# Patient Record
Sex: FEMALE | Race: WHITE | NOT HISPANIC OR LATINO | Employment: OTHER | URBAN - METROPOLITAN AREA
[De-identification: names, ages, dates, MRNs, and addresses within clinical notes are randomized per-mention and may not be internally consistent; named-entity substitution may affect disease eponyms.]

---

## 2017-02-03 ENCOUNTER — OFFICE VISIT (OUTPATIENT)
Dept: PULMONOLOGY | Facility: HOSPICE | Age: 72
End: 2017-02-03
Payer: MEDICARE

## 2017-02-03 VITALS
TEMPERATURE: 98.1 F | SYSTOLIC BLOOD PRESSURE: 110 MMHG | HEIGHT: 63 IN | BODY MASS INDEX: 22.68 KG/M2 | RESPIRATION RATE: 16 BRPM | OXYGEN SATURATION: 95 % | HEART RATE: 75 BPM | DIASTOLIC BLOOD PRESSURE: 70 MMHG | WEIGHT: 128 LBS

## 2017-02-03 DIAGNOSIS — J45.20 MILD INTERMITTENT ASTHMA WITHOUT COMPLICATION: ICD-10-CM

## 2017-02-03 PROCEDURE — G8432 DEP SCR NOT DOC, RNG: HCPCS | Performed by: INTERNAL MEDICINE

## 2017-02-03 PROCEDURE — G8419 CALC BMI OUT NRM PARAM NOF/U: HCPCS | Performed by: INTERNAL MEDICINE

## 2017-02-03 PROCEDURE — 3014F SCREEN MAMMO DOC REV: CPT | Mod: 8P | Performed by: INTERNAL MEDICINE

## 2017-02-03 PROCEDURE — 1101F PT FALLS ASSESS-DOCD LE1/YR: CPT | Mod: 8P | Performed by: INTERNAL MEDICINE

## 2017-02-03 PROCEDURE — 1036F TOBACCO NON-USER: CPT | Performed by: INTERNAL MEDICINE

## 2017-02-03 PROCEDURE — 3017F COLORECTAL CA SCREEN DOC REV: CPT | Mod: 8P | Performed by: INTERNAL MEDICINE

## 2017-02-03 PROCEDURE — G8484 FLU IMMUNIZE NO ADMIN: HCPCS | Performed by: INTERNAL MEDICINE

## 2017-02-03 PROCEDURE — 4040F PNEUMOC VAC/ADMIN/RCVD: CPT | Mod: 8P | Performed by: INTERNAL MEDICINE

## 2017-02-03 PROCEDURE — 99213 OFFICE O/P EST LOW 20 MIN: CPT | Performed by: INTERNAL MEDICINE

## 2017-02-03 RX ORDER — TRIAMCINOLONE ACETONIDE 0.1 %
PASTE (GRAM) DENTAL
Refills: 1 | COMMUNITY
Start: 2016-11-17 | End: 2018-05-02

## 2017-02-03 RX ORDER — LOTEPREDNOL ETABONATE 2 MG/ML
SUSPENSION/ DROPS OPHTHALMIC
Refills: 12 | COMMUNITY
Start: 2017-01-24 | End: 2018-02-20

## 2017-02-03 NOTE — MR AVS SNAPSHOT
"        Ana Paula NICHOL Ordonez   2/3/2017 2:00 PM   Office Visit   MRN: [id number]    Department:  [facility]   Dept Phone:  [phone]    Description:  Female : 1945   Provider:  Peng Arzate M.D.           Reason for Visit     Follow-Up 1 Yr      Allergies as of 2/3/2017     Allergen Noted Reactions    Azithromycin 2016       Clindamycin 2016       Codeine 2013       Dilaudid [Hydromorphone] 2016   Anaphylaxis    \"went into code blue\"     Erythromycin 2016       Keflex 2013       Levaquin 2008       \"i dont know\"     Lyrica 2016       Morphine 2013       Other Food 2011       Dairy-mucus (butter is okay). spices-cough    Quinolones 06/15/2012       Sulfa Drugs 2008       \"i dont know\"     Tetracyclines 2013       Vicodin [Hydrocodone-Acetaminophen] 2013         You were diagnosed with     Mild intermittent asthma without complication   [[id number]]         Vital Signs     Blood Pressure Pulse Temperature Respirations Height Weight    110/70 mmHg 75 36.7 °C (98.1 °F) 16 1.6 m (5' 2.99\") 58.06 kg (128 lb)    Body Mass Index Oxygen Saturation Smoking Status             22.68 kg/m2 95% Never Smoker          Basic Information     Date Of Birth Sex Race Ethnicity Preferred Language    1945 Female White Non- English      Problem List              ICD-10-CM Priority Class Noted - Resolved    Other chest pain R07.89   2013 - Present    Headache, common migraine G43.009   2013 - Present    Chronic ulcerative colitis (CMS-HCC) K51.90   2013 - Present    Peripheral neuropathy (CMS-HCC) G62.9   Unknown - Present    Mild intermittent asthma without complication J45.20   2/3/2017 - Present      Health Maintenance        Date Due Completion Dates    IMM DTaP/Tdap/Td Vaccine (1 - Tdap) 1966 ---    PAP SMEAR 1968 ---    COLONOSCOPY 1997 ---    IMM ZOSTER VACCINE 2007 ---    IMM PNEUMOCOCCAL 65+ " (ADULT) LOW/MEDIUM RISK SERIES (1 of 2 - PCV13) 2/27/2012 ---    MAMMOGRAM 7/27/2013 7/27/2012, 4/1/2010, 4/1/2010, 7/14/2009, 7/14/2009, 1/21/2009, 12/29/2008, 12/29/2008    BONE DENSITY 4/1/2015 4/1/2010, 3/15/2007    IMM INFLUENZA (1) 9/1/2016 ---            Current Immunizations     No immunizations on file.      Below and/or attached are the medications your provider expects you to take. Review all of your home medications and newly ordered medications with your provider and/or pharmacist. Follow medication instructions as directed by your provider and/or pharmacist. Please keep your medication list with you and share with your provider. Update the information when medications are discontinued, doses are changed, or new medications (including over-the-counter products) are added; and carry medication information at all times in the event of emergency situations     Allergies:  AZITHROMYCIN - (reactions not documented)     CLINDAMYCIN - (reactions not documented)     CODEINE - (reactions not documented)     DILAUDID - Anaphylaxis     ERYTHROMYCIN - (reactions not documented)     KEFLEX - (reactions not documented)     LEVAQUIN - (reactions not documented)     LYRICA - (reactions not documented)     MORPHINE - (reactions not documented)     OTHER FOOD - (reactions not documented)     QUINOLONES - (reactions not documented)     SULFA DRUGS - (reactions not documented)     TETRACYCLINES - (reactions not documented)     VICODIN - (reactions not documented)               Medications  Valid as of: February 03, 2017 -  2:33 PM    Generic Name Brand Name Tablet Size Instructions for use    Albuterol Sulfate (Aero Soln) albuterol 108 (90 BASE) MCG/ACT Inhale 2 Puffs by mouth every four hours as needed for Shortness of Breath.        Amoxicillin (Cap) AMOXIL 250 MG Take 250 mg by mouth 3 times a day.        Baclofen (Tab) LIORESAL 10 MG Take 10 mg by mouth every evening.        Calcium Carbonate-Vitamin D (Tab) OSCAL-250  250-125 MG-UNIT Take 1 Tab by mouth every day.          Cholecalciferol   Take  by mouth every day.        DiazePAM (Tab) VALIUM 5 MG Take 1 Tab by mouth 3 times a day as needed (muscle relaxant).        Hydrocodone-Acetaminophen (Tab) NORCO 5-325 MG Take 1 Tab by mouth every four hours as needed.        Hydrocodone-Acetaminophen (Tab) NORCO 5-325 MG Take 1 Tab by mouth every four hours as needed.        Hydrocodone-Acetaminophen (Tab) NORCO 5-325 MG Take 1-2 Tabs by mouth every four hours as needed.        Loteprednol Etabonate (Suspension) ALREX 0.2 % INSTILL 1 DROP INTO RIGHT EYE 4 TIMES DAILY        Mesalamine (Tablet Delayed Response) ASACOL 400 MG Take 800 mg by mouth every evening.        Mesalamine (Tablet Delayed Response) Mesalamine 800 MG Take 800 mg by mouth every evening.        Multiple Vitamin (Tab) THERAGRAN  Take 1 Tab by mouth every day.        Nitrofurantoin Monohyd Macro (Cap) MACROBID 100 MG Take 50 mg by mouth every evening.        Triamcinolone Acetonide (Paste) KENALOG IN ORABASE 0.1 % APPLY TWICE A DAY        .                 Medicines prescribed today were sent to:     [id number] - [place], [place] - [address]    [address] [address]    Phone: [phone] Fax: [phone]    Open 24 Hours?: No      Medication refill instructions:       If your prescription bottle indicates you have medication refills left, it is not necessary to call your provider’s office. Please contact your pharmacy and they will refill your medication.    If your prescription bottle indicates you do not have any refills left, you may request refills at any time through one of the following ways: The online Voxie system (except Urgent Care), by calling your provider’s office, or by asking your pharmacy to contact your provider’s office with a refill request. Medication refills are processed only during regular business hours and may not be available until the next business day. Your provider may  request additional information or to have a follow-up visit with you prior to refilling your medication.   *Please Note: Medication refills are assigned a new Rx number when refilled electronically. Your pharmacy may indicate that no refills were authorized even though a new prescription for the same medication is available at the pharmacy. Please request the medicine by name with the pharmacy before contacting your provider for a refill.           BrainBot Access Code: [email or web address]  Expires: 3/5/2017  2:33 PM    BrainBot  A secure, online tool to manage your health information     [employer]’s BrainBot® is a secure, online tool that connects you to your personalized health information from the privacy of your home -- day or night - making it very easy for you to manage your healthcare. Once the activation process is completed, you can even access your medical information using the BrainBot andrew, which is available for free in the Apple Nadrew store or Google Play store.     BrainBot provides the following levels of access (as shown below):   My Chart Features   [facility] Doctor [facility]  Specialists [facility]  Urgent  Care Non-[facility]  Primary Care  Doctor   Email your healthcare team securely and privately 24/7 X X X    Manage appointments: schedule your next appointment; view details of past/upcoming appointments X      Request prescription refills. X      View recent personal medical records, including lab and immunizations X X X X   View health record, including health history, allergies, medications X X X X   Read reports about your outpatient visits, procedures, consult and ER notes X X X X   See your discharge summary, which is a recap of your hospital and/or ER visit that includes your diagnosis, lab results, and care plan. X X       How to register for BrainBot:  1. Go to  [email or web address].  2. Click on the Sign Up Now box, which takes you to the New Member Sign Up page. You will need to  provide the following information:  a. Enter your [employer] Access Code exactly as it appears at the top of this page. (You will not need to use this code after you’ve completed the sign-up process. If you do not sign up before the expiration date, you must request a new code.)   b. Enter your date of birth.   c. Enter your home email address.   d. Click Submit, and follow the next screen’s instructions.  3. Create a Lum[employer] Lighting ID. This will be your Lum[employer] Lighting login ID and cannot be changed, so think of one that is secure and easy to remember.  4. Create a [employer] password. You can change your password at any time.  5. Enter your Password Reset Question and Answer. This can be used at a later time if you forget your password.   6. Enter your e-mail address. This allows you to receive e-mail notifications when new information is available in Lum[employer] Lighting.  7. Click Sign Up. You can now view your health information.    For assistance activating your Lum[employer] Lighting account, call [phone]

## 2017-02-03 NOTE — PROGRESS NOTES
CC: Asthma follow-up       HPI:  71-year-old woman here for her yearly asthma evaluation. She has primarily a cough variant asthma. She occasionally uses pro-air when necessary but is not a fan of inhaled medication. The patient's other problems include ulcerative colitis, reflux, osteoporosis, Raynaud's syndrome, gastroesophageal reflux, post herpetic neuralgia, interureteric disease, tremors, and multifactorial balance issues. She is followed by Dr. Sourav Lai from GI.    Has had sinus issues and follows the advice in a 30 year old sinus booklet, washed sinuses with success. Seeing a homeopath, José Miguel Vale, who gave her pellets which improved mucus clearance from her lungs. Estimates that she uses her Proair only once a month. The only pharmaceutical she is taking now is Zyrtec. Off of all her UC meds and hasn't had any issues. Had a colonoscopy last year, polyp removed.    Off macrodantin. Has to self cath because of bladder issues.          Patient Active Problem List    Diagnosis Date Noted   • Mild intermittent asthma without complication 02/03/2017   • Other chest pain 06/26/2013   • Headache, common migraine 06/26/2013   • Chronic ulcerative colitis (CMS-HCC) 06/26/2013   • Peripheral neuropathy (CMS-[facility])        Past Medical History   Diagnosis Date   • Indigestion    • Arthritis    • Near-syncope    • Ulcerative colitis    • Raynaud disease    • HYPOTENSION    • ASTHMA    • Osteoporosis    • Peripheral neuropathy (CMS-HCC)    • Neck injury      fracture, hand and leg numbness and tingling   • Other chest pain 6/26/2013   • Chronic ulcerative colitis (CMS-HCC) 6/26/2013   • Cough    • GERD (gastroesophageal reflux disease)    • Vitamin D deficiency    • Inner ear disease    • Tremor         Past Surgical History   Procedure Laterality Date   • Nasal fracture reduction closed  10/8/08     Performed by DON BARRIOS at SURGERY SAME DAY [facility] ORS   • Other     • Other abdominal surgery        gallbladder   • Hip nailing intramedullary  7/30/2011     Performed by KALEB RAGSDALE at SURGERY [facility] ORS   • Tonsillectomy and adenoidectomy     • Bladder suspension       X 2       Family History   Problem Relation Age of Onset   • Heart Disease Neg Hx    • Hypertension Neg Hx    • Hyperlipidemia Neg Hx    • Diabetes Neg Hx    • Non-contributory Father      Parkinson's disease   • Non-contributory Mother      old age with mild dementia       Social History     Social History   • Marital Status: Single     Spouse Name: N/A   • Number of Children: N/A   • Years of Education: N/A     Occupational History   • Not on file.     Social History Main Topics   • Smoking status: Never Smoker    • Smokeless tobacco: Never Used   • Alcohol Use: No   • Drug Use: No   • Sexual Activity: Not on file     Other Topics Concern   • Not on file     Social History Narrative       Current Outpatient Prescriptions   Medication Sig Dispense Refill   • albuterol (PROAIR HFA) 108 (90 BASE) MCG/ACT Aero Soln inhalation aerosol Inhale 2 Puffs by mouth every four hours as needed for Shortness of Breath.     • Cholecalciferol (VITAMIN D3) Take  by mouth every day.     • multivitamin (THERAGRAN) TABS Take 1 Tab by mouth every day.     • ALREX 0.2 % Suspension INSTILL 1 DROP INTO RIGHT EYE 4 TIMES DAILY  12   • triamcinolone acetonide-orabase (KENALOG IN ORABASE) 0.1 % Paste APPLY TWICE A DAY  1   • hydrocodone-acetaminophen (NORCO) 5-325 MG Tab per tablet Take 1-2 Tabs by mouth every four hours as needed. (Patient not taking: Reported on 2/3/2017) 10 Tab 0   • hydrocodone-acetaminophen (NORCO) 5-325 MG TABS per tablet Take 1 Tab by mouth every four hours as needed. (Patient not taking: Reported on 2/3/2017) 15 Tab 0   • baclofen (LIORESAL) 10 MG TABS Take 10 mg by mouth every evening.     • Mesalamine (ASACOL HD) 800 MG TBEC Take 800 mg by mouth every evening.     • nitrofurantoin monohydr macro (MACROBID) 100 MG CAPS Take 50 mg by  "mouth every evening.     • amoxicillin (AMOXIL) 250 MG CAPS Take 250 mg by mouth 3 times a day.     • diazepam (VALIUM) 5 MG TABS Take 1 Tab by mouth 3 times a day as needed (muscle relaxant). (Patient not taking: Reported on 2/3/2017) 15 Tab 0   • hydrocodone-acetaminophen (NORCO) 5-325 MG TABS per tablet Take 1 Tab by mouth every four hours as needed. (Patient not taking: Reported on 2/3/2017) 16 Tab 0   • calcium-vitamin D (OSCAL-250) 250-125 MG-UNIT TABS Take 1 Tab by mouth every day.       • mesalamine EC (ASACOL) 400 MG TBEC Take 800 mg by mouth every evening.       No current facility-administered medications for this visit.    \"CURRENT RX\"    ALLERGIES: Azithromycin; Clindamycin; Codeine; Dilaudid; Erythromycin; Keflex; Levaquin; Lyrica; Morphine; Other food; Quinolones; Sulfa drugs; Tetracyclines; and Vicodin    ROS  Per history of present illness otherwise negative.        PHYSICAL EXAM  /70 mmHg  Pulse 75  Temp(Src) 36.7 °C (98.1 °F)  Resp 16  Ht 1.6 m (5' 2.99\")  Wt 58.06 kg (128 lb)  BMI 22.68 kg/m2  SpO2 95%  Appearance: Well-nourished, well-developed, no acute distress  Eyes:  PERRLA, EOMI; wears glasses  Hearing:  Grossly intact  Nose:  Normal, no lesions or deformities, turbinates moist  Oropharynx:  Tongue normal, posterior pharynx without erythema or exudate  Mallampati classification:    Neck: Supple, trachea midline, no masses  Respiratory effort:  No intercostal retractions or use of accessory muscles  Lung auscultation:  No wheezes rhonchi rubs or rales  Cardiac auscultation:  No murmurs, rubs, or gallops, no regular rhythm, normal rate  Abdomen:  No tenderness, no organomegaly  Extremities:  No cyanosis, clubbing, edema  Gait and Station:  Normal  Digits and nails: No clubbing, cyanosis, petechiae, or nodes  Musculoskeletal:  Grossly normal  Skin:  No rashes  Orientation:  Oriented time, place, and person  Mood and affect:  No depression, anxiety, agitation  Judgment:  " Intact    PROBLEMS:    1. Mild intermittent asthma without complication        PLAN:     Return in about 1 year (around 2/3/2018).

## 2017-04-29 ENCOUNTER — OFFICE VISIT (OUTPATIENT)
Dept: URGENT CARE | Facility: CLINIC | Age: 72
End: 2017-04-29
Payer: MEDICARE

## 2017-04-29 VITALS
BODY MASS INDEX: 19.21 KG/M2 | SYSTOLIC BLOOD PRESSURE: 106 MMHG | OXYGEN SATURATION: 90 % | RESPIRATION RATE: 18 BRPM | WEIGHT: 108.4 LBS | DIASTOLIC BLOOD PRESSURE: 60 MMHG | HEART RATE: 84 BPM | TEMPERATURE: 97.3 F

## 2017-04-29 DIAGNOSIS — J02.9 SORE THROAT: ICD-10-CM

## 2017-04-29 LAB
INT CON NEG: NEGATIVE
INT CON POS: POSITIVE
S PYO AG THROAT QL: NORMAL

## 2017-04-29 PROCEDURE — 87880 STREP A ASSAY W/OPTIC: CPT | Performed by: PHYSICIAN ASSISTANT

## 2017-04-29 PROCEDURE — 1101F PT FALLS ASSESS-DOCD LE1/YR: CPT | Mod: 8P | Performed by: PHYSICIAN ASSISTANT

## 2017-04-29 PROCEDURE — G8420 CALC BMI NORM PARAMETERS: HCPCS | Performed by: PHYSICIAN ASSISTANT

## 2017-04-29 PROCEDURE — 99214 OFFICE O/P EST MOD 30 MIN: CPT | Performed by: PHYSICIAN ASSISTANT

## 2017-04-29 PROCEDURE — 3017F COLORECTAL CA SCREEN DOC REV: CPT | Mod: 8P | Performed by: PHYSICIAN ASSISTANT

## 2017-04-29 PROCEDURE — 1036F TOBACCO NON-USER: CPT | Performed by: PHYSICIAN ASSISTANT

## 2017-04-29 PROCEDURE — G8432 DEP SCR NOT DOC, RNG: HCPCS | Performed by: PHYSICIAN ASSISTANT

## 2017-04-29 PROCEDURE — 3014F SCREEN MAMMO DOC REV: CPT | Mod: 8P | Performed by: PHYSICIAN ASSISTANT

## 2017-04-29 PROCEDURE — 4040F PNEUMOC VAC/ADMIN/RCVD: CPT | Mod: 8P | Performed by: PHYSICIAN ASSISTANT

## 2017-04-29 ASSESSMENT — ENCOUNTER SYMPTOMS
SHORTNESS OF BREATH: 0
SORE THROAT: 1
WHEEZING: 0
FEVER: 0
STRIDOR: 0
PALPITATIONS: 0
CHILLS: 0
HEADACHES: 0
COUGH: 1

## 2017-04-29 NOTE — PROGRESS NOTES
Subjective:      Ana Paula Ordonez is a 72 y.o. female who presents with Pharyngitis            Pharyngitis   This is a new problem. The current episode started in the past 7 days. The problem has been unchanged. The pain is worse on the left side. There has been no fever. The pain is moderate. Associated symptoms include coughing. Pertinent negatives include no congestion, ear discharge, ear pain, headaches, shortness of breath or stridor. She has tried NSAIDs and gargles for the symptoms. The treatment provided mild relief.       Review of Systems   Constitutional: Negative for fever, chills and malaise/fatigue.   HENT: Positive for sore throat. Negative for congestion, ear discharge and ear pain.    Respiratory: Positive for cough. Negative for shortness of breath, wheezing and stridor.    Cardiovascular: Negative for chest pain and palpitations.   Skin: Negative for rash.   Neurological: Negative for headaches.     All other systems reviewed and are negative.  PMH:  has a past medical history of Indigestion; Arthritis; Near-syncope; Ulcerative colitis; Raynaud disease; HYPOTENSION; ASTHMA; Osteoporosis; Peripheral neuropathy (CMS-HCC); Neck injury; Other chest pain (6/26/2013); Chronic ulcerative colitis (CMS-HCC) (6/26/2013); Cough; GERD (gastroesophageal reflux disease); Vitamin D deficiency; Inner ear disease; and Tremor. She also has no past medical history of Breast cancer (CMS-HCC).  MEDS:   Current outpatient prescriptions:   •  maalox plus-benadryl-visc lidocaine (MAGIC MOUTHWASH), Gargle and spit 5 mL every 6 hours as needed for pain, Disp: 100 mL, Rfl: 0  •  Cholecalciferol (VITAMIN D3), Take  by mouth every day., Disp: , Rfl:   •  multivitamin (THERAGRAN) TABS, Take 1 Tab by mouth every day., Disp: , Rfl:   •  ALREX 0.2 % Suspension, INSTILL 1 DROP INTO RIGHT EYE 4 TIMES DAILY, Disp: , Rfl: 12  •  triamcinolone acetonide-orabase (KENALOG IN ORABASE) 0.1 % Paste, APPLY TWICE A DAY, Disp: , Rfl: 1  •   "albuterol (PROAIR HFA) 108 (90 BASE) MCG/ACT Aero Soln inhalation aerosol, Inhale 2 Puffs by mouth every four hours as needed for Shortness of Breath., Disp: , Rfl:   •  hydrocodone-acetaminophen (NORCO) 5-325 MG Tab per tablet, Take 1-2 Tabs by mouth every four hours as needed. (Patient not taking: Reported on 2/3/2017), Disp: 10 Tab, Rfl: 0  •  hydrocodone-acetaminophen (NORCO) 5-325 MG TABS per tablet, Take 1 Tab by mouth every four hours as needed. (Patient not taking: Reported on 2/3/2017), Disp: 15 Tab, Rfl: 0  •  baclofen (LIORESAL) 10 MG TABS, Take 10 mg by mouth every evening., Disp: , Rfl:   •  Mesalamine (ASACOL HD) 800 MG TBEC, Take 800 mg by mouth every evening., Disp: , Rfl:   •  nitrofurantoin monohydr macro (MACROBID) 100 MG CAPS, Take 50 mg by mouth every evening., Disp: , Rfl:   •  amoxicillin (AMOXIL) 250 MG CAPS, Take 250 mg by mouth 3 times a day., Disp: , Rfl:   •  diazepam (VALIUM) 5 MG TABS, Take 1 Tab by mouth 3 times a day as needed (muscle relaxant). (Patient not taking: Reported on 2/3/2017), Disp: 15 Tab, Rfl: 0  •  hydrocodone-acetaminophen (NORCO) 5-325 MG TABS per tablet, Take 1 Tab by mouth every four hours as needed. (Patient not taking: Reported on 2/3/2017), Disp: 16 Tab, Rfl: 0  •  calcium-vitamin D (OSCAL-250) 250-125 MG-UNIT TABS, Take 1 Tab by mouth every day.  , Disp: , Rfl:   •  mesalamine EC (ASACOL) 400 MG TBEC, Take 800 mg by mouth every evening., Disp: , Rfl:   ALLERGIES:   Allergies   Allergen Reactions   • Azithromycin    • Clindamycin    • Codeine    • Dilaudid [Hydromorphone] Anaphylaxis     \"went into code blue\"    • Erythromycin    • Keflex    • Levaquin      \"i dont know\"    • Lyrica    • Morphine    • Other Food      Dairy-mucus (butter is okay). spices-cough   • Quinolones    • Sulfa Drugs      \"i dont know\"    • Tetracyclines    • Vicodin [Hydrocodone-Acetaminophen]      SURGHX:   Past Surgical History   Procedure Laterality Date   • Nasal fracture reduction " closed  10/8/08     Performed by DON BARRIOS at SURGERY SAME DAY [facility] ORS   • Other     • Other abdominal surgery       gallbladder   • Hip nailing intramedullary  7/30/2011     Performed by KALEB RAGSDALE at SURGERY [facility] ORS   • Tonsillectomy and adenoidectomy     • Bladder suspension       X 2     SOCHX:  reports that she has never smoked. She has never used smokeless tobacco. She reports that she does not drink alcohol or use illicit drugs.  FH: Family history was reviewed, no pertinent findings to report  Medications, Allergies, and current problem list reviewed today in Epic       Objective:     /60 mmHg  Pulse 84  Temp(Src) 36.3 °C (97.3 °F)  Resp 18  Wt 49.17 kg (108 lb 6.4 oz)  SpO2 90%     Physical Exam   Constitutional: She is oriented to person, place, and time. Vital signs are normal. She appears well-developed and well-nourished.   HENT:   Head: Normocephalic and atraumatic.   Right Ear: Hearing, tympanic membrane, external ear and ear canal normal.   Left Ear: Hearing, tympanic membrane, external ear and ear canal normal.   Nose: Nose normal.   Mouth/Throat: Uvula is midline and mucous membranes are normal. No oropharyngeal exudate, posterior oropharyngeal edema, posterior oropharyngeal erythema or tonsillar abscesses.   Neck: Normal range of motion. Neck supple.   Cardiovascular: Normal rate and regular rhythm.  Exam reveals no gallop and no friction rub.    No murmur heard.  Pulmonary/Chest: Effort normal and breath sounds normal. She has no wheezes. She has no rales.   Lymphadenopathy:     She has cervical adenopathy.   Neurological: She is alert and oriented to person, place, and time.   Skin: Skin is warm and dry.   Psychiatric: She has a normal mood and affect. Her behavior is normal. Judgment and thought content normal.   Vitals reviewed.           Rapid Strep: NEG  Assessment/Plan:     1. Sore throat    - POCT Rapid Strep A  - maalox plus-benadryl-visc  lidocaine (MAGIC MOUTHWASH); Gargle and spit 5 mL every 6 hours as needed for pain  Dispense: 100 mL; Refill: 0    Differential diagnosis, natural history, supportive care, and indications for immediate follow-up discussed at length.   Follow-up with primary care provider within 4-5 days, emergency room precautions discussed.  Patient and/or family appears understanding of information.

## 2017-10-30 ENCOUNTER — OFFICE VISIT (OUTPATIENT)
Dept: PULMONOLOGY | Facility: HOSPICE | Age: 72
End: 2017-10-30
Payer: MEDICARE

## 2017-10-30 VITALS
HEART RATE: 68 BPM | SYSTOLIC BLOOD PRESSURE: 126 MMHG | HEIGHT: 63 IN | BODY MASS INDEX: 19.6 KG/M2 | OXYGEN SATURATION: 94 % | WEIGHT: 110.6 LBS | DIASTOLIC BLOOD PRESSURE: 74 MMHG

## 2017-10-30 DIAGNOSIS — J45.20 MILD INTERMITTENT ASTHMA WITHOUT COMPLICATION: ICD-10-CM

## 2017-10-30 DIAGNOSIS — Z91.09 ENVIRONMENTAL ALLERGIES: ICD-10-CM

## 2017-10-30 PROCEDURE — 99213 OFFICE O/P EST LOW 20 MIN: CPT | Performed by: NURSE PRACTITIONER

## 2017-10-30 NOTE — PATIENT INSTRUCTIONS
Plan:    1) She has had a mild increased cough. Query secondary to exacerbation of seasonal allergies. Recommend addition of saline sinus irrigation which she has used in the past and Mucinex OTC daily with a tall glass of water. Continue Albuterol HFA inhaler 2 puffs q 4 hours prn. She declines addition of an ICS or Leukotrine inhibitor. She states she sees a Homeopathic doctor and would like to avoid medication if possible. We discussed pathophysiology of Asthma and stepwise treatment approach in detail. Hold off on starting antibiotic at this time as she feels her symptoms are slowly improving.   2) She declines Pneumonia or Influenza vaccines.   3) She does exercise routinely. She is encouraged to continue this.   4) She declines current reflux symptoms.   5) Follow up in 3 months with complete PFT's, sooner if needed.

## 2017-10-30 NOTE — PROGRESS NOTES
Chief Complaint   Patient presents with   • Asthma       HPI:  Ana Paula Ordonez is a 72 y.o. year old female here today for follow-up on her Asthma. She was last seen in the office in February 2017 with Dr. Peng Arzate. She has not had recent PFT's or chest imaging. She is on an Albuterol HFA inhaler as needed. She comes in today with complaints of increased cough. She states last Thursday she developed an increased cough which was productive with green mucous. She saw her PCP in consult and she was given a prescription for an antibiotic, but was encouraged to hold off on starting it. She was encouraged to increase use of her Albuterol to twice a day. She is doing this and she is feeling a little better. She states she does continue to have an intermittent cough, but her mucous is now clear in color. She has had an intermittent wheeze. She notes mild dyspnea with exertion which she feels is chronic or unchanged. She denies any fevers or chills. She does have a history of reflux. She did have a nissen fundoplication in the past. She denies current reflux symptoms. She has had an increase in her sinus drainage. Her sinus drainage is clear in color. She denies sneezing. She denies any fevers or chills. Before last week she was using her Albuterol 2-4 times per month. She does exercise routinely. She had allergy testing 25 years ago and was told she had multiple allergies. She declines repeating testing. She declines inhaled steroids or Singulair. She sees a Homeopathic doctor.       Past Medical History:   Diagnosis Date   • Arthritis    • ASTHMA    • Chronic ulcerative colitis (CMS-HCC) 6/26/2013   • Cough    • GERD (gastroesophageal reflux disease)    • HYPOTENSION    • Indigestion    • Inner ear disease    • Near-syncope    • Neck injury     fracture, hand and leg numbness and tingling   • Osteoporosis    • Other chest pain 6/26/2013   • Peripheral neuropathy (CMS-HCC)    • Raynaud disease    • Tremor    •  Ulcerative colitis    • Vitamin D deficiency        Past Surgical History:   Procedure Laterality Date   • HIP NAILING INTRAMEDULLARY  7/30/2011    Performed by KALEB RAGSDALE at SURGERY [facility] ORS   • NASAL FRACTURE REDUCTION CLOSED  10/8/08    Performed by DON BARRIOS at SURGERY SAME DAY [facility] ORS   • BLADDER SUSPENSION      X 2   • OTHER     • OTHER ABDOMINAL SURGERY      gallbladder   • TONSILLECTOMY AND ADENOIDECTOMY         Family History   Problem Relation Age of Onset   • Non-contributory Father      Parkinson's disease   • Non-contributory Mother      old age with mild dementia   • Heart Disease Neg Hx    • Hypertension Neg Hx    • Hyperlipidemia Neg Hx    • Diabetes Neg Hx        Social History     Social History   • Marital status: Single     Spouse name: N/A   • Number of children: N/A   • Years of education: N/A     Occupational History   • Not on file.     Social History Main Topics   • Smoking status: Never Smoker   • Smokeless tobacco: Never Used   • Alcohol use No   • Drug use: No   • Sexual activity: Not on file     Other Topics Concern   • Not on file     Social History Narrative   • No narrative on file           ROS:  Constitutional: Denies fevers, chills, sweats, fatigue, weight loss  Eyes: Denies  vision loss, pain, drainage, double vision. Wears glasses  Ears/Nose/Mouth/Throat: Denies rhinitis, nasal congestion, ear ache, difficulty hearing, sore throat, persistent hoarseness, decayed teeth/toothache  Cardiovascular: Denies chest pain, tightness, palpitations, swelling in feet/legs, fainting, difficulty breathing when laying down  Respiratory: See HPI   GI: Denies heartburn, difficulty swallowing, nausea, vomiting, abdominal pain, diarrhea, constipation  : Denies frequent urination, painful urination  Integumentary: Denies rashes, lumps or color changes  MSK: Denies painful joints, sore muscles, and back pain.   Neurological: Denies frequent headaches, dizziness,  weakness        Current Outpatient Prescriptions   Medication Sig Dispense Refill   • TURMERIC CURCUMIN PO Take  by mouth.     • LUTEIN PO Take  by mouth.     • BIOTIN PO Take  by mouth.     • POTASSIUM PO Take  by mouth.     • Cetirizine HCl (ZYRTEC ALLERGY PO) Take  by mouth.     • MAGNESIUM PO Take  by mouth.     • ASCORBIC ACID PO Take  by mouth.     • albuterol (PROAIR HFA) 108 (90 BASE) MCG/ACT Aero Soln inhalation aerosol Inhale 2 Puffs by mouth every four hours as needed for Shortness of Breath.     • Cholecalciferol (VITAMIN D3) Take  by mouth every day.     • multivitamin (THERAGRAN) TABS Take 1 Tab by mouth every day.     • maalox plus-benadryl-visc lidocaine (MAGIC MOUTHWASH) Gargle and spit 5 mL every 6 hours as needed for pain 100 mL 0   • ALREX 0.2 % Suspension INSTILL 1 DROP INTO RIGHT EYE 4 TIMES DAILY  12   • triamcinolone acetonide-orabase (KENALOG IN ORABASE) 0.1 % Paste APPLY TWICE A DAY  1   • hydrocodone-acetaminophen (NORCO) 5-325 MG Tab per tablet Take 1-2 Tabs by mouth every four hours as needed. (Patient not taking: Reported on 2/3/2017) 10 Tab 0   • hydrocodone-acetaminophen (NORCO) 5-325 MG TABS per tablet Take 1 Tab by mouth every four hours as needed. (Patient not taking: Reported on 2/3/2017) 15 Tab 0   • baclofen (LIORESAL) 10 MG TABS Take 10 mg by mouth every evening.     • Mesalamine (ASACOL HD) 800 MG TBEC Take 800 mg by mouth every evening.     • nitrofurantoin monohydr macro (MACROBID) 100 MG CAPS Take 50 mg by mouth every evening.     • amoxicillin (AMOXIL) 250 MG CAPS Take 250 mg by mouth 3 times a day.     • diazepam (VALIUM) 5 MG TABS Take 1 Tab by mouth 3 times a day as needed (muscle relaxant). (Patient not taking: Reported on 2/3/2017) 15 Tab 0   • hydrocodone-acetaminophen (NORCO) 5-325 MG TABS per tablet Take 1 Tab by mouth every four hours as needed. (Patient not taking: Reported on 2/3/2017) 16 Tab 0   • calcium-vitamin D (OSCAL-250) 250-125 MG-UNIT TABS Take 1 Tab by  "mouth every day.       • mesalamine EC (ASACOL) 400 MG TBEC Take 800 mg by mouth every evening.       No current facility-administered medications for this visit.        Allergies   Allergen Reactions   • Azithromycin    • Clindamycin    • Codeine    • Dilaudid [Hydromorphone] Anaphylaxis     \"went into code blue\"    • Erythromycin    • Keflex    • Levaquin      \"i dont know\"    • Lyrica    • Morphine    • Other Food      Dairy-mucus (butter is okay). spices-cough   • Quinolones    • Sulfa Drugs      \"i dont know\"    • Tetracyclines    • Vicodin [Hydrocodone-Acetaminophen]        Blood pressure 126/74, pulse 68, height 1.6 m (5' 2.99\"), weight 50.2 kg (110 lb 9.6 oz), SpO2 94 %.    PE:   Appearance: Well developed, well nourished, no acute distress  Eyes: PERRL, EOM intact, sclera white, conjunctiva moist  Ears: no lesions or deformities  Hearing: grossly intact  Nose: no lesions or deformities  Oropharynx: tongue normal, posterior pharynx without erythema or exudate  Neck: supple, trachea midline, no masses   Respiratory effort: no intercostal retractions or use of accessory muscles  Lung auscultation: no rales, rhonchi or wheezes, slightly prolonged expiratory phase  Heart auscultation: no murmur rub or gallop  Extremities: no cyanosis or edema  Abdomen: soft ,non tender, no masses  Gait and Station: Ambulates with walker  Digits and nails: no clubbing, cyanosis, petechiae or nodes.  Cranial nerves: grossly intact  Skin: no rashes, lesions or ulcers noted  Orientation: Oriented to time, person and place  Mood and affect: mood and affect appropriate, normal interaction with examiner  Judgement: Intact          Assessment:  1. Mild intermittent asthma without complication  AMB PULMONARY FUNCTION TEST/LAB   2. Environmental allergies           Plan:    1) She has had a mild increased cough. Query secondary to exacerbation of seasonal allergies. Recommend addition of saline sinus irrigation which she has used in the " past and Mucinex OTC daily with a tall glass of water. Continue Albuterol HFA inhaler 2 puffs q 4 hours prn. She declines addition of an ICS or Leukotrine inhibitor. She states she sees a Homeopathic doctor and would like to avoid medication if possible. We discussed pathophysiology of Asthma and stepwise treatment approach in detail. Hold off on starting antibiotic at this time as she feels her symptoms are slowly improving.   2) She declines Pneumonia or Influenza vaccines.   3) She does exercise routinely. She is encouraged to continue this.   4) She declines current reflux symptoms.   5) Follow up in 3 months with complete PFT's, sooner if needed.

## 2018-01-26 ENCOUNTER — APPOINTMENT (RX ONLY)
Dept: URBAN - METROPOLITAN AREA CLINIC 4 | Facility: CLINIC | Age: 73
Setting detail: DERMATOLOGY
End: 2018-01-26

## 2018-01-26 DIAGNOSIS — B02.9 ZOSTER WITHOUT COMPLICATIONS: ICD-10-CM

## 2018-01-26 DIAGNOSIS — L57.8 OTHER SKIN CHANGES DUE TO CHRONIC EXPOSURE TO NONIONIZING RADIATION: ICD-10-CM

## 2018-01-26 DIAGNOSIS — D22 MELANOCYTIC NEVI: ICD-10-CM

## 2018-01-26 DIAGNOSIS — L82.0 INFLAMED SEBORRHEIC KERATOSIS: ICD-10-CM

## 2018-01-26 DIAGNOSIS — L81.4 OTHER MELANIN HYPERPIGMENTATION: ICD-10-CM

## 2018-01-26 DIAGNOSIS — Z85.828 PERSONAL HISTORY OF OTHER MALIGNANT NEOPLASM OF SKIN: ICD-10-CM

## 2018-01-26 DIAGNOSIS — D18.0 HEMANGIOMA: ICD-10-CM

## 2018-01-26 DIAGNOSIS — L82.1 OTHER SEBORRHEIC KERATOSIS: ICD-10-CM

## 2018-01-26 PROBLEM — D22.5 MELANOCYTIC NEVI OF TRUNK: Status: ACTIVE | Noted: 2018-01-26

## 2018-01-26 PROBLEM — D18.01 HEMANGIOMA OF SKIN AND SUBCUTANEOUS TISSUE: Status: ACTIVE | Noted: 2018-01-26

## 2018-01-26 PROCEDURE — ? DIAGNOSIS COMMENT

## 2018-01-26 PROCEDURE — ? COUNSELING

## 2018-01-26 PROCEDURE — ? LIQUID NITROGEN

## 2018-01-26 PROCEDURE — 17110 DESTRUCTION B9 LES UP TO 14: CPT

## 2018-01-26 PROCEDURE — ? PATIENT SPECIFIC COUNSELING

## 2018-01-26 PROCEDURE — 99213 OFFICE O/P EST LOW 20 MIN: CPT | Mod: 25

## 2018-01-26 ASSESSMENT — LOCATION DETAILED DESCRIPTION DERM
LOCATION DETAILED: RIGHT SUPERIOR UPPER BACK
LOCATION DETAILED: LEFT SUPERIOR ANTERIOR NECK
LOCATION DETAILED: RIGHT INFERIOR CENTRAL MALAR CHEEK
LOCATION DETAILED: LEFT ULNAR DORSAL HAND
LOCATION DETAILED: LEFT POSTERIOR SHOULDER
LOCATION DETAILED: LEFT SUPERIOR LATERAL UPPER BACK
LOCATION DETAILED: RIGHT POSTERIOR SHOULDER
LOCATION DETAILED: RIGHT SUPERIOR MEDIAL UPPER BACK
LOCATION DETAILED: RIGHT ULNAR DORSAL HAND

## 2018-01-26 ASSESSMENT — LOCATION SIMPLE DESCRIPTION DERM
LOCATION SIMPLE: RIGHT SHOULDER
LOCATION SIMPLE: LEFT HAND
LOCATION SIMPLE: LEFT SHOULDER
LOCATION SIMPLE: LEFT ANTERIOR NECK
LOCATION SIMPLE: LEFT UPPER BACK
LOCATION SIMPLE: RIGHT CHEEK
LOCATION SIMPLE: RIGHT UPPER BACK
LOCATION SIMPLE: RIGHT HAND

## 2018-01-26 ASSESSMENT — LOCATION ZONE DERM
LOCATION ZONE: ARM
LOCATION ZONE: TRUNK
LOCATION ZONE: FACE
LOCATION ZONE: NECK
LOCATION ZONE: HAND

## 2018-01-26 NOTE — PROCEDURE: LIQUID NITROGEN
Post-Care Instructions: I reviewed with the patient in detail post-care instructions. Patient is to wear sunprotection, and avoid picking at any of the treated lesions. Pt may apply Vaseline to crusted or scabbing areas.
Number Of Freeze-Thaw Cycles: 1 freeze-thaw cycle
Add 52 Modifier (Optional): no
Consent: The patient's consent was obtained including but not limited to risks of crusting, scabbing, blistering, scarring, darker or lighter pigmentary change, recurrence, incomplete removal and infection.
Medical Necessity Clause: This procedure was medically necessary because the lesions that were treated were:
Medical Necessity Information: It is in your best interest to select a reason for this procedure from the list below. All of these items fulfill various CMS LCD requirements except the new and changing color options.
Aperture Size (Optional): C
Duration Of Freeze Thaw-Cycle (Seconds): 3
Detail Level: Detailed

## 2018-02-20 ENCOUNTER — APPOINTMENT (OUTPATIENT)
Dept: RADIOLOGY | Facility: MEDICAL CENTER | Age: 73
End: 2018-02-20
Attending: EMERGENCY MEDICINE
Payer: MEDICARE

## 2018-02-20 ENCOUNTER — HOSPITAL ENCOUNTER (EMERGENCY)
Facility: MEDICAL CENTER | Age: 73
End: 2018-02-21
Attending: EMERGENCY MEDICINE
Payer: MEDICARE

## 2018-02-20 DIAGNOSIS — G60.9 IDIOPATHIC PERIPHERAL NEUROPATHY: ICD-10-CM

## 2018-02-20 DIAGNOSIS — G44.89 OTHER HEADACHE SYNDROME: ICD-10-CM

## 2018-02-20 DIAGNOSIS — E86.0 DEHYDRATION: ICD-10-CM

## 2018-02-20 DIAGNOSIS — K51.919 CHRONIC ULCERATIVE COLITIS WITH COMPLICATION, UNSPECIFIED LOCATION (HCC): ICD-10-CM

## 2018-02-20 LAB
ALBUMIN SERPL BCP-MCNC: 4.2 G/DL (ref 3.2–4.9)
ALBUMIN/GLOB SERPL: 1.1 G/DL
ALP SERPL-CCNC: 78 U/L (ref 30–99)
ALT SERPL-CCNC: 24 U/L (ref 2–50)
ANION GAP SERPL CALC-SCNC: 11 MMOL/L (ref 0–11.9)
APTT PPP: 23.7 SEC (ref 24.7–36)
AST SERPL-CCNC: 17 U/L (ref 12–45)
BASOPHILS # BLD AUTO: 0.2 % (ref 0–1.8)
BASOPHILS # BLD: 0.02 K/UL (ref 0–0.12)
BILIRUB SERPL-MCNC: 0.7 MG/DL (ref 0.1–1.5)
BUN SERPL-MCNC: 18 MG/DL (ref 8–22)
CALCIUM SERPL-MCNC: 9.7 MG/DL (ref 8.5–10.5)
CHLORIDE SERPL-SCNC: 101 MMOL/L (ref 96–112)
CO2 SERPL-SCNC: 25 MMOL/L (ref 20–33)
CREAT SERPL-MCNC: 0.57 MG/DL (ref 0.5–1.4)
EOSINOPHIL # BLD AUTO: 0.07 K/UL (ref 0–0.51)
EOSINOPHIL NFR BLD: 0.9 % (ref 0–6.9)
ERYTHROCYTE [DISTWIDTH] IN BLOOD BY AUTOMATED COUNT: 40.3 FL (ref 35.9–50)
GLOBULIN SER CALC-MCNC: 3.8 G/DL (ref 1.9–3.5)
GLUCOSE SERPL-MCNC: 104 MG/DL (ref 65–99)
HCT VFR BLD AUTO: 45.9 % (ref 37–47)
HGB BLD-MCNC: 15.7 G/DL (ref 12–16)
IMM GRANULOCYTES # BLD AUTO: 0.04 K/UL (ref 0–0.11)
IMM GRANULOCYTES NFR BLD AUTO: 0.5 % (ref 0–0.9)
INR PPP: 1 (ref 0.87–1.13)
LYMPHOCYTES # BLD AUTO: 1.73 K/UL (ref 1–4.8)
LYMPHOCYTES NFR BLD: 21.4 % (ref 22–41)
MCH RBC QN AUTO: 32.4 PG (ref 27–33)
MCHC RBC AUTO-ENTMCNC: 34.2 G/DL (ref 33.6–35)
MCV RBC AUTO: 94.6 FL (ref 81.4–97.8)
MONOCYTES # BLD AUTO: 0.55 K/UL (ref 0–0.85)
MONOCYTES NFR BLD AUTO: 6.8 % (ref 0–13.4)
NEUTROPHILS # BLD AUTO: 5.68 K/UL (ref 2–7.15)
NEUTROPHILS NFR BLD: 70.2 % (ref 44–72)
NRBC # BLD AUTO: 0 K/UL
NRBC BLD-RTO: 0 /100 WBC
PLATELET # BLD AUTO: 284 K/UL (ref 164–446)
PMV BLD AUTO: 8.5 FL (ref 9–12.9)
POTASSIUM SERPL-SCNC: 4.1 MMOL/L (ref 3.6–5.5)
PROT SERPL-MCNC: 8 G/DL (ref 6–8.2)
PROTHROMBIN TIME: 12.9 SEC (ref 12–14.6)
RBC # BLD AUTO: 4.85 M/UL (ref 4.2–5.4)
SODIUM SERPL-SCNC: 137 MMOL/L (ref 135–145)
WBC # BLD AUTO: 8.1 K/UL (ref 4.8–10.8)

## 2018-02-20 PROCEDURE — 85730 THROMBOPLASTIN TIME PARTIAL: CPT

## 2018-02-20 PROCEDURE — 96374 THER/PROPH/DIAG INJ IV PUSH: CPT | Mod: XU

## 2018-02-20 PROCEDURE — 96375 TX/PRO/DX INJ NEW DRUG ADDON: CPT | Mod: XU

## 2018-02-20 PROCEDURE — 70496 CT ANGIOGRAPHY HEAD: CPT

## 2018-02-20 PROCEDURE — 80053 COMPREHEN METABOLIC PANEL: CPT

## 2018-02-20 PROCEDURE — 700111 HCHG RX REV CODE 636 W/ 250 OVERRIDE (IP): Performed by: EMERGENCY MEDICINE

## 2018-02-20 PROCEDURE — 85610 PROTHROMBIN TIME: CPT

## 2018-02-20 PROCEDURE — 96361 HYDRATE IV INFUSION ADD-ON: CPT

## 2018-02-20 PROCEDURE — 85025 COMPLETE CBC W/AUTO DIFF WBC: CPT

## 2018-02-20 PROCEDURE — 700117 HCHG RX CONTRAST REV CODE 255: Performed by: EMERGENCY MEDICINE

## 2018-02-20 PROCEDURE — 700105 HCHG RX REV CODE 258: Performed by: EMERGENCY MEDICINE

## 2018-02-20 PROCEDURE — 36415 COLL VENOUS BLD VENIPUNCTURE: CPT

## 2018-02-20 PROCEDURE — 71045 X-RAY EXAM CHEST 1 VIEW: CPT

## 2018-02-20 PROCEDURE — 99284 EMERGENCY DEPT VISIT MOD MDM: CPT

## 2018-02-20 PROCEDURE — 85652 RBC SED RATE AUTOMATED: CPT

## 2018-02-20 RX ORDER — MONTELUKAST SODIUM 10 MG/1
10 TABLET ORAL DAILY
COMMUNITY
End: 2018-05-02

## 2018-02-20 RX ORDER — DIPHENHYDRAMINE HYDROCHLORIDE 50 MG/ML
12.5 INJECTION INTRAMUSCULAR; INTRAVENOUS ONCE
Status: COMPLETED | OUTPATIENT
Start: 2018-02-20 | End: 2018-02-20

## 2018-02-20 RX ORDER — LORAZEPAM 2 MG/ML
0.5 INJECTION INTRAMUSCULAR ONCE
Status: COMPLETED | OUTPATIENT
Start: 2018-02-20 | End: 2018-02-20

## 2018-02-20 RX ORDER — METOCLOPRAMIDE HYDROCHLORIDE 5 MG/ML
10 INJECTION INTRAMUSCULAR; INTRAVENOUS ONCE
Status: COMPLETED | OUTPATIENT
Start: 2018-02-20 | End: 2018-02-20

## 2018-02-20 RX ORDER — DEXAMETHASONE SODIUM PHOSPHATE 10 MG/ML
10 INJECTION, SOLUTION INTRAMUSCULAR; INTRAVENOUS ONCE
Status: COMPLETED | OUTPATIENT
Start: 2018-02-20 | End: 2018-02-20

## 2018-02-20 RX ORDER — SODIUM CHLORIDE 9 MG/ML
1000 INJECTION, SOLUTION INTRAVENOUS ONCE
Status: COMPLETED | OUTPATIENT
Start: 2018-02-20 | End: 2018-02-21

## 2018-02-20 RX ADMIN — DIPHENHYDRAMINE HYDROCHLORIDE 12.5 MG: 50 INJECTION, SOLUTION INTRAMUSCULAR; INTRAVENOUS at 21:15

## 2018-02-20 RX ADMIN — DEXAMETHASONE SODIUM PHOSPHATE 10 MG: 10 INJECTION, SOLUTION INTRAMUSCULAR; INTRAVENOUS at 21:15

## 2018-02-20 RX ADMIN — IOHEXOL 100 ML: 350 INJECTION, SOLUTION INTRAVENOUS at 23:05

## 2018-02-20 RX ADMIN — SODIUM CHLORIDE 1000 ML: 9 INJECTION, SOLUTION INTRAVENOUS at 21:16

## 2018-02-20 RX ADMIN — METOCLOPRAMIDE 10 MG: 5 INJECTION, SOLUTION INTRAMUSCULAR; INTRAVENOUS at 21:15

## 2018-02-20 RX ADMIN — LORAZEPAM 0.5 MG: 2 INJECTION INTRAMUSCULAR; INTRAVENOUS at 22:42

## 2018-02-21 VITALS
RESPIRATION RATE: 16 BRPM | DIASTOLIC BLOOD PRESSURE: 78 MMHG | TEMPERATURE: 98.6 F | HEIGHT: 63 IN | HEART RATE: 79 BPM | WEIGHT: 110.67 LBS | BODY MASS INDEX: 19.61 KG/M2 | SYSTOLIC BLOOD PRESSURE: 122 MMHG | OXYGEN SATURATION: 92 %

## 2018-02-21 LAB — ERYTHROCYTE [SEDIMENTATION RATE] IN BLOOD BY WESTERGREN METHOD: 32 MM/HOUR (ref 0–30)

## 2018-02-21 PROCEDURE — 96361 HYDRATE IV INFUSION ADD-ON: CPT

## 2018-02-21 NOTE — ED PROVIDER NOTES
"ED PROVIDER NOTE     Scribed for Aj Alberto M.D. by Delmar Correa. 2/20/2018, 8:05 PM.    CHIEF COMPLAINT  Chief Complaint   Patient presents with   • Headache       HPI    Primary care provider: Poppy Saldana D.O.  Means of arrival: ambulance  History obtained from: patient  History limited by: none     Ana Paula Ordonez is a 72 y.o. female who presents with headache onset three days ago. Per patient, she was diagnosed with pneumonia four days ago, for which she is taking Augmentin. Soon afterwards, she had a sudden onset of \"shooting\" pain to the back of her head that has been intermittent and worsening since onset. She rates her pain as severe at worst for a second or two, then it abates to almost nothing.The patient states her pain is worsened with certain positions and movements.  She reports initially her pain was improved with Tylenol, but it no longer helps with her pain. The patient notes she called her doctor regarding her symptoms who prescribed her Gabapentin. However, the Gabapentin has not improved her pain. Patient reports she has experienced a similar headaches many times, last a few years ago, but she received some pain injections in her head/neck that improved her pain. Patient confirms a history of cerebellar ataxia. The patient adds she had a ground level fall last night, but does not report any significant injuries, hitting her head, or LOC or vomiting. She denies photophobia, auditory sensitivity, fevers, chest pain, shortness of breath, abdominal pain, leg swelling, nausea, vomiting, sensory or motor changes.    REVIEW OF SYSTEMS  Constitutional: Negative for fever or chills.   HENT: Negative for nosebleeds or sore throat.    Eyes: Negative for vision changes or discharge.   Respiratory: Negative for cough or shortness of breath.    Cardiovascular: Negative for chest pain or palpitations.   Gastrointestinal: Negative for nausea, vomiting, abdominal pain.   Genitourinary: Negative " for dysuria or flank pain.   Musculoskeletal: Negative for back pain or joint pain.   Skin: Negative for itching or rash.   Neurological: Negative for sensory or motor changes. +headache.  Endo/Heme/Allergies: Negative for weight changes or hives.   Psychiatric/Behavioral: Negative for depression or mood changes.  C.     PAST MEDICAL HISTORY   has a past medical history of Arthritis; ASTHMA; Chronic ulcerative colitis (CMS-HCC) (6/26/2013); Cough; GERD (gastroesophageal reflux disease); HYPOTENSION; Indigestion; Inner ear disease; Near-syncope; Neck injury; Osteoporosis; Other chest pain (6/26/2013); Peripheral neuropathy (CMS-HCC); Raynaud disease; Tremor; Ulcerative colitis; and Vitamin D deficiency.    PAST FAMILY HISTORY  Family History   Problem Relation Age of Onset   • Non-contributory Father      Parkinson's disease   • Non-contributory Mother      old age with mild dementia   • Heart Disease Neg Hx    • Hypertension Neg Hx    • Hyperlipidemia Neg Hx    • Diabetes Neg Hx        SOCIAL HISTORY  Social History     Social History Main Topics   • Smoking status: Never Smoker   • Smokeless tobacco: Never Used   • Alcohol use No   • Drug use: No   • Sexual activity: Not on file       SURGICAL HISTORY   has a past surgical history that includes nasal fracture reduction closed (10/8/08); other; other abdominal surgery; hip nailing intramedullary (7/30/2011); tonsillectomy and adenoidectomy; and bladder suspension.    CURRENT MEDICATIONS  Home Medications     Reviewed by Qamar Johnston R.N. (Registered Nurse) on 02/20/18 at 1936  Med List Status: Not Addressed   Medication Last Dose Status   albuterol (PROAIR HFA) 108 (90 BASE) MCG/ACT Aero Soln inhalation aerosol 10/30/2017 Active   amoxicillin-clavulanate suspension (AUGMENTIN) 125-31.25 MG/5ML Recon Susp  Active   ASCORBIC ACID PO 10/30/2017 Active   baclofen (LIORESAL) 10 MG TABS not taking Active   BIOTIN PO 10/30/2017 Active   calcium-vitamin D  "(OSCAL-250) 250-125 MG-UNIT TABS not taking Active   Cetirizine HCl (ZYRTEC ALLERGY PO) 10/30/2017 Active   Cholecalciferol (VITAMIN D3) 10/30/2017 Active   diazepam (VALIUM) 5 MG TABS not taking Active   hydrocodone-acetaminophen (NORCO) 5-325 MG Tab per tablet not taking Active   hydrocodone-acetaminophen (NORCO) 5-325 MG TABS per tablet duplicate Active   hydrocodone-acetaminophen (NORCO) 5-325 MG TABS per tablet duplicate Active   LUTEIN PO 10/30/2017 Active   maalox plus-benadryl-visc lidocaine (MAGIC MOUTHWASH)  Active   MAGNESIUM PO 10/30/2017 Active   Mesalamine (ASACOL HD) 800 MG TBEC not taking Active   mesalamine EC (ASACOL) 400 MG TBEC not taking Active   montelukast (SINGULAIR) 10 MG Tab  Active   multivitamin (THERAGRAN) TABS 10/30/2017 Active   nitrofurantoin monohydr macro (MACROBID) 100 MG CAPS not taking Active   POTASSIUM PO 10/30/2017 Active   triamcinolone acetonide-orabase (KENALOG IN ORABASE) 0.1 % Paste not taking Active   TURMERIC CURCUMIN PO 10/30/2017 Active                ALLERGIES  Allergies   Allergen Reactions   • Azithromycin    • Clindamycin    • Codeine    • Dilaudid [Hydromorphone] Anaphylaxis     \"went into code blue\"    • Erythromycin    • Keflex    • Levaquin      \"i dont know\"    • Lyrica    • Morphine    • Other Food      Dairy-mucus (butter is okay). spices-cough   • Quinolones    • Sulfa Drugs      \"i dont know\"    • Tetracyclines    • Vicodin [Hydrocodone-Acetaminophen]        PHYSICAL EXAM  VITAL SIGNS: /100   Pulse 88   Temp 36.6 °C (97.8 °F)   Resp 20   Ht 1.6 m (5' 3\")   Wt 50.2 kg (110 lb 10.7 oz)   SpO2 99%   BMI 19.60 kg/m²    Pulse ox interpretation: On room air, I interpret this pulse ox as normal.  Constitutional: Well developed, well nourished. In moderate distress.   HEENT: Normocephalic, atraumatic. Posterior pharynx clear, mucous membranes dry.   Eyes:  EOMI. Normal sclera. PERRL 3-2mm, intact.   Neck: Supple, Full range of motion, " nontender.  Chest/Pulmonary: No rhonchi. Trace expiratory wheezes at the left base.   Cardiovascular: Regular rate and rhythm, no murmur.   Abdomen: Soft, nontender, no rebound, guarding, or masses.  Back: No CVA tenderness, nontender midline, no step offs.  Musculoskeletal: No deformity, no edema.  Neuro: Clear speech, normal coordination, cranial nerves II-XII grossly intact. No pronator drift. Normal finger to nose. Normal DDK. Strength 5/5 x all extremities. Steady gait.  Psych: Normal mood and affect.  Skin: No rashes, warm and dry.     DIAGNOSTIC STUDIES / PROCEDURES    LABS & EKG  Results for orders placed or performed during the hospital encounter of 02/20/18   CBC WITH DIFFERENTIAL   Result Value Ref Range    WBC 8.1 4.8 - 10.8 K/uL    RBC 4.85 4.20 - 5.40 M/uL    Hemoglobin 15.7 12.0 - 16.0 g/dL    Hematocrit 45.9 37.0 - 47.0 %    MCV 94.6 81.4 - 97.8 fL    MCH 32.4 27.0 - 33.0 pg    MCHC 34.2 33.6 - 35.0 g/dL    RDW 40.3 35.9 - 50.0 fL    Platelet Count 284 164 - 446 K/uL    MPV 8.5 (L) 9.0 - 12.9 fL    Neutrophils-Polys 70.20 44.00 - 72.00 %    Lymphocytes 21.40 (L) 22.00 - 41.00 %    Monocytes 6.80 0.00 - 13.40 %    Eosinophils 0.90 0.00 - 6.90 %    Basophils 0.20 0.00 - 1.80 %    Immature Granulocytes 0.50 0.00 - 0.90 %    Nucleated RBC 0.00 /100 WBC    Neutrophils (Absolute) 5.68 2.00 - 7.15 K/uL    Lymphs (Absolute) 1.73 1.00 - 4.80 K/uL    Monos (Absolute) 0.55 0.00 - 0.85 K/uL    Eos (Absolute) 0.07 0.00 - 0.51 K/uL    Baso (Absolute) 0.02 0.00 - 0.12 K/uL    Immature Granulocytes (abs) 0.04 0.00 - 0.11 K/uL    NRBC (Absolute) 0.00 K/uL   COMP METABOLIC PANEL   Result Value Ref Range    Sodium 137 135 - 145 mmol/L    Potassium 4.1 3.6 - 5.5 mmol/L    Chloride 101 96 - 112 mmol/L    Co2 25 20 - 33 mmol/L    Anion Gap 11.0 0.0 - 11.9    Glucose 104 (H) 65 - 99 mg/dL    Bun 18 8 - 22 mg/dL    Creatinine 0.57 0.50 - 1.40 mg/dL    Calcium 9.7 8.5 - 10.5 mg/dL    AST(SGOT) 17 12 - 45 U/L    ALT(SGPT) 24  2 - 50 U/L    Alkaline Phosphatase 78 30 - 99 U/L    Total Bilirubin 0.7 0.1 - 1.5 mg/dL    Albumin 4.2 3.2 - 4.9 g/dL    Total Protein 8.0 6.0 - 8.2 g/dL    Globulin 3.8 (H) 1.9 - 3.5 g/dL    A-G Ratio 1.1 g/dL   ESTIMATED GFR   Result Value Ref Range    GFR If African American >60 >60 mL/min/1.73 m 2    GFR If Non African American >60 >60 mL/min/1.73 m 2   WESTERGREN SED RATE   Result Value Ref Range    Sed Rate Westergren 32 (H) 0 - 30 mm/hour   PT/INR   Result Value Ref Range    PT 12.9 12.0 - 14.6 sec    INR 1.00 0.87 - 1.13   PTT   Result Value Ref Range    APTT 23.7 (L) 24.7 - 36.0 sec        RADIOLOGY  CT-CTA HEAD WITH & W/O-POST PROCESS   Final Result         1. No hemodynamically significant narrowing of the major intracranial vessels.      DX-CHEST-PORTABLE (1 VIEW)   Final Result         1. No acute cardiopulmonary abnormalities are identified.          COURSE & MEDICAL DECISION MAKING    This is a 72 y.o. female who presents with recurrent shooting headaches.    Differential Diagnosis includes but is not limited to:  Temporal arteritis, tension headache, migraine, aneurysm, neuorpathy    ED Course:  8:05 PM - Patient was seen and examined at bedside. Ordered chest x-ray, CTA head, westergren sed rate, PT/INR, PTT, CBC with differential, CMP, estimated GFR to evaluate. The patient will be treated with 1000 mL NS infusion, 10 mg Reglan, 12.5 mg Benadryl, 10 mg Decadron. The patient is treated with IV fluids secondary to concerns for dehydration with poor oral intake and dry mucous membranes. I discussed the plan as above with the patient. She understood and verbalized agreement.       Labs are reassuring with normal white blood cell count, no evidence of anemia, electrolytes are within normal limits and kidney function is normal.  She has no LFT abnormalities, normal coags, and her sed rate is only minimally outside the normal range at 32.  Chest x-ray without any focal pneumonia or pneumothorax, the  patient has a reassuring CT/CTA of her head as I highly doubt aneurysm, bleed, or mass.    Prior to CT scan the patient felt she would be able to hold still on a low-dose of lorazepam was given.    On serial evaluations the patient's headache has gotten much better and she feels well.    12:11 AM - Reevaluated the patient at bedside. Patient is resting comfortably in the gurney. The patient is feeling improved after resuscitation with IV fluids. Updated the patient on her lab and imaging results as above. The patient will be PO challenged. If she is able to tolerate fluids PO, she will be discharged. Patient understood the plan of care and verbalized agreement.     She has a steady gait to the bathroom and is comfortable following up outpatient with her neurologist for further workup.  Again reassessed and she has no focal temporal tenderness, she has had no jaw claudication, and her ESR is only minimally elevated thus I highly doubt temporal arteritis.  She is currently on a prednisone course for an early exacerbation has been getting better, and thus I feel it is safe for her to be discharged with henry outpatient follow-up as if her symptoms persist she may need higher dose steroids and a  temporal artery biopsy.  However, since she has had the symptoms before and is mostly a posterior occipital headache that has been improved with injections in the past she may benefit from this therapy again, and my clinical impression is that this is likely tension or neuropathic in nature.  Repeat neurologic examination is normal and I feel that she is safe to go home.  A neighbors coming to provide her a ride home, all questions answered, and she will return immediately if she gets any worse or new symptoms.      Medications   metoclopramide (REGLAN) injection 10 mg (10 mg Intravenous Given 2/20/18 2115)   diphenhydrAMINE (BENADRYL) injection 12.5 mg (12.5 mg Intravenous Given 2/20/18 2115)   NS infusion 1,000 mL (0 mL  Intravenous Stopped 2/21/18 0119)   dexamethasone pf (DECADRON) injection 10 mg (10 mg Intravenous Given 2/20/18 2115)   LORazepam (ATIVAN) injection 0.5 mg (0.5 mg Intravenous Given 2/20/18 2242)   iohexol (OMNIPAQUE) 350 mg/mL (100 mL Intravenous Given 2/20/18 2305)     FINAL IMPRESSION  1. Other headache syndrome    2. Chronic ulcerative colitis with complication, unspecified location (CMS-HCC)    3. Idiopathic peripheral neuropathy    4. Dehydration        PRESCRIPTIONS  Discharge Medication List as of 2/21/2018  1:19 AM          FOLLOW UP  Poppy Saldana D.O.  [address]  [facility] [address]  [phone]    Schedule an appointment as soon as possible for a visit in 2 days      Your neurologist    Schedule an appointment as soon as possible for a visit in 1 day      [facility], Emergency Dept  [address] [facility] [id number] [phone]  Today  If symptoms worsen        -DISCHARGE-       The patient is referred to a primary physician for blood pressure management, diabetic screening, and for all other preventative health concerns and to her neurologist for a f/u of today's complaint.    Pertinent Labs & Imaging studies reviewed and verified by myself, as well as nursing notes and the patient's past medical, family, and social histories (See chart for details).    Results, exam findings, clinical impression, presumed diagnosis, treatment options, and strict return precautions were discussed with the patient, and they verbalized understanding, agreed with, and appreciated the plan of care.    IDelmar (Jb), am scribing for, and in the presence of, Aj Alberto M.D..    Electronically signed by: Delmar Correa (Jb), 2/20/2018    Aj GUADARRAMA M.D. personally performed the services described in this documentation, as scribed by Delmar Correa in my presence, and it is both accurate and complete.    Portions of this record were made with voice  recognition software.  Despite my review, spelling/grammar/context errors may still remain.  Interpretation of this chart should be taken in this context.    The note accurately reflects work and decisions made by me.  Aj Alberto  2/21/2018  1:55 PM

## 2018-02-21 NOTE — ED NOTES
Patient in pediatrics bathroom with alarm going off.  Escorted patient back to her room.  Call light in place, warm blanket and chap stick provided. Notified. RN.

## 2018-02-21 NOTE — ED TRIAGE NOTES
Pt RODRIGUEZ HENNING, c/o headaches x few days , deneis dizziness, denies weakness, pt is ambulatory , upon arrival to triage pt very upset she's going to the lobby and not to a room , pt begins to cry , intermittently yell out , pt CELE's , alert/oriented

## 2018-02-21 NOTE — DISCHARGE INSTRUCTIONS
You were seen and evaluated in the Emergency Department at [facility] for:     Headache.    You had the following tests and studies:    CT scan and blood tests and chest x-ray look great!     You received the following medications:    Reglan, benadryl, iv fluids.     ----------------------------    Please make sure to follow up with:    Your neurologist in 1 day for a recheck, but come RIGHT BACK if your symptoms get ANY worse!    Good luck, and feel better!  ----------------------------    We always encourage patients to return IMMEDIATELY if they have:  Increased or changing pain, passing out, fevers over 100.4 (taken in your mouth or rectally) for more than 2 days, redness or swelling of skin or tissues, feeling like your heart is beating fast, chest pain that is new or worsening, trouble breathing, feeling like your throat is closing up and can not breath, inability to walk, weakness of any part of your body, new dizziness, severe bleeding that won't stop from any part of your body, if you can't eat or drink, or if you have any other concerns.   If you feel worse, please know that you can always return with any questions, concerns, worse symptoms, or you are feeling unsafe. We certainly cannot say for sure that we have ruled out every illness or dangerous disease, but we feel that at this specific time, your exam, tests, and vital signs like heart rate and blood pressure are safe for discharge.         Headache   A headache is pain or discomfort felt around the head or neck area. The specific cause of a headache may not be found. There are many causes and types of headaches. A few common ones are:  · Tension headaches.  · Migraine headaches.  · Cluster headaches.  · Chronic daily headaches.  HOME CARE INSTRUCTIONS   · Keep all follow-up appointments with your health care provider or any specialist referral.  · Only take over-the-counter or prescription medicines for pain or discomfort as directed by  your health care provider.  · Lie down in a dark, quiet room when you have a headache.  · Keep a headache journal to find out what may trigger your migraine headaches. For example, write down:  ¨ What you eat and drink.  ¨ How much sleep you get.  ¨ Any change to your diet or medicines.  · Try massage or other relaxation techniques.  · Put ice packs or heat on the head and neck. Use these 3 to 4 times per day for 15 to 20 minutes each time, or as needed.  · Limit stress.  · Sit up straight, and do not tense your muscles.  · Quit smoking if you smoke.  · Limit alcohol use.  · Decrease the amount of caffeine you drink, or stop drinking caffeine.  · Eat and sleep on a regular schedule.  · Get 7 to 9 hours of sleep, or as recommended by your health care provider.  · Keep lights dim if bright lights bother you and make your headaches worse.  SEEK MEDICAL CARE IF:   · You have problems with the medicines you were prescribed.  · Your medicines are not working.  · You have a change from the usual headache.  · You have nausea or vomiting.  SEEK IMMEDIATE MEDICAL CARE IF:   · Your headache becomes severe.  · You have a fever.  · You have a stiff neck.  · You have loss of vision.  · You have muscular weakness or loss of muscle control.  · You start losing your balance or have trouble walking.  · You feel faint or pass out.  · You have severe symptoms that are different from your first symptoms.     This information is not intended to replace advice given to you by your health care provider. Make sure you discuss any questions you have with your health care provider.     Document Released: 12/18/2006 Document Revised: 05/03/2016 Document Reviewed: 01/02/2013  [employer] Interactive Patient Education ©2016 [employer]

## 2018-02-21 NOTE — ED NOTES
Pt ambulatory  Vital signs stable  Pt handed d/c paperwork with understanding stated  Pt states will follow up with PCP and neuro  Pt states safe way home

## 2018-02-21 NOTE — ED TRIAGE NOTES
"Chief Complaint   Patient presents with   • Headache     Agree with triage rn.  Pt in room.  Pt calm in room.  Pt states was sent by her neurologist and homeopath.  Pt states shooting pains in head for last two days.  Pt states \"something similar in the past several times, but not this severe.\"  Pt denies n/v, LOC, no diarrhea.  Pt has been on augmenton for last four days.    "

## 2018-03-26 ENCOUNTER — RX ONLY (OUTPATIENT)
Age: 73
Setting detail: RX ONLY
End: 2018-03-26

## 2018-03-26 RX ORDER — TRIAMCINOLONE ACETONIDE 1 MG/G
1 CREAM TOPICAL BID
Qty: 1 | Refills: 1 | Status: CANCELLED

## 2018-03-26 RX ORDER — TRIAMCINOLONE ACETONIDE 0.1 %
OINTMENT (GRAM) TOPICAL
Qty: 1 | Refills: 2 | Status: ERX

## 2018-03-26 RX ORDER — TRIAMCINOLONE ACETONIDE 1 MG/G
PASTE TOPICAL
Qty: 1 | Refills: 5 | Status: ERX

## 2018-03-27 ENCOUNTER — APPOINTMENT (RX ONLY)
Dept: URBAN - METROPOLITAN AREA CLINIC 31 | Facility: CLINIC | Age: 73
Setting detail: DERMATOLOGY
End: 2018-03-27

## 2018-03-27 DIAGNOSIS — L57.8 OTHER SKIN CHANGES DUE TO CHRONIC EXPOSURE TO NONIONIZING RADIATION: ICD-10-CM

## 2018-03-27 DIAGNOSIS — D22 MELANOCYTIC NEVI: ICD-10-CM

## 2018-03-27 DIAGNOSIS — L82.1 OTHER SEBORRHEIC KERATOSIS: ICD-10-CM

## 2018-03-27 DIAGNOSIS — R20.2 PARESTHESIA OF SKIN: ICD-10-CM

## 2018-03-27 DIAGNOSIS — D18.0 HEMANGIOMA: ICD-10-CM

## 2018-03-27 DIAGNOSIS — L259 CONTACT DERMATITIS AND OTHER ECZEMA, UNSPECIFIED CAUSE: ICD-10-CM

## 2018-03-27 DIAGNOSIS — L81.4 OTHER MELANIN HYPERPIGMENTATION: ICD-10-CM

## 2018-03-27 PROBLEM — L23.9 ALLERGIC CONTACT DERMATITIS, UNSPECIFIED CAUSE: Status: ACTIVE | Noted: 2018-03-27

## 2018-03-27 PROBLEM — D18.01 HEMANGIOMA OF SKIN AND SUBCUTANEOUS TISSUE: Status: ACTIVE | Noted: 2018-03-27

## 2018-03-27 PROBLEM — D22.5 MELANOCYTIC NEVI OF TRUNK: Status: ACTIVE | Noted: 2018-03-27

## 2018-03-27 PROCEDURE — 99213 OFFICE O/P EST LOW 20 MIN: CPT

## 2018-03-27 PROCEDURE — ? COUNSELING

## 2018-03-27 PROCEDURE — ? ADDITIONAL NOTES

## 2018-03-27 ASSESSMENT — LOCATION SIMPLE DESCRIPTION DERM
LOCATION SIMPLE: RIGHT UPPER BACK
LOCATION SIMPLE: RIGHT CHEEK
LOCATION SIMPLE: LEFT CHEEK
LOCATION SIMPLE: LEFT LIP
LOCATION SIMPLE: CHEST
LOCATION SIMPLE: LEFT FOREARM
LOCATION SIMPLE: RIGHT HAND
LOCATION SIMPLE: LEFT UPPER BACK
LOCATION SIMPLE: LEFT HAND
LOCATION SIMPLE: UPPER BACK
LOCATION SIMPLE: RIGHT FOREARM

## 2018-03-27 ASSESSMENT — LOCATION DETAILED DESCRIPTION DERM
LOCATION DETAILED: RIGHT MID-UPPER BACK
LOCATION DETAILED: INFERIOR THORACIC SPINE
LOCATION DETAILED: RIGHT INFERIOR CENTRAL MALAR CHEEK
LOCATION DETAILED: LEFT DISTAL DORSAL FOREARM
LOCATION DETAILED: RIGHT PROXIMAL DORSAL FOREARM
LOCATION DETAILED: RIGHT SUPERIOR MEDIAL UPPER BACK
LOCATION DETAILED: LEFT RADIAL DORSAL HAND
LOCATION DETAILED: LEFT INFERIOR CENTRAL MALAR CHEEK
LOCATION DETAILED: LEFT SUPERIOR UPPER BACK
LOCATION DETAILED: LEFT MEDIAL SUPERIOR CHEST
LOCATION DETAILED: LEFT INFERIOR VERMILION LIP
LOCATION DETAILED: RIGHT RADIAL DORSAL HAND
LOCATION DETAILED: LEFT SUPERIOR MEDIAL UPPER BACK

## 2018-03-27 ASSESSMENT — LOCATION ZONE DERM
LOCATION ZONE: TRUNK
LOCATION ZONE: FACE
LOCATION ZONE: LIP
LOCATION ZONE: HAND
LOCATION ZONE: ARM

## 2018-03-27 NOTE — PROCEDURE: ADDITIONAL NOTES
Additional Notes: May continue to use TAC dental paste twice a day.  Use petroleum 3-4 x a day; samples given.  Discontinue the use of current lip balm; in a 15 minute discussion informed patient of preservatives and ingredients of lip balm.  Patient declines use of a new toothpaste, no chewing gum, no new oral regimen.  Patient maybe licking her lips more now that they are drier.  Discussed option of referral to an allergist for allergy testing but patient refuses referral today.  Options of IM Kenalog discussed, patient declines today.

## 2018-03-27 NOTE — HPI: SKIN LESIONS
Is This A New Presentation, Or A Follow-Up?: Skin Lesions
How Severe Is Your Skin Lesion?: moderate
Have Your Skin Lesions Been Treated?: not been treated
Additional History: Patient has been using a dental paste since last night and a otc lip balm by [employer]

## 2018-03-30 ENCOUNTER — HOSPITAL ENCOUNTER (OUTPATIENT)
Facility: MEDICAL CENTER | Age: 73
End: 2018-03-30
Attending: INTERNAL MEDICINE
Payer: MEDICARE

## 2018-03-30 ENCOUNTER — OFFICE VISIT (OUTPATIENT)
Dept: INTERNAL MEDICINE | Facility: IMAGING CENTER | Age: 73
End: 2018-03-30
Payer: MEDICARE

## 2018-03-30 VITALS
HEART RATE: 80 BPM | OXYGEN SATURATION: 96 % | BODY MASS INDEX: 19.49 KG/M2 | TEMPERATURE: 37.1 F | HEIGHT: 63 IN | DIASTOLIC BLOOD PRESSURE: 68 MMHG | WEIGHT: 110 LBS | RESPIRATION RATE: 14 BRPM | SYSTOLIC BLOOD PRESSURE: 104 MMHG

## 2018-03-30 DIAGNOSIS — M81.0 AGE-RELATED OSTEOPOROSIS WITHOUT CURRENT PATHOLOGICAL FRACTURE: ICD-10-CM

## 2018-03-30 DIAGNOSIS — R35.1 NOCTURIA: ICD-10-CM

## 2018-03-30 DIAGNOSIS — Z79.899 LONG TERM USE OF DRUG: ICD-10-CM

## 2018-03-30 DIAGNOSIS — G11.9 CEREBELLAR ATAXIA (HCC): ICD-10-CM

## 2018-03-30 DIAGNOSIS — E55.9 VITAMIN D DEFICIENCY: ICD-10-CM

## 2018-03-30 DIAGNOSIS — K51.20 CHRONIC ULCERATIVE PROCTITIS WITHOUT COMPLICATIONS (HCC): ICD-10-CM

## 2018-03-30 LAB
25(OH)D3 SERPL-MCNC: 29 NG/ML (ref 30–100)
ALBUMIN SERPL BCP-MCNC: 4.6 G/DL (ref 3.2–4.9)
ALBUMIN/GLOB SERPL: 1.5 G/DL
ALP SERPL-CCNC: 82 U/L (ref 30–99)
ALT SERPL-CCNC: 21 U/L (ref 2–50)
ANION GAP SERPL CALC-SCNC: 7 MMOL/L (ref 0–11.9)
APPEARANCE UR: CLEAR
AST SERPL-CCNC: 22 U/L (ref 12–45)
BACTERIA #/AREA URNS HPF: NEGATIVE /HPF
BASOPHILS # BLD AUTO: 0.7 % (ref 0–1.8)
BASOPHILS # BLD: 0.05 K/UL (ref 0–0.12)
BILIRUB SERPL-MCNC: 1 MG/DL (ref 0.1–1.5)
BILIRUB UR QL STRIP.AUTO: NEGATIVE
BUN SERPL-MCNC: 21 MG/DL (ref 8–22)
CALCIUM SERPL-MCNC: 10.3 MG/DL (ref 8.5–10.5)
CHLORIDE SERPL-SCNC: 101 MMOL/L (ref 96–112)
CHOLEST SERPL-MCNC: 220 MG/DL (ref 100–199)
CO2 SERPL-SCNC: 29 MMOL/L (ref 20–33)
COLOR UR: YELLOW
CREAT SERPL-MCNC: 0.57 MG/DL (ref 0.5–1.4)
EOSINOPHIL # BLD AUTO: 0.08 K/UL (ref 0–0.51)
EOSINOPHIL NFR BLD: 1.1 % (ref 0–6.9)
EPI CELLS #/AREA URNS HPF: ABNORMAL /HPF
ERYTHROCYTE [DISTWIDTH] IN BLOOD BY AUTOMATED COUNT: 45.1 FL (ref 35.9–50)
EST. AVERAGE GLUCOSE BLD GHB EST-MCNC: 117 MG/DL
GLOBULIN SER CALC-MCNC: 3.1 G/DL (ref 1.9–3.5)
GLUCOSE SERPL-MCNC: 94 MG/DL (ref 65–99)
GLUCOSE UR STRIP.AUTO-MCNC: NEGATIVE MG/DL
HBA1C MFR BLD: 5.7 % (ref 0–5.6)
HCT VFR BLD AUTO: 49.4 % (ref 37–47)
HDLC SERPL-MCNC: 67 MG/DL
HGB BLD-MCNC: 15.7 G/DL (ref 12–16)
HYALINE CASTS #/AREA URNS LPF: ABNORMAL /LPF
IMM GRANULOCYTES # BLD AUTO: 0.02 K/UL (ref 0–0.11)
IMM GRANULOCYTES NFR BLD AUTO: 0.3 % (ref 0–0.9)
IRON SATN MFR SERPL: 25 % (ref 15–55)
IRON SERPL-MCNC: 110 UG/DL (ref 40–170)
KETONES UR STRIP.AUTO-MCNC: NEGATIVE MG/DL
LDLC SERPL CALC-MCNC: 137 MG/DL
LEUKOCYTE ESTERASE UR QL STRIP.AUTO: ABNORMAL
LYMPHOCYTES # BLD AUTO: 1.86 K/UL (ref 1–4.8)
LYMPHOCYTES NFR BLD: 26.5 % (ref 22–41)
MCH RBC QN AUTO: 31.7 PG (ref 27–33)
MCHC RBC AUTO-ENTMCNC: 31.8 G/DL (ref 33.6–35)
MCV RBC AUTO: 99.6 FL (ref 81.4–97.8)
MICRO URNS: ABNORMAL
MONOCYTES # BLD AUTO: 0.3 K/UL (ref 0–0.85)
MONOCYTES NFR BLD AUTO: 4.3 % (ref 0–13.4)
NEUTROPHILS # BLD AUTO: 4.72 K/UL (ref 2–7.15)
NEUTROPHILS NFR BLD: 67.1 % (ref 44–72)
NITRITE UR QL STRIP.AUTO: NEGATIVE
NRBC # BLD AUTO: 0 K/UL
NRBC BLD-RTO: 0 /100 WBC
PH UR STRIP.AUTO: 7.5 [PH]
PLATELET # BLD AUTO: 275 K/UL (ref 164–446)
PMV BLD AUTO: 9.5 FL (ref 9–12.9)
POTASSIUM SERPL-SCNC: 3.8 MMOL/L (ref 3.6–5.5)
PROT SERPL-MCNC: 7.7 G/DL (ref 6–8.2)
PROT UR QL STRIP: NEGATIVE MG/DL
RBC # BLD AUTO: 4.96 M/UL (ref 4.2–5.4)
RBC # URNS HPF: ABNORMAL /HPF
RBC UR QL AUTO: NEGATIVE
SODIUM SERPL-SCNC: 137 MMOL/L (ref 135–145)
SP GR UR STRIP.AUTO: 1.01
TIBC SERPL-MCNC: 447 UG/DL (ref 250–450)
TRIGL SERPL-MCNC: 78 MG/DL (ref 0–149)
TSH SERPL DL<=0.005 MIU/L-ACNC: 1.7 UIU/ML (ref 0.38–5.33)
UROBILINOGEN UR STRIP.AUTO-MCNC: 0.2 MG/DL
VIT B12 SERPL-MCNC: 858 PG/ML (ref 211–911)
WBC # BLD AUTO: 7 K/UL (ref 4.8–10.8)
WBC #/AREA URNS HPF: ABNORMAL /HPF

## 2018-03-30 PROCEDURE — 80053 COMPREHEN METABOLIC PANEL: CPT

## 2018-03-30 PROCEDURE — 85025 COMPLETE CBC W/AUTO DIFF WBC: CPT

## 2018-03-30 PROCEDURE — 82306 VITAMIN D 25 HYDROXY: CPT

## 2018-03-30 PROCEDURE — 80061 LIPID PANEL: CPT

## 2018-03-30 PROCEDURE — 83550 IRON BINDING TEST: CPT

## 2018-03-30 PROCEDURE — 99214 OFFICE O/P EST MOD 30 MIN: CPT | Performed by: INTERNAL MEDICINE

## 2018-03-30 PROCEDURE — 84443 ASSAY THYROID STIM HORMONE: CPT

## 2018-03-30 PROCEDURE — 81001 URINALYSIS AUTO W/SCOPE: CPT

## 2018-03-30 PROCEDURE — 83036 HEMOGLOBIN GLYCOSYLATED A1C: CPT

## 2018-03-30 PROCEDURE — 83540 ASSAY OF IRON: CPT

## 2018-03-30 PROCEDURE — 82607 VITAMIN B-12: CPT

## 2018-03-30 ASSESSMENT — ENCOUNTER SYMPTOMS
HEARTBURN: 0
DIARRHEA: 0
CONSTIPATION: 1
ORTHOPNEA: 0
SHORTNESS OF BREATH: 1
DEPRESSION: 0
WHEEZING: 0
PALPITATIONS: 0
COUGH: 1
INSOMNIA: 1

## 2018-03-30 NOTE — PROGRESS NOTES
Established Patient Note   HPI:        She is concerned about diabetes. She has been fasting since last night. Her concern is based upon urinating at night 3-4 times and the fact that she has had fasting glucose levels occasionally over 100mg/dl on CMP. No excessive thirst.    Past Medical History:   Diagnosis Date   • Age related osteoporosis 3/30/2018    Prior fracture with left hip   • Arthritis    • ASTHMA    • Cerebellar ataxia (CMS-HCC) 3/30/2018    Due to fall 2004   • Chronic ulcerative colitis (CMS-HCC) 6/26/2013   • Cough    • GERD (gastroesophageal reflux disease)    • HYPOTENSION    • Inner ear disease    • Near-syncope    • Neck injury     fracture, hand and leg numbness and tingling   • Osteoporosis    • Peripheral neuropathy (CMS-HCC)    • Raynaud disease    • Tremor    • Vitamin D deficiency        Current Outpatient Prescriptions   Medication Sig Dispense Refill   • LUTEIN PO Take  by mouth.     • BIOTIN PO Take  by mouth.     • MAGNESIUM PO Take  by mouth.     • ASCORBIC ACID PO Take  by mouth.     • baclofen (LIORESAL) 10 MG TABS Take 10 mg by mouth every evening.     • Cholecalciferol (VITAMIN D3) Take  by mouth every day.     • amoxicillin-clavulanate suspension (AUGMENTIN) 125-31.25 MG/5ML Recon Susp Take 125 mg by mouth 2 times a day.     • montelukast (SINGULAIR) 10 MG Tab Take 10 mg by mouth every day.     • TURMERIC CURCUMIN PO Take  by mouth.     • POTASSIUM PO Take  by mouth.     • Cetirizine HCl (ZYRTEC ALLERGY PO) Take  by mouth.     • maalox plus-benadryl-visc lidocaine (MAGIC MOUTHWASH) Gargle and spit 5 mL every 6 hours as needed for pain 100 mL 0   • triamcinolone acetonide-orabase (KENALOG IN ORABASE) 0.1 % Paste APPLY TWICE A DAY  1   • albuterol (PROAIR HFA) 108 (90 BASE) MCG/ACT Aero Soln inhalation aerosol Inhale 2 Puffs by mouth every four hours as needed for Shortness of Breath.     • hydrocodone-acetaminophen (NORCO) 5-325 MG Tab per tablet Take 1-2 Tabs by mouth every four  "hours as needed. (Patient not taking: Reported on 2/3/2017) 10 Tab 0   • hydrocodone-acetaminophen (NORCO) 5-325 MG TABS per tablet Take 1 Tab by mouth every four hours as needed. (Patient not taking: Reported on 2/3/2017) 15 Tab 0   • Mesalamine (ASACOL HD) 800 MG TBEC Take 800 mg by mouth every evening.     • nitrofurantoin monohydr macro (MACROBID) 100 MG CAPS Take 50 mg by mouth every evening.     • diazepam (VALIUM) 5 MG TABS Take 1 Tab by mouth 3 times a day as needed (muscle relaxant). (Patient not taking: Reported on 2/3/2017) 15 Tab 0   • hydrocodone-acetaminophen (NORCO) 5-325 MG TABS per tablet Take 1 Tab by mouth every four hours as needed. (Patient not taking: Reported on 2/3/2017) 16 Tab 0   • multivitamin (THERAGRAN) TABS Take 1 Tab by mouth every day.     • calcium-vitamin D (OSCAL-250) 250-125 MG-UNIT TABS Take 1 Tab by mouth every day.       • mesalamine EC (ASACOL) 400 MG TBEC Take 800 mg by mouth every evening.       No current facility-administered medications for this visit.          Allergies   Allergen Reactions   • Azithromycin    • Clindamycin    • Codeine    • Dilaudid [Hydromorphone] Anaphylaxis     \"went into code blue\"    • Erythromycin    • Keflex    • Levaquin      \"i dont know\"    • Lyrica    • Morphine    • Other Food      Dairy-mucus (butter is okay). spices-cough   • Quinolones    • Sulfa Drugs      \"i dont know\"    • Tetracyclines    • Vicodin [Hydrocodone-Acetaminophen]          Social History   Substance Use Topics   • Smoking status: Never Smoker   • Smokeless tobacco: Never Used   • Alcohol use No     History   Alcohol Use No     History   Drug Use No       Review of Systems   Constitutional: Negative for malaise/fatigue.   Respiratory: Positive for cough and shortness of breath. Negative for wheezing.         Chronic cough but mostly dry but occasional phlegm. She will get some shortness of breath if walks up stairs or hill   Cardiovascular: Negative for chest pain, " "palpitations, orthopnea and leg swelling.   Gastrointestinal: Positive for constipation. Negative for diarrhea and heartburn.        Occasional reflux symptoms. She has had Huey fundoplication for hiatal hernia. Uses benefiber for constipation.   Genitourinary: Positive for frequency.   Neurological:        Leg cramps early morning fairly often. Chronic imbalance due to cerebellar ataxia. Balance is poor.   Psychiatric/Behavioral: Negative for depression and suicidal ideas. The patient has insomnia.         States she will have trouble falling asleep for which she will take melatonin.       Ambulatory Vitals  /68   Pulse 80   Temp (!) 2.8 °C (37.1 °F)   Resp 14   Ht 1.6 m (5' 3\")   Wt 49.9 kg (110 lb)   SpO2 96%   BMI 19.49 kg/m²     Physical Exam   Constitutional: She is oriented to person, place, and time and well-developed, well-nourished, and in no distress. No distress.   HENT:   Head: Normocephalic.   Neck: Neck supple. No JVD present. No tracheal deviation present. No thyromegaly present.   Cardiovascular: Normal rate, regular rhythm and intact distal pulses.  Exam reveals no gallop and no friction rub.    No murmur heard.  No carotid bruits   Pulmonary/Chest: Effort normal and breath sounds normal. She has no wheezes. She has no rales.   Abdominal: Soft. She exhibits no distension and no mass. There is no tenderness.   Musculoskeletal: She exhibits no edema.   Lymphadenopathy:     She has no cervical adenopathy.   Neurological: She is alert and oriented to person, place, and time. No cranial nerve deficit.   Skin: Skin is warm and dry. She is not diaphoretic.   Psychiatric: Mood and affect normal.         Assessment and Plan:     1. Nocturia  COMP METABOLIC PANEL    URINALYSIS    HEMOGLOBIN A1C   2. Cerebellar ataxia (CMS-HCC)  VITAMIN B12   3. Vitamin D deficiency  COMP METABOLIC PANEL    VITAMIN D,25 HYDROXY   4. Long term use of drug  CBC WITH DIFFERENTIAL    COMP METABOLIC PANEL    LIPID " PROFILE   5. Age-related osteoporosis without current pathological fracture  CBC WITH DIFFERENTIAL    COMP METABOLIC PANEL    VITAMIN D,25 HYDROXY    TSH    LIPID PROFILE   6. Chronic ulcerative proctitis without complications (CMS-HCC)  CBC WITH DIFFERENTIAL    VITAMIN B12    IRON/TOTAL IRON BIND    LIPID PROFILE     Obtain blood work above and schedule follow up appointment one month. I discussed with her she should consider treatment for her osteoporosis especially with her imbalance with the cerebellar ataxia; she refuses treatment. I suspect her nocturia may be due to overactive bladder or a mixed incontinence. Will attempt to discuss PT for balance and strengthening on her follow up visit in one month.    Face to face time: 45 minutes with greater than 50% of time spent with direct patient contact and medical management.       Dar Fraire M.D.

## 2018-04-10 PROBLEM — K63.5 POLYP OF SIGMOID COLON: Status: ACTIVE | Noted: 2018-04-10

## 2018-04-24 DIAGNOSIS — S89.92XD INJURY OF LEFT KNEE, SUBSEQUENT ENCOUNTER: ICD-10-CM

## 2018-04-24 DIAGNOSIS — M25.562 ACUTE PAIN OF LEFT KNEE: ICD-10-CM

## 2018-05-02 ENCOUNTER — OFFICE VISIT (OUTPATIENT)
Dept: INTERNAL MEDICINE | Facility: IMAGING CENTER | Age: 73
End: 2018-05-02
Payer: MEDICARE

## 2018-05-02 ENCOUNTER — HOSPITAL ENCOUNTER (OUTPATIENT)
Facility: MEDICAL CENTER | Age: 73
End: 2018-05-02
Attending: INTERNAL MEDICINE
Payer: MEDICARE

## 2018-05-02 VITALS
DIASTOLIC BLOOD PRESSURE: 66 MMHG | SYSTOLIC BLOOD PRESSURE: 100 MMHG | OXYGEN SATURATION: 96 % | TEMPERATURE: 97.9 F | HEART RATE: 80 BPM | RESPIRATION RATE: 14 BRPM | WEIGHT: 105 LBS | BODY MASS INDEX: 18.61 KG/M2 | HEIGHT: 63 IN

## 2018-05-02 DIAGNOSIS — G60.9 IDIOPATHIC PERIPHERAL NEUROPATHY: ICD-10-CM

## 2018-05-02 DIAGNOSIS — Z91.81 HISTORY OF FALLING: ICD-10-CM

## 2018-05-02 DIAGNOSIS — M81.0 AGE-RELATED OSTEOPOROSIS WITHOUT CURRENT PATHOLOGICAL FRACTURE: ICD-10-CM

## 2018-05-02 DIAGNOSIS — Z87.440 HISTORY OF RECURRENT UTI (URINARY TRACT INFECTION): ICD-10-CM

## 2018-05-02 DIAGNOSIS — Z79.899 LONG TERM USE OF DRUG: ICD-10-CM

## 2018-05-02 DIAGNOSIS — N39.0 RECURRENT UTI (URINARY TRACT INFECTION): ICD-10-CM

## 2018-05-02 DIAGNOSIS — G47.00 INSOMNIA, UNSPECIFIED TYPE: ICD-10-CM

## 2018-05-02 DIAGNOSIS — R23.2 HOT FLASHES: ICD-10-CM

## 2018-05-02 DIAGNOSIS — G47.62 LEG CRAMPS, SLEEP RELATED: ICD-10-CM

## 2018-05-02 DIAGNOSIS — E55.9 VITAMIN D DEFICIENCY: ICD-10-CM

## 2018-05-02 LAB
APPEARANCE UR: CLEAR
BACTERIA #/AREA URNS HPF: NEGATIVE /HPF
BILIRUB UR QL STRIP.AUTO: NEGATIVE
CAOX CRY #/AREA URNS HPF: ABNORMAL /HPF
COLOR UR: YELLOW
EPI CELLS #/AREA URNS HPF: ABNORMAL /HPF
GLUCOSE UR STRIP.AUTO-MCNC: NEGATIVE MG/DL
HYALINE CASTS #/AREA URNS LPF: ABNORMAL /LPF
KETONES UR STRIP.AUTO-MCNC: NEGATIVE MG/DL
LEUKOCYTE ESTERASE UR QL STRIP.AUTO: ABNORMAL
MICRO URNS: ABNORMAL
NITRITE UR QL STRIP.AUTO: NEGATIVE
PH UR STRIP.AUTO: 7 [PH]
PROT UR QL STRIP: NEGATIVE MG/DL
RBC UR QL AUTO: NEGATIVE
SP GR UR STRIP.AUTO: 1.02
UROBILINOGEN UR STRIP.AUTO-MCNC: 0.2 MG/DL
WBC #/AREA URNS HPF: ABNORMAL /HPF

## 2018-05-02 PROCEDURE — 81001 URINALYSIS AUTO W/SCOPE: CPT

## 2018-05-02 PROCEDURE — 87086 URINE CULTURE/COLONY COUNT: CPT

## 2018-05-02 PROCEDURE — 99214 OFFICE O/P EST MOD 30 MIN: CPT | Performed by: INTERNAL MEDICINE

## 2018-05-02 RX ORDER — DIAZEPAM 5 MG/1
5 TABLET ORAL DAILY
Qty: 30 TAB | Refills: 0 | Status: CANCELLED | OUTPATIENT
Start: 2018-05-02 | End: 2018-06-01

## 2018-05-02 ASSESSMENT — ENCOUNTER SYMPTOMS
SHORTNESS OF BREATH: 0
PALPITATIONS: 0
HEARTBURN: 1
ROS GI COMMENTS: OCCASIONAL HEARTBURN

## 2018-05-02 ASSESSMENT — PATIENT HEALTH QUESTIONNAIRE - PHQ9: CLINICAL INTERPRETATION OF PHQ2 SCORE: 0

## 2018-05-02 NOTE — PROGRESS NOTES
Established Patient Note   HPI:        She is here to review lab results. She is not interested in treating osteoporosis at this time. She fell 2 weeks ago and states left knee is sore. She is seeing physical therapy for the knee; she states she is also doing muscle strengthening and balance training with therapist and . States she always feels hot. She is prone to leg cramps.    Past Medical History:   Diagnosis Date   • Age related osteoporosis 3/30/2018    Prior fracture with left hip   • Arthritis    • ASTHMA    • Cerebellar ataxia (HCC) 3/30/2018    Due to fall 2004   • Chronic ulcerative colitis (HCC) 6/26/2013   • Cough    • GERD (gastroesophageal reflux disease)    • HYPOTENSION    • Inner ear disease    • Leg cramps, sleep related 5/2/2018   • Near-syncope    • Neck injury     fracture, hand and leg numbness and tingling   • Osteoporosis    • Peripheral neuropathy (HCC)    • Polyp of sigmoid colon 04/10/2018    Hyperplastic polyp   • Raynaud disease    • Tremor    • Vitamin D deficiency        Current Outpatient Prescriptions   Medication Sig Dispense Refill   • TURMERIC CURCUMIN PO Take  by mouth.     • LUTEIN PO Take  by mouth.     • BIOTIN PO Take  by mouth.     • POTASSIUM PO Take  by mouth.     • Cetirizine HCl (ZYRTEC ALLERGY PO) Take  by mouth.     • Cholecalciferol (VITAMIN D3) Take  by mouth every day.     • multivitamin (THERAGRAN) TABS Take 1 Tab by mouth every day.     • MAGNESIUM PO Take  by mouth.     • ASCORBIC ACID PO Take  by mouth.     • albuterol (PROAIR HFA) 108 (90 BASE) MCG/ACT Aero Soln inhalation aerosol Inhale 2 Puffs by mouth every four hours as needed for Shortness of Breath.     • diazepam (VALIUM) 5 MG TABS Take 1 Tab by mouth 3 times a day as needed (muscle relaxant). (Patient not taking: Reported on 2/3/2017) 15 Tab 0     No current facility-administered medications for this visit.          Allergies   Allergen Reactions   • Azithromycin    • Clindamycin    •  "Codeine    • Dilaudid [Hydromorphone] Anaphylaxis     \"went into code blue\"    • Erythromycin    • Keflex    • Levaquin      \"i dont know\"    • Lyrica    • Morphine    • Other Food      Dairy-mucus (butter is okay). spices-cough   • Quinolones    • Sulfa Drugs      \"i dont know\"    • Tetracyclines    • Vicodin [Hydrocodone-Acetaminophen]          Social History   Substance Use Topics   • Smoking status: Never Smoker   • Smokeless tobacco: Never Used   • Alcohol use No     History   Alcohol Use No     History   Drug Use No       Review of Systems   Respiratory: Negative for shortness of breath.    Cardiovascular: Negative for chest pain and palpitations.   Gastrointestinal: Positive for heartburn.        Occasional heartburn   Genitourinary:        Uses catheter to drain bladder throughout the day. She has been prone to frequent UTI's in past.       Ambulatory Vitals  /66   Pulse 80   Temp 36.6 °C (97.9 °F)   Resp 14   Ht 1.6 m (5' 2.99\")   Wt 47.6 kg (105 lb)   SpO2 96%   BMI 18.60 kg/m²     Physical Exam   Constitutional: No distress.   Neck: Neck supple.   Cardiovascular: Normal rate, regular rhythm and normal heart sounds.  Exam reveals no gallop and no friction rub.    No murmur heard.  Pulmonary/Chest: Effort normal and breath sounds normal. She has no wheezes. She has no rales.   Musculoskeletal: She exhibits no edema.   Skin: She is not diaphoretic.       Component      Latest Ref Rng & Units 3/30/2018   WBC      4.8 - 10.8 K/uL 7.0   RBC      4.20 - 5.40 M/uL 4.96   Hemoglobin      12.0 - 16.0 g/dL 15.7   Hematocrit      37.0 - 47.0 % 49.4 (H)   MCV      81.4 - 97.8 fL 99.6 (H)   MCH      27.0 - 33.0 pg 31.7   MCHC      33.6 - 35.0 g/dL 31.8 (L)   RDW      35.9 - 50.0 fL 45.1   Platelet Count      164 - 446 K/uL 275   MPV      9.0 - 12.9 fL 9.5   Neutrophils-Polys      44.00 - 72.00 % 67.10   Lymphocytes      22.00 - 41.00 % 26.50   Monocytes      0.00 - 13.40 % 4.30   Eosinophils      0.00 - " 6.90 % 1.10   Basophils      0.00 - 1.80 % 0.70   Immature Granulocytes      0.00 - 0.90 % 0.30   Nucleated RBC      /100 WBC 0.00   Neutrophils (Absolute)      2.00 - 7.15 K/uL 4.72   Lymphs (Absolute)      1.00 - 4.80 K/uL 1.86   Monos (Absolute)      0.00 - 0.85 K/uL 0.30   Eos (Absolute)      0.00 - 0.51 K/uL 0.08   Baso (Absolute)      0.00 - 0.12 K/uL 0.05   Immature Granulocytes (abs)      0.00 - 0.11 K/uL 0.02   NRBC (Absolute)      K/uL 0.00   Sodium      135 - 145 mmol/L 137   Potassium      3.6 - 5.5 mmol/L 3.8   Chloride      96 - 112 mmol/L 101   Co2      20 - 33 mmol/L 29   Anion Gap      0.0 - 11.9 7.0   Glucose      65 - 99 mg/dL 94   Bun      8 - 22 mg/dL 21   Creatinine      0.50 - 1.40 mg/dL 0.57   Calcium      8.5 - 10.5 mg/dL 10.3   AST(SGOT)      12 - 45 U/L 22   ALT(SGPT)      2 - 50 U/L 21   Alkaline Phosphatase      30 - 99 U/L 82   Total Bilirubin      0.1 - 1.5 mg/dL 1.0   Albumin      3.2 - 4.9 g/dL 4.6   Total Protein      6.0 - 8.2 g/dL 7.7   Globulin      1.9 - 3.5 g/dL 3.1   A-G Ratio      g/dL 1.5   Color       Yellow   Character       Clear   Specific Gravity      <1.035 1.015   Ph      5.0 - 8.0 7.5   Glucose      Negative mg/dL Negative   Ketones      Negative mg/dL Negative   Protein      Negative mg/dL Negative   Bilirubin      Negative Negative   Urobilinogen, Urine      Negative 0.2   Nitrite      Negative Negative   Leukocyte Esterase      Negative Small (A)   Occult Blood      Negative Negative   Micro Urine Req       Microscopic   WBC      /hpf 5-10 (A)   RBC      /hpf 0-2   Bacteria      None /hpf Negative   Epithelial Cells      /hpf Few   Hyaline Cast      /lpf 0-2   Cholesterol,Tot      100 - 199 mg/dL 220 (H)   Triglycerides      0 - 149 mg/dL 78   HDL      >=40 mg/dL 67   LDL      <100 mg/dL 137 (H)   Iron      40 - 170 ug/dL 110   Total Iron Binding      250 - 450 ug/dL 447   % Saturation      15 - 55 % 25   Glycohemoglobin      0.0 - 5.6 % 5.7 (H)   Estim. Avg Glu       mg/dL 117   GFR If African American      >60 mL/min/1.73 m 2 >60   GFR If Non African American      >60 mL/min/1.73 m 2 >60   25-Hydroxy   Vitamin D 25      30 - 100 ng/mL 29 (L)   TSH      0.380 - 5.330 uIU/mL 1.700   Vitamin B12 -True Cobalamin      211 - 911 pg/mL 858     ACC/AHA 10 year CV risk 6.6%    Assessment and Plan:     1. History of recurrent UTI (urinary tract infection)     2. Vitamin D deficiency     3. Age-related osteoporosis without current pathological fracture     4. Long term use of drug     5. History of falling     6. Idiopathic peripheral neuropathy     7. Leg cramps, sleep related       Increase vitamin D3; she thinks she is taking 2,000 IU daily so she will increase to 3,000 IU daily. She is taking MVI daily but is not sure how much calcium is present. Advised she take somewhere between 500-1000 mg calcium daily due to her osteoporosis. Will arrange monthly urinalysis with reflex C&S for WBC/hpf greater than 10. Check vitamin D level in 3 months. Will refill valium which she uses sparingly. Refer to endocrinologist to evaluate her heat insensitivity. She has already tried acupuncture for her neuropathy. I have recommended increasing sources of potassium to potentially help with leg cramps since her potassium level is low side of normal. I suspect her heat intolerance may be related to her neuropathy.    Face to face time: 45 minutes with greater than 50% of time spent with direct patient contact and medical management.       Dar Fraire M.D.

## 2018-05-03 DIAGNOSIS — R23.2 HOT FLASHES: ICD-10-CM

## 2018-05-03 DIAGNOSIS — R68.89 HEAT INTOLERANCE: ICD-10-CM

## 2018-05-04 ENCOUNTER — TELEPHONE (OUTPATIENT)
Dept: PULMONOLOGY | Facility: HOSPICE | Age: 73
End: 2018-05-04

## 2018-05-04 LAB
BACTERIA UR CULT: NORMAL
SIGNIFICANT IND 70042: NORMAL
SITE SITE: NORMAL
SOURCE SOURCE: NORMAL

## 2018-05-04 NOTE — TELEPHONE ENCOUNTER
Patient is scheduled to do a PFT 60 and follow up with the A rotation which is Dr. Alondra Parker on May 24th.  There was a note that she only wants to see Dr. Arzate. I spoke with her to advise that Dr. Arzate does not round in our pulmonary office anymore and she is scheduled to see one of our other pulmonologist. She insisted on only seeing Dr. Arzate and wants to talk to him only. I let her know that he only sees patients in the sleep clinic for sleep issues and he rounds in the hospital. She asked how to talk to him to see if he is willing to see her privately. I explained that he does not have any private practice but she was welcome to Mychart message him and I did my best to explain to her how to message him on her own. I also explained that I would do my best to get a message to him.  She understands that she will be seeing Dr. Parker and asked his age, etc.  I gave her my direct number to call if she needed anything further.

## 2018-05-05 ENCOUNTER — PATIENT MESSAGE (OUTPATIENT)
Dept: PULMONOLOGY | Facility: HOSPICE | Age: 73
End: 2018-05-05

## 2018-05-07 ENCOUNTER — PATIENT MESSAGE (OUTPATIENT)
Dept: PULMONOLOGY | Facility: HOSPICE | Age: 73
End: 2018-05-07

## 2018-05-07 NOTE — TELEPHONE ENCOUNTER
From: Ana Paula Ordonez  To: JACKLYN Bonilla  Sent: 5/5/2018 11:13 AM PDT  Subject: Non-Urgent Medical Question    I'm sorry, but I don't recognize your name. What kind of physician are you?    Thank you.

## 2018-05-07 NOTE — TELEPHONE ENCOUNTER
From: Ana Paula Ordonez  To: Sandie Chavis, Med Ass't  Sent: 5/7/2018 11:49 AM PDT  Subject: RE:(No subject)    I didn’t receive a message from you. I don’t know what I should reply to.  ----- Message -----  From: Sandie Chavis Med Ass't  Sent: 5/7/2018 8:19 AM PDT  To: Ana Paula Ordonez  Subject: (No subject)  Pulmonary and Sleep

## 2018-05-09 DIAGNOSIS — G47.00 INSOMNIA, UNSPECIFIED TYPE: ICD-10-CM

## 2018-05-09 DIAGNOSIS — G60.3 IDIOPATHIC PROGRESSIVE NEUROPATHY: ICD-10-CM

## 2018-05-09 DIAGNOSIS — F41.9 ANXIETY: ICD-10-CM

## 2018-05-11 RX ORDER — DIAZEPAM 5 MG/1
5 TABLET ORAL DAILY
Qty: 30 TAB | Refills: 0 | Status: SHIPPED | OUTPATIENT
Start: 2018-05-11 | End: 2018-06-10

## 2018-05-21 ENCOUNTER — HOSPITAL ENCOUNTER (OUTPATIENT)
Facility: MEDICAL CENTER | Age: 73
End: 2018-05-21
Attending: INTERNAL MEDICINE
Payer: MEDICARE

## 2018-05-21 ENCOUNTER — NON-PROVIDER VISIT (OUTPATIENT)
Dept: INTERNAL MEDICINE | Facility: IMAGING CENTER | Age: 73
End: 2018-05-21
Payer: MEDICARE

## 2018-05-21 DIAGNOSIS — Z87.440 HISTORY OF RECURRENT UTI (URINARY TRACT INFECTION): ICD-10-CM

## 2018-05-21 PROCEDURE — 81001 URINALYSIS AUTO W/SCOPE: CPT

## 2018-05-22 LAB
APPEARANCE UR: CLEAR
BACTERIA #/AREA URNS HPF: NEGATIVE /HPF
BILIRUB UR QL STRIP.AUTO: NEGATIVE
COLOR UR: YELLOW
EPI CELLS #/AREA URNS HPF: NEGATIVE /HPF
GLUCOSE UR STRIP.AUTO-MCNC: NEGATIVE MG/DL
HYALINE CASTS #/AREA URNS LPF: ABNORMAL /LPF
KETONES UR STRIP.AUTO-MCNC: NEGATIVE MG/DL
LEUKOCYTE ESTERASE UR QL STRIP.AUTO: ABNORMAL
MICRO URNS: ABNORMAL
NITRITE UR QL STRIP.AUTO: NEGATIVE
PH UR STRIP.AUTO: 7 [PH]
PROT UR QL STRIP: NEGATIVE MG/DL
RBC # URNS HPF: ABNORMAL /HPF
RBC UR QL AUTO: NEGATIVE
SP GR UR STRIP.AUTO: 1.01
UROBILINOGEN UR STRIP.AUTO-MCNC: 0.2 MG/DL
WBC #/AREA URNS HPF: ABNORMAL /HPF

## 2018-05-24 ENCOUNTER — APPOINTMENT (OUTPATIENT)
Dept: PULMONOLOGY | Facility: HOSPICE | Age: 73
End: 2018-05-24
Payer: MEDICARE

## 2018-06-06 DIAGNOSIS — G11.9 CEREBELLAR ATAXIA (HCC): ICD-10-CM

## 2018-06-12 ENCOUNTER — APPOINTMENT (RX ONLY)
Dept: URBAN - METROPOLITAN AREA CLINIC 4 | Facility: CLINIC | Age: 73
Setting detail: DERMATOLOGY
End: 2018-06-12

## 2018-06-12 DIAGNOSIS — L57.8 OTHER SKIN CHANGES DUE TO CHRONIC EXPOSURE TO NONIONIZING RADIATION: ICD-10-CM

## 2018-06-12 DIAGNOSIS — L259 CONTACT DERMATITIS AND OTHER ECZEMA, UNSPECIFIED CAUSE: ICD-10-CM

## 2018-06-12 PROBLEM — L23.9 ALLERGIC CONTACT DERMATITIS, UNSPECIFIED CAUSE: Status: ACTIVE | Noted: 2018-06-12

## 2018-06-12 PROCEDURE — 99213 OFFICE O/P EST LOW 20 MIN: CPT

## 2018-06-12 PROCEDURE — ? COUNSELING

## 2018-06-12 ASSESSMENT — LOCATION DETAILED DESCRIPTION DERM
LOCATION DETAILED: LEFT SUPERIOR MEDIAL UPPER BACK
LOCATION DETAILED: RIGHT LATERAL SUPERIOR CHEST
LOCATION DETAILED: RIGHT INFERIOR MEDIAL FOREHEAD
LOCATION DETAILED: UPPER STERNUM
LOCATION DETAILED: RIGHT MID-UPPER BACK
LOCATION DETAILED: RIGHT CENTRAL MALAR CHEEK

## 2018-06-12 ASSESSMENT — LOCATION ZONE DERM
LOCATION ZONE: TRUNK
LOCATION ZONE: FACE

## 2018-06-12 ASSESSMENT — LOCATION SIMPLE DESCRIPTION DERM
LOCATION SIMPLE: CHEST
LOCATION SIMPLE: RIGHT UPPER BACK
LOCATION SIMPLE: RIGHT CHEEK
LOCATION SIMPLE: RIGHT FOREHEAD
LOCATION SIMPLE: LEFT UPPER BACK

## 2018-06-12 NOTE — HPI: RASH (ALLERGIC CONTACT DERMATITIS)
How Severe Is Your Rash?: mild
Is This A New Presentation, Or A Follow-Up?: Evaluation for Possible Contact Allergy

## 2018-06-27 ENCOUNTER — APPOINTMENT (RX ONLY)
Dept: URBAN - METROPOLITAN AREA CLINIC 4 | Facility: CLINIC | Age: 73
Setting detail: DERMATOLOGY
End: 2018-06-27

## 2018-06-27 DIAGNOSIS — L85.3 XEROSIS CUTIS: ICD-10-CM

## 2018-06-27 DIAGNOSIS — L81.4 OTHER MELANIN HYPERPIGMENTATION: ICD-10-CM

## 2018-06-27 DIAGNOSIS — Z71.89 OTHER SPECIFIED COUNSELING: ICD-10-CM

## 2018-06-27 PROBLEM — D48.5 NEOPLASM OF UNCERTAIN BEHAVIOR OF SKIN: Status: ACTIVE | Noted: 2018-06-27

## 2018-06-27 PROCEDURE — ? COUNSELING

## 2018-06-27 PROCEDURE — ? TREATMENT REGIMEN

## 2018-06-27 PROCEDURE — 99213 OFFICE O/P EST LOW 20 MIN: CPT | Mod: 25

## 2018-06-27 PROCEDURE — ? BIOPSY BY SHAVE METHOD

## 2018-06-27 PROCEDURE — 11100: CPT

## 2018-06-27 ASSESSMENT — LOCATION SIMPLE DESCRIPTION DERM
LOCATION SIMPLE: LEFT PRETIBIAL REGION
LOCATION SIMPLE: LEFT FOREARM
LOCATION SIMPLE: RIGHT PRETIBIAL REGION
LOCATION SIMPLE: RIGHT FOREARM

## 2018-06-27 ASSESSMENT — LOCATION DETAILED DESCRIPTION DERM
LOCATION DETAILED: LEFT PROXIMAL DORSAL FOREARM
LOCATION DETAILED: RIGHT PROXIMAL PRETIBIAL REGION
LOCATION DETAILED: LEFT PROXIMAL PRETIBIAL REGION
LOCATION DETAILED: RIGHT PROXIMAL DORSAL FOREARM

## 2018-06-27 ASSESSMENT — LOCATION ZONE DERM
LOCATION ZONE: ARM
LOCATION ZONE: LEG

## 2018-06-27 NOTE — PROCEDURE: BIOPSY BY SHAVE METHOD
Lab Facility: 
Anesthesia Type: 1% lidocaine with epinephrine
Biopsy Type: H and E
Curettage Text: The wound bed was treated with curettage after the biopsy was performed.
Depth Of Biopsy: dermis
Render Post-Care Instructions In Note?: yes
Billing Type: Third-Party Bill
Electrodesiccation Text: The wound bed was treated with electrodesiccation after the biopsy was performed.
Wound Care: Vaseline
Biopsy Method: Personna blade
Bill For Surgical Tray: no
Size Of Lesion In Cm: 0
Cryotherapy Text: The wound bed was treated with cryotherapy after the biopsy was performed.
Consent: Written consent was obtained and risks were reviewed including but not limited to scarring, infection, bleeding, scabbing, incomplete removal, nerve damage and allergy to anesthesia.
Dressing: bandage
Type Of Destruction Used: Curettage
Post-Care Instructions: I reviewed with the patient in detail post-care instructions. Patient is to keep the biopsy site dry overnight, and then apply bacitracin twice daily until healed. Patient may apply hydrogen peroxide soaks to remove any crusting.
Notification Instructions: Patient will be notified of biopsy results. However, patient instructed to call the office if not contacted within 2 weeks.
Silver Nitrate Text: The wound bed was treated with silver nitrate after the biopsy was performed.
Anesthesia Volume In Cc: 1
Detail Level: Detailed
Electrodesiccation And Curettage Text: The wound bed was treated with electrodesiccation and curettage after the biopsy was performed.
Hemostasis: Drysol
Lab: [id number]

## 2018-07-13 ENCOUNTER — RX ONLY (OUTPATIENT)
Age: 73
Setting detail: RX ONLY
End: 2018-07-13

## 2018-07-13 RX ORDER — TRIAMCINOLONE ACETONIDE 1 MG/G
CREAM TOPICAL
Qty: 1 | Refills: 2 | Status: ERX

## 2018-08-08 DIAGNOSIS — G47.62 LEG CRAMPS, SLEEP RELATED: ICD-10-CM

## 2018-08-08 DIAGNOSIS — M81.0 AGE-RELATED OSTEOPOROSIS WITHOUT CURRENT PATHOLOGICAL FRACTURE: ICD-10-CM

## 2018-08-08 DIAGNOSIS — G60.9 IDIOPATHIC PERIPHERAL NEUROPATHY: ICD-10-CM

## 2018-08-08 DIAGNOSIS — R73.02 GLUCOSE INTOLERANCE (IMPAIRED GLUCOSE TOLERANCE): ICD-10-CM

## 2018-08-08 DIAGNOSIS — E78.00 HYPERCHOLESTEROLEMIA: ICD-10-CM

## 2018-08-08 DIAGNOSIS — Z87.440 HISTORY OF RECURRENT UTI (URINARY TRACT INFECTION): ICD-10-CM

## 2018-08-08 DIAGNOSIS — K51.20 CHRONIC ULCERATIVE PROCTITIS WITHOUT COMPLICATIONS (HCC): ICD-10-CM

## 2018-08-08 DIAGNOSIS — E55.9 VITAMIN D DEFICIENCY: ICD-10-CM

## 2018-08-29 ENCOUNTER — HOSPITAL ENCOUNTER (OUTPATIENT)
Facility: MEDICAL CENTER | Age: 73
End: 2018-08-29
Attending: INTERNAL MEDICINE
Payer: MEDICARE

## 2018-08-29 ENCOUNTER — NON-PROVIDER VISIT (OUTPATIENT)
Dept: INTERNAL MEDICINE | Facility: IMAGING CENTER | Age: 73
End: 2018-08-29
Payer: MEDICARE

## 2018-08-29 LAB
25(OH)D3 SERPL-MCNC: 41 NG/ML (ref 30–100)
ALBUMIN SERPL BCP-MCNC: 4 G/DL (ref 3.2–4.9)
ALBUMIN/GLOB SERPL: 1.3 G/DL
ALP SERPL-CCNC: 96 U/L (ref 30–99)
ALT SERPL-CCNC: 22 U/L (ref 2–50)
ANION GAP SERPL CALC-SCNC: 9 MMOL/L (ref 0–11.9)
APPEARANCE UR: CLEAR
AST SERPL-CCNC: 23 U/L (ref 12–45)
BASOPHILS # BLD AUTO: 0.6 % (ref 0–1.8)
BASOPHILS # BLD: 0.04 K/UL (ref 0–0.12)
BILIRUB SERPL-MCNC: 0.8 MG/DL (ref 0.1–1.5)
BILIRUB UR QL STRIP.AUTO: NEGATIVE
BUN SERPL-MCNC: 21 MG/DL (ref 8–22)
CALCIUM SERPL-MCNC: 9.5 MG/DL (ref 8.5–10.5)
CHLORIDE SERPL-SCNC: 100 MMOL/L (ref 96–112)
CO2 SERPL-SCNC: 27 MMOL/L (ref 20–33)
COLOR UR: YELLOW
CREAT SERPL-MCNC: 0.59 MG/DL (ref 0.5–1.4)
EOSINOPHIL # BLD AUTO: 0.14 K/UL (ref 0–0.51)
EOSINOPHIL NFR BLD: 2.2 % (ref 0–6.9)
ERYTHROCYTE [DISTWIDTH] IN BLOOD BY AUTOMATED COUNT: 43.6 FL (ref 35.9–50)
GLOBULIN SER CALC-MCNC: 3.1 G/DL (ref 1.9–3.5)
GLUCOSE SERPL-MCNC: 95 MG/DL (ref 65–99)
GLUCOSE UR STRIP.AUTO-MCNC: NEGATIVE MG/DL
HCT VFR BLD AUTO: 44.9 % (ref 37–47)
HGB BLD-MCNC: 14.1 G/DL (ref 12–16)
IMM GRANULOCYTES # BLD AUTO: 0.01 K/UL (ref 0–0.11)
IMM GRANULOCYTES NFR BLD AUTO: 0.2 % (ref 0–0.9)
IRON SATN MFR SERPL: 23 % (ref 15–55)
IRON SERPL-MCNC: 92 UG/DL (ref 40–170)
KETONES UR STRIP.AUTO-MCNC: NEGATIVE MG/DL
LEUKOCYTE ESTERASE UR QL STRIP.AUTO: NEGATIVE
LYMPHOCYTES # BLD AUTO: 1.68 K/UL (ref 1–4.8)
LYMPHOCYTES NFR BLD: 25.8 % (ref 22–41)
MAGNESIUM SERPL-MCNC: 2.4 MG/DL (ref 1.5–2.5)
MCH RBC QN AUTO: 31.5 PG (ref 27–33)
MCHC RBC AUTO-ENTMCNC: 31.4 G/DL (ref 33.6–35)
MCV RBC AUTO: 100.4 FL (ref 81.4–97.8)
MICRO URNS: NORMAL
MONOCYTES # BLD AUTO: 0.43 K/UL (ref 0–0.85)
MONOCYTES NFR BLD AUTO: 6.6 % (ref 0–13.4)
NEUTROPHILS # BLD AUTO: 4.21 K/UL (ref 2–7.15)
NEUTROPHILS NFR BLD: 64.6 % (ref 44–72)
NITRITE UR QL STRIP.AUTO: NEGATIVE
NRBC # BLD AUTO: 0 K/UL
NRBC BLD-RTO: 0 /100 WBC
PH UR STRIP.AUTO: 7.5 [PH]
PLATELET # BLD AUTO: 257 K/UL (ref 164–446)
PMV BLD AUTO: 9.8 FL (ref 9–12.9)
POTASSIUM SERPL-SCNC: 4.5 MMOL/L (ref 3.6–5.5)
PROT SERPL-MCNC: 7.1 G/DL (ref 6–8.2)
PROT UR QL STRIP: NEGATIVE MG/DL
RBC # BLD AUTO: 4.47 M/UL (ref 4.2–5.4)
RBC UR QL AUTO: NEGATIVE
SODIUM SERPL-SCNC: 136 MMOL/L (ref 135–145)
SP GR UR STRIP.AUTO: 1.02
TIBC SERPL-MCNC: 402 UG/DL (ref 250–450)
UROBILINOGEN UR STRIP.AUTO-MCNC: 0.2 MG/DL
WBC # BLD AUTO: 6.5 K/UL (ref 4.8–10.8)

## 2018-08-29 PROCEDURE — 83704 LIPOPROTEIN BLD QUAN PART: CPT

## 2018-08-29 PROCEDURE — 83735 ASSAY OF MAGNESIUM: CPT | Mod: GZ

## 2018-08-29 PROCEDURE — 80053 COMPREHEN METABOLIC PANEL: CPT

## 2018-08-29 PROCEDURE — 80061 LIPID PANEL: CPT

## 2018-08-29 PROCEDURE — 83540 ASSAY OF IRON: CPT

## 2018-08-29 PROCEDURE — 82306 VITAMIN D 25 HYDROXY: CPT

## 2018-08-29 PROCEDURE — 83550 IRON BINDING TEST: CPT

## 2018-08-29 PROCEDURE — 85025 COMPLETE CBC W/AUTO DIFF WBC: CPT

## 2018-08-29 PROCEDURE — 83036 HEMOGLOBIN GLYCOSYLATED A1C: CPT

## 2018-08-29 PROCEDURE — 81003 URINALYSIS AUTO W/O SCOPE: CPT

## 2018-08-30 LAB
EST. AVERAGE GLUCOSE BLD GHB EST-MCNC: 114 MG/DL
HBA1C MFR BLD: 5.6 % (ref 0–5.6)

## 2018-08-31 ENCOUNTER — OFFICE VISIT (OUTPATIENT)
Dept: INTERNAL MEDICINE | Facility: IMAGING CENTER | Age: 73
End: 2018-08-31
Payer: MEDICARE

## 2018-08-31 VITALS
RESPIRATION RATE: 14 BRPM | HEART RATE: 68 BPM | SYSTOLIC BLOOD PRESSURE: 120 MMHG | TEMPERATURE: 98.6 F | BODY MASS INDEX: 18.52 KG/M2 | WEIGHT: 104.5 LBS | OXYGEN SATURATION: 94 % | DIASTOLIC BLOOD PRESSURE: 60 MMHG

## 2018-08-31 DIAGNOSIS — E55.9 VITAMIN D DEFICIENCY: ICD-10-CM

## 2018-08-31 DIAGNOSIS — M81.0 AGE-RELATED OSTEOPOROSIS WITHOUT CURRENT PATHOLOGICAL FRACTURE: ICD-10-CM

## 2018-08-31 DIAGNOSIS — Z87.440 HISTORY OF RECURRENT UTI (URINARY TRACT INFECTION): ICD-10-CM

## 2018-08-31 DIAGNOSIS — Z13.220 SCREENING FOR HYPERCHOLESTEROLEMIA: ICD-10-CM

## 2018-08-31 DIAGNOSIS — Z91.81 HISTORY OF FALLING: ICD-10-CM

## 2018-08-31 DIAGNOSIS — J45.20 MILD INTERMITTENT ASTHMA WITHOUT COMPLICATION: ICD-10-CM

## 2018-08-31 DIAGNOSIS — G11.9 CEREBELLAR ATAXIA (HCC): ICD-10-CM

## 2018-08-31 DIAGNOSIS — G47.62 LEG CRAMPS, SLEEP RELATED: ICD-10-CM

## 2018-08-31 PROCEDURE — 99214 OFFICE O/P EST MOD 30 MIN: CPT | Performed by: INTERNAL MEDICINE

## 2018-08-31 ASSESSMENT — ENCOUNTER SYMPTOMS
WEIGHT LOSS: 0
SPUTUM PRODUCTION: 1
FALLS: 1
DIZZINESS: 0
PALPITATIONS: 0

## 2018-08-31 NOTE — PROGRESS NOTES
"Established Patient Note   HPI:        Ana Paula is here today to follow up her asthma and review her lab results. She has been coughing more over past couple days. No wheezing or more shortness of breath compared to normal. She uses her albuterol rarely. She sees a pulmonologist somewhat regularly.    Past Medical History:   Diagnosis Date   • Age related osteoporosis 3/30/2018    Prior fracture with left hip   • Arthritis    • ASTHMA    • Cerebellar ataxia ([facility]) 3/30/2018    Due to fall 2004   • Chronic ulcerative colitis ([facility]) 6/26/2013   • Cough    • GERD (gastroesophageal reflux disease)    • HYPOTENSION    • Inner ear disease    • Leg cramps, sleep related 5/2/2018   • Near-syncope    • Neck injury     fracture, hand and leg numbness and tingling   • Osteoporosis    • Peripheral neuropathy ([facility])    • Polyp of sigmoid colon 04/10/2018    Hyperplastic polyp   • Raynaud disease    • Tremor    • Vitamin D deficiency        Current Outpatient Prescriptions   Medication Sig Dispense Refill   • TURMERIC CURCUMIN PO Take  by mouth.     • LUTEIN PO Take  by mouth.     • BIOTIN PO Take  by mouth.     • POTASSIUM PO Take  by mouth.     • Cetirizine HCl (ZYRTEC ALLERGY PO) Take  by mouth.     • MAGNESIUM PO Take  by mouth.     • ASCORBIC ACID PO Take  by mouth.     • albuterol (PROAIR HFA) 108 (90 BASE) MCG/ACT Aero Soln inhalation aerosol Inhale 2 Puffs by mouth every four hours as needed for Shortness of Breath.     • diazepam (VALIUM) 5 MG TABS Take 1 Tab by mouth 3 times a day as needed (muscle relaxant). (Patient not taking: Reported on 2/3/2017) 15 Tab 0   • Cholecalciferol (VITAMIN D3) Take  by mouth every day.     • multivitamin (THERAGRAN) TABS Take 1 Tab by mouth every day.       No current facility-administered medications for this visit.          Allergies   Allergen Reactions   • Azithromycin    • Clindamycin    • Codeine    • Dilaudid [Hydromorphone] Anaphylaxis     \"went into code blue\"    • Erythromycin    • " "Keflex    • Levaquin      \"i dont know\"    • Lyrica    • Morphine    • Other Food      Dairy-mucus (butter is okay). spices-cough   • Quinolones    • Sulfa Drugs      \"i dont know\"    • Tetracyclines    • Vicodin [Hydrocodone-Acetaminophen]          Social History   Substance Use Topics   • Smoking status: Never Smoker   • Smokeless tobacco: Never Used   • Alcohol use No     History   Alcohol Use No     History   Drug Use No       Review of Systems   Constitutional: Negative for malaise/fatigue and weight loss.   Respiratory: Positive for sputum production.         Small amount of phlegm regularly.   Cardiovascular: Negative for chest pain and palpitations.   Musculoskeletal: Positive for falls.        She has fallen couple times since last seen when she tripped on object.   Neurological: Negative for dizziness.       Ambulatory Vitals  /60   Pulse 68   Temp 37 °C (98.6 °F)   Resp 14   Wt 47.4 kg (104 lb 8 oz)   SpO2 94%   BMI 18.52 kg/m²     Physical Exam   Neck: Neck supple. No JVD present.   Cardiovascular: Normal rate, regular rhythm and normal heart sounds.  Exam reveals no gallop and no friction rub.    No murmur heard.  Pulmonary/Chest: Effort normal and breath sounds normal. She has no wheezes. She has no rales.   Musculoskeletal: She exhibits no edema.   Neurological: Coordination abnormal.   Imbalance present     Component      Latest Ref Rng & Units 3/30/2018 5/2/2018   WBC      4.8 - 10.8 K/uL 7.0    RBC      4.20 - 5.40 M/uL 4.96    Hemoglobin      12.0 - 16.0 g/dL 15.7    Hematocrit      37.0 - 47.0 % 49.4 (H)    MCV      81.4 - 97.8 fL 99.6 (H)    MCH      27.0 - 33.0 pg 31.7    MCHC      33.6 - 35.0 g/dL 31.8 (L)    RDW      35.9 - 50.0 fL 45.1    Platelet Count      164 - 446 K/uL 275    MPV      9.0 - 12.9 fL 9.5    Neutrophils-Polys      44.00 - 72.00 % 67.10    Lymphocytes      22.00 - 41.00 % 26.50    Monocytes      0.00 - 13.40 % 4.30    Eosinophils      0.00 - 6.90 % 1.10  "   Basophils      0.00 - 1.80 % 0.70    Immature Granulocytes      0.00 - 0.90 % 0.30    Nucleated RBC      /100 WBC 0.00    Neutrophils (Absolute)      2.00 - 7.15 K/uL 4.72    Lymphs (Absolute)      1.00 - 4.80 K/uL 1.86    Monos (Absolute)      0.00 - 0.85 K/uL 0.30    Eos (Absolute)      0.00 - 0.51 K/uL 0.08    Baso (Absolute)      0.00 - 0.12 K/uL 0.05    Immature Granulocytes (abs)      0.00 - 0.11 K/uL 0.02    NRBC (Absolute)      K/uL 0.00    Sodium      135 - 145 mmol/L 137    Potassium      3.6 - 5.5 mmol/L 3.8    Chloride      96 - 112 mmol/L 101    Co2      20 - 33 mmol/L 29    Anion Gap      0.0 - 11.9 7.0    Glucose      65 - 99 mg/dL 94    Bun      8 - 22 mg/dL 21    Creatinine      0.50 - 1.40 mg/dL 0.57    Calcium      8.5 - 10.5 mg/dL 10.3    AST(SGOT)      12 - 45 U/L 22    ALT(SGPT)      2 - 50 U/L 21    Alkaline Phosphatase      30 - 99 U/L 82    Total Bilirubin      0.1 - 1.5 mg/dL 1.0    Albumin      3.2 - 4.9 g/dL 4.6    Total Protein      6.0 - 8.2 g/dL 7.7    Globulin      1.9 - 3.5 g/dL 3.1    A-G Ratio      g/dL 1.5    Color       Yellow Yellow   Character       Clear Clear   Specific Gravity      <1.035 1.015 1.020   Ph      5.0 - 8.0 7.5 7.0   Glucose      Negative mg/dL Negative Negative   Ketones      Negative mg/dL Negative Negative   Protein      Negative mg/dL Negative Negative   Bilirubin      Negative Negative Negative   Urobilinogen, Urine      Negative 0.2 0.2   Nitrite      Negative Negative Negative   Leukocyte Esterase      Negative Small (A) Moderate (A)   Occult Blood      Negative Negative Negative   Micro Urine Req       Microscopic Microscopic   WBC      /hpf 5-10 (A) 20-50 (A)   RBC      /hpf 0-2    Bacteria      None /hpf Negative Negative   Epithelial Cells      /hpf Few Few   Hyaline Cast      /lpf 0-2 0-2   Ca Oxalate Crystal      /hpf  Rare   Cholesterol,Tot      100 - 199 mg/dL 220 (H)    Triglycerides      0 - 149 mg/dL 78    HDL      >=40 mg/dL 67    LDL       <100 mg/dL 137 (H)    Iron      40 - 170 ug/dL 110    Total Iron Binding      250 - 450 ug/dL 447    % Saturation      15 - 55 % 25    Significant Indicator        NEG   Source        UR   Urine Culture        Mixed skin evert 10-50,000 cfu/mL   Glycohemoglobin      0.0 - 5.6 % 5.7 (H)    Estim. Avg Glu      mg/dL 117    GFR If African American      >60 mL/min/1.73 m 2 >60    GFR If Non African American      >60 mL/min/1.73 m 2 >60    25-Hydroxy   Vitamin D 25      30 - 100 ng/mL 29 (L)    TSH      0.380 - 5.330 uIU/mL 1.700    Vitamin B12 -True Cobalamin      211 - 911 pg/mL 858    Magnesium      1.5 - 2.5 mg/dL       Component      Latest Ref Rng & Units 5/21/2018 8/29/2018   WBC      4.8 - 10.8 K/uL  6.5   RBC      4.20 - 5.40 M/uL  4.47   Hemoglobin      12.0 - 16.0 g/dL  14.1   Hematocrit      37.0 - 47.0 %  44.9   MCV      81.4 - 97.8 fL  100.4 (H)   MCH      27.0 - 33.0 pg  31.5   MCHC      33.6 - 35.0 g/dL  31.4 (L)   RDW      35.9 - 50.0 fL  43.6   Platelet Count      164 - 446 K/uL  257   MPV      9.0 - 12.9 fL  9.8   Neutrophils-Polys      44.00 - 72.00 %  64.60   Lymphocytes      22.00 - 41.00 %  25.80   Monocytes      0.00 - 13.40 %  6.60   Eosinophils      0.00 - 6.90 %  2.20   Basophils      0.00 - 1.80 %  0.60   Immature Granulocytes      0.00 - 0.90 %  0.20   Nucleated RBC      /100 WBC  0.00   Neutrophils (Absolute)      2.00 - 7.15 K/uL  4.21   Lymphs (Absolute)      1.00 - 4.80 K/uL  1.68   Monos (Absolute)      0.00 - 0.85 K/uL  0.43   Eos (Absolute)      0.00 - 0.51 K/uL  0.14   Baso (Absolute)      0.00 - 0.12 K/uL  0.04   Immature Granulocytes (abs)      0.00 - 0.11 K/uL  0.01   NRBC (Absolute)      K/uL  0.00   Sodium      135 - 145 mmol/L  136   Potassium      3.6 - 5.5 mmol/L  4.5   Chloride      96 - 112 mmol/L  100   Co2      20 - 33 mmol/L  27   Anion Gap      0.0 - 11.9  9.0   Glucose      65 - 99 mg/dL  95   Bun      8 - 22 mg/dL  21   Creatinine      0.50 - 1.40 mg/dL  0.59    Calcium      8.5 - 10.5 mg/dL  9.5   AST(SGOT)      12 - 45 U/L  23   ALT(SGPT)      2 - 50 U/L  22   Alkaline Phosphatase      30 - 99 U/L  96   Total Bilirubin      0.1 - 1.5 mg/dL  0.8   Albumin      3.2 - 4.9 g/dL  4.0   Total Protein      6.0 - 8.2 g/dL  7.1   Globulin      1.9 - 3.5 g/dL  3.1   A-G Ratio      g/dL  1.3   Color       Yellow Yellow   Character       Clear Clear   Specific Gravity      <1.035 1.014 1.016   Ph      5.0 - 8.0 7.0 7.5   Glucose      Negative mg/dL Negative Negative   Ketones      Negative mg/dL Negative Negative   Protein      Negative mg/dL Negative Negative   Bilirubin      Negative Negative Negative   Urobilinogen, Urine      Negative 0.2 0.2   Nitrite      Negative Negative Negative   Leukocyte Esterase      Negative Small (A) Negative   Occult Blood      Negative Negative Negative   Micro Urine Req       Microscopic see below   WBC      /hpf 0-2    RBC      /hpf 2-5 (A)    Bacteria      None /hpf Negative    Epithelial Cells      /hpf Negative    Hyaline Cast      /lpf 0-2    Ca Oxalate Crystal      /hpf     Cholesterol,Tot      100 - 199 mg/dL     Triglycerides      0 - 149 mg/dL     HDL      >=40 mg/dL     LDL      <100 mg/dL     Iron      40 - 170 ug/dL  92   Total Iron Binding      250 - 450 ug/dL  402   % Saturation      15 - 55 %  23   Significant Indicator           Source           Urine Culture           Glycohemoglobin      0.0 - 5.6 %  5.6   Estim. Avg Glu      mg/dL  114   GFR If African American      >60 mL/min/1.73 m 2  >60   GFR If Non African American      >60 mL/min/1.73 m 2  >60   25-Hydroxy   Vitamin D 25      30 - 100 ng/mL  41   TSH      0.380 - 5.330 uIU/mL     Vitamin B12 -True Cobalamin      211 - 911 pg/mL     Magnesium      1.5 - 2.5 mg/dL  2.4       Assessment and Plan:     1. Cerebellar ataxia ([facility])  COMP METABOLIC PANEL    TSH    VITAMIN B12   2. Age-related osteoporosis without current pathological fracture  COMP METABOLIC PANEL    TSH   3. Mild  intermittent asthma without complication     4. History of recurrent UTI (urinary tract infection)  COMP METABOLIC PANEL    URINALYSIS CX IF IND   5. History of falling     6. Vitamin D deficiency  COMP METABOLIC PANEL    VITAMIN D 25-HYDROXY   7. Leg cramps, sleep related  MAGNESIUM   8. Screening for hypercholesterolemia  NMR LIPOPROFILE     She states she had a hard fall going back going back about one month ago. She saw physical therapist who found she was okay. She does not wish to treat osteoporosis at this time. Lipoprofile NMR is pending. Follow up 6 months with blood work to be done one week before. She wants to keep track of her glucose and cholesterol. Due to urine retention in the bladder she requires frequent self catheterization up to 12 times a day.    Face to face time: 45 minutes with greater than 50% of time spent with direct patient contact and medical management.       Dar Fraire M.D.

## 2018-09-03 LAB
CHOLEST SERPL-MCNC: 206 MG/DL
HDL PARTICAL NO Q4363: 40.4 UMOL/L
HDL SIZE Q4361: 9.3 NM
HDLC SERPL-MCNC: 66 MG/DL (ref 40–59)
HLD.LARGE SERPL-SCNC: 9 UMOL/L
L VLDL PART NO Q4357: <1.5 NMOL/L
LDL SERPL QN: 21.1 NM
LDL SERPL-SCNC: 1349 NMOL/L
LDL SMALL SERPL-SCNC: 404 NMOL/L
LDLC SERPL CALC-MCNC: 122 MG/DL
PATHOLOGY STUDY: ABNORMAL
TRIGL SERPL-MCNC: 89 MG/DL (ref 30–149)
VLDL SIZE Q4362: 45.5 NM

## 2018-09-12 ENCOUNTER — APPOINTMENT (RX ONLY)
Dept: URBAN - METROPOLITAN AREA CLINIC 4 | Facility: CLINIC | Age: 73
Setting detail: DERMATOLOGY
End: 2018-09-12

## 2018-09-12 DIAGNOSIS — D17 BENIGN LIPOMATOUS NEOPLASM: ICD-10-CM

## 2018-09-12 DIAGNOSIS — D485 NEOPLASM OF UNCERTAIN BEHAVIOR OF SKIN: ICD-10-CM

## 2018-09-12 PROBLEM — D17.21 BENIGN LIPOMATOUS NEOPLASM OF SKIN AND SUBCUTANEOUS TISSUE OF RIGHT ARM: Status: ACTIVE | Noted: 2018-09-12

## 2018-09-12 PROBLEM — C44.722 SQUAMOUS CELL CARCINOMA OF SKIN OF RIGHT LOWER LIMB, INCLUDING HIP: Status: ACTIVE | Noted: 2018-09-12

## 2018-09-12 PROBLEM — D48.5 NEOPLASM OF UNCERTAIN BEHAVIOR OF SKIN: Status: ACTIVE | Noted: 2018-09-12

## 2018-09-12 PROCEDURE — ? EXCISION

## 2018-09-12 PROCEDURE — 99213 OFFICE O/P EST LOW 20 MIN: CPT | Mod: 25

## 2018-09-12 PROCEDURE — ? DIAGNOSIS COMMENT

## 2018-09-12 PROCEDURE — 11603 EXC TR-EXT MAL+MARG 2.1-3 CM: CPT

## 2018-09-12 PROCEDURE — ? OBSERVATION

## 2018-09-12 ASSESSMENT — LOCATION DETAILED DESCRIPTION DERM
LOCATION DETAILED: LEFT CENTRAL EYEBROW
LOCATION DETAILED: RIGHT ANTERIOR PROXIMAL UPPER ARM

## 2018-09-12 ASSESSMENT — LOCATION ZONE DERM
LOCATION ZONE: ARM
LOCATION ZONE: FACE

## 2018-09-12 ASSESSMENT — LOCATION SIMPLE DESCRIPTION DERM
LOCATION SIMPLE: RIGHT UPPER ARM
LOCATION SIMPLE: LEFT EYEBROW

## 2018-09-12 NOTE — PROCEDURE: EXCISION
Cartilage Graft Text: The defect edges were debeveled with a #15 scalpel blade.  Given the location of the defect, shape of the defect, the fact the defect involved a full thickness cartilage defect a cartilage graft was deemed most appropriate.  An appropriate donor site was identified, cleansed, and anesthetized. The cartilage graft was then harvested and transferred to the recipient site, oriented appropriately and then sutured into place.  The secondary defect was then repaired using a primary closure.
Graft Donor Site Will Heal By Secondary Intention: No
Show Pathology Comment Variable: Yes
Excisional Biopsy Additional Text (Leave Blank If You Do Not Want): The margin was drawn around the clinically apparent lesion. An elliptical shape was then drawn on the skin incorporating the lesion and margins.  Incisions were then made along these lines to the appropriate tissue plane and the lesion was extirpated.
Bilobed Transposition Flap Text: The defect edges were debeveled with a #15 scalpel blade.  Given the location of the defect and the proximity to free margins a bilobed transposition flap was deemed most appropriate.  Using a sterile surgical marker, an appropriate bilobe flap drawn around the defect.    The area thus outlined was incised deep to adipose tissue with a #15 scalpel blade.  The skin margins were undermined to an appropriate distance in all directions utilizing iris scissors.
Hemostasis: Electrocautery
Burow's Advancement Flap Text: The defect edges were debeveled with a #15 scalpel blade.  Given the location of the defect and the proximity to free margins a Burow's advancement flap was deemed most appropriate.  Using a sterile surgical marker, the appropriate advancement flap was drawn incorporating the defect and placing the expected incisions within the relaxed skin tension lines where possible.    The area thus outlined was incised deep to adipose tissue with a #15 scalpel blade.  The skin margins were undermined to an appropriate distance in all directions utilizing iris scissors.
Anesthesia Type: 1% lidocaine with epinephrine
Complex Repair And Melolabial Flap Text: The defect edges were debeveled with a #15 scalpel blade.  The primary defect was closed partially with a complex linear closure.  Given the location of the remaining defect, shape of the defect and the proximity to free margins a melolabial flap was deemed most appropriate for complete closure of the defect.  Using a sterile surgical marker, an appropriate advancement flap was drawn incorporating the defect and placing the expected incisions within the relaxed skin tension lines where possible.    The area thus outlined was incised deep to adipose tissue with a #15 scalpel blade.  The skin margins were undermined to an appropriate distance in all directions utilizing iris scissors.
Dressing: dry sterile dressing
Tissue Cultured Epidermal Autograft Text: The defect edges were debeveled with a #15 scalpel blade.  Given the location of the defect, shape of the defect and the proximity to free margins a tissue cultured epidermal autograft was deemed most appropriate.  The graft was then trimmed to fit the size of the defect.  The graft was then placed in the primary defect and oriented appropriately.
Excision Depth: adipose tissue
Mucosal Advancement Flap Text: Given the location of the defect, shape of the defect and the proximity to free margins a mucosal advancement flap was deemed most appropriate. Incisions were made with a 15 blade scalpel in the appropriate fashion along the cutaneous vermilion border and the mucosal lip. The remaining actinically damaged mucosal tissue was excised.  The mucosal advancement flap was then elevated to the gingival sulcus with care taken to preserve the neurovascular structures and advanced into the primary defect. Care was taken to ensure that precise realignment of the vermilion border was achieved.
Lazy S Intermediate Repair Preamble Text (Leave Blank If You Do Not Want): Undermining was performed with blunt dissection.
Melolabial Interpolation Flap Text: A decision was made to reconstruct the defect utilizing an interpolation axial flap and a staged reconstruction.  A telfa template was made of the defect.  This telfa template was then used to outline the melolabial interpolation flap.  The donor area for the pedicle flap was then injected with anesthesia.  The flap was excised through the skin and subcutaneous tissue down to the layer of the underlying musculature.  The pedicle flap was carefully excised within this deep plane to maintain its blood supply.  The edges of the donor site were undermined.   The donor site was closed in a primary fashion.  The pedicle was then rotated into position and sutured.  Once the tube was sutured into place, adequate blood supply was confirmed with blanching and refill.  The pedicle was then wrapped with xeroform gauze and dressed appropriately with a telfa and gauze bandage to ensure continued blood supply and protect the attached pedicle.
Island Pedicle Flap Text: The defect edges were debeveled with a #15 scalpel blade.  Given the location of the defect, shape of the defect and the proximity to free margins an island pedicle advancement flap was deemed most appropriate.  Using a sterile surgical marker, an appropriate advancement flap was drawn incorporating the defect, outlining the appropriate donor tissue and placing the expected incisions within the relaxed skin tension lines where possible.    The area thus outlined was incised deep to adipose tissue with a #15 scalpel blade.  The skin margins were undermined to an appropriate distance in all directions around the primary defect and laterally outward around the island pedicle utilizing iris scissors.  There was minimal undermining beneath the pedicle flap.
Advancement-Rotation Flap Text: The defect edges were debeveled with a #15 scalpel blade.  Given the location of the defect, shape of the defect and the proximity to free margins an advancement-rotation flap was deemed most appropriate.  Using a sterile surgical marker, an appropriate flap was drawn incorporating the defect and placing the expected incisions within the relaxed skin tension lines where possible. The area thus outlined was incised deep to adipose tissue with a #15 scalpel blade.  The skin margins were undermined to an appropriate distance in all directions utilizing iris scissors.
Keystone Flap Text: The defect edges were debeveled with a #15 scalpel blade.  Given the location of the defect, shape of the defect a keystone flap was deemed most appropriate.  Using a sterile surgical marker, an appropriate keystone flap was drawn incorporating the defect, outlining the appropriate donor tissue and placing the expected incisions within the relaxed skin tension lines where possible. The area thus outlined was incised deep to adipose tissue with a #15 scalpel blade.  The skin margins were undermined to an appropriate distance in all directions around the primary defect and laterally outward around the flap utilizing iris scissors.
Complex Repair And W Plasty Text: The defect edges were debeveled with a #15 scalpel blade.  The primary defect was closed partially with a complex linear closure.  Given the location of the remaining defect, shape of the defect and the proximity to free margins a W plasty was deemed most appropriate for complete closure of the defect.  Using a sterile surgical marker, an appropriate advancement flap was drawn incorporating the defect and placing the expected incisions within the relaxed skin tension lines where possible.    The area thus outlined was incised deep to adipose tissue with a #15 scalpel blade.  The skin margins were undermined to an appropriate distance in all directions utilizing iris scissors.
H Plasty Text: Given the location of the defect, shape of the defect and the proximity to free margins a H-plasty was deemed most appropriate for repair.  Using a sterile surgical marker, the appropriate advancement arms of the H-plasty were drawn incorporating the defect and placing the expected incisions within the relaxed skin tension lines where possible. The area thus outlined was incised deep to adipose tissue with a #15 scalpel blade. The skin margins were undermined to an appropriate distance in all directions utilizing iris scissors.  The opposing advancement arms were then advanced into place in opposite direction and anchored with interrupted buried subcutaneous sutures.
Perilesional Excision Additional Text (Leave Blank If You Do Not Want): The margin was drawn around the clinically apparent lesion. Incisions were then made along these lines to the appropriate tissue plane and the lesion was extirpated.
Slit Excision Additional Text (Leave Blank If You Do Not Want): A linear line was drawn on the skin overlying the lesion. An incision was made slowly until the lesion was visualized.  Once visualized, the lesion was removed with blunt dissection.
Saucerization Excision Additional Text (Leave Blank If You Do Not Want): The margin was drawn around the clinically apparent lesion.  Incisions were then made along these lines, in a tangential fashion, to the appropriate tissue plane and the lesion was extirpated.
Helical Rim Advancement Flap Text: The defect edges were debeveled with a #15 blade scalpel.  Given the location of the defect and the proximity to free margins (helical rim) a double helical rim advancement flap was deemed most appropriate.  Using a sterile surgical marker, the appropriate advancement flaps were drawn incorporating the defect and placing the expected incisions between the helical rim and antihelix where possible.  The area thus outlined was incised through and through with a #15 scalpel blade.  With a skin hook and iris scissors, the flaps were gently and sharply undermined and freed up.
Mastoid Interpolation Flap Text: A decision was made to reconstruct the defect utilizing an interpolation axial flap and a staged reconstruction.  A telfa template was made of the defect.  This telfa template was then used to outline the mastoid interpolation flap.  The donor area for the pedicle flap was then injected with anesthesia.  The flap was excised through the skin and subcutaneous tissue down to the layer of the underlying musculature.  The pedicle flap was carefully excised within this deep plane to maintain its blood supply.  The edges of the donor site were undermined.   The donor site was closed in a primary fashion.  The pedicle was then rotated into position and sutured.  Once the tube was sutured into place, adequate blood supply was confirmed with blanching and refill.  The pedicle was then wrapped with xeroform gauze and dressed appropriately with a telfa and gauze bandage to ensure continued blood supply and protect the attached pedicle.
S Plasty Text: Given the location and shape of the defect, and the orientation of relaxed skin tension lines, an S-plasty was deemed most appropriate for repair.  Using a sterile surgical marker, the appropriate outline of the S-plasty was drawn, incorporating the defect and placing the expected incisions within the relaxed skin tension lines where possible.  The area thus outlined was incised deep to adipose tissue with a #15 scalpel blade.  The skin margins were undermined to an appropriate distance in all directions utilizing iris scissors. The skin flaps were advanced over the defect.  The opposing margins were then approximated with interrupted buried subcutaneous sutures.
Island Pedicle Flap-Requiring Vessel Identification Text: The defect edges were debeveled with a #15 scalpel blade.  Given the location of the defect, shape of the defect and the proximity to free margins an island pedicle advancement flap was deemed most appropriate.  Using a sterile surgical marker, an appropriate advancement flap was drawn, based on the axial vessel mentioned above, incorporating the defect, outlining the appropriate donor tissue and placing the expected incisions within the relaxed skin tension lines where possible.    The area thus outlined was incised deep to adipose tissue with a #15 scalpel blade.  The skin margins were undermined to an appropriate distance in all directions around the primary defect and laterally outward around the island pedicle utilizing iris scissors.  There was minimal undermining beneath the pedicle flap.
Bi-Rhombic Flap Text: The defect edges were debeveled with a #15 scalpel blade.  Given the location of the defect and the proximity to free margins a bi-rhombic flap was deemed most appropriate.  Using a sterile surgical marker, an appropriate rhombic flap was drawn incorporating the defect. The area thus outlined was incised deep to adipose tissue with a #15 scalpel blade.  The skin margins were undermined to an appropriate distance in all directions utilizing iris scissors.
Anesthesia Volume In Cc: 12
Island Pedicle Flap With Canthal Suspension Text: The defect edges were debeveled with a #15 scalpel blade.  Given the location of the defect, shape of the defect and the proximity to free margins an island pedicle advancement flap was deemed most appropriate.  Using a sterile surgical marker, an appropriate advancement flap was drawn incorporating the defect, outlining the appropriate donor tissue and placing the expected incisions within the relaxed skin tension lines where possible. The area thus outlined was incised deep to adipose tissue with a #15 scalpel blade.  The skin margins were undermined to an appropriate distance in all directions around the primary defect and laterally outward around the island pedicle utilizing iris scissors.  There was minimal undermining beneath the pedicle flap. A suspension suture was placed in the canthal tendon to prevent tension and prevent ectropion.
V-Y Flap Text: The defect edges were debeveled with a #15 scalpel blade.  Given the location of the defect, shape of the defect and the proximity to free margins a V-Y flap was deemed most appropriate.  Using a sterile surgical marker, an appropriate advancement flap was drawn incorporating the defect and placing the expected incisions within the relaxed skin tension lines where possible.    The area thus outlined was incised deep to adipose tissue with a #15 scalpel blade.  The skin margins were undermined to an appropriate distance in all directions utilizing iris scissors.
Paramedian Forehead Flap Text: A decision was made to reconstruct the defect utilizing an interpolation axial flap and a staged reconstruction.  A telfa template was made of the defect.  This telfa template was then used to outline the paramedian forehead pedicle flap.  The donor area for the pedicle flap was then injected with anesthesia.  The flap was excised through the skin and subcutaneous tissue down to the layer of the underlying musculature.  The pedicle flap was carefully excised within this deep plane to maintain its blood supply.  The edges of the donor site were undermined.   The donor site was closed in a primary fashion.  The pedicle was then rotated into position and sutured.  Once the tube was sutured into place, adequate blood supply was confirmed with blanching and refill.  The pedicle was then wrapped with xeroform gauze and dressed appropriately with a telfa and gauze bandage to ensure continued blood supply and protect the attached pedicle.
Melolabial Transposition Flap Text: The defect edges were debeveled with a #15 scalpel blade.  Given the location of the defect and the proximity to free margins a melolabial flap was deemed most appropriate.  Using a sterile surgical marker, an appropriate melolabial transposition flap was drawn incorporating the defect.    The area thus outlined was incised deep to adipose tissue with a #15 scalpel blade.  The skin margins were undermined to an appropriate distance in all directions utilizing iris scissors.
Complex Repair Preamble Text (Leave Blank If You Do Not Want): Extensive wide undermining was performed.
Dorsal Nasal Flap Text: The defect edges were debeveled with a #15 scalpel blade.  Given the location of the defect and the proximity to free margins a dorsal nasal flap was deemed most appropriate.  Using a sterile surgical marker, an appropriate dorsal nasal flap was drawn around the defect.    The area thus outlined was incised deep to adipose tissue with a #15 scalpel blade.  The skin margins were undermined to an appropriate distance in all directions utilizing iris scissors.
Complex Repair And Double M Plasty Text: The defect edges were debeveled with a #15 scalpel blade.  The primary defect was closed partially with a complex linear closure.  Given the location of the remaining defect, shape of the defect and the proximity to free margins a double M plasty was deemed most appropriate for complete closure of the defect.  Using a sterile surgical marker, an appropriate advancement flap was drawn incorporating the defect and placing the expected incisions within the relaxed skin tension lines where possible.    The area thus outlined was incised deep to adipose tissue with a #15 scalpel blade.  The skin margins were undermined to an appropriate distance in all directions utilizing iris scissors.
Elliptical Excision Additional Text (Leave Blank If You Do Not Want): The margin was drawn around the clinically apparent lesion.  An elliptical shape was then drawn on the skin incorporating the lesion and margins.  Incisions were then made along these lines to the appropriate tissue plane and the lesion was extirpated.
Size Of Lesion In Cm: 2.2
Crescentic Advancement Flap Text: The defect edges were debeveled with a #15 scalpel blade.  Given the location of the defect and the proximity to free margins a crescentic advancement flap was deemed most appropriate.  Using a sterile surgical marker, the appropriate advancement flap was drawn incorporating the defect and placing the expected incisions within the relaxed skin tension lines where possible.    The area thus outlined was incised deep to adipose tissue with a #15 scalpel blade.  The skin margins were undermined to an appropriate distance in all directions utilizing iris scissors.
Previous Accession (Optional): [id number]
No Repair - Repaired With Adjacent Surgical Defect Text (Leave Blank If You Do Not Want): After the excision the defect was repaired concurrently with another surgical defect which was in close approximation.
Complex Repair And Xenograft Text: The defect edges were debeveled with a #15 scalpel blade.  The primary defect was closed partially with a complex linear closure.  Given the location of the defect, shape of the defect and the proximity to free margins a xenograft was deemed most appropriate to repair the remaining defect.  The graft was trimmed to fit the size of the remaining defect.  The graft was then placed in the primary defect, oriented appropriately, and sutured into place.
Complex Repair And A-T Advancement Flap Text: The defect edges were debeveled with a #15 scalpel blade.  The primary defect was closed partially with a complex linear closure.  Given the location of the remaining defect, shape of the defect and the proximity to free margins an A-T advancement flap was deemed most appropriate for complete closure of the defect.  Using a sterile surgical marker, an appropriate advancement flap was drawn incorporating the defect and placing the expected incisions within the relaxed skin tension lines where possible.    The area thus outlined was incised deep to adipose tissue with a #15 scalpel blade.  The skin margins were undermined to an appropriate distance in all directions utilizing iris scissors.
Complex Repair And Dermal Autograft Text: The defect edges were debeveled with a #15 scalpel blade.  The primary defect was closed partially with a complex linear closure.  Given the location of the defect, shape of the defect and the proximity to free margins an dermal autograft was deemed most appropriate to repair the remaining defect.  The graft was trimmed to fit the size of the remaining defect.  The graft was then placed in the primary defect, oriented appropriately, and sutured into place.
Modified Advancement Flap Text: The defect edges were debeveled with a #15 scalpel blade.  Given the location of the defect, shape of the defect and the proximity to free margins a modified advancement flap was deemed most appropriate.  Using a sterile surgical marker, an appropriate advancement flap was drawn incorporating the defect and placing the expected incisions within the relaxed skin tension lines where possible.    The area thus outlined was incised deep to adipose tissue with a #15 scalpel blade.  The skin margins were undermined to an appropriate distance in all directions utilizing iris scissors.
Scalpel Size: 15 blade
Xenograft Text: The defect edges were debeveled with a #15 scalpel blade.  Given the location of the defect, shape of the defect and the proximity to free margins a xenograft was deemed most appropriate.  The graft was then trimmed to fit the size of the defect.  The graft was then placed in the primary defect and oriented appropriately.
Purse String (Intermediate) Text: Given the location of the defect and the characteristics of the surrounding skin a purse string intermediate closure was deemed most appropriate.  Undermining was performed circumfirentially around the surgical defect.  A purse string suture was then placed and tightened.
Size Of Margin In Cm: 0.2
Surgeon Performing The Repair (Optional): Jayson
Secondary Defect Width (In Cm): 0
Complex Repair And Ftsg Text: The defect edges were debeveled with a #15 scalpel blade.  The primary defect was closed partially with a complex linear closure.  Given the location of the defect, shape of the defect and the proximity to free margins a full thickness skin graft was deemed most appropriate to repair the remaining defect.  The graft was trimmed to fit the size of the remaining defect.  The graft was then placed in the primary defect, oriented appropriately, and sutured into place.
W Plasty Text: The lesion was extirpated to the level of the fat with a #15 scalpel blade.  Given the location of the defect, shape of the defect and the proximity to free margins a W-plasty was deemed most appropriate for repair.  Using a sterile surgical marker, the appropriate transposition arms of the W-plasty were drawn incorporating the defect and placing the expected incisions within the relaxed skin tension lines where possible.    The area thus outlined was incised deep to adipose tissue with a #15 scalpel blade.  The skin margins were undermined to an appropriate distance in all directions utilizing iris scissors.  The opposing transposition arms were then transposed into place in opposite direction and anchored with interrupted buried subcutaneous sutures.
O-Z Plasty Text: The defect edges were debeveled with a #15 scalpel blade.  Given the location of the defect, shape of the defect and the proximity to free margins an O-Z plasty (double transposition flap) was deemed most appropriate.  Using a sterile surgical marker, the appropriate transposition flaps were drawn incorporating the defect and placing the expected incisions within the relaxed skin tension lines where possible.    The area thus outlined was incised deep to adipose tissue with a #15 scalpel blade.  The skin margins were undermined to an appropriate distance in all directions utilizing iris scissors.  Hemostasis was achieved with electrocautery.  The flaps were then transposed into place, one clockwise and the other counterclockwise, and anchored with interrupted buried subcutaneous sutures.
Trilobed Flap Text: The defect edges were debeveled with a #15 scalpel blade.  Given the location of the defect and the proximity to free margins a trilobed flap was deemed most appropriate.  Using a sterile surgical marker, an appropriate trilobed flap drawn around the defect.    The area thus outlined was incised deep to adipose tissue with a #15 scalpel blade.  The skin margins were undermined to an appropriate distance in all directions utilizing iris scissors.
Composite Graft Text: The defect edges were debeveled with a #15 scalpel blade.  Given the location of the defect, shape of the defect, the proximity to free margins and the fact the defect was full thickness a composite graft was deemed most appropriate.  The defect was outline and then transferred to the donor site.  A full thickness graft was then excised from the donor site. The graft was then placed in the primary defect, oriented appropriately and then sutured into place.  The secondary defect was then repaired using a primary closure.
Body Location Override (Optional - Billing Will Still Be Based On Selected Body Map Location If Applicable): right lateral ankle
Billing Type: Third-Party Bill
Fusiform Excision Additional Text (Leave Blank If You Do Not Want): The margin was drawn around the clinically apparent lesion.  A fusiform shape was then drawn on the skin incorporating the lesion and margins.  Incisions were then made along these lines to the appropriate tissue plane and the lesion was extirpated.
Posterior Auricular Interpolation Flap Text: A decision was made to reconstruct the defect utilizing an interpolation axial flap and a staged reconstruction.  A telfa template was made of the defect.  This telfa template was then used to outline the posterior auricular interpolation flap.  The donor area for the pedicle flap was then injected with anesthesia.  The flap was excised through the skin and subcutaneous tissue down to the layer of the underlying musculature.  The pedicle flap was carefully excised within this deep plane to maintain its blood supply.  The edges of the donor site were undermined.   The donor site was closed in a primary fashion.  The pedicle was then rotated into position and sutured.  Once the tube was sutured into place, adequate blood supply was confirmed with blanching and refill.  The pedicle was then wrapped with xeroform gauze and dressed appropriately with a telfa and gauze bandage to ensure continued blood supply and protect the attached pedicle.
A-T Advancement Flap Text: The defect edges were debeveled with a #15 scalpel blade.  Given the location of the defect, shape of the defect and the proximity to free margins an A-T advancement flap was deemed most appropriate.  Using a sterile surgical marker, an appropriate advancement flap was drawn incorporating the defect and placing the expected incisions within the relaxed skin tension lines where possible.    The area thus outlined was incised deep to adipose tissue with a #15 scalpel blade.  The skin margins were undermined to an appropriate distance in all directions utilizing iris scissors.
O-T Plasty Text: The defect edges were debeveled with a #15 scalpel blade.  Given the location of the defect, shape of the defect and the proximity to free margins an O-T plasty was deemed most appropriate.  Using a sterile surgical marker, an appropriate O-T plasty was drawn incorporating the defect and placing the expected incisions within the relaxed skin tension lines where possible.    The area thus outlined was incised deep to adipose tissue with a #15 scalpel blade.  The skin margins were undermined to an appropriate distance in all directions utilizing iris scissors.
Interpolation Flap Text: A decision was made to reconstruct the defect utilizing an interpolation axial flap and a staged reconstruction.  A telfa template was made of the defect.  This telfa template was then used to outline the interpolation flap.  The donor area for the pedicle flap was then injected with anesthesia.  The flap was excised through the skin and subcutaneous tissue down to the layer of the underlying musculature.  The interpolation flap was carefully excised within this deep plane to maintain its blood supply.  The edges of the donor site were undermined.   The donor site was closed in a primary fashion.  The pedicle was then rotated into position and sutured.  Once the tube was sutured into place, adequate blood supply was confirmed with blanching and refill.  The pedicle was then wrapped with xeroform gauze and dressed appropriately with a telfa and gauze bandage to ensure continued blood supply and protect the attached pedicle.
Complex Repair And Modified Advancement Flap Text: The defect edges were debeveled with a #15 scalpel blade.  The primary defect was closed partially with a complex linear closure.  Given the location of the remaining defect, shape of the defect and the proximity to free margins a modified advancement flap was deemed most appropriate for complete closure of the defect.  Using a sterile surgical marker, an appropriate advancement flap was drawn incorporating the defect and placing the expected incisions within the relaxed skin tension lines where possible.    The area thus outlined was incised deep to adipose tissue with a #15 scalpel blade.  The skin margins were undermined to an appropriate distance in all directions utilizing iris scissors.
Complex Repair And O-T Advancement Flap Text: The defect edges were debeveled with a #15 scalpel blade.  The primary defect was closed partially with a complex linear closure.  Given the location of the remaining defect, shape of the defect and the proximity to free margins an O-T advancement flap was deemed most appropriate for complete closure of the defect.  Using a sterile surgical marker, an appropriate advancement flap was drawn incorporating the defect and placing the expected incisions within the relaxed skin tension lines where possible.    The area thus outlined was incised deep to adipose tissue with a #15 scalpel blade.  The skin margins were undermined to an appropriate distance in all directions utilizing iris scissors.
Bilateral Helical Rim Advancement Flap Text: The defect edges were debeveled with a #15 blade scalpel.  Given the location of the defect and the proximity to free margins (helical rim) a bilateral helical rim advancement flap was deemed most appropriate.  Using a sterile surgical marker, the appropriate advancement flaps were drawn incorporating the defect and placing the expected incisions between the helical rim and antihelix where possible.  The area thus outlined was incised through and through with a #15 scalpel blade.  With a skin hook and iris scissors, the flaps were gently and sharply undermined and freed up.
Partial Purse String (Simple) Text: Given the location of the defect and the characteristics of the surrounding skin a simple purse string closure was deemed most appropriate.  Undermining was performed circumferentially around the surgical defect.  A purse string suture was then placed and tightened. Wound tension of the circular defect prevented complete closure of the wound.
Complex Repair And Tissue Cultured Epidermal Autograft Text: The defect edges were debeveled with a #15 scalpel blade.  The primary defect was closed partially with a complex linear closure.  Given the location of the defect, shape of the defect and the proximity to free margins an tissue cultured epidermal autograft was deemed most appropriate to repair the remaining defect.  The graft was trimmed to fit the size of the remaining defect.  The graft was then placed in the primary defect, oriented appropriately, and sutured into place.
Complex Repair And Rhombic Flap Text: The defect edges were debeveled with a #15 scalpel blade.  The primary defect was closed partially with a complex linear closure.  Given the location of the remaining defect, shape of the defect and the proximity to free margins a rhombic flap was deemed most appropriate for complete closure of the defect.  Using a sterile surgical marker, an appropriate advancement flap was drawn incorporating the defect and placing the expected incisions within the relaxed skin tension lines where possible.    The area thus outlined was incised deep to adipose tissue with a #15 scalpel blade.  The skin margins were undermined to an appropriate distance in all directions utilizing iris scissors.
Muscle Hinge Flap Text: The defect edges were debeveled with a #15 scalpel blade.  Given the size, depth and location of the defect and the proximity to free margins a muscle hinge flap was deemed most appropriate.  Using a sterile surgical marker, an appropriate hinge flap was drawn incorporating the defect. The area thus outlined was incised with a #15 scalpel blade.  The skin margins were undermined to an appropriate distance in all directions utilizing iris scissors.
Complex Repair And Double Advancement Flap Text: The defect edges were debeveled with a #15 scalpel blade.  The primary defect was closed partially with a complex linear closure.  Given the location of the remaining defect, shape of the defect and the proximity to free margins a double advancement flap was deemed most appropriate for complete closure of the defect.  Using a sterile surgical marker, an appropriate advancement flap was drawn incorporating the defect and placing the expected incisions within the relaxed skin tension lines where possible.    The area thus outlined was incised deep to adipose tissue with a #15 scalpel blade.  The skin margins were undermined to an appropriate distance in all directions utilizing iris scissors.
Complex Repair And Transposition Flap Text: The defect edges were debeveled with a #15 scalpel blade.  The primary defect was closed partially with a complex linear closure.  Given the location of the remaining defect, shape of the defect and the proximity to free margins a transposition flap was deemed most appropriate for complete closure of the defect.  Using a sterile surgical marker, an appropriate advancement flap was drawn incorporating the defect and placing the expected incisions within the relaxed skin tension lines where possible.    The area thus outlined was incised deep to adipose tissue with a #15 scalpel blade.  The skin margins were undermined to an appropriate distance in all directions utilizing iris scissors.
Estimated Blood Loss (Cc): minimal
Partial Purse String (Intermediate) Text: Given the location of the defect and the characteristics of the surrounding skin an intermediate purse string closure was deemed most appropriate.  Undermining was performed circumferentially around the surgical defect.  A purse string suture was then placed and tightened. Wound tension of the circular defect prevented complete closure of the wound.
Purse String (Simple) Text: Given the location of the defect and the characteristics of the surrounding skin a purse string simple closure was deemed most appropriate.  Undermining was performed circumferentially around the surgical defect.  A purse string suture was then placed and tightened.
Cheek-To-Nose Interpolation Flap Text: A decision was made to reconstruct the defect utilizing an interpolation axial flap and a staged reconstruction.  A telfa template was made of the defect.  This telfa template was then used to outline the Cheek-To-Nose Interpolation flap.  The donor area for the pedicle flap was then injected with anesthesia.  The flap was excised through the skin and subcutaneous tissue down to the layer of the underlying musculature.  The interpolation flap was carefully excised within this deep plane to maintain its blood supply.  The edges of the donor site were undermined.   The donor site was closed in a primary fashion.  The pedicle was then rotated into position and sutured.  Once the tube was sutured into place, adequate blood supply was confirmed with blanching and refill.  The pedicle was then wrapped with xeroform gauze and dressed appropriately with a telfa and gauze bandage to ensure continued blood supply and protect the attached pedicle.
Double Island Pedicle Flap Text: The defect edges were debeveled with a #15 scalpel blade.  Given the location of the defect, shape of the defect and the proximity to free margins a double island pedicle advancement flap was deemed most appropriate.  Using a sterile surgical marker, an appropriate advancement flap was drawn incorporating the defect, outlining the appropriate donor tissue and placing the expected incisions within the relaxed skin tension lines where possible.    The area thus outlined was incised deep to adipose tissue with a #15 scalpel blade.  The skin margins were undermined to an appropriate distance in all directions around the primary defect and laterally outward around the island pedicle utilizing iris scissors.  There was minimal undermining beneath the pedicle flap.
Epidermal Autograft Text: The defect edges were debeveled with a #15 scalpel blade.  Given the location of the defect, shape of the defect and the proximity to free margins an epidermal autograft was deemed most appropriate.  Using a sterile surgical marker, the primary defect shape was transferred to the donor site. The epidermal graft was then harvested.  The skin graft was then placed in the primary defect and oriented appropriately.
Bilobed Flap Text: The defect edges were debeveled with a #15 scalpel blade.  Given the location of the defect and the proximity to free margins a bilobe flap was deemed most appropriate.  Using a sterile surgical marker, an appropriate bilobe flap drawn around the defect.    The area thus outlined was incised deep to adipose tissue with a #15 scalpel blade.  The skin margins were undermined to an appropriate distance in all directions utilizing iris scissors.
Split-Thickness Skin Graft Text: The defect edges were debeveled with a #15 scalpel blade.  Given the location of the defect, shape of the defect and the proximity to free margins a split thickness skin graft was deemed most appropriate.  Using a sterile surgical marker, the primary defect shape was transferred to the donor site. The split thickness graft was then harvested.  The skin graft was then placed in the primary defect and oriented appropriately.
Consent was obtained from the patient. The risks and benefits to therapy were discussed in detail. Specifically, the risks of infection, scarring, bleeding, prolonged wound healing, incomplete removal, allergy to anesthesia, nerve injury and recurrence were addressed. Prior to the procedure, the treatment site was clearly identified and confirmed by the patient. All components of Universal Protocol/PAUSE Rule completed.
Advancement Flap (Single) Text: The defect edges were debeveled with a #15 scalpel blade.  Given the location of the defect and the proximity to free margins a single advancement flap was deemed most appropriate.  Using a sterile surgical marker, an appropriate advancement flap was drawn incorporating the defect and placing the expected incisions within the relaxed skin tension lines where possible.    The area thus outlined was incised deep to adipose tissue with a #15 scalpel blade.  The skin margins were undermined to an appropriate distance in all directions utilizing iris scissors.
Z Plasty Text: The lesion was extirpated to the level of the fat with a #15 scalpel blade.  Given the location of the defect, shape of the defect and the proximity to free margins a Z-plasty was deemed most appropriate for repair.  Using a sterile surgical marker, the appropriate transposition arms of the Z-plasty were drawn incorporating the defect and placing the expected incisions within the relaxed skin tension lines where possible.    The area thus outlined was incised deep to adipose tissue with a #15 scalpel blade.  The skin margins were undermined to an appropriate distance in all directions utilizing iris scissors.  The opposing transposition arms were then transposed into place in opposite direction and anchored with interrupted buried subcutaneous sutures.
Detail Level: Detailed
Ear Star Wedge Flap Text: The defect edges were debeveled with a #15 blade scalpel.  Given the location of the defect and the proximity to free margins (helical rim) an ear star wedge flap was deemed most appropriate.  Using a sterile surgical marker, the appropriate flap was drawn incorporating the defect and placing the expected incisions between the helical rim and antihelix where possible.  The area thus outlined was incised through and through with a #15 scalpel blade.
Complex Repair And O-L Flap Text: The defect edges were debeveled with a #15 scalpel blade.  The primary defect was closed partially with a complex linear closure.  Given the location of the remaining defect, shape of the defect and the proximity to free margins an O-L flap was deemed most appropriate for complete closure of the defect.  Using a sterile surgical marker, an appropriate flap was drawn incorporating the defect and placing the expected incisions within the relaxed skin tension lines where possible.    The area thus outlined was incised deep to adipose tissue with a #15 scalpel blade.  The skin margins were undermined to an appropriate distance in all directions utilizing iris scissors.
Excision Method: Saucerization
Transposition Flap Text: The defect edges were debeveled with a #15 scalpel blade.  Given the location of the defect and the proximity to free margins a transposition flap was deemed most appropriate.  Using a sterile surgical marker, an appropriate transposition flap was drawn incorporating the defect.    The area thus outlined was incised deep to adipose tissue with a #15 scalpel blade.  The skin margins were undermined to an appropriate distance in all directions utilizing iris scissors.
Complex Repair And Split-Thickness Skin Graft Text: The defect edges were debeveled with a #15 scalpel blade.  The primary defect was closed partially with a complex linear closure.  Given the location of the defect, shape of the defect and the proximity to free margins a split thickness skin graft was deemed most appropriate to repair the remaining defect.  The graft was trimmed to fit the size of the remaining defect.  The graft was then placed in the primary defect, oriented appropriately, and sutured into place.
Mercedes Flap Text: The defect edges were debeveled with a #15 scalpel blade.  Given the location of the defect, shape of the defect and the proximity to free margins a Mercedes flap was deemed most appropriate.  Using a sterile surgical marker, an appropriate advancement flap was drawn incorporating the defect and placing the expected incisions within the relaxed skin tension lines where possible. The area thus outlined was incised deep to adipose tissue with a #15 scalpel blade.  The skin margins were undermined to an appropriate distance in all directions utilizing iris scissors.
Dermal Autograft Text: The defect edges were debeveled with a #15 scalpel blade.  Given the location of the defect, shape of the defect and the proximity to free margins a dermal autograft was deemed most appropriate.  Using a sterile surgical marker, the primary defect shape was transferred to the donor site. The area thus outlined was incised deep to adipose tissue with a #15 scalpel blade.  The harvested graft was then trimmed of adipose and epidermal tissue until only dermis was left.  The skin graft was then placed in the primary defect and oriented appropriately.
Complex Repair And Skin Substitute Graft Text: The defect edges were debeveled with a #15 scalpel blade.  The primary defect was closed partially with a complex linear closure.  Given the location of the remaining defect, shape of the defect and the proximity to free margins a skin substitute graft was deemed most appropriate to repair the remaining defect.  The graft was trimmed to fit the size of the remaining defect.  The graft was then placed in the primary defect, oriented appropriately, and sutured into place.
Curvilinear Excision Additional Text (Leave Blank If You Do Not Want): The margin was drawn around the clinically apparent lesion.  A curvilinear shape was then drawn on the skin incorporating the lesion and margins.  Incisions were then made along these lines to the appropriate tissue plane and the lesion was extirpated.
Repair Performed By Another Provider Text (Leave Blank If You Do Not Want): After the tissue was excised the defect was repaired by another provider.
Banner Transposition Flap Text: The defect edges were debeveled with a #15 scalpel blade.  Given the location of the defect and the proximity to free margins a Banner transposition flap was deemed most appropriate.  Using a sterile surgical marker, an appropriate flap drawn around the defect. The area thus outlined was incised deep to adipose tissue with a #15 scalpel blade.  The skin margins were undermined to an appropriate distance in all directions utilizing iris scissors.
Additional Anesthesia Volume In Cc: 6
Ftsg Text: The defect edges were debeveled with a #15 scalpel blade.  Given the location of the defect, shape of the defect and the proximity to free margins a full thickness skin graft was deemed most appropriate.  Using a sterile surgical marker, the primary defect shape was transferred to the donor site. The area thus outlined was incised deep to adipose tissue with a #15 scalpel blade.  The harvested graft was then trimmed of adipose tissue until only dermis and epidermis was left.  The skin margins of the secondary defect were undermined to an appropriate distance in all directions utilizing iris scissors.  The secondary defect was closed with interrupted buried subcutaneous sutures.  The skin edges were then re-apposed with running  sutures.  The skin graft was then placed in the primary defect and oriented appropriately.
O-T Advancement Flap Text: The defect edges were debeveled with a #15 scalpel blade.  Given the location of the defect, shape of the defect and the proximity to free margins an O-T advancement flap was deemed most appropriate.  Using a sterile surgical marker, an appropriate advancement flap was drawn incorporating the defect and placing the expected incisions within the relaxed skin tension lines where possible.    The area thus outlined was incised deep to adipose tissue with a #15 scalpel blade.  The skin margins were undermined to an appropriate distance in all directions utilizing iris scissors.
Lab Facility: 
Rhombic Flap Text: The defect edges were debeveled with a #15 scalpel blade.  Given the location of the defect and the proximity to free margins a rhombic flap was deemed most appropriate.  Using a sterile surgical marker, an appropriate rhombic flap was drawn incorporating the defect.    The area thus outlined was incised deep to adipose tissue with a #15 scalpel blade.  The skin margins were undermined to an appropriate distance in all directions utilizing iris scissors.
O-L Flap Text: The defect edges were debeveled with a #15 scalpel blade.  Given the location of the defect, shape of the defect and the proximity to free margins an O-L flap was deemed most appropriate.  Using a sterile surgical marker, an appropriate advancement flap was drawn incorporating the defect and placing the expected incisions within the relaxed skin tension lines where possible.    The area thus outlined was incised deep to adipose tissue with a #15 scalpel blade.  The skin margins were undermined to an appropriate distance in all directions utilizing iris scissors.
Complex Repair And Rotation Flap Text: The defect edges were debeveled with a #15 scalpel blade.  The primary defect was closed partially with a complex linear closure.  Given the location of the remaining defect, shape of the defect and the proximity to free margins a rotation flap was deemed most appropriate for complete closure of the defect.  Using a sterile surgical marker, an appropriate advancement flap was drawn incorporating the defect and placing the expected incisions within the relaxed skin tension lines where possible.    The area thus outlined was incised deep to adipose tissue with a #15 scalpel blade.  The skin margins were undermined to an appropriate distance in all directions utilizing iris scissors.
Complex Repair And Epidermal Autograft Text: The defect edges were debeveled with a #15 scalpel blade.  The primary defect was closed partially with a complex linear closure.  Given the location of the defect, shape of the defect and the proximity to free margins an epidermal autograft was deemed most appropriate to repair the remaining defect.  The graft was trimmed to fit the size of the remaining defect.  The graft was then placed in the primary defect, oriented appropriately, and sutured into place.
Home Suture Removal Text: Patient was provided a home suture removal kit and will remove their sutures at home.  If they have any questions or difficulties they will call the office.
Star Wedge Flap Text: The defect edges were debeveled with a #15 scalpel blade.  Given the location of the defect, shape of the defect and the proximity to free margins a star wedge flap was deemed most appropriate.  Using a sterile surgical marker, an appropriate rotation flap was drawn incorporating the defect and placing the expected incisions within the relaxed skin tension lines where possible. The area thus outlined was incised deep to adipose tissue with a #15 scalpel blade.  The skin margins were undermined to an appropriate distance in all directions utilizing iris scissors.
Complex Repair And V-Y Plasty Text: The defect edges were debeveled with a #15 scalpel blade.  The primary defect was closed partially with a complex linear closure.  Given the location of the remaining defect, shape of the defect and the proximity to free margins a V-Y plasty was deemed most appropriate for complete closure of the defect.  Using a sterile surgical marker, an appropriate advancement flap was drawn incorporating the defect and placing the expected incisions within the relaxed skin tension lines where possible.    The area thus outlined was incised deep to adipose tissue with a #15 scalpel blade.  The skin margins were undermined to an appropriate distance in all directions utilizing iris scissors.
Complex Repair And Bilobe Flap Text: The defect edges were debeveled with a #15 scalpel blade.  The primary defect was closed partially with a complex linear closure.  Given the location of the remaining defect, shape of the defect and the proximity to free margins a bilobe flap was deemed most appropriate for complete closure of the defect.  Using a sterile surgical marker, an appropriate advancement flap was drawn incorporating the defect and placing the expected incisions within the relaxed skin tension lines where possible.    The area thus outlined was incised deep to adipose tissue with a #15 scalpel blade.  The skin margins were undermined to an appropriate distance in all directions utilizing iris scissors.
Hatchet Flap Text: The defect edges were debeveled with a #15 scalpel blade.  Given the location of the defect, shape of the defect and the proximity to free margins a hatchet flap was deemed most appropriate.  Using a sterile surgical marker, an appropriate hatchet flap was drawn incorporating the defect and placing the expected incisions within the relaxed skin tension lines where possible.    The area thus outlined was incised deep to adipose tissue with a #15 scalpel blade.  The skin margins were undermined to an appropriate distance in all directions utilizing iris scissors.
Complex Repair And Dorsal Nasal Flap Text: The defect edges were debeveled with a #15 scalpel blade.  The primary defect was closed partially with a complex linear closure.  Given the location of the remaining defect, shape of the defect and the proximity to free margins a dorsal nasal flap was deemed most appropriate for complete closure of the defect.  Using a sterile surgical marker, an appropriate flap was drawn incorporating the defect and placing the expected incisions within the relaxed skin tension lines where possible.    The area thus outlined was incised deep to adipose tissue with a #15 scalpel blade.  The skin margins were undermined to an appropriate distance in all directions utilizing iris scissors.
Complex Repair And Single Advancement Flap Text: The defect edges were debeveled with a #15 scalpel blade.  The primary defect was closed partially with a complex linear closure.  Given the location of the remaining defect, shape of the defect and the proximity to free margins a single advancement flap was deemed most appropriate for complete closure of the defect.  Using a sterile surgical marker, an appropriate advancement flap was drawn incorporating the defect and placing the expected incisions within the relaxed skin tension lines where possible.    The area thus outlined was incised deep to adipose tissue with a #15 scalpel blade.  The skin margins were undermined to an appropriate distance in all directions utilizing iris scissors.
Lip Wedge Excision Repair Text: Given the location of the defect and the proximity to free margins a full thickness wedge repair was deemed most appropriate.  Using a sterile surgical marker, the appropriate repair was drawn incorporating the defect and placing the expected incisions perpendicular to the vermilion border.  The vermilion border was also meticulously outlined to ensure appropriate reapproximation during the repair.  The area thus outlined was incised through and through with a #15 scalpel blade.  The muscularis and dermis were reaproximated with deep sutures following hemostasis. Care was taken to realign the vermilion border before proceeding with the superficial closure.  Once the vermilion was realigned the superfical and mucosal closure was finished.
Wound Care: Bacitracin
Advancement Flap (Double) Text: The defect edges were debeveled with a #15 scalpel blade.  Given the location of the defect and the proximity to free margins a double advancement flap was deemed most appropriate.  Using a sterile surgical marker, the appropriate advancement flaps were drawn incorporating the defect and placing the expected incisions within the relaxed skin tension lines where possible.    The area thus outlined was incised deep to adipose tissue with a #15 scalpel blade.  The skin margins were undermined to an appropriate distance in all directions utilizing iris scissors.
Cheek Interpolation Flap Text: A decision was made to reconstruct the defect utilizing an interpolation axial flap and a staged reconstruction.  A telfa template was made of the defect.  This telfa template was then used to outline the Cheek Interpolation flap.  The donor area for the pedicle flap was then injected with anesthesia.  The flap was excised through the skin and subcutaneous tissue down to the layer of the underlying musculature.  The interpolation flap was carefully excised within this deep plane to maintain its blood supply.  The edges of the donor site were undermined.   The donor site was closed in a primary fashion.  The pedicle was then rotated into position and sutured.  Once the tube was sutured into place, adequate blood supply was confirmed with blanching and refill.  The pedicle was then wrapped with xeroform gauze and dressed appropriately with a telfa and gauze bandage to ensure continued blood supply and protect the attached pedicle.
Spiral Flap Text: The defect edges were debeveled with a #15 scalpel blade.  Given the location of the defect, shape of the defect and the proximity to free margins a spiral flap was deemed most appropriate.  Using a sterile surgical marker, an appropriate rotation flap was drawn incorporating the defect and placing the expected incisions within the relaxed skin tension lines where possible. The area thus outlined was incised deep to adipose tissue with a #15 scalpel blade.  The skin margins were undermined to an appropriate distance in all directions utilizing iris scissors.
Alar Island Pedicle Flap Text: The defect edges were debeveled with a #15 scalpel blade.  Given the location of the defect, shape of the defect and the proximity to the alar rim an island pedicle advancement flap was deemed most appropriate.  Using a sterile surgical marker, an appropriate advancement flap was drawn incorporating the defect, outlining the appropriate donor tissue and placing the expected incisions within the nasal ala running parallel to the alar rim. The area thus outlined was incised with a #15 scalpel blade.  The skin margins were undermined minimally to an appropriate distance in all directions around the primary defect and laterally outward around the island pedicle utilizing iris scissors.  There was minimal undermining beneath the pedicle flap.
Skin Substitute Text: The defect edges were debeveled with a #15 scalpel blade.  Given the location of the defect, shape of the defect and the proximity to free margins a skin substitute graft was deemed most appropriate.  The graft material was trimmed to fit the size of the defect. The graft was then placed in the primary defect and oriented appropriately.
Graft Donor Site Bandage (Optional-Leave Blank If You Don't Want In Note): Steri-strips and a pressure bandage were applied to the donor site.
Post-Care Instructions: I reviewed with the patient in detail post-care instructions:\\n1. Apply bacitracin over the steri-strips.  \\n2. Cut non-stick pad (Telfa) to cover the steri-strips\\n3. Apply tape (hypafix) over the non-stick pad\\n4. Change once per day for 5 days\\n5. Shower with bandage on, change bandage after shower\\n\\nPatient is not to engage in any heavy lifting, exercise, hot tub, or swimming for the next 14 days. Should the patient develop any fevers, chills, bleeding, severe pain patient will contact the office immediately.
Rotation Flap Text: The defect edges were debeveled with a #15 scalpel blade.  Given the location of the defect, shape of the defect and the proximity to free margins a rotation flap was deemed most appropriate.  Using a sterile surgical marker, an appropriate rotation flap was drawn incorporating the defect and placing the expected incisions within the relaxed skin tension lines where possible.    The area thus outlined was incised deep to adipose tissue with a #15 scalpel blade.  The skin margins were undermined to an appropriate distance in all directions utilizing iris scissors.
Pre-Excision Curettage Text (Leave Blank If You Do Not Want): Prior to drawing the surgical margin the visible lesion was removed with electrodesiccation and curettage to clearly define the lesion size.
Repair Type: Complex
Path Notes (To The Dermatopathologist): Please check margins.
Positioning (Leave Blank If You Do Not Want): The patient was placed in a comfortable position exposing the surgical site.
Complex Repair And M Plasty Text: The defect edges were debeveled with a #15 scalpel blade.  The primary defect was closed partially with a complex linear closure.  Given the location of the remaining defect, shape of the defect and the proximity to free margins an M plasty was deemed most appropriate for complete closure of the defect.  Using a sterile surgical marker, an appropriate advancement flap was drawn incorporating the defect and placing the expected incisions within the relaxed skin tension lines where possible.    The area thus outlined was incised deep to adipose tissue with a #15 scalpel blade.  The skin margins were undermined to an appropriate distance in all directions utilizing iris scissors.
Complex Repair And Z Plasty Text: The defect edges were debeveled with a #15 scalpel blade.  The primary defect was closed partially with a complex linear closure.  Given the location of the remaining defect, shape of the defect and the proximity to free margins a Z plasty was deemed most appropriate for complete closure of the defect.  Using a sterile surgical marker, an appropriate advancement flap was drawn incorporating the defect and placing the expected incisions within the relaxed skin tension lines where possible.    The area thus outlined was incised deep to adipose tissue with a #15 scalpel blade.  The skin margins were undermined to an appropriate distance in all directions utilizing iris scissors.
Lab: [id number]
Epidermal Closure Graft Donor Site (Optional): simple interrupted
V-Y Plasty Text: The defect edges were debeveled with a #15 scalpel blade.  Given the location of the defect, shape of the defect and the proximity to free margins an V-Y advancement flap was deemed most appropriate.  Using a sterile surgical marker, an appropriate advancement flap was drawn incorporating the defect and placing the expected incisions within the relaxed skin tension lines where possible.    The area thus outlined was incised deep to adipose tissue with a #15 scalpel blade.  The skin margins were undermined to an appropriate distance in all directions utilizing iris scissors.

## 2018-09-12 NOTE — PROCEDURE: OBSERVATION
Your medication has been sent to the pharmacy    Your lab results will be visible on [facility], and we will call you if any of the results are abnormal    Return in 6 months or sooner if needed for any concerns
Detail Level: Detailed
Size Of Lesion In Cm (Optional): 0

## 2018-10-19 ENCOUNTER — NON-PROVIDER VISIT (OUTPATIENT)
Dept: PULMONOLOGY | Facility: HOSPICE | Age: 73
End: 2018-10-19
Payer: MEDICARE

## 2018-10-19 ENCOUNTER — OFFICE VISIT (OUTPATIENT)
Dept: PULMONOLOGY | Facility: HOSPICE | Age: 73
End: 2018-10-19
Payer: MEDICARE

## 2018-10-19 VITALS
RESPIRATION RATE: 14 BRPM | TEMPERATURE: 97.9 F | OXYGEN SATURATION: 98 % | HEART RATE: 68 BPM | BODY MASS INDEX: 18.43 KG/M2 | WEIGHT: 104 LBS | SYSTOLIC BLOOD PRESSURE: 128 MMHG | DIASTOLIC BLOOD PRESSURE: 76 MMHG | HEIGHT: 63 IN

## 2018-10-19 VITALS — WEIGHT: 104 LBS | BODY MASS INDEX: 18.43 KG/M2

## 2018-10-19 DIAGNOSIS — J45.20 MILD INTERMITTENT ASTHMA WITHOUT COMPLICATION: ICD-10-CM

## 2018-10-19 DIAGNOSIS — G11.9 CEREBELLAR ATAXIA (HCC): ICD-10-CM

## 2018-10-19 PROCEDURE — 99214 OFFICE O/P EST MOD 30 MIN: CPT | Mod: 25 | Performed by: INTERNAL MEDICINE

## 2018-10-19 PROCEDURE — 94060 EVALUATION OF WHEEZING: CPT | Performed by: INTERNAL MEDICINE

## 2018-10-19 RX ORDER — AZELASTINE 1 MG/ML
SPRAY, METERED NASAL
Refills: 11 | COMMUNITY
Start: 2018-07-23 | End: 2019-05-06

## 2018-10-19 ASSESSMENT — PULMONARY FUNCTION TESTS
FEV1/FVC_PREDICTED: 75.18
FVC_PERCENT_PREDICTED: 95
FEV1_PREDICTED: 82
FEV1/FVC_PERCENT_PREDICTED: 95
FEV1/FVC: 70.74
FEV1_PREDICTED: 2.12
FEV1: 1.91
FEV1_PERCENT_CHANGE: 9
FVC: 2.7
FVC: 2.63
FEV1/FVC_PERCENT_PREDICTED: 88
FEV1_PERCENT_PREDICTED: 90
FEV1/FVC_PERCENT_CHANGE: 450
FVC_PERCENT_PREDICTED: 93
FVC_PREDICTED: 2.82
FEV1/FVC: 66
FEV1_PERCENT_CHANGE: 2
FEV1: 1.74

## 2018-10-19 ASSESSMENT — PAIN SCALES - GENERAL: PAINLEVEL: NO PAIN

## 2018-10-19 NOTE — PROCEDURES
Technician: Kasie Tapia [facility]  Tech notes:  Good pt effort and cooperation. Pt could not tolerate normal breathing throught the MP as it made her cough every time. Pre/Post FVC only collected due to the fact that it is the only maneuver that does not require 4 normal breaths.  ATS standards met.  Ventolin HFA 2 puffs via spacer administered.    Interpretation:  Baseline spirometry demonstrates an FEV1 of 1.74 L which is 82% of predicted.  FEV1 FVC ratio was 66%.  There is a 9% improvement after bronchodilator.      Mild obstructive lung disease with a definite reversible component

## 2018-10-20 NOTE — PROGRESS NOTES
No chief complaint on file.      HPI:  Patient is a 73-year-old woman with mild asthma.  She uses pro-air on an as-needed basis and has an antihistamine nasal spray.  She tells me she is not having any problems with her asthma.  The patient fell several years ago and suffered a cerebellar injury.  She has cerebellar ataxia.  She does not like to use inhaled medications.  Other problems include ulcerative colitis, reflux, and tremors.    Past Medical History:   Diagnosis Date   • Age related osteoporosis 3/30/2018    Prior fracture with left hip   • Arthritis    • ASTHMA    • Cerebellar ataxia (HCC) 3/30/2018    Due to fall 2004   • Chronic ulcerative colitis (HCC) 6/26/2013   • Cough    • GERD (gastroesophageal reflux disease)    • HYPOTENSION    • Inner ear disease    • Leg cramps, sleep related 5/2/2018   • Near-syncope    • Neck injury     fracture, hand and leg numbness and tingling   • Osteoporosis    • Peripheral neuropathy    • Polyp of sigmoid colon 04/10/2018    Hyperplastic polyp   • Raynaud disease    • Tremor    • Vitamin D deficiency        ROS:   Constitutional: Denies fevers, chills, night sweats, fatigue or weight loss  Eyes: Denies vision loss, pain, drainage, double vision  Ears, Nose, Throat: Denies earache, tinnitus, hoarseness  Cardiovascular: Denies chest pain, tightness, palpitations  Respiratory: Denies shortness of breath, sputum production, cough, hemoptysis  Sleep: Denies, snoring, apnea  GI: Denies abdominal pain, nausea, vomiting, diarrhea  : Denies frequent urination, hematuria, painful urination  Musculoskeletal: Denies back pain, painful joints, sore muscles  Neurological: Has significant cerebellar ataxia  Skin: Denies rashes, color changes  Psychiatric: Denies depression or thoughts of suicide  Hematologic: Denies bleeding tendency or clotting tendency  Allergic/Immunologic: Denies rhinitis, skin sensitivity    Social History     Social History   • Marital status: Single      "Spouse name: N/A   • Number of children: N/A   • Years of education: N/A     Occupational History   • Not on file.     Social History Main Topics   • Smoking status: Never Smoker   • Smokeless tobacco: Never Used   • Alcohol use No   • Drug use: No   • Sexual activity: Not on file     Other Topics Concern   • Not on file     Social History Narrative   • No narrative on file     Dilaudid [hydromorphone]; Azithromycin; Clindamycin; Codeine; Erythromycin; Keflex; Levaquin; Lyrica; Morphine; Quinolones; Sulfa drugs; Tetracyclines; Vicodin [hydrocodone-acetaminophen]; and Other food  Current Outpatient Prescriptions on File Prior to Visit   Medication Sig Dispense Refill   • TURMERIC CURCUMIN PO Take  by mouth.     • LUTEIN PO Take  by mouth.     • BIOTIN PO Take  by mouth.     • POTASSIUM PO Take  by mouth.     • Cetirizine HCl (ZYRTEC ALLERGY PO) Take  by mouth.     • MAGNESIUM PO Take  by mouth.     • ASCORBIC ACID PO Take  by mouth.     • albuterol (PROAIR HFA) 108 (90 BASE) MCG/ACT Aero Soln inhalation aerosol Inhale 2 Puffs by mouth every four hours as needed for Shortness of Breath.     • Cholecalciferol (VITAMIN D3) Take  by mouth every day.     • multivitamin (THERAGRAN) TABS Take 1 Tab by mouth every day.     • diazepam (VALIUM) 5 MG TABS Take 1 Tab by mouth 3 times a day as needed (muscle relaxant). (Patient not taking: Reported on 10/19/2018) 15 Tab 0     No current facility-administered medications on file prior to visit.      Blood pressure 128/76, pulse 68, temperature 36.6 °C (97.9 °F), temperature source Oral, resp. rate 14, height 1.6 m (5' 2.99\"), weight 47.2 kg (104 lb), SpO2 98 %, not currently breastfeeding.  Family History   Problem Relation Age of Onset   • Non-contributory Father         Parkinson's disease   • Non-contributory Mother         old age with mild dementia   • Heart Disease Neg Hx    • Hypertension Neg Hx    • Hyperlipidemia Neg Hx    • Diabetes Neg Hx        Physical Exam:    HEENT: " PERRLA, EOMI, no scleral icterus, no nasal or oral lesions  Neck: No thyromegaly, no adenopathy, no bruits  Mallampatti: Grade II  Lungs: Equal breath sounds, no wheezes or crackles  Heart: Regular rate and rhythm, no gallops or murmurs  Abdomen: Soft, benign, no organomegaly  Extremities: No clubbing, cyanosis, or edema  Neurologic: Very unsteady walking with wide-based gait    1. Mild intermittent asthma without complication    2. Cerebellar ataxia (HCC)        Her asthma is reasonably well controlled with occasional pro-air.  She does not desire any more aggressive therapy.  She does not take vaccines.  We will see her on a yearly basis.

## 2018-10-29 ENCOUNTER — OFFICE VISIT (OUTPATIENT)
Dept: INTERNAL MEDICINE | Facility: IMAGING CENTER | Age: 73
End: 2018-10-29
Payer: MEDICARE

## 2018-10-29 VITALS
OXYGEN SATURATION: 95 % | TEMPERATURE: 99.3 F | HEIGHT: 63 IN | SYSTOLIC BLOOD PRESSURE: 106 MMHG | BODY MASS INDEX: 18.43 KG/M2 | WEIGHT: 104 LBS | RESPIRATION RATE: 14 BRPM | DIASTOLIC BLOOD PRESSURE: 64 MMHG | HEART RATE: 78 BPM

## 2018-10-29 DIAGNOSIS — M25.531 RIGHT WRIST PAIN: ICD-10-CM

## 2018-10-29 PROCEDURE — 99213 OFFICE O/P EST LOW 20 MIN: CPT | Performed by: INTERNAL MEDICINE

## 2018-10-29 NOTE — PROGRESS NOTES
"Established Patient Note   HPI:        Ana Paula states fell couple days ago and hit right wrist on dresser. She has had mild pain over right medial wrist but denies swelling. She states symptoms are better today.    Past Medical History:   Diagnosis Date   • Age related osteoporosis 3/30/2018    Prior fracture with left hip   • Arthritis    • ASTHMA    • Cerebellar ataxia (HCC) 3/30/2018    Due to fall 2004   • Chronic ulcerative colitis (HCC) 6/26/2013   • Cough    • GERD (gastroesophageal reflux disease)    • HYPOTENSION    • Inner ear disease    • Leg cramps, sleep related 5/2/2018   • Near-syncope    • Neck injury     fracture, hand and leg numbness and tingling   • Osteoporosis    • Peripheral neuropathy    • Polyp of sigmoid colon 04/10/2018    Hyperplastic polyp   • Raynaud disease    • Tremor    • Vitamin D deficiency        Current Outpatient Prescriptions   Medication Sig Dispense Refill   • azelastine (ASTELIN) 137 MCG/SPRAY nasal spray USE 2 SPRAY IEN BID  11   • TURMERIC CURCUMIN PO Take  by mouth.     • LUTEIN PO Take  by mouth.     • BIOTIN PO Take  by mouth.     • POTASSIUM PO Take  by mouth.     • Cetirizine HCl (ZYRTEC ALLERGY PO) Take  by mouth.     • MAGNESIUM PO Take  by mouth.     • ASCORBIC ACID PO Take  by mouth.     • albuterol (PROAIR HFA) 108 (90 BASE) MCG/ACT Aero Soln inhalation aerosol Inhale 2 Puffs by mouth every four hours as needed for Shortness of Breath.     • diazepam (VALIUM) 5 MG TABS Take 1 Tab by mouth 3 times a day as needed (muscle relaxant). (Patient not taking: Reported on 10/19/2018) 15 Tab 0   • Cholecalciferol (VITAMIN D3) Take  by mouth every day.     • multivitamin (THERAGRAN) TABS Take 1 Tab by mouth every day.       No current facility-administered medications for this visit.          Allergies   Allergen Reactions   • Dilaudid [Hydromorphone] Anaphylaxis     \"went into code blue\"    • Azithromycin Unspecified     \" I don't know\"   • Clindamycin    • Codeine " "Unspecified     ?   • Erythromycin Unspecified     ?   • Keflex      ?   • Levaquin Unspecified     \"i dont know\"    • Lyrica Unspecified     ?   • Morphine Unspecified     ?   • Quinolones Unspecified     ?   • Sulfa Drugs Unspecified     \"i dont know\"    • Tetracyclines Unspecified     ?   • Vicodin [Hydrocodone-Acetaminophen] Unspecified     ?   • Other Food Unspecified     Dairy-mucus (butter is okay). spices-cough         Social History   Substance Use Topics   • Smoking status: Never Smoker   • Smokeless tobacco: Never Used   • Alcohol use No     History   Alcohol Use No     History   Drug Use No       ROS    Ambulatory Vitals  /64   Pulse 78   Temp 37.4 °C (99.3 °F)   Resp 14   Ht 1.6 m (5' 2.99\")   Wt 47.2 kg (104 lb)   SpO2 95%   BMI 18.43 kg/m²     Physical Exam   Musculoskeletal:   ROM right wrist without pain with flexion and extension. No tenderness with palpation over wrist including medial styloid.          Assessment and Plan:     1. Right wrist pain       Since pain is minimal over styloid and ROM is without pain and without joint swelling will not order X-ray at this time; she is comfortable with this decision. She will call if pain worsens to order X-ray if needed.    Dar Fraire M.D.  "

## 2018-12-10 ENCOUNTER — NON-PROVIDER VISIT (OUTPATIENT)
Dept: INTERNAL MEDICINE | Facility: IMAGING CENTER | Age: 73
End: 2018-12-10
Payer: MEDICARE

## 2018-12-10 VITALS
OXYGEN SATURATION: 97 % | SYSTOLIC BLOOD PRESSURE: 116 MMHG | DIASTOLIC BLOOD PRESSURE: 68 MMHG | HEART RATE: 68 BPM | TEMPERATURE: 98.7 F | RESPIRATION RATE: 14 BRPM

## 2018-12-11 NOTE — NON-PROVIDER
Patient presented with non productive cough x 1 week. All lung fields clear to auscultation; no wheezing, rhonchi, crackles. Vital signs WNL; no fever.      MD listened to lung fields and agreed with RN.     RN will follow up with patient on Wednesday and patient will continue use of inhaler.

## 2018-12-21 ENCOUNTER — NON-PROVIDER VISIT (OUTPATIENT)
Dept: INTERNAL MEDICINE | Facility: IMAGING CENTER | Age: 73
End: 2018-12-21
Payer: MEDICARE

## 2018-12-21 ENCOUNTER — HOSPITAL ENCOUNTER (OUTPATIENT)
Dept: RADIOLOGY | Facility: MEDICAL CENTER | Age: 73
End: 2018-12-21
Attending: INTERNAL MEDICINE
Payer: MEDICARE

## 2018-12-21 DIAGNOSIS — R05.9 COUGH: ICD-10-CM

## 2018-12-21 PROCEDURE — 71046 X-RAY EXAM CHEST 2 VIEWS: CPT

## 2018-12-21 NOTE — NON-PROVIDER
Patient came in to have her lungs listened to. Lungs fields are clear to auscultation throughout. Patient denies fever, chills and fatigue. MD aware and ordered a chest xray at this time. RN to follow results of chest xray and notify patient.

## 2018-12-26 ENCOUNTER — TELEPHONE (OUTPATIENT)
Dept: INTERNAL MEDICINE | Facility: IMAGING CENTER | Age: 73
End: 2018-12-26

## 2018-12-26 NOTE — TELEPHONE ENCOUNTER
Ana Paula states she fell Sunday night 12/23 hitting rub but has noticed pain left side of chest wall near breast. No bruising, shortness of breath but thinks there may be some mild swelling. She is able to sleep at night without pain waking her. I have offered her to come into office today along with ordering rib series but discussed this will likely not change the treatment even if she has fractured a rib. She wishes to wait on appointment and x-rays unless pain worsens; I feel this is acceptable unless her pain worsens or she develops other symptoms such as shortness of breath.

## 2018-12-27 ENCOUNTER — TELEPHONE (OUTPATIENT)
Dept: INTERNAL MEDICINE | Facility: IMAGING CENTER | Age: 73
End: 2018-12-27

## 2018-12-27 DIAGNOSIS — S62.102S CLOSED FRACTURE OF LEFT WRIST, SEQUELA: ICD-10-CM

## 2018-12-27 RX ORDER — TRAMADOL HYDROCHLORIDE 50 MG/1
50 TABLET ORAL EVERY 6 HOURS PRN
Qty: 40 TAB | Refills: 1 | OUTPATIENT
Start: 2018-12-27 | End: 2019-01-06

## 2019-01-10 ENCOUNTER — NON-PROVIDER VISIT (OUTPATIENT)
Dept: INTERNAL MEDICINE | Facility: IMAGING CENTER | Age: 74
End: 2019-01-10
Payer: MEDICARE

## 2019-01-10 ENCOUNTER — HOSPITAL ENCOUNTER (OUTPATIENT)
Dept: LAB | Facility: MEDICAL CENTER | Age: 74
End: 2019-01-10
Attending: PSYCHIATRY & NEUROLOGY
Payer: MEDICARE

## 2019-01-10 DIAGNOSIS — G60.9 IDIOPATHIC PERIPHERAL NEUROPATHY: ICD-10-CM

## 2019-01-10 DIAGNOSIS — G11.9 CEREBELLAR ATAXIA (HCC): ICD-10-CM

## 2019-01-10 LAB
BASOPHILS # BLD AUTO: 0.8 % (ref 0–1.8)
BASOPHILS # BLD: 0.07 K/UL (ref 0–0.12)
CK SERPL-CCNC: 53 U/L (ref 0–154)
EOSINOPHIL # BLD AUTO: 0.15 K/UL (ref 0–0.51)
EOSINOPHIL NFR BLD: 1.8 % (ref 0–6.9)
ERYTHROCYTE [DISTWIDTH] IN BLOOD BY AUTOMATED COUNT: 42.7 FL (ref 35.9–50)
HCT VFR BLD AUTO: 45.7 % (ref 37–47)
HCYS SERPL-SCNC: 9.11 UMOL/L
HGB BLD-MCNC: 15 G/DL (ref 12–16)
IMM GRANULOCYTES # BLD AUTO: 0.03 K/UL (ref 0–0.11)
IMM GRANULOCYTES NFR BLD AUTO: 0.4 % (ref 0–0.9)
LYMPHOCYTES # BLD AUTO: 2.02 K/UL (ref 1–4.8)
LYMPHOCYTES NFR BLD: 23.8 % (ref 22–41)
MCH RBC QN AUTO: 32.4 PG (ref 27–33)
MCHC RBC AUTO-ENTMCNC: 32.8 G/DL (ref 33.6–35)
MCV RBC AUTO: 98.7 FL (ref 81.4–97.8)
MONOCYTES # BLD AUTO: 0.47 K/UL (ref 0–0.85)
MONOCYTES NFR BLD AUTO: 5.5 % (ref 0–13.4)
NEUTROPHILS # BLD AUTO: 5.73 K/UL (ref 2–7.15)
NEUTROPHILS NFR BLD: 67.7 % (ref 44–72)
NRBC # BLD AUTO: 0 K/UL
NRBC BLD-RTO: 0 /100 WBC
PLATELET # BLD AUTO: 307 K/UL (ref 164–446)
PMV BLD AUTO: 10 FL (ref 9–12.9)
RBC # BLD AUTO: 4.63 M/UL (ref 4.2–5.4)
WBC # BLD AUTO: 8.5 K/UL (ref 4.8–10.8)

## 2019-01-10 PROCEDURE — 84207 ASSAY OF VITAMIN B-6: CPT

## 2019-01-10 PROCEDURE — 82550 ASSAY OF CK (CPK): CPT

## 2019-01-10 PROCEDURE — 86780 TREPONEMA PALLIDUM: CPT

## 2019-01-10 PROCEDURE — 85652 RBC SED RATE AUTOMATED: CPT

## 2019-01-10 PROCEDURE — 83090 ASSAY OF HOMOCYSTEINE: CPT

## 2019-01-10 PROCEDURE — 85025 COMPLETE CBC W/AUTO DIFF WBC: CPT

## 2019-01-11 LAB
ERYTHROCYTE [SEDIMENTATION RATE] IN BLOOD BY WESTERGREN METHOD: 23 MM/HOUR (ref 0–30)
TREPONEMA PALLIDUM IGG+IGM AB [PRESENCE] IN SERUM OR PLASMA BY IMMUNOASSAY: NON REACTIVE

## 2019-01-14 LAB — VIT B6 SERPL-MCNC: 116 NMOL/L (ref 20–125)

## 2019-02-21 ENCOUNTER — HOSPITAL ENCOUNTER (OUTPATIENT)
Dept: RADIOLOGY | Facility: MEDICAL CENTER | Age: 74
End: 2019-02-21
Attending: INTERNAL MEDICINE
Payer: MEDICARE

## 2019-02-21 ENCOUNTER — APPOINTMENT (OUTPATIENT)
Dept: INTERNAL MEDICINE | Facility: IMAGING CENTER | Age: 74
End: 2019-02-21
Payer: MEDICARE

## 2019-02-21 DIAGNOSIS — R05.3 COUGH, PERSISTENT: ICD-10-CM

## 2019-02-21 PROCEDURE — 71046 X-RAY EXAM CHEST 2 VIEWS: CPT

## 2019-03-06 ENCOUNTER — HOSPITAL ENCOUNTER (OUTPATIENT)
Facility: MEDICAL CENTER | Age: 74
End: 2019-03-06
Attending: INTERNAL MEDICINE
Payer: MEDICARE

## 2019-03-06 ENCOUNTER — NON-PROVIDER VISIT (OUTPATIENT)
Dept: INTERNAL MEDICINE | Facility: IMAGING CENTER | Age: 74
End: 2019-03-06
Payer: MEDICARE

## 2019-03-06 DIAGNOSIS — K51.20 CHRONIC ULCERATIVE PROCTITIS WITHOUT COMPLICATIONS (HCC): ICD-10-CM

## 2019-03-06 DIAGNOSIS — Z01.89 ENCOUNTER FOR ROUTINE LABORATORY TESTING: ICD-10-CM

## 2019-03-06 DIAGNOSIS — G60.9 IDIOPATHIC PERIPHERAL NEUROPATHY: ICD-10-CM

## 2019-03-06 DIAGNOSIS — G11.9 CEREBELLAR ATAXIA (HCC): ICD-10-CM

## 2019-03-06 DIAGNOSIS — N39.0 RECURRENT UTI (URINARY TRACT INFECTION): ICD-10-CM

## 2019-03-06 LAB
25(OH)D3 SERPL-MCNC: 40 NG/ML (ref 30–100)
ALBUMIN SERPL BCP-MCNC: 4.4 G/DL (ref 3.2–4.9)
ALBUMIN/GLOB SERPL: 1.3 G/DL
ALP SERPL-CCNC: 91 U/L (ref 30–99)
ALT SERPL-CCNC: 21 U/L (ref 2–50)
ANION GAP SERPL CALC-SCNC: 5 MMOL/L (ref 0–11.9)
APPEARANCE UR: CLEAR
AST SERPL-CCNC: 21 U/L (ref 12–45)
BILIRUB SERPL-MCNC: 1 MG/DL (ref 0.1–1.5)
BILIRUB UR QL STRIP.AUTO: NEGATIVE
BUN SERPL-MCNC: 19 MG/DL (ref 8–22)
CALCIUM SERPL-MCNC: 10.5 MG/DL (ref 8.5–10.5)
CHLORIDE SERPL-SCNC: 102 MMOL/L (ref 96–112)
CO2 SERPL-SCNC: 32 MMOL/L (ref 20–33)
COLOR UR: YELLOW
CREAT SERPL-MCNC: 0.59 MG/DL (ref 0.5–1.4)
CRP SERPL HS-MCNC: 1.8 MG/L (ref 0–7.5)
GLOBULIN SER CALC-MCNC: 3.3 G/DL (ref 1.9–3.5)
GLUCOSE SERPL-MCNC: 94 MG/DL (ref 65–99)
GLUCOSE UR STRIP.AUTO-MCNC: NEGATIVE MG/DL
KETONES UR STRIP.AUTO-MCNC: NEGATIVE MG/DL
LEUKOCYTE ESTERASE UR QL STRIP.AUTO: NEGATIVE
MAGNESIUM SERPL-MCNC: 2.4 MG/DL (ref 1.5–2.5)
MICRO URNS: NORMAL
NITRITE UR QL STRIP.AUTO: NEGATIVE
PH UR STRIP.AUTO: 7.5 [PH]
POTASSIUM SERPL-SCNC: 3.9 MMOL/L (ref 3.6–5.5)
PROT SERPL-MCNC: 7.7 G/DL (ref 6–8.2)
PROT UR QL STRIP: NEGATIVE MG/DL
RBC UR QL AUTO: NEGATIVE
SODIUM SERPL-SCNC: 139 MMOL/L (ref 135–145)
SP GR UR STRIP.AUTO: 1.01
TSH SERPL DL<=0.005 MIU/L-ACNC: 2.36 UIU/ML (ref 0.38–5.33)
UROBILINOGEN UR STRIP.AUTO-MCNC: 0.2 MG/DL
VIT B12 SERPL-MCNC: 988 PG/ML (ref 211–911)

## 2019-03-06 PROCEDURE — 83735 ASSAY OF MAGNESIUM: CPT | Mod: GZ

## 2019-03-06 PROCEDURE — 84443 ASSAY THYROID STIM HORMONE: CPT

## 2019-03-06 PROCEDURE — 82306 VITAMIN D 25 HYDROXY: CPT

## 2019-03-06 PROCEDURE — 80061 LIPID PANEL: CPT | Mod: GY,XU

## 2019-03-06 PROCEDURE — 82607 VITAMIN B-12: CPT

## 2019-03-06 PROCEDURE — 81003 URINALYSIS AUTO W/O SCOPE: CPT

## 2019-03-06 PROCEDURE — 86141 C-REACTIVE PROTEIN HS: CPT | Mod: GZ

## 2019-03-06 PROCEDURE — 83704 LIPOPROTEIN BLD QUAN PART: CPT | Mod: GY

## 2019-03-06 PROCEDURE — 80053 COMPREHEN METABOLIC PANEL: CPT

## 2019-03-09 LAB
CHOLEST SERPL-MCNC: 216 MG/DL
HDL PARTICAL NO Q4363: 38.8 UMOL/L
HDL SIZE Q4361: 9.3 NM
HDLC SERPL-MCNC: 68 MG/DL (ref 40–59)
HLD.LARGE SERPL-SCNC: 9.5 UMOL/L
L VLDL PART NO Q4357: <1.5 NMOL/L
LDL SERPL QN: 20.9 NM
LDL SERPL-SCNC: 1546 NMOL/L
LDL SMALL SERPL-SCNC: 527 NMOL/L
LDLC SERPL CALC-MCNC: 131 MG/DL
PATHOLOGY STUDY: ABNORMAL
TRIGL SERPL-MCNC: 87 MG/DL (ref 30–149)
VLDL SIZE Q4362: 42.8 NM

## 2019-03-13 ENCOUNTER — OFFICE VISIT (OUTPATIENT)
Dept: INTERNAL MEDICINE | Facility: IMAGING CENTER | Age: 74
End: 2019-03-13
Payer: MEDICARE

## 2019-03-13 ENCOUNTER — APPOINTMENT (RX ONLY)
Dept: URBAN - METROPOLITAN AREA CLINIC 4 | Facility: CLINIC | Age: 74
Setting detail: DERMATOLOGY
End: 2019-03-13

## 2019-03-13 VITALS
SYSTOLIC BLOOD PRESSURE: 100 MMHG | HEART RATE: 81 BPM | HEIGHT: 63 IN | OXYGEN SATURATION: 99 % | WEIGHT: 103 LBS | TEMPERATURE: 98.2 F | DIASTOLIC BLOOD PRESSURE: 60 MMHG | RESPIRATION RATE: 14 BRPM | BODY MASS INDEX: 18.25 KG/M2

## 2019-03-13 DIAGNOSIS — Z85.828 PERSONAL HISTORY OF OTHER MALIGNANT NEOPLASM OF SKIN: ICD-10-CM

## 2019-03-13 DIAGNOSIS — G11.9 CEREBELLAR ATAXIA (HCC): ICD-10-CM

## 2019-03-13 DIAGNOSIS — E78.00 HYPERCHOLESTEROLEMIA: ICD-10-CM

## 2019-03-13 DIAGNOSIS — G60.9 IDIOPATHIC PERIPHERAL NEUROPATHY: ICD-10-CM

## 2019-03-13 DIAGNOSIS — Z00.00 MEDICARE ANNUAL WELLNESS VISIT, SUBSEQUENT: ICD-10-CM

## 2019-03-13 DIAGNOSIS — L57.8 OTHER SKIN CHANGES DUE TO CHRONIC EXPOSURE TO NONIONIZING RADIATION: ICD-10-CM

## 2019-03-13 DIAGNOSIS — D18.0 HEMANGIOMA: ICD-10-CM

## 2019-03-13 DIAGNOSIS — M81.0 AGE-RELATED OSTEOPOROSIS WITHOUT CURRENT PATHOLOGICAL FRACTURE: ICD-10-CM

## 2019-03-13 DIAGNOSIS — D22 MELANOCYTIC NEVI: ICD-10-CM

## 2019-03-13 DIAGNOSIS — L57.0 ACTINIC KERATOSIS: ICD-10-CM

## 2019-03-13 DIAGNOSIS — Z91.81 HISTORY OF FALLING: ICD-10-CM

## 2019-03-13 DIAGNOSIS — L85.3 XEROSIS CUTIS: ICD-10-CM

## 2019-03-13 DIAGNOSIS — L82.1 OTHER SEBORRHEIC KERATOSIS: ICD-10-CM

## 2019-03-13 DIAGNOSIS — Z79.899 LONG TERM USE OF DRUG: ICD-10-CM

## 2019-03-13 DIAGNOSIS — G47.62 SLEEP RELATED LEG CRAMPS: ICD-10-CM

## 2019-03-13 DIAGNOSIS — L81.4 OTHER MELANIN HYPERPIGMENTATION: ICD-10-CM

## 2019-03-13 DIAGNOSIS — S62.102S WRIST FRACTURE, CLOSED, LEFT, SEQUELA: ICD-10-CM

## 2019-03-13 PROBLEM — D18.01 HEMANGIOMA OF SKIN AND SUBCUTANEOUS TISSUE: Status: ACTIVE | Noted: 2019-03-13

## 2019-03-13 PROBLEM — D22.5 MELANOCYTIC NEVI OF TRUNK: Status: ACTIVE | Noted: 2019-03-13

## 2019-03-13 PROCEDURE — 99214 OFFICE O/P EST MOD 30 MIN: CPT | Mod: 25

## 2019-03-13 PROCEDURE — ? PRESCRIPTION MEDICATION MANAGEMENT

## 2019-03-13 PROCEDURE — G0439 PPPS, SUBSEQ VISIT: HCPCS | Performed by: INTERNAL MEDICINE

## 2019-03-13 PROCEDURE — 17000 DESTRUCT PREMALG LESION: CPT

## 2019-03-13 PROCEDURE — ? ADDITIONAL NOTES

## 2019-03-13 PROCEDURE — ? LIQUID NITROGEN

## 2019-03-13 PROCEDURE — 17003 DESTRUCT PREMALG LES 2-14: CPT

## 2019-03-13 PROCEDURE — ? COUNSELING

## 2019-03-13 RX ORDER — MELATONIN 200 MCG
TABLET ORAL
COMMUNITY
End: 2019-05-06

## 2019-03-13 RX ORDER — HYDROCORTISONE ACETATE 0.5 %
CREAM (GRAM) TOPICAL
COMMUNITY
End: 2019-05-06

## 2019-03-13 RX ORDER — DIAZEPAM 5 MG/1
5 TABLET ORAL NIGHTLY PRN
Qty: 30 TAB | Refills: 0 | Status: SHIPPED
Start: 2019-03-13 | End: 2019-04-12

## 2019-03-13 ASSESSMENT — LOCATION SIMPLE DESCRIPTION DERM
LOCATION SIMPLE: RIGHT ANTERIOR NECK
LOCATION SIMPLE: LEFT PRETIBIAL REGION
LOCATION SIMPLE: RIGHT UPPER BACK
LOCATION SIMPLE: LEFT FOREARM
LOCATION SIMPLE: RIGHT PRETIBIAL REGION
LOCATION SIMPLE: RIGHT FOREARM
LOCATION SIMPLE: NOSE
LOCATION SIMPLE: LEFT HAND
LOCATION SIMPLE: LEFT UPPER BACK
LOCATION SIMPLE: RIGHT CHEEK
LOCATION SIMPLE: RIGHT HAND
LOCATION SIMPLE: SUPERIOR FOREHEAD
LOCATION SIMPLE: RIGHT FOREHEAD
LOCATION SIMPLE: CHEST
LOCATION SIMPLE: RIGHT LOWER LEG
LOCATION SIMPLE: LEFT CHEEK

## 2019-03-13 ASSESSMENT — LOCATION DETAILED DESCRIPTION DERM
LOCATION DETAILED: RIGHT INFERIOR LATERAL MALAR CHEEK
LOCATION DETAILED: RIGHT RADIAL DORSAL HAND
LOCATION DETAILED: RIGHT MEDIAL UPPER BACK
LOCATION DETAILED: UPPER STERNUM
LOCATION DETAILED: LEFT CENTRAL MALAR CHEEK
LOCATION DETAILED: RIGHT INFERIOR CENTRAL MALAR CHEEK
LOCATION DETAILED: SUPERIOR MID FOREHEAD
LOCATION DETAILED: NASAL SUPRATIP
LOCATION DETAILED: RIGHT PROXIMAL PRETIBIAL REGION
LOCATION DETAILED: LEFT SUPERIOR UPPER BACK
LOCATION DETAILED: RIGHT LATERAL ANKLE
LOCATION DETAILED: LEFT RADIAL DORSAL HAND
LOCATION DETAILED: LEFT PROXIMAL PRETIBIAL REGION
LOCATION DETAILED: LEFT MEDIAL SUPERIOR CHEST
LOCATION DETAILED: LEFT PROXIMAL DORSAL FOREARM
LOCATION DETAILED: RIGHT SUPERIOR UPPER BACK
LOCATION DETAILED: RIGHT SUPERIOR MEDIAL FOREHEAD
LOCATION DETAILED: RIGHT PROXIMAL DORSAL FOREARM
LOCATION DETAILED: RIGHT INFERIOR MEDIAL UPPER BACK
LOCATION DETAILED: RIGHT INFERIOR ANTERIOR NECK

## 2019-03-13 ASSESSMENT — ACTIVITIES OF DAILY LIVING (ADL): BATHING_REQUIRES_ASSISTANCE: 0

## 2019-03-13 ASSESSMENT — LOCATION ZONE DERM
LOCATION ZONE: FACE
LOCATION ZONE: NOSE
LOCATION ZONE: TRUNK
LOCATION ZONE: NECK
LOCATION ZONE: ARM
LOCATION ZONE: LEG
LOCATION ZONE: HAND

## 2019-03-13 ASSESSMENT — ENCOUNTER SYMPTOMS: GENERAL WELL-BEING: GOOD

## 2019-03-13 ASSESSMENT — PATIENT HEALTH QUESTIONNAIRE - PHQ9: CLINICAL INTERPRETATION OF PHQ2 SCORE: 0

## 2019-03-13 NOTE — PROGRESS NOTES
Established Patient Note   HPI:        Ana Paula is here today for her annual wellness for medicare and to review recent lab work done. Her last DEXA was done in May 2017 which revealed T score in lumbar of -3.3 and in hip of -3.5. She has had prior fractures. Her last fracture was left wrist but has not healed well.    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    Due to fall 2004   • Chronic ulcerative colitis (HCC) 6/26/2013   • Cough    • GERD (gastroesophageal reflux disease)    • HYPOTENSION    • Inner ear disease    • Leg cramps, sleep related 5/2/2018   • Near-syncope    • Neck injury     fracture, hand and leg numbness and tingling   • Osteoporosis    • Peripheral neuropathy    • Polyp of sigmoid colon 04/10/2018    Hyperplastic polyp   • Raynaud disease    • Tremor    • Vitamin D deficiency        Current Outpatient Prescriptions   Medication Sig Dispense Refill   • Omega-3 Fatty Acids (FISH OIL) 1200 MG CAPSULE DELAYED RELEASE Take  by mouth.     • Glucosamine-Chondroit-Vit C-Mn (GLUCOSAMINE CHONDR 1500 COMPLX) Cap Take  by mouth.     • Calcium-Vitamin D-Vitamin K 750-500-40 MG-UNT-MCG Tab Take  by mouth.     • Vitamins A & D (VITAMIN A & D PO) Take  by mouth.     • Melatonin 3 MG SL Tab Place  under tongue.     • Misc Natural Products (GINSENG COMPLEX PO) Take  by mouth.     • NON SPECIFIED      • B Complex Vitamins (VITAMIN B COMPLEX PO) Take  by mouth.     • NON SPECIFIED      • TURMERIC CURCUMIN PO Take  by mouth.     • LUTEIN PO Take  by mouth.     • POTASSIUM PO Take  by mouth.     • Cetirizine HCl (ZYRTEC ALLERGY PO) Take  by mouth.     • MAGNESIUM PO Take  by mouth.     • ASCORBIC ACID PO Take  by mouth.     • multivitamin (THERAGRAN) TABS Take 1 Tab by mouth every day.     • azelastine (ASTELIN) 137 MCG/SPRAY nasal spray USE 2 SPRAY IEN BID  11   • albuterol (PROAIR HFA) 108 (90 BASE) MCG/ACT Aero Soln inhalation aerosol Inhale 2 Puffs by  "mouth every four hours as needed for Shortness of Breath.     • Cholecalciferol (VITAMIN D3) Take  by mouth every day.       No current facility-administered medications for this visit.          Allergies   Allergen Reactions   • Dilaudid [Hydromorphone] Anaphylaxis     \"went into code blue\"    • Azithromycin Unspecified     \" I don't know\"   • Clindamycin    • Codeine Unspecified     ?   • Erythromycin Unspecified     ?   • Keflex      ?   • Levaquin Unspecified     \"i dont know\"    • Lyrica Unspecified     ?   • Morphine Unspecified     ?   • Quinolones Unspecified     ?   • Sulfa Drugs Unspecified     \"i dont know\"    • Tetracyclines Unspecified     ?   • Vicodin [Hydrocodone-Acetaminophen] Unspecified     ?   • Other Food Unspecified     Dairy-mucus (butter is okay). spices-cough         Social History   Substance Use Topics   • Smoking status: Never Smoker   • Smokeless tobacco: Never Used   • Alcohol use No     History   Alcohol Use No     History   Drug Use No       ROS    Depression Screening    Little interest or pleasure in doing things?  0 - not at all  Feeling down, depressed , or hopeless? 0 - not at all  Patient Health Questionnaire Score: 0     If depressive symptoms identified deferred to follow up visit unless specifically addressed in assessment and plan.    Interpretation of PHQ-9 Total Score   Score Severity   1-4 No Depression   5-9 Mild Depression   10-14 Moderate Depression   15-19 Moderately Severe Depression   20-27 Severe Depression    Screening for Cognitive Impairment    Three Minute Recall (leader, season, table) 3/3    Brad clock face with all 12 numbers and set the hands to show 10 past 11.  Yes    Cognitive concerns identified deferred for follow up unless specifically addressed in assessment and plan.    Fall Risk Assessment    Has the patient had two or more falls in the last year or any fall with injury in the last year?  Yes    Safety Assessment    Throw rugs on floor.  " No  Handrails on all stairs.  Yes  Good lighting in all hallways.  Yes  Difficulty hearing.  No  Patient counseled about all safety risks that were identified.    Functional Assessment ADLs    Are there any barriers preventing you from cooking for yourself or meeting nutritional needs?  No.    Are there any barriers preventing you from driving safely or obtaining transportation?  Yes. Unable to drive due to atxia  Are there any barriers preventing you from using a telephone or calling for help?  No.    Are there any barriers preventing you from shopping?  No.    Are there any barriers preventing you from taking care of your own finances?  No.    Are there any barriers preventing you from managing your medications?  No.    Are there any barriers preventing you from showering, bathing or dressing yourself?  No.    Are you currently engaging in any exercise or physical activity?  Yes.     What is your perception of your health?  Good.      Health Maintenance Summary                MAMMOGRAM Postponed 3/30/2020 Originally 3/30/2017. Patient Refused     Done 3/30/2016 [id number]     Patient has more history with this topic...    IMM PNEUMOCOCCAL 65+ (ADULT) LOW/MEDIUM RISK SERIES Postponed 1/1/2099 Originally 2/27/2012. Patient Refused    IMM INFLUENZA Postponed 1/1/2099 Originally 9/1/2018. Patient Refused    IMM ZOSTER VACCINES Postponed 1/1/2099 Originally 4/26/2013. Patient Refused     Done 3/1/2013 Imm Admin: Zoster Vaccine Live (ZVL) (Zostavax)    PAP SMEAR Next Due 12/28/2020      Done 12/28/2017 PAP IG (IMAGE GUIDED)    BONE DENSITY Next Due 5/3/2022      Done 5/3/2017 DS-BONE DENSITY STUDY (DEXA)     Patient has more history with this topic...    IMM DTaP/Tdap/Td Vaccine Next Due 4/27/2025      Done 4/27/2015 Imm Admin: Tdap Vaccine    COLONOSCOPY Next Due 6/29/2028      Done 6/29/2018 REFERRAL TO GI FOR COLONOSCOPY     Patient has more history with this topic...          Patient Care Team:  Dar COBB  "KAY Fraire as PCP - General (Internal Medicine)  Catie Barrientos M.D. (Otolaryngology)  Ambulatory Vitals  /60 (BP Location: Left arm, Patient Position: Sitting, BP Cuff Size: Adult)   Pulse 81   Temp 36.8 °C (98.2 °F) (Temporal)   Resp 14   Ht 1.6 m (5' 3\")   Wt 46.7 kg (103 lb)   SpO2 99%   BMI 18.25 kg/m²     Physical Exam   Constitutional: She is well-developed, well-nourished, and in no distress. No distress.   Cardiovascular: Normal rate, regular rhythm and normal heart sounds.  Exam reveals no gallop and no friction rub.    No murmur heard.  Pulmonary/Chest: Effort normal and breath sounds normal. She has no wheezes. She has no rales.   Musculoskeletal: She exhibits no edema.   Skin: She is not diaphoretic.       Component      Latest Ref Rng & Units 8/29/2018 1/10/2019 3/6/2019   WBC      4.8 - 10.8 K/uL 6.5 8.5    RBC      4.20 - 5.40 M/uL 4.47 4.63    Hemoglobin      12.0 - 16.0 g/dL 14.1 15.0    Hematocrit      37.0 - 47.0 % 44.9 45.7    MCV      81.4 - 97.8 fL 100.4 (H) 98.7 (H)    MCH      27.0 - 33.0 pg 31.5 32.4    MCHC      33.6 - 35.0 g/dL 31.4 (L) 32.8 (L)    RDW      35.9 - 50.0 fL 43.6 42.7    Platelet Count      164 - 446 K/uL 257 307    MPV      9.0 - 12.9 fL 9.8 10.0    Neutrophils-Polys      44.00 - 72.00 % 64.60 67.70    Lymphocytes      22.00 - 41.00 % 25.80 23.80    Monocytes      0.00 - 13.40 % 6.60 5.50    Eosinophils      0.00 - 6.90 % 2.20 1.80    Basophils      0.00 - 1.80 % 0.60 0.80    Immature Granulocytes      0.00 - 0.90 % 0.20 0.40    Nucleated RBC      /100 WBC 0.00 0.00    Neutrophils (Absolute)      2.00 - 7.15 K/uL 4.21 5.73    Lymphs (Absolute)      1.00 - 4.80 K/uL 1.68 2.02    Monos (Absolute)      0.00 - 0.85 K/uL 0.43 0.47    Eos (Absolute)      0.00 - 0.51 K/uL 0.14 0.15    Baso (Absolute)      0.00 - 0.12 K/uL 0.04 0.07    Immature Granulocytes (abs)      0.00 - 0.11 K/uL 0.01 0.03    NRBC (Absolute)      K/uL 0.00 0.00    Sodium      135 - 145 " mmol/L 136  139   Potassium      3.6 - 5.5 mmol/L 4.5  3.9   Chloride      96 - 112 mmol/L 100  102   Co2      20 - 33 mmol/L 27  32   Anion Gap      0.0 - 11.9 9.0  5.0   Glucose      65 - 99 mg/dL 95  94   Bun      8 - 22 mg/dL 21  19   Creatinine      0.50 - 1.40 mg/dL 0.59  0.59   Calcium      8.5 - 10.5 mg/dL 9.5  10.5   AST(SGOT)      12 - 45 U/L 23  21   ALT(SGPT)      2 - 50 U/L 22  21   Alkaline Phosphatase      30 - 99 U/L 96  91   Total Bilirubin      0.1 - 1.5 mg/dL 0.8  1.0   Albumin      3.2 - 4.9 g/dL 4.0  4.4   Total Protein      6.0 - 8.2 g/dL 7.1  7.7   Globulin      1.9 - 3.5 g/dL 3.1  3.3   A-G Ratio      g/dL 1.3  1.3   Color       Yellow  Yellow   Character       Clear  Clear   Specific Gravity      <1.035 1.016  1.006   Ph      5.0 - 8.0 7.5  7.5   Glucose      Negative mg/dL Negative  Negative   Ketones      Negative mg/dL Negative  Negative   Protein      Negative mg/dL Negative  Negative   Bilirubin      Negative Negative  Negative   Urobilinogen, Urine      Negative 0.2  0.2   Nitrite      Negative Negative  Negative   Leukocyte Esterase      Negative Negative  Negative   Occult Blood      Negative Negative  Negative   Micro Urine Req       see below  see below   Cholesterol,Tot      <=199 mg/dL 206 (H)  216 (H)   Triglycerides      30 - 149 mg/dL 89  87   HDL      40 - 59 mg/dL 66 (H)  68 (H)   LDL Cholesterol      <=129 mg/dL 122  131 (H)   LDL Particle      <=1135 nmol/L 1349 (H)  1546 (H)   Small LDL      <=634 nmol/L 404  527   L-VLDL Particle No.      <=2.7 nmol/L <1.5  <1.5   HDL Particle No.      >=33.0 umol/L 40.4  38.8   L-HDL Particle No.      >=4.2 umol/L 9.0  9.5   LDL Particle Size      >=20.7 nm 21.1  20.9   VLDL Size      <=46.7 nm 45.5  42.8   HDL Size      >=8.9 nm 9.3  9.3   EER LipoFit by NMR       See Note  See Note   Iron      40 - 170 ug/dL 92     Total Iron Binding      250 - 450 ug/dL 402     % Saturation      15 - 55 % 23     Glycohemoglobin      0.0 - 5.6 % 5.6    "  Estim. Avg Glu      mg/dL 114     GFR If African American      >60 mL/min/1.73 m 2 >60  >60   GFR If Non African American      >60 mL/min/1.73 m 2 >60  >60   25-Hydroxy   Vitamin D 25      30 - 100 ng/mL 41  40   Magnesium      1.5 - 2.5 mg/dL 2.4  2.4   CPK Total      0 - 154 U/L  53    Homocysteine      <11.00 umol/L  9.11    Sed Rate Westergren      0 - 30 mm/hour  23    Syphilis, Treponemal Qual      Non Reactive  Non Reactive    Vitamin B6      20.0 - 125.0 nmol/L  116.0    C Reactive Protein High Sensitive      0.0 - 7.5 mg/L   1.8   TSH      0.380 - 5.330 uIU/mL   2.360   Vitamin B12 -True Cobalamin      211 - 911 pg/mL   988 (H)     ACC/AHA 10 year CV risk at 7-8%    Assessment and Plan:     1. Medicare annual wellness visit, subsequent     2. Hypercholesterolemia     3. Age-related osteoporosis without current pathological fracture  CBC WITH DIFFERENTIAL    PTH INTACT (PTH ONLY)    METABOLIC PANEL,COMPREHENSIVE    DS-BONE DENSITY STUDY (DEXA)   4. Idiopathic peripheral neuropathy  VITAMIN B12   5. Long term use of drug  CBC WITH DIFFERENTIAL    METABOLIC PANEL,COMPREHENSIVE     She declines vaccines and mammogram. She is not interested at this time in trying singulair for her cough. I have discussed with her she would be an appropriate candidate to treat her osteoporosis with \"bone builder\" forteo or the new \"bone builder\" which should be available within the next year since she states she will not do daily forteo followed by inhibitor of osteoclast such as prolia; she will think on this since she does not wish to start active treatment at this time. Since she does take a fair amount of otc supplements/medications will check CBC, CMP in 6 months; will also check PTH at that time. Will order DEXA for her to do in May. Will refer to [facility] to have her left wrist evaluated due to poor healing and misalignment.    Dar Fraire M.D.  "

## 2019-03-13 NOTE — PROCEDURE: LIQUID NITROGEN
Number Of Freeze-Thaw Cycles: 2 freeze-thaw cycles
Post-Care Instructions: I reviewed with the patient in detail post-care instructions. Patient is to wear sunprotection, and avoid picking at any of the treated lesions. Pt may apply Vaseline to crusted or scabbing areas.
Aperture Size (Optional): C
Duration Of Freeze Thaw-Cycle (Seconds): 3
Consent: The patient's consent was obtained including but not limited to risks of crusting, scabbing, blistering, scarring, darker or lighter pigmentary change, recurrence, incomplete removal and infection.
Render Post-Care Instructions In Note?: no
Detail Level: Simple

## 2019-04-19 ENCOUNTER — TELEPHONE (OUTPATIENT)
Dept: INTERNAL MEDICINE | Facility: IMAGING CENTER | Age: 74
End: 2019-04-19

## 2019-04-19 ENCOUNTER — OFFICE VISIT (OUTPATIENT)
Dept: INTERNAL MEDICINE | Facility: IMAGING CENTER | Age: 74
End: 2019-04-19
Payer: MEDICARE

## 2019-04-19 VITALS
TEMPERATURE: 98.2 F | BODY MASS INDEX: 19.14 KG/M2 | SYSTOLIC BLOOD PRESSURE: 114 MMHG | OXYGEN SATURATION: 94 % | DIASTOLIC BLOOD PRESSURE: 78 MMHG | RESPIRATION RATE: 14 BRPM | HEIGHT: 63 IN | WEIGHT: 108 LBS | HEART RATE: 84 BPM

## 2019-04-19 DIAGNOSIS — J30.1 HAY FEVER: ICD-10-CM

## 2019-04-19 DIAGNOSIS — H66.001 ACUTE SUPPURATIVE OTITIS MEDIA OF RIGHT EAR WITHOUT SPONTANEOUS RUPTURE OF TYMPANIC MEMBRANE, RECURRENCE NOT SPECIFIED: ICD-10-CM

## 2019-04-19 DIAGNOSIS — J01.00 ACUTE MAXILLARY SINUSITIS, RECURRENCE NOT SPECIFIED: ICD-10-CM

## 2019-04-19 PROCEDURE — 99213 OFFICE O/P EST LOW 20 MIN: CPT | Performed by: INTERNAL MEDICINE

## 2019-04-19 RX ORDER — AMOXICILLIN AND CLAVULANATE POTASSIUM 600; 42.9 MG/5ML; MG/5ML
600 POWDER, FOR SUSPENSION ORAL 3 TIMES DAILY
Qty: 150 ML | Refills: 0 | Status: SHIPPED | OUTPATIENT
Start: 2019-04-19 | End: 2019-04-25 | Stop reason: SDUPTHER

## 2019-04-19 RX ORDER — MONTELUKAST SODIUM 10 MG/1
10 TABLET ORAL DAILY
Qty: 30 TAB | Refills: 11 | Status: SHIPPED | OUTPATIENT
Start: 2019-04-19 | End: 2020-04-14

## 2019-04-19 RX ORDER — AMOXICILLIN AND CLAVULANATE POTASSIUM 562.5; 437.5; 62.5 MG/1; MG/1; MG/1
1 TABLET, MULTILAYER, EXTENDED RELEASE ORAL 2 TIMES DAILY
Qty: 20 TAB | Refills: 0 | Status: SHIPPED | OUTPATIENT
Start: 2019-04-19 | End: 2019-04-29

## 2019-04-19 NOTE — PROGRESS NOTES
Established Patient Note   HPI:        Ana Paula comes in today after having pain in right ear and nasal congestion that started 4 days ago. When she flew into [place], [place] she experienced pressure and pain in right ear; it occurred again when flying home yesterday. Mild nasal discharge without color. No phlegm. No fever or chills.    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    Due to fall 2004   • Chronic ulcerative colitis (HCC) 6/26/2013   • Cough    • GERD (gastroesophageal reflux disease)    • Hay fever 4/19/2019   • HYPOTENSION    • Inner ear disease    • Leg cramps, sleep related 5/2/2018   • Near-syncope    • Neck injury     fracture, hand and leg numbness and tingling   • Osteoporosis    • Peripheral neuropathy    • Polyp of sigmoid colon 04/10/2018    Hyperplastic polyp   • Raynaud disease    • Tremor    • Vitamin D deficiency        Current Outpatient Prescriptions   Medication Sig Dispense Refill   • amoxicillin-clavulanate SR (AUGMENTIN SR) 1000-62.5 MG TABLET SR 12 HR Take 1 Tab by mouth 2 times a day for 10 days. 20 Tab 0   • montelukast (SINGULAIR) 10 MG Tab Take 1 Tab by mouth every day. 30 Tab 11   • Omega-3 Fatty Acids (FISH OIL) 1200 MG CAPSULE DELAYED RELEASE Take  by mouth.     • Glucosamine-Chondroit-Vit C-Mn (GLUCOSAMINE CHONDR 1500 COMPLX) Cap Take  by mouth.     • Calcium-Vitamin D-Vitamin K 750-500-40 MG-UNT-MCG Tab Take  by mouth.     • Vitamins A & D (VITAMIN A & D PO) Take  by mouth.     • Melatonin 3 MG SL Tab Place  under tongue.     • Misc Natural Products (GINSENG COMPLEX PO) Take  by mouth.     • NON SPECIFIED      • B Complex Vitamins (VITAMIN B COMPLEX PO) Take  by mouth.     • NON SPECIFIED      • azelastine (ASTELIN) 137 MCG/SPRAY nasal spray USE 2 SPRAY IEN BID  11   • TURMERIC CURCUMIN PO Take  by mouth.     • LUTEIN PO Take  by mouth.     • POTASSIUM PO Take  by mouth.     • Cetirizine HCl (ZYRTEC ALLERGY PO) Take   "by mouth.     • MAGNESIUM PO Take  by mouth.     • ASCORBIC ACID PO Take  by mouth.     • Cholecalciferol (VITAMIN D3) Take  by mouth every day.     • multivitamin (THERAGRAN) TABS Take 1 Tab by mouth every day.     • albuterol (PROAIR HFA) 108 (90 BASE) MCG/ACT Aero Soln inhalation aerosol Inhale 2 Puffs by mouth every four hours as needed for Shortness of Breath.       No current facility-administered medications for this visit.          Allergies   Allergen Reactions   • Azithromycin Unspecified     \" I don't know\"   • Clindamycin    • Codeine Unspecified     ?   • Erythromycin Unspecified     ?   • Keflex      ?   • Levaquin Unspecified     \"i dont know\"    • Lyrica Unspecified     ?   • Morphine Unspecified     ?   • Quinolones Unspecified     ?   • Sulfa Drugs Unspecified     \"i dont know\"    • Tetracyclines Unspecified     ?   • Other Food Unspecified     Dairy-mucus (butter is okay). spices-cough         Social History   Substance Use Topics   • Smoking status: Never Smoker   • Smokeless tobacco: Never Used   • Alcohol use No     History   Alcohol Use No     History   Drug Use No       ROS    Ambulatory Vitals  /78 (BP Location: Left arm, Patient Position: Sitting, BP Cuff Size: Adult)   Pulse 84   Temp 36.8 °C (98.2 °F) (Temporal)   Resp 14   Ht 1.6 m (5' 3\")   Wt 49 kg (108 lb)   SpO2 94%   BMI 19.13 kg/m²     Physical Exam   Constitutional: She is well-developed, well-nourished, and in no distress. No distress.   HENT:   TM mildly erythematous right side; no bulging. Sinuses tender over both maxillary sinuses. Throat without exudates.   Skin: She is not diaphoretic.         Assessment and Plan:     1. Acute maxillary sinusitis, recurrence not specified     2. Acute suppurative otitis media of right ear without spontaneous rupture of tympanic membrane, recurrence not specified     3. Hay fever  montelukast (SINGULAIR) 10 MG Tab     Rx augmentin SR bid for 10 days.      Dar Fraire M.D.  "

## 2019-04-22 ENCOUNTER — TELEPHONE (OUTPATIENT)
Dept: INTERNAL MEDICINE | Facility: IMAGING CENTER | Age: 74
End: 2019-04-22

## 2019-04-22 NOTE — TELEPHONE ENCOUNTER
"Patient reports stomach upset with constipation on 4/20.  She called [facility] because she was concerned she had a \"blockage\".  She thought the paramedics were unclean & unprofessional so she sent them away.  She is very concerned about starting the prescribed Augmentin because of the potential side effects of vomiting & diarrrhea given her ataxia.  She is worried that she couldn't make it to the bathroom.  She plans on treating her sinusitis with natural methods - including sinus rinse and garlic oil to her right ear.  Dr Sosa informed.  "

## 2019-04-25 RX ORDER — AMOXICILLIN AND CLAVULANATE POTASSIUM 600; 42.9 MG/5ML; MG/5ML
600 POWDER, FOR SUSPENSION ORAL 3 TIMES DAILY
Qty: 75 ML | Refills: 0 | Status: SHIPPED | OUTPATIENT
Start: 2019-04-25 | End: 2019-04-30

## 2019-05-03 ENCOUNTER — APPOINTMENT (OUTPATIENT)
Dept: RADIOLOGY | Facility: MEDICAL CENTER | Age: 74
DRG: 389 | End: 2019-05-03
Attending: EMERGENCY MEDICINE
Payer: MEDICARE

## 2019-05-03 ENCOUNTER — HOSPITAL ENCOUNTER (EMERGENCY)
Facility: MEDICAL CENTER | Age: 74
DRG: 389 | End: 2019-05-03
Attending: EMERGENCY MEDICINE
Payer: MEDICARE

## 2019-05-03 VITALS
BODY MASS INDEX: 19.57 KG/M2 | OXYGEN SATURATION: 100 % | TEMPERATURE: 97.5 F | SYSTOLIC BLOOD PRESSURE: 156 MMHG | HEART RATE: 71 BPM | RESPIRATION RATE: 14 BRPM | WEIGHT: 110.45 LBS | DIASTOLIC BLOOD PRESSURE: 63 MMHG | HEIGHT: 63 IN

## 2019-05-03 DIAGNOSIS — R07.89 ATYPICAL CHEST PAIN: ICD-10-CM

## 2019-05-03 DIAGNOSIS — K56.600 PARTIAL SMALL BOWEL OBSTRUCTION (HCC): ICD-10-CM

## 2019-05-03 DIAGNOSIS — G44.319 ACUTE POST-TRAUMATIC HEADACHE, NOT INTRACTABLE: ICD-10-CM
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
## 2019-05-03 DIAGNOSIS — R52 GENERALIZED BODY ACHES: ICD-10-CM

## 2019-05-03 LAB
ALBUMIN SERPL BCP-MCNC: 4.2 G/DL (ref 3.2–4.9)
ALBUMIN/GLOB SERPL: 1.5 G/DL
ALP SERPL-CCNC: 90 U/L (ref 30–99)
ALT SERPL-CCNC: 15 U/L (ref 2–50)
ANION GAP SERPL CALC-SCNC: 6 MMOL/L (ref 0–11.9)
AST SERPL-CCNC: 14 U/L (ref 12–45)
BASOPHILS # BLD AUTO: 0.4 % (ref 0–1.8)
BASOPHILS # BLD: 0.03 K/UL (ref 0–0.12)
BILIRUB SERPL-MCNC: 0.8 MG/DL (ref 0.1–1.5)
BUN SERPL-MCNC: 16 MG/DL (ref 8–22)
CALCIUM SERPL-MCNC: 9.3 MG/DL (ref 8.5–10.5)
CHLORIDE SERPL-SCNC: 102 MMOL/L (ref 96–112)
CO2 SERPL-SCNC: 30 MMOL/L (ref 20–33)
CREAT SERPL-MCNC: 0.53 MG/DL (ref 0.5–1.4)
EKG IMPRESSION: NORMAL
EOSINOPHIL # BLD AUTO: 0.13 K/UL (ref 0–0.51)
EOSINOPHIL NFR BLD: 1.5 % (ref 0–6.9)
ERYTHROCYTE [DISTWIDTH] IN BLOOD BY AUTOMATED COUNT: 43.1 FL (ref 35.9–50)
GLOBULIN SER CALC-MCNC: 2.8 G/DL (ref 1.9–3.5)
GLUCOSE SERPL-MCNC: 106 MG/DL (ref 65–99)
HCT VFR BLD AUTO: 44.1 % (ref 37–47)
HGB BLD-MCNC: 14.5 G/DL (ref 12–16)
IMM GRANULOCYTES # BLD AUTO: 0.02 K/UL (ref 0–0.11)
IMM GRANULOCYTES NFR BLD AUTO: 0.2 % (ref 0–0.9)
LIPASE SERPL-CCNC: 11 U/L (ref 11–82)
LYMPHOCYTES # BLD AUTO: 2.08 K/UL (ref 1–4.8)
LYMPHOCYTES NFR BLD: 24.5 % (ref 22–41)
MCH RBC QN AUTO: 32.2 PG (ref 27–33)
MCHC RBC AUTO-ENTMCNC: 32.9 G/DL (ref 33.6–35)
MCV RBC AUTO: 97.8 FL (ref 81.4–97.8)
MONOCYTES # BLD AUTO: 0.52 K/UL (ref 0–0.85)
MONOCYTES NFR BLD AUTO: 6.1 % (ref 0–13.4)
NEUTROPHILS # BLD AUTO: 5.72 K/UL (ref 2–7.15)
NEUTROPHILS NFR BLD: 67.3 % (ref 44–72)
NRBC # BLD AUTO: 0 K/UL
NRBC BLD-RTO: 0 /100 WBC
PLATELET # BLD AUTO: 259 K/UL (ref 164–446)
PMV BLD AUTO: 8.8 FL (ref 9–12.9)
POTASSIUM SERPL-SCNC: 3.8 MMOL/L (ref 3.6–5.5)
PROT SERPL-MCNC: 7 G/DL (ref 6–8.2)
RBC # BLD AUTO: 4.51 M/UL (ref 4.2–5.4)
SODIUM SERPL-SCNC: 138 MMOL/L (ref 135–145)
TROPONIN I SERPL-MCNC: <0.01 NG/ML (ref 0–0.04)
WBC # BLD AUTO: 8.5 K/UL (ref 4.8–10.8)

## 2019-05-03 PROCEDURE — 96375 TX/PRO/DX INJ NEW DRUG ADDON: CPT

## 2019-05-03 PROCEDURE — 71045 X-RAY EXAM CHEST 1 VIEW: CPT

## 2019-05-03 PROCEDURE — 36415 COLL VENOUS BLD VENIPUNCTURE: CPT

## 2019-05-03 PROCEDURE — 96376 TX/PRO/DX INJ SAME DRUG ADON: CPT

## 2019-05-03 PROCEDURE — 80053 COMPREHEN METABOLIC PANEL: CPT

## 2019-05-03 PROCEDURE — 84484 ASSAY OF TROPONIN QUANT: CPT

## 2019-05-03 PROCEDURE — 93005 ELECTROCARDIOGRAM TRACING: CPT | Performed by: EMERGENCY MEDICINE

## 2019-05-03 PROCEDURE — 700111 HCHG RX REV CODE 636 W/ 250 OVERRIDE (IP): Performed by: EMERGENCY MEDICINE

## 2019-05-03 PROCEDURE — 74177 CT ABD & PELVIS W/CONTRAST: CPT

## 2019-05-03 PROCEDURE — 93005 ELECTROCARDIOGRAM TRACING: CPT

## 2019-05-03 PROCEDURE — 70450 CT HEAD/BRAIN W/O DYE: CPT

## 2019-05-03 PROCEDURE — 85025 COMPLETE CBC W/AUTO DIFF WBC: CPT

## 2019-05-03 PROCEDURE — 96374 THER/PROPH/DIAG INJ IV PUSH: CPT

## 2019-05-03 PROCEDURE — 99285 EMERGENCY DEPT VISIT HI MDM: CPT

## 2019-05-03 PROCEDURE — 700102 HCHG RX REV CODE 250 W/ 637 OVERRIDE(OP): Performed by: EMERGENCY MEDICINE

## 2019-05-03 PROCEDURE — 700117 HCHG RX CONTRAST REV CODE 255: Performed by: EMERGENCY MEDICINE

## 2019-05-03 PROCEDURE — A9270 NON-COVERED ITEM OR SERVICE: HCPCS | Performed by: EMERGENCY MEDICINE

## 2019-05-03 PROCEDURE — 83690 ASSAY OF LIPASE: CPT

## 2019-05-03 PROCEDURE — 72170 X-RAY EXAM OF PELVIS: CPT

## 2019-05-03 PROCEDURE — 72125 CT NECK SPINE W/O DYE: CPT

## 2019-05-03 RX ORDER — TRAMADOL HYDROCHLORIDE 50 MG/1
50 TABLET ORAL ONCE
Status: COMPLETED | OUTPATIENT
Start: 2019-05-03 | End: 2019-05-03

## 2019-05-03 RX ORDER — MORPHINE SULFATE 4 MG/ML
2 INJECTION, SOLUTION INTRAMUSCULAR; INTRAVENOUS ONCE
Status: COMPLETED | OUTPATIENT
Start: 2019-05-03 | End: 2019-05-03

## 2019-05-03 RX ORDER — MORPHINE SULFATE 4 MG/ML
4 INJECTION, SOLUTION INTRAMUSCULAR; INTRAVENOUS ONCE
Status: COMPLETED | OUTPATIENT
Start: 2019-05-03 | End: 2019-05-03

## 2019-05-03 RX ORDER — TRAMADOL HYDROCHLORIDE 50 MG/1
50 TABLET ORAL EVERY 6 HOURS PRN
Qty: 12 TAB | Refills: 0 | Status: SHIPPED | OUTPATIENT
Start: 2019-05-03 | End: 2019-05-06

## 2019-05-03 RX ADMIN — TRAMADOL HYDROCHLORIDE 50 MG: 50 TABLET, FILM COATED ORAL at 08:01

## 2019-05-03 RX ADMIN — MORPHINE SULFATE 2 MG: 4 INJECTION INTRAVENOUS at 04:41

## 2019-05-03 RX ADMIN — FENTANYL CITRATE 50 MCG: 50 INJECTION, SOLUTION INTRAMUSCULAR; INTRAVENOUS at 05:11

## 2019-05-03 RX ADMIN — IOHEXOL 80 ML: 350 INJECTION, SOLUTION INTRAVENOUS at 06:59

## 2019-05-03 RX ADMIN — MORPHINE SULFATE 2 MG: 4 INJECTION INTRAVENOUS at 04:04

## 2019-05-03 ASSESSMENT — PAIN DESCRIPTION - DESCRIPTORS: DESCRIPTORS: SHOOTING

## 2019-05-03 NOTE — ED PROVIDER NOTES
"ED Provider Note    CHIEF COMPLAINT  Chief Complaint   Patient presents with   • Pain   • Chest Pain       HPI  Ana Paula Ordonez is a 74 y.o. female who presents with diffuse body pain.  She states that she has had frequent falls secondary to cerebellar ataxia diagnosis.  She has been falling several times in the past month.  Last was about a week ago.  Since then she has had a severe headache and neck pain involving the midline and radiating down to her right shoulder.  Denies any numbness or new weakness.  Has diffuse back and pelvis pain and epigastric pain around the area of her ribs.  She believes it is secondary to multiple falls.  No nausea, vomiting, fevers.  No dysuria or hematuria.  No diarrhea.    REVIEW OF SYSTEMS  See HPI for further details. All other systems are negative.     PAST MEDICAL HISTORY   has a past medical history of Arthritis; ASTHMA; Cerebellar ataxia (HCC) (3/30/2018); Chronic ulcerative colitis (HCC) (6/26/2013); Cough; GERD (gastroesophageal reflux disease); Hay fever (4/19/2019); HYPOTENSION; Inner ear disease; Leg cramps, sleep related (5/2/2018); Near-syncope; Neck injury; Osteoporosis; Peripheral neuropathy; Polyp of sigmoid colon (04/10/2018); Raynaud disease; Tremor; and Vitamin D deficiency.    SOCIAL HISTORY  Social History     Social History Main Topics   • Smoking status: Never Smoker   • Smokeless tobacco: Never Used   • Alcohol use No   • Drug use: No   • Sexual activity: Not on file       SURGICAL HISTORY   has a past surgical history that includes nasal fracture reduction closed (10/8/08); hip nailing intramedullary (7/30/2011); tonsillectomy and adenoidectomy; bladder suspension; nissen fundoplication laparoscopic (2005); and cholecystectomy (2008).    CURRENT MEDICATIONS  Home Medications    **Home medications have not yet been reviewed for this encounter**         ALLERGIES  Allergies   Allergen Reactions   • Dilaudid [Hydromorphone] Anaphylaxis     \"went into code " "blue\"    • Azithromycin Unspecified     \" I don't know\"   • Clindamycin    • Codeine Unspecified     ?   • Erythromycin Unspecified     ?   • Keflex      ?   • Levaquin Unspecified     \"i dont know\"    • Lyrica Unspecified     ?   • Quinolones Unspecified     ?   • Sulfa Drugs Unspecified     \"i dont know\"    • Tetracyclines Unspecified     ?   • Other Food Unspecified     Dairy-mucus (butter is okay). spices-cough       PHYSICAL EXAM  VITAL SIGNS: /92   Pulse 82   Temp 36.4 °C (97.5 °F) (Temporal)   Resp 16   Ht 1.6 m (5' 3\")   Wt 50.1 kg (110 lb 7.2 oz)   SpO2 95%   BMI 19.57 kg/m²   Pulse ox interpretation: I interpret this pulse ox as normal.  Constitutional: Alert in no apparent distress.  HENT: No signs of trauma, tenderness to the occipital region of the head, bilateral external ears normal, Nose normal.   Eyes: Pupils are equal and reactive, Conjunctiva normal, Non-icteric.   Neck: Normal range of motion, diffuse midline tenderness, Supple, No stridor.   Cardiovascular: Regular rate and rhythm.   Thorax & Lungs: Normal breath sounds, No respiratory distress, No wheezing, No chest tenderness.   Abdomen: Bowel sounds normal, Soft, epi tenderness, No masses, No pulsatile masses. No peritoneal signs.  Skin: Warm, Dry, No erythema, No rash.   Back: No bony tenderness, No CVA tenderness.   Extremities: Intact distal pulses, No edema, No tenderness, No cyanosis  Musculoskeletal: Good range of motion in all major joints. No tenderness to palpation or major deformities noted.   Neurologic: Alert, No focal deficits noted.       DIAGNOSTIC STUDIES / PROCEDURES    EKG - Physician interpretation  Results for orders placed or performed during the hospital encounter of 05/03/19   EKG   Result Value Ref Range    Report       [facility] Emergency Dept.    Test Date:  2019-05-03  Pt Name:    TORY HERNANDEZ                Department: ER  MRN:        [id number]                      Room:  Gender:    "  Female                       Technician: [id number]  :        [id number]                   Requested By:ER TRIAGE PROTOCOL  Order #:    [id number]                    Reading MD: LACHELLE MOREJON MD    Measurements  Intervals                                Axis  Rate:       72                           P:          59  ID:         160                          QRS:        39  QRSD:       80                           T:          68  QT:         400  QTc:        438    Interpretive Statements  SINUS RHYTHM  Compared to ECG 06/15/2012 22:42:56      Electronically Signed On 5-3-2019 5:00:45 PDT by LACHELLE MOREJON MD         LABS  Labs Reviewed   CBC WITH DIFFERENTIAL - Abnormal; Notable for the following:        Result Value    MCHC 32.9 (*)     MPV 8.8 (*)     All other components within normal limits   COMP METABOLIC PANEL - Abnormal; Notable for the following:     Glucose 106 (*)     All other components within normal limits   TROPONIN   ESTIMATED GFR   LIPASE       RADIOLOGY  CT-ABDOMEN-PELVIS WITH   Final Result      Mild fluid and gas distention of small bowel in the left central abdomen which could indicate early or incomplete mechanical small bowel obstruction. Otherwise no acute findings.      CT-HEAD W/O   Final Result      Normal CT scan of the head without contrast.               INTERPRETING LOCATION:  [address] [place] [address], [facility]      CT-CSPINE WITHOUT PLUS RECONS   Final Result      No acute fracture or traumatic malalignment.      DX-PELVIS-1 OR 2 VIEWS   Final Result      No acute bony abnormality.      DX-CHEST-PORTABLE (1 VIEW)   Final Result      No acute cardiopulmonary abnormality. No interval change.          COURSE & MEDICAL DECISION MAKING    Medications   morphine (pf) 4 mg/ml injection 4 mg (2 mg Intravenous Given 5/3/19 0109)   morphine (pf) 4 mg/ml injection 2 mg (2 mg Intravenous Given 5/3/19 0574)   fentaNYL (SUBLIMAZE) injection 50 mcg (50 mcg Intravenous Given 5/3/19 0986)   iohexol (OMNIPAQUE) 350  mg/mL (80 mL Intravenous Given 5/3/19 9936)   tramadol (ULTRAM) 50 MG tablet 50 mg (50 mg Oral Given 5/3/19 0801)       Pertinent Labs & Imaging studies reviewed. (See chart for details)  74 y.o. female presenting with diffuse body aches.  States that the pain has been worsening over the past 3 days.  Has chronic pains throughout her body however.  She attributes this to recurrent falls from her cerebellar ataxia.  Notes that she has had about 3 falls in the past month.  Last fall was about a week ago.  States that she struck her head on the ground and has had continual and worsening headache and neck pain.  No focal neurologic deficits.  She is also reporting anterior chest wall pain and pelvic pain especially in her right buttock which she has fallen onto several times.    Patient denies nausea and vomiting.  No postprandial pain symptoms.  No pain with exertion.  The patient is in no respiratory distress.  Clear breath sounds bilaterally.  No hypoxia.    Chest x-ray is unremarkable.  No evidence of rib fractures.  EKG is also unremarkable.  No signs of acute ischemia.  Troponin is normal.  No signs of ACS.  Pelvic x-ray was performed that was unremarkable.  Patient had CT head and neck performed given her history of recent fall, her age, consistent pain to the head and neck.  She does have midline tenderness to the cervical spine.  Has worsening headache following a recent fall.  Concern for possible intracranial hemorrhage such as sub-dural hematoma.    CT of the head and neck were ultimately unremarkable.    Despite treatment with morphine and fentanyl, the patient continues to writhe in pain and appears very uncomfortable.  She states that her headache and neck pain have completely resolved however she continues to have persistent epigastric pain.  Does not appear to be an ACS equivalent.  Laboratory studies are largely unremarkable.  No leukocytosis.  No liver enzyme abnormalities.    Given the patient's  epigastric pain that is rather severe, concern for potential mesenteric ischemia or pancreatitis.  Possible aortic dissection / abdominal aortic aneurysm.  CT abdomen pelvis was performed for further evaluation.  There is potential for early incomplete small bowel obstruction.  Patient was informed of the results.  Recommending a clear liquid diet for next few days and to monitor her symptoms closely.    With regards to the patient's pain symptoms, she was given a few days of pain medication until she can follow-up with a primary care physician for further management.  Overall, patient is much improved clinically prior to discharge.  No longer aggravating and feels much improved overall.  Resting comfortably prior to discharge.  Nontoxic in appearance and with stable vital signs.    In prescribing controlled substances to this patient, I certify that I have obtained and reviewed the medical history of Ana Paula Ordonez. I have also made a good yeyo effort to obtain applicable records from other providers who have treated the patient and records did not demonstrate any increased risk of substance abuse that would prevent me from prescribing controlled substances.     I have conducted a physical exam and documented it. I have reviewed Ms. Ordonez’s prescription history as maintained by the [place] Prescription Monitoring Program.     I have assessed the patient’s risk for abuse, dependency, and addiction using the validated Opioid Risk Tool available at https://www.mdcalc.com/patlah-gcdk-dprt-ort-narcotic-abuse.     Given the above, I believe the benefits of controlled substance therapy outweigh the risks. The reasons for prescribing controlled substances include non-narcotic, oral analgesic alternatives have been inadequate for pain control. Accordingly, I have discussed the risk and benefits, treatment plan, and alternative therapies with the patient.       The patient will not drink alcohol nor drive with prescribed  "medications.   The patient was instructed to follow-up with primary care physician for further management.  To return immediately for any worsening symptoms or development of any other concerning signs or symptoms. The patient verbalizes understanding in their own words.      /63   Pulse 71   Temp 36.4 °C (97.5 °F) (Temporal)   Resp 14   Ht 1.6 m (5' 3\")   Wt 50.1 kg (110 lb 7.2 oz)   SpO2 100%   BMI 19.57 kg/m²     The patient was referred to primary care where they will receive further BP management.      Dar Fraire M.D.  [address] [facility] [id number] [phone]    Schedule an appointment as soon as possible for a visit       [facility], Emergency Dept  [address] [facility] [address] [phone]    As needed, If symptoms worsen      FINAL IMPRESSION  1. Atypical chest pain    2. Fall, initial encounter    3. Generalized body aches    4. Acute post-traumatic headache, not intractable            Electronically signed by: Geoffrey Fermin, 5/3/2019 3:30 AM    "

## 2019-05-03 NOTE — ED NOTES
Pt discharged home. Explained discharge and medication instructions. Questions and comments addressed. Pt verbalized understanding of instructions. Pt advised to follow-up with PCP or return to ED for any new or worsening of symptoms. Pt is ambulating well and steady on feet. VS stable. Pt instructed no driving while taking narcotics. Pt educated on risk of opioid medications. Pt verbalized understanding. Consent signed and placed in chart. Pt's family member at bedside and will be driving pt home.

## 2019-05-03 NOTE — ED NOTES
"Pt on call light again requesting pain medication. Pt states she has \"a high pain tolerance but morphine hasn't helped\" MD notified  "

## 2019-05-03 NOTE — ED NOTES
Pt brought to red 3 via wc needing to use bathroom, wc to bathroom pt self caths, refusing assistance from RN, instructed to pull emergency cord for any assistance, pt verbalizes understanding

## 2019-05-03 NOTE — ED TRIAGE NOTES
"Chief Complaint   Patient presents with   • Pain   • Chest Pain     Pt to triage in a wheelchair, and appears to be in NAD. Pt reports pain \"everywhere\" and in my chest, describes pain as \"shooting\" and denies associated SOB, N/V, or fevers/chills. Pt has history of cerebellar ataxia from a fall, and as also reported three falls over the last month, denies dizziness and ambulates with walker at home. Pt denies taking any blood thinners.Pt educated on triage process and placed back in lobby, pt encourage to alert staff with changes in condition.        /92   Pulse 82   Temp 36.4 °C (97.5 °F) (Temporal)   Resp 16   Ht 1.6 m (5' 3\")   Wt 50.1 kg (110 lb 7.2 oz)   SpO2 95%   BMI 19.57 kg/m²     "

## 2019-05-06 ENCOUNTER — APPOINTMENT (OUTPATIENT)
Dept: RADIOLOGY | Facility: MEDICAL CENTER | Age: 74
DRG: 389 | End: 2019-05-06
Attending: EMERGENCY MEDICINE
Payer: MEDICARE

## 2019-05-06 ENCOUNTER — HOSPITAL ENCOUNTER (INPATIENT)
Facility: MEDICAL CENTER | Age: 74
LOS: 2 days | DRG: 389 | End: 2019-05-08
Attending: EMERGENCY MEDICINE | Admitting: INTERNAL MEDICINE
Payer: MEDICARE

## 2019-05-06 DIAGNOSIS — R10.9 ABDOMINAL PAIN, UNSPECIFIED ABDOMINAL LOCATION: ICD-10-CM

## 2019-05-06 DIAGNOSIS — H92.02 LEFT EAR PAIN: ICD-10-CM

## 2019-05-06 PROBLEM — R33.9 URINARY RETENTION: Status: ACTIVE | Noted: 2019-05-06

## 2019-05-06 PROBLEM — R10.13 EPIGASTRIC PAIN: Status: ACTIVE | Noted: 2019-05-06

## 2019-05-06 PROBLEM — K56.609 SMALL BOWEL OBSTRUCTION (HCC): Status: ACTIVE | Noted: 2019-05-06

## 2019-05-06 PROBLEM — R19.7 DIARRHEA: Status: ACTIVE | Noted: 2019-05-06

## 2019-05-06 LAB
ALBUMIN SERPL BCP-MCNC: 4.1 G/DL (ref 3.2–4.9)
ALBUMIN/GLOB SERPL: 1.5 G/DL
ALP SERPL-CCNC: 84 U/L (ref 30–99)
ALT SERPL-CCNC: 23 U/L (ref 2–50)
ANION GAP SERPL CALC-SCNC: 9 MMOL/L (ref 0–11.9)
APPEARANCE UR: CLEAR
AST SERPL-CCNC: 21 U/L (ref 12–45)
BASOPHILS # BLD AUTO: 0.5 % (ref 0–1.8)
BASOPHILS # BLD: 0.04 K/UL (ref 0–0.12)
BILIRUB SERPL-MCNC: 1.1 MG/DL (ref 0.1–1.5)
BILIRUB UR QL STRIP.AUTO: NEGATIVE
BUN SERPL-MCNC: 9 MG/DL (ref 8–22)
CALCIUM SERPL-MCNC: 9.3 MG/DL (ref 8.5–10.5)
CHLORIDE SERPL-SCNC: 103 MMOL/L (ref 96–112)
CO2 SERPL-SCNC: 25 MMOL/L (ref 20–33)
COLOR UR: YELLOW
CREAT SERPL-MCNC: 0.52 MG/DL (ref 0.5–1.4)
EKG IMPRESSION: NORMAL
EOSINOPHIL # BLD AUTO: 0.09 K/UL (ref 0–0.51)
EOSINOPHIL NFR BLD: 1.1 % (ref 0–6.9)
ERYTHROCYTE [DISTWIDTH] IN BLOOD BY AUTOMATED COUNT: 41 FL (ref 35.9–50)
GLOBULIN SER CALC-MCNC: 2.7 G/DL (ref 1.9–3.5)
GLUCOSE SERPL-MCNC: 104 MG/DL (ref 65–99)
GLUCOSE UR STRIP.AUTO-MCNC: NEGATIVE MG/DL
HCT VFR BLD AUTO: 41.6 % (ref 37–47)
HGB BLD-MCNC: 14.1 G/DL (ref 12–16)
IMM GRANULOCYTES # BLD AUTO: 0.02 K/UL (ref 0–0.11)
IMM GRANULOCYTES NFR BLD AUTO: 0.2 % (ref 0–0.9)
KETONES UR STRIP.AUTO-MCNC: 15 MG/DL
LEUKOCYTE ESTERASE UR QL STRIP.AUTO: NEGATIVE
LIPASE SERPL-CCNC: 7 U/L (ref 11–82)
LYMPHOCYTES # BLD AUTO: 1.84 K/UL (ref 1–4.8)
LYMPHOCYTES NFR BLD: 22.9 % (ref 22–41)
MCH RBC QN AUTO: 32.5 PG (ref 27–33)
MCHC RBC AUTO-ENTMCNC: 33.9 G/DL (ref 33.6–35)
MCV RBC AUTO: 95.9 FL (ref 81.4–97.8)
MICRO URNS: ABNORMAL
MONOCYTES # BLD AUTO: 0.56 K/UL (ref 0–0.85)
MONOCYTES NFR BLD AUTO: 7 % (ref 0–13.4)
NEUTROPHILS # BLD AUTO: 5.48 K/UL (ref 2–7.15)
NEUTROPHILS NFR BLD: 68.3 % (ref 44–72)
NITRITE UR QL STRIP.AUTO: NEGATIVE
NRBC # BLD AUTO: 0 K/UL
NRBC BLD-RTO: 0 /100 WBC
PH UR STRIP.AUTO: 5.5 [PH]
PLATELET # BLD AUTO: 273 K/UL (ref 164–446)
PMV BLD AUTO: 8.9 FL (ref 9–12.9)
POTASSIUM SERPL-SCNC: 3.6 MMOL/L (ref 3.6–5.5)
PROT SERPL-MCNC: 6.8 G/DL (ref 6–8.2)
PROT UR QL STRIP: NEGATIVE MG/DL
RBC # BLD AUTO: 4.34 M/UL (ref 4.2–5.4)
RBC UR QL AUTO: NEGATIVE
SODIUM SERPL-SCNC: 137 MMOL/L (ref 135–145)
SP GR UR STRIP.AUTO: 1.02
UROBILINOGEN UR STRIP.AUTO-MCNC: 0.2 MG/DL
WBC # BLD AUTO: 8 K/UL (ref 4.8–10.8)

## 2019-05-06 PROCEDURE — 85025 COMPLETE CBC W/AUTO DIFF WBC: CPT

## 2019-05-06 PROCEDURE — 700105 HCHG RX REV CODE 258: Performed by: STUDENT IN AN ORGANIZED HEALTH CARE EDUCATION/TRAINING PROGRAM

## 2019-05-06 PROCEDURE — 96374 THER/PROPH/DIAG INJ IV PUSH: CPT

## 2019-05-06 PROCEDURE — 700117 HCHG RX CONTRAST REV CODE 255: Performed by: EMERGENCY MEDICINE

## 2019-05-06 PROCEDURE — A9270 NON-COVERED ITEM OR SERVICE: HCPCS | Performed by: STUDENT IN AN ORGANIZED HEALTH CARE EDUCATION/TRAINING PROGRAM

## 2019-05-06 PROCEDURE — 93005 ELECTROCARDIOGRAM TRACING: CPT | Performed by: STUDENT IN AN ORGANIZED HEALTH CARE EDUCATION/TRAINING PROGRAM

## 2019-05-06 PROCEDURE — 74177 CT ABD & PELVIS W/CONTRAST: CPT

## 2019-05-06 PROCEDURE — 306784 CATH,PED 10FR VINYL LATEX FREE: Performed by: INTERNAL MEDICINE

## 2019-05-06 PROCEDURE — 700105 HCHG RX REV CODE 258: Performed by: EMERGENCY MEDICINE

## 2019-05-06 PROCEDURE — 770006 HCHG ROOM/CARE - MED/SURG/GYN SEMI*

## 2019-05-06 PROCEDURE — 99223 1ST HOSP IP/OBS HIGH 75: CPT | Mod: AI,GC | Performed by: INTERNAL MEDICINE

## 2019-05-06 PROCEDURE — 700111 HCHG RX REV CODE 636 W/ 250 OVERRIDE (IP): Performed by: EMERGENCY MEDICINE

## 2019-05-06 PROCEDURE — 83690 ASSAY OF LIPASE: CPT

## 2019-05-06 PROCEDURE — 700102 HCHG RX REV CODE 250 W/ 637 OVERRIDE(OP): Performed by: STUDENT IN AN ORGANIZED HEALTH CARE EDUCATION/TRAINING PROGRAM

## 2019-05-06 PROCEDURE — 36415 COLL VENOUS BLD VENIPUNCTURE: CPT

## 2019-05-06 PROCEDURE — 81003 URINALYSIS AUTO W/O SCOPE: CPT

## 2019-05-06 PROCEDURE — 99285 EMERGENCY DEPT VISIT HI MDM: CPT

## 2019-05-06 PROCEDURE — 80053 COMPREHEN METABOLIC PANEL: CPT

## 2019-05-06 PROCEDURE — 93010 ELECTROCARDIOGRAM REPORT: CPT | Performed by: INTERNAL MEDICINE

## 2019-05-06 RX ORDER — SODIUM CHLORIDE, SODIUM LACTATE, POTASSIUM CHLORIDE, CALCIUM CHLORIDE 600; 310; 30; 20 MG/100ML; MG/100ML; MG/100ML; MG/100ML
1000 INJECTION, SOLUTION INTRAVENOUS ONCE
Status: COMPLETED | OUTPATIENT
Start: 2019-05-06 | End: 2019-05-06

## 2019-05-06 RX ORDER — MONTELUKAST SODIUM 10 MG/1
10 TABLET ORAL DAILY
Status: DISCONTINUED | OUTPATIENT
Start: 2019-05-06 | End: 2019-05-08 | Stop reason: HOSPADM

## 2019-05-06 RX ORDER — ACETAMINOPHEN 325 MG/1
650 TABLET ORAL EVERY 6 HOURS PRN
Status: DISCONTINUED | OUTPATIENT
Start: 2019-05-06 | End: 2019-05-08 | Stop reason: HOSPADM

## 2019-05-06 RX ORDER — BISACODYL 10 MG
10 SUPPOSITORY, RECTAL RECTAL
Status: DISCONTINUED | OUTPATIENT
Start: 2019-05-06 | End: 2019-05-06

## 2019-05-06 RX ORDER — AMOXICILLIN 250 MG
2 CAPSULE ORAL 2 TIMES DAILY
Status: DISCONTINUED | OUTPATIENT
Start: 2019-05-06 | End: 2019-05-06

## 2019-05-06 RX ORDER — SODIUM CHLORIDE 9 MG/ML
INJECTION, SOLUTION INTRAVENOUS CONTINUOUS
Status: DISCONTINUED | OUTPATIENT
Start: 2019-05-06 | End: 2019-05-08 | Stop reason: HOSPADM

## 2019-05-06 RX ORDER — ALBUTEROL SULFATE 90 UG/1
2 AEROSOL, METERED RESPIRATORY (INHALATION) EVERY 4 HOURS PRN
Status: DISCONTINUED | OUTPATIENT
Start: 2019-05-06 | End: 2019-05-08 | Stop reason: HOSPADM

## 2019-05-06 RX ORDER — POLYETHYLENE GLYCOL 3350 17 G/17G
1 POWDER, FOR SOLUTION ORAL
Status: DISCONTINUED | OUTPATIENT
Start: 2019-05-06 | End: 2019-05-06

## 2019-05-06 RX ADMIN — ACETAMINOPHEN 650 MG: 325 TABLET, FILM COATED ORAL at 06:39

## 2019-05-06 RX ADMIN — SODIUM CHLORIDE: 9 INJECTION, SOLUTION INTRAVENOUS at 09:34

## 2019-05-06 RX ADMIN — ACETAMINOPHEN 650 MG: 325 TABLET, FILM COATED ORAL at 13:08

## 2019-05-06 RX ADMIN — MONTELUKAST SODIUM 10 MG: 10 TABLET, COATED ORAL at 17:53

## 2019-05-06 RX ADMIN — IOHEXOL 80 ML: 350 INJECTION, SOLUTION INTRAVENOUS at 04:35

## 2019-05-06 RX ADMIN — FENTANYL CITRATE 50 MCG: 50 INJECTION, SOLUTION INTRAMUSCULAR; INTRAVENOUS at 03:33

## 2019-05-06 RX ADMIN — ALBUTEROL SULFATE 2 PUFF: 90 AEROSOL, METERED RESPIRATORY (INHALATION) at 21:36

## 2019-05-06 RX ADMIN — SODIUM CHLORIDE, POTASSIUM CHLORIDE, SODIUM LACTATE AND CALCIUM CHLORIDE 1000 ML: 600; 310; 30; 20 INJECTION, SOLUTION INTRAVENOUS at 03:14

## 2019-05-06 RX ADMIN — ACETAMINOPHEN 650 MG: 325 TABLET, FILM COATED ORAL at 19:23

## 2019-05-06 RX ADMIN — SODIUM CHLORIDE: 9 INJECTION, SOLUTION INTRAVENOUS at 21:41

## 2019-05-06 ASSESSMENT — COGNITIVE AND FUNCTIONAL STATUS - GENERAL
HELP NEEDED FOR BATHING: A LITTLE
DAILY ACTIVITIY SCORE: 20
SUGGESTED CMS G CODE MODIFIER MOBILITY: CK
DRESSING REGULAR UPPER BODY CLOTHING: A LITTLE
DRESSING REGULAR LOWER BODY CLOTHING: A LITTLE
STANDING UP FROM CHAIR USING ARMS: A LITTLE
MOBILITY SCORE: 19
CLIMB 3 TO 5 STEPS WITH RAILING: A LITTLE
SUGGESTED CMS G CODE MODIFIER DAILY ACTIVITY: CJ
TOILETING: A LITTLE
WALKING IN HOSPITAL ROOM: A LITTLE
MOVING TO AND FROM BED TO CHAIR: A LITTLE
MOVING FROM LYING ON BACK TO SITTING ON SIDE OF FLAT BED: A LITTLE

## 2019-05-06 ASSESSMENT — ENCOUNTER SYMPTOMS
DIARRHEA: 0
FEVER: 0
COUGH: 0
BACK PAIN: 0
SEIZURES: 0
WEAKNESS: 1
DOUBLE VISION: 0
DEPRESSION: 0
WEIGHT LOSS: 0
DIZZINESS: 0
LOSS OF CONSCIOUSNESS: 0
SORE THROAT: 0
WHEEZING: 0
BLURRED VISION: 0
BACK PAIN: 1
HEARTBURN: 1
CONSTIPATION: 0
MYALGIAS: 1
HEARTBURN: 0
NAUSEA: 0
CONSTIPATION: 1
NECK PAIN: 1
PALPITATIONS: 0
FALLS: 1
CHILLS: 0
ABDOMINAL PAIN: 1
BLOOD IN STOOL: 0
COUGH: 1
DIARRHEA: 1
VOMITING: 0
SHORTNESS OF BREATH: 0
CLAUDICATION: 0
DIAPHORESIS: 0
HEADACHES: 1

## 2019-05-06 ASSESSMENT — LIFESTYLE VARIABLES
ALCOHOL_USE: NO
SUBSTANCE_ABUSE: 0
EVER_SMOKED: NEVER

## 2019-05-06 ASSESSMENT — PATIENT HEALTH QUESTIONNAIRE - PHQ9
2. FEELING DOWN, DEPRESSED, IRRITABLE, OR HOPELESS: NOT AT ALL
1. LITTLE INTEREST OR PLEASURE IN DOING THINGS: NOT AT ALL
SUM OF ALL RESPONSES TO PHQ9 QUESTIONS 1 AND 2: 0

## 2019-05-06 NOTE — ASSESSMENT & PLAN NOTE
-resolved, so far no recurrence.   -May be related to enteritis versus overflow from partial small bowel obstruction, normally has chronic constipation.  She also had an antibiotic course approximately 3 weeks ago.  -Monitor her for recurrence.  If so, sent for C. Difficile PCR.

## 2019-05-06 NOTE — ED PROVIDER NOTES
ED Provider Note    ED Provider Note      Primary care provider: Dar Fraire M.D.    CHIEF COMPLAINT  Chief Complaint   Patient presents with   • Epigastric Pain     acute on chronic   • Joint Pain     acute on chronic   • Headache     acute on chronic   • Diarrhea     watery x 24 hours       HPI  Ana Paula Ordonez is a 74 y.o. female who presents to the Emergency Department with chief complaint of multiple complaints.  Patient reports epigastric abdominal pain that radiates throughout her entire abdomen she also reports some intermittent joint pain diffusely throughout her entire body.  She has had intermittent headaches.  Patient reports that since her last visit here she is developed profuse diarrhea watery no blood noted in stool she is also had nausea without emesis.  Pain is a 10 out of 10 without alleviating or aggravating factors she denies urinary complaints.  Patient stated that she was urged to come to the emergency department by her gastroenterologist Dr. Aranda.      REVIEW OF SYSTEMS  10 systems reviewed and otherwise negative, pertinent positives and negatives listed in the history of present illness.    PAST MEDICAL HISTORY   has a past medical history of Arthritis; ASTHMA; Cerebellar ataxia (HCC) (3/30/2018); Chronic ulcerative colitis (HCC) (6/26/2013); Cough; GERD (gastroesophageal reflux disease); Hay fever (4/19/2019); HYPOTENSION; Inner ear disease; Leg cramps, sleep related (5/2/2018); Near-syncope; Neck injury; Osteoporosis; Peripheral neuropathy; Polyp of sigmoid colon (04/10/2018); Raynaud disease; Tremor; and Vitamin D deficiency.    SURGICAL HISTORY   has a past surgical history that includes nasal fracture reduction closed (10/8/08); hip nailing intramedullary (7/30/2011); tonsillectomy and adenoidectomy; bladder suspension; nissen fundoplication laparoscopic (2005); and cholecystectomy (2008).    SOCIAL HISTORY  Social History   Substance Use Topics   • Smoking status: Never Smoker   •  "Smokeless tobacco: Never Used   • Alcohol use No      History   Drug Use No       FAMILY HISTORY  Non-Contributory    CURRENT MEDICATIONS  Home Medications     Reviewed by Divine Bentley R.N. (Registered Nurse) on 05/06/19 at 0116  Med List Status: Complete   Medication Last Dose Status   albuterol (PROAIR HFA) 108 (90 BASE) MCG/ACT Aero Soln inhalation aerosol  Active   ASCORBIC ACID PO  Active   B Complex Vitamins (VITAMIN B COMPLEX PO)  Active   Calcium-Vitamin D-Vitamin K 750-500-40 MG-UNT-MCG Tab  Active   Cholecalciferol (VITAMIN D3)  Active   Glucosamine-Chondroit-Vit C-Mn (GLUCOSAMINE CHONDR 1500 COMPLX) Cap  Active   LUTEIN PO  Active   MAGNESIUM PO  Active   Melatonin 3 MG SL Tab  Active   Misc Natural Products (GINSENG COMPLEX PO)  Active   montelukast (SINGULAIR) 10 MG Tab  Active   multivitamin (THERAGRAN) TABS  Active   NON SPECIFIED  Active   NON SPECIFIED  Active   Omega-3 Fatty Acids (FISH OIL) 1200 MG CAPSULE DELAYED RELEASE  Active   POTASSIUM PO  Active   TURMERIC CURCUMIN PO  Active   Vitamins A & D (VITAMIN A & D PO)  Active                ALLERGIES  Allergies   Allergen Reactions   • Dilaudid [Hydromorphone] Anaphylaxis     \"went into code blue\"    • Azithromycin Unspecified     \" I don't know\"   • Clindamycin    • Codeine Unspecified     ?   • Erythromycin Unspecified     ?   • Keflex      ?   • Levaquin Unspecified     \"i dont know\"    • Lyrica Unspecified     ?   • Quinolones Unspecified     ?   • Sulfa Drugs Unspecified     \"i dont know\"    • Tetracyclines Unspecified     ?   • Other Food Unspecified     Dairy-mucus (butter is okay). spices-cough       PHYSICAL EXAM  VITAL SIGNS: BP (!) 120/38   Pulse (!) 59   Temp 36.7 °C (98 °F) (Temporal)   Resp 16   Ht 1.6 m (5' 3\")   Wt 46.7 kg (103 lb)   SpO2 92%   BMI 18.25 kg/m²   Pulse ox interpretation: I interpret this pulse ox as normal.  Constitutional: Alert and oriented x 3, minimal distress  HEENT: Atraumatic normocephalic, pupils " are equal round reactive to light extraocular movements are intact. The nares is clear, external ears are normal, mouth shows moist mucous membranes  Neck: Supple, no JVD no tracheal deviation  Cardiovascular: Regular rate and rhythm no murmur rub or gallop 2+ pulses peripherally x4  Thorax & Lungs: No respiratory distress, no wheezes rales or rhonchi, No chest tenderness.   GI: Tender to palpation in the epigastrium no rebound or guarding positive bowel sounds nondistended no peritoneal signs  Skin: Warm dry no acute rash or lesion  Musculoskeletal: Moving all extremities with full range and 5 of 5 strength, no acute  deformity  Neurologic: Cranial nerves III through XII are grossly intact, no sensory deficit, baseline difficulty with balance  Psychiatric: Appropriate affect for situation at this time      DIAGNOSTIC STUDIES / PROCEDURES  LABS      Results for orders placed or performed during the hospital encounter of 05/06/19   CBC WITH DIFFERENTIAL   Result Value Ref Range    WBC 8.0 4.8 - 10.8 K/uL    RBC 4.34 4.20 - 5.40 M/uL    Hemoglobin 14.1 12.0 - 16.0 g/dL    Hematocrit 41.6 37.0 - 47.0 %    MCV 95.9 81.4 - 97.8 fL    MCH 32.5 27.0 - 33.0 pg    MCHC 33.9 33.6 - 35.0 g/dL    RDW 41.0 35.9 - 50.0 fL    Platelet Count 273 164 - 446 K/uL    MPV 8.9 (L) 9.0 - 12.9 fL    Neutrophils-Polys 68.30 44.00 - 72.00 %    Lymphocytes 22.90 22.00 - 41.00 %    Monocytes 7.00 0.00 - 13.40 %    Eosinophils 1.10 0.00 - 6.90 %    Basophils 0.50 0.00 - 1.80 %    Immature Granulocytes 0.20 0.00 - 0.90 %    Nucleated RBC 0.00 /100 WBC    Neutrophils (Absolute) 5.48 2.00 - 7.15 K/uL    Lymphs (Absolute) 1.84 1.00 - 4.80 K/uL    Monos (Absolute) 0.56 0.00 - 0.85 K/uL    Eos (Absolute) 0.09 0.00 - 0.51 K/uL    Baso (Absolute) 0.04 0.00 - 0.12 K/uL    Immature Granulocytes (abs) 0.02 0.00 - 0.11 K/uL    NRBC (Absolute) 0.00 K/uL   COMP METABOLIC PANEL   Result Value Ref Range    Sodium 137 135 - 145 mmol/L    Potassium 3.6 3.6 - 5.5  mmol/L    Chloride 103 96 - 112 mmol/L    Co2 25 20 - 33 mmol/L    Anion Gap 9.0 0.0 - 11.9    Glucose 104 (H) 65 - 99 mg/dL    Bun 9 8 - 22 mg/dL    Creatinine 0.52 0.50 - 1.40 mg/dL    Calcium 9.3 8.5 - 10.5 mg/dL    AST(SGOT) 21 12 - 45 U/L    ALT(SGPT) 23 2 - 50 U/L    Alkaline Phosphatase 84 30 - 99 U/L    Total Bilirubin 1.1 0.1 - 1.5 mg/dL    Albumin 4.1 3.2 - 4.9 g/dL    Total Protein 6.8 6.0 - 8.2 g/dL    Globulin 2.7 1.9 - 3.5 g/dL    A-G Ratio 1.5 g/dL   URINALYSIS CULTURE, IF INDICATED   Result Value Ref Range    Color Yellow     Character Clear     Specific Gravity 1.019 <1.035    Ph 5.5 5.0 - 8.0    Glucose Negative Negative mg/dL    Ketones 15 (A) Negative mg/dL    Protein Negative Negative mg/dL    Bilirubin Negative Negative    Urobilinogen, Urine 0.2 Negative    Nitrite Negative Negative    Leukocyte Esterase Negative Negative    Occult Blood Negative Negative    Micro Urine Req see below    LIPASE   Result Value Ref Range    Lipase 7 (L) 11 - 82 U/L   ESTIMATED GFR   Result Value Ref Range    GFR If African American >60 >60 mL/min/1.73 m 2    GFR If Non African American >60 >60 mL/min/1.73 m 2       All labs reviewed by me.      RADIOLOGY  CT-ABDOMEN-PELVIS WITH   Final Result      1.  Ongoing dilated small bowel in the left abdomen, again suggestive of incomplete mechanical small bowel obstruction. The degree of distention is somewhat increased compared with the recent prior scan of 5/3. There is no significant change otherwise.   2.  Minimal right lower lobe atelectasis, improved.        The radiologist's interpretation of all radiological studies have been reviewed by me.    COURSE & MEDICAL DECISION MAKING  Pertinent Labs & Imaging studies reviewed. (See chart for details)    2:15 AM - Patient seen and examined at bedside.  Patient will have abdominal protocol labs and CT scan of the abdomen and pelvis with contrast.  We will also order C. difficile is patient recently completed a course of  "Augmentin.      CT scan shows ongoing dilated small bowel loops in the left lower abdomen somewhat increased distention compared to prior examination.    Labs as above negative lipase normal white count otherwise reassuring.  Troponin is negative.  Patient has ongoing pain with increasing distention of small bowel loops will be admitted to the hospital for further evaluation and treatment.    Patient noted to have slightly elevated blood pressure likely circumstantial secondary to presenting complaint. Referred to primary care physician for further evaluation.        BP (!) 120/38   Pulse (!) 59   Temp 36.7 °C (98 °F) (Temporal)   Resp 16   Ht 1.6 m (5' 3\")   Wt 46.7 kg (103 lb)   SpO2 92%   BMI 18.25 kg/m²             FINAL IMPRESSION  1. Abdominal pain, unspecified abdominal location Active   2.  Partial small bowel obstruction      This dictation has been created using voice recognition software and/or scribes. The accuracy of the dictation is limited by the abilities of the software and the expertise of the scribes. I expect there may be some errors of grammar and possibly content. I made every attempt to manually correct the errors within my dictation. However, errors related to voice recognition software and/or scribes may still exist and should be interpreted within the appropriate context.            "

## 2019-05-06 NOTE — ASSESSMENT & PLAN NOTE
- Stable.  Not in acute exacerbation.  - Continue home montelukast and albuterol, along with PRN RT protocol.

## 2019-05-06 NOTE — CARE PLAN
Problem: Safety  Goal: Will remain free from falls  Outcome: PROGRESSING AS EXPECTED  Pt agrees to call for assistance. Calls appropriately, staff stand by, pt uses F[facility]    Problem: Discharge Barriers/Planning  Goal: Patient's continuum of care needs will be met  Outcome: PROGRESSING AS EXPECTED  Pt oriented to room and unit. Mobility assessed.   Pending stool sample, now r/o c diff, isolation precautions in place.  Admission profile completed.

## 2019-05-06 NOTE — PROGRESS NOTES
Internal Medicine Interval Note  Note Author: Irtqderian Hooker D.O.     Name Ana Paula Ordonez     1945   Age/Sex 74 y.o. female   MRN [id number]   Code Status Full Code      After 5PM or if no immediate response to page, please call for cross-coverage  Attending/Team: Dr. Krishna / ALEJANDRA Lawrence  See Patient List for primary contact information  Call [phone] to page    1st Call - Day Intern (R1):   Dr. Hooker  2nd Call - Day Sr. Resident (R2/R3):   Dr. Lai          Reason for interval visit  (Principal Problem)   Abdominal pain secondary to partial mechanical small bowel obstruction      Interval Problem Daily Status Update  (24 hours, problem oriented, brief subjective history, new lab/imaging data pertinent to that problem)   Ms. Ordonez is a 74 year-old male with past medical history of cerebellar ataxia, chronic constipation, urinary retention with self catheterization, history of ulcerative colitis in remission, asthma, arthritis who came in with abdominal pain. Imaging suggestive of partial small bowel obstruction.     This morning patient was advanced to clear liquid diet which she tolerated well. She states her abdominal pain is intermittent under her right rib cage and same in character as it was one week ago. Patient reports passing gas. She has not had diarrhea since yesterday.       Review of Systems   Constitutional: Negative for chills, diaphoresis, fever and weight loss.   HENT: Negative for congestion, hearing loss and tinnitus.         Recent abx for sinusitus   Eyes: Negative for blurred vision and double vision.   Respiratory: Negative for cough.    Cardiovascular: Negative for chest pain, palpitations, claudication and leg swelling.   Gastrointestinal: Positive for abdominal pain. Negative for blood in stool, constipation, diarrhea, heartburn, melena, nausea and vomiting.   Genitourinary: Negative for dysuria, frequency and urgency.        +self caths   Musculoskeletal: Negative for  back pain.   Skin: Negative for itching and rash.   Neurological: Positive for weakness (generalized).       Disposition/Barriers to discharge:   Inpatient for partial small bowel obstruction     Consultants/Specialty  PCP: Dar Fraire M.D.      Quality Measures  Quality-Core Measures   Reviewed items::  Labs reviewed, Radiology images reviewed and Medications reviewed  Comer catheter::  No Comer  DVT prophylaxis pharmacological::  Enoxaparin (Lovenox)      Physical Exam       Vitals:    05/06/19 0300 05/06/19 0434 05/06/19 0530 05/06/19 0759   BP:    127/78   Pulse: 67 70 87 67   Resp:  15  15   Temp:    36.7 °C (98.1 °F)   TempSrc:    Temporal   SpO2: 96% 95%  90%   Weight:       Height:         Body mass index is 18.25 kg/m². Weight: 46.7 kg (103 lb)  Oxygen Therapy:  Pulse Oximetry: 90 %, O2 (LPM): 0, O2 Delivery: None (Room Air)    Physical Exam   Constitutional: She is oriented to person, place, and time. No distress.   Laying in bed in no acute distress. During attending rounds, patients was standing to go to the bathroom.    HENT:   Head: Normocephalic and atraumatic.   Mouth/Throat: No oropharyngeal exudate.   Eyes: Pupils are equal, round, and reactive to light. EOM are normal. No scleral icterus.   Neck: Normal range of motion. No JVD present.   Cardiovascular: Normal rate, regular rhythm, normal heart sounds and intact distal pulses.    No murmur heard.  Pulmonary/Chest: Effort normal and breath sounds normal. No respiratory distress. She has no wheezes. She has no rales.   Abdominal: Soft. Bowel sounds are normal. She exhibits no distension. There is no tenderness. There is no rebound.   Normotensive bowel sounds    Musculoskeletal: Normal range of motion. She exhibits no edema.   Neurological: She is alert and oriented to person, place, and time. No cranial nerve deficit. Coordination normal.   Steady gait, negative Babinski's test   Skin: Skin is warm and dry. No rash noted. She is not diaphoretic.  No erythema.         Assessment/Plan     * Partial small bowel obstruction (HCC)   Assessment & Plan    Patient presents with one week history of abdominal pain. Imaging shows evidence of partial small bowel mechanical obstruction. Patient has history of UC in remission.   - Patient appears to be improving.   - Without nausea or vomiting, NGT not indicated at this time   - Tolerating clear liquids well, advance as tolerated   - GI cocktail PRN   - Diarrhea has subsided  - Cont. IVF at reduced rate   - Low threshold to obtain abdominal x ray and possible consult to surgery if patient's abdominal pain worsens     Diarrhea   Assessment & Plan    Patient with abdominal pain and 6-8 episodes of diarrhea prior to admission.  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 Has had 3 falls in the last month per patient and daughter   - uses walker for ambulation   - declines evaluation by PT and/or home health at this time   - fall precautions      Mild intermittent asthma without complication- (present on admission)   Assessment & Plan    Stable  - Continue home montelukast and albuterol     Urinary retention- (present on admission)   Assessment & Plan    History of chronic urinary retention, self caths at home   - UA negative for infection   - continue self cath PRN

## 2019-05-06 NOTE — PROGRESS NOTES
· 2 RN skin check complete with LUKAS Winter.  · Devices in place none.  · Skin assessed under devices N/A.  · Confirmed pressure ulcers found on none.  · New potential pressure ulcers noted on none.   · The following interventions in place moisturizer, extra pillows, mobilization encouraged.

## 2019-05-06 NOTE — ASSESSMENT & PLAN NOTE
-Appears to have resolved.  No further GI symptoms.  -Continue supportive care with IV fluid, as needed antiemetics.  Trial to advance diet to GI soft.    - Low threshold to obtain abdominal x ray and possible consult to surgery if patient's abdominal pain recurs or worsens.

## 2019-05-06 NOTE — H&P
Internal Medicine Admitting History and Physical    Note Author: Janet Mcneal M.D.       Name Ana Paula Ordonez     1945   Age/Sex 74 y.o. female   MRN [id number]   Code Status FULL     After 5PM or if no immediate response to page, please call for cross-coverage  Attending/Team: Dr. Camilo/Rmoeo See Patient List for primary contact information  Call [phone] to page    1st Call - Day Intern (R1):   Dr. Hooker 2nd Call - Day Sr. Resident (R2/R3):   Dr. Lai        Chief Complaint:   Abdominal Pain     HPI:  Ms. Ordonez is a 73 yo female with PMHx of cerebellar ataxia, chronic constipation and urinary retention, hx of ulcerative colitis in remission, GERD, Osteoporosis, Raynaud disease, asthma and arthritis who presents to the ED with abdominal pain, primarily in the epigastric area, pain is intermittent and comes every couple of minutes. She denies any associated fever, chills, nausea or vomiting. It has been present now for approximately 1 week, she was seen in the ED on 5/3 and had CT abdomen done which revealed mild fluid and gas distension of the small bowel otherwise no other finding, she was having increased constipation at that time. She was told to follow a liquid diet and sent home, she has since had continuation of her symptoms in addition to the development of diarrhea, she has had 6-8 bowel movements in the last 24 hours, she has had none since admission. She has multiple other complaints but all of which are chronic including cough and body aches. She denies SOB, palpitations or dizziness.     Patient had colonoscopy approximately 6 months ago which was normal, she has no active UC.     In the ED, vitals were stable, Temp 98, HR 59, RR 16, /38, 92% on Room air. Labs are unremarkable without electrolyte derangements or leukocytosis. Lipase was 7. UA was positive for ketones but otherwise negative. CT was repeated and revealed ongoing dilated small bowel in the left abdomen with  some increase in distention.     Review of Systems   Constitutional: Positive for malaise/fatigue. Negative for chills and fever.   HENT: Negative for congestion and sore throat.    Eyes: Negative for blurred vision and double vision.   Respiratory: Positive for cough. Negative for shortness of breath and wheezing.    Cardiovascular: Negative for chest pain and leg swelling.   Gastrointestinal: Positive for abdominal pain, constipation, diarrhea and heartburn. Negative for blood in stool, melena, nausea and vomiting.   Genitourinary: Negative for dysuria.        Chronic retention, self caths    Musculoskeletal: Positive for back pain, falls, joint pain, myalgias and neck pain.   Neurological: Positive for headaches. Negative for dizziness, seizures and loss of consciousness.   Psychiatric/Behavioral: Negative for depression and substance abuse.             Past Medical History (Chronic medical problem, known complications and current treatment)    Cerebellar ataxia- follows with neurology   Chronic urinary retention- self caths   Arthritis - multivitamins   Ulcerative colitis- currently in remission, not on pharmacology therapy   Frequent falls- has  3 x weekly   Asthma- on montelukast      Past Surgical History:  Past Surgical History:   Procedure Laterality Date   • HIP NAILING INTRAMEDULLARY  7/30/2011    Performed by KALEB RAGSDALE at SURGERY [facility] ORS   • NASAL FRACTURE REDUCTION CLOSED  10/8/08    Performed by DON BARRIOS at SURGERY SAME DAY [facility] ORS   • CHOLECYSTECTOMY  2008   • NISSEN FUNDOPLICATION LAPAROSCOPIC  2005   • BLADDER SUSPENSION      X 2   • TONSILLECTOMY AND ADENOIDECTOMY         Current Outpatient Medications:  Home Medications     Reviewed by Divine Bentley R.N. (Registered Nurse) on 05/06/19 at 0116  Med List Status: Complete   Medication Last Dose Status   albuterol (PROAIR HFA) 108 (90 BASE) MCG/ACT Aero Soln inhalation aerosol  Active   ASCORBIC  "ACID PO  Active   B Complex Vitamins (VITAMIN B COMPLEX PO)  Active   Calcium-Vitamin D-Vitamin K 750-500-40 MG-UNT-MCG Tab  Active   Cholecalciferol (VITAMIN D3)  Active   Glucosamine-Chondroit-Vit C-Mn (GLUCOSAMINE CHONDR 1500 COMPLX) Cap  Active   LUTEIN PO  Active   MAGNESIUM PO  Active   Melatonin 3 MG SL Tab  Active   Misc Natural Products (GINSENG COMPLEX PO)  Active   montelukast (SINGULAIR) 10 MG Tab  Active   multivitamin (THERAGRAN) TABS  Active   NON SPECIFIED  Active   NON SPECIFIED  Active   Omega-3 Fatty Acids (FISH OIL) 1200 MG CAPSULE DELAYED RELEASE  Active   POTASSIUM PO  Active   TURMERIC CURCUMIN PO  Active   Vitamins A & D (VITAMIN A & D PO)  Active                Medication Allergy/Sensitivities:  Allergies   Allergen Reactions   • Dilaudid [Hydromorphone] Anaphylaxis     \"went into code blue\"    • Azithromycin Unspecified     \" I don't know\"   • Clindamycin    • Codeine Unspecified     ?   • Erythromycin Unspecified     ?   • Keflex      ?   • Levaquin Unspecified     \"i dont know\"    • Lyrica Unspecified     ?   • Quinolones Unspecified     ?   • Sulfa Drugs Unspecified     \"i dont know\"    • Tetracyclines Unspecified     ?   • Other Food Unspecified     Dairy-mucus (butter is okay). spices-cough         Family History (mandatory)   Family History   Problem Relation Age of Onset   • Non-contributory Father         Parkinson's disease   • Non-contributory Mother         old age with mild dementia   • Heart Disease Neg Hx    • Hypertension Neg Hx    • Hyperlipidemia Neg Hx    • Diabetes Neg Hx        Social History (mandatory)   Social History     Social History   • Marital status: Single     Spouse name: N/A   • Number of children: N/A   • Years of education: N/A     Occupational History   • Not on file.     Social History Main Topics   • Smoking status: Never Smoker   • Smokeless tobacco: Never Used   • Alcohol use No   • Drug use: No   • Sexual activity: Not on file     Other Topics Concern " "  • Not on file     Social History Narrative   • No narrative on file     Living situation: lives with daughter who is present   PCP : Dar Fraire M.D.    Physical Exam     Vitals:    05/06/19 0058 05/06/19 0112 05/06/19 0300 05/06/19 0434   BP: (!) 120/38      Pulse: (!) 59  67 70   Resp: 16   15   Temp: 36.7 °C (98 °F)      TempSrc: Temporal      SpO2: 92%  96% 95%   Weight:  46.7 kg (103 lb)     Height: 1.6 m (5' 3\")        Body mass index is 18.25 kg/m².  BP (!) 120/38   Pulse 70   Temp 36.7 °C (98 °F) (Temporal)   Resp 15   Ht 1.6 m (5' 3\")   Wt 46.7 kg (103 lb)   SpO2 95%   BMI 18.25 kg/m²   O2 therapy: Pulse Oximetry: 95 %, O2 (LPM): 0, O2 Delivery: None (Room Air)    Physical Exam   Constitutional: She is oriented to person, place, and time.   Thin, frail appearing female, sitting up comfortably in no acute distress    HENT:   Head: Normocephalic and atraumatic.   Mouth/Throat: Oropharynx is clear and moist.   Eyes: Pupils are equal, round, and reactive to light. Conjunctivae and EOM are normal. No scleral icterus.   Neck: Normal range of motion. Neck supple.   Cardiovascular: Normal rate, regular rhythm, normal heart sounds and intact distal pulses.  Exam reveals no gallop.    No murmur heard.  Pulmonary/Chest: Effort normal. No respiratory distress. She has no wheezes. She has no rales.   Abdominal: Soft. Bowel sounds are normal. She exhibits no distension and no mass. There is no tenderness. There is no rebound and no guarding.   Musculoskeletal: Normal range of motion. She exhibits no edema.   Lymphadenopathy:     She has no cervical adenopathy.   Neurological: She is alert and oriented to person, place, and time. No cranial nerve deficit. She exhibits normal muscle tone. Coordination abnormal.   Skin: Skin is warm and dry. No rash noted. No erythema.   Psychiatric: Mood, memory, affect and judgment normal.         Data Review       Old Records Request:   Deferred  Current Records " review/summary: Completed    Lab Data Review:  Recent Results (from the past 24 hour(s))   URINALYSIS CULTURE, IF INDICATED    Collection Time: 05/06/19  2:27 AM   Result Value Ref Range    Color Yellow     Character Clear     Specific Gravity 1.019 <1.035    Ph 5.5 5.0 - 8.0    Glucose Negative Negative mg/dL    Ketones 15 (A) Negative mg/dL    Protein Negative Negative mg/dL    Bilirubin Negative Negative    Urobilinogen, Urine 0.2 Negative    Nitrite Negative Negative    Leukocyte Esterase Negative Negative    Occult Blood Negative Negative    Micro Urine Req see below    CBC WITH DIFFERENTIAL    Collection Time: 05/06/19  3:15 AM   Result Value Ref Range    WBC 8.0 4.8 - 10.8 K/uL    RBC 4.34 4.20 - 5.40 M/uL    Hemoglobin 14.1 12.0 - 16.0 g/dL    Hematocrit 41.6 37.0 - 47.0 %    MCV 95.9 81.4 - 97.8 fL    MCH 32.5 27.0 - 33.0 pg    MCHC 33.9 33.6 - 35.0 g/dL    RDW 41.0 35.9 - 50.0 fL    Platelet Count 273 164 - 446 K/uL    MPV 8.9 (L) 9.0 - 12.9 fL    Neutrophils-Polys 68.30 44.00 - 72.00 %    Lymphocytes 22.90 22.00 - 41.00 %    Monocytes 7.00 0.00 - 13.40 %    Eosinophils 1.10 0.00 - 6.90 %    Basophils 0.50 0.00 - 1.80 %    Immature Granulocytes 0.20 0.00 - 0.90 %    Nucleated RBC 0.00 /100 WBC    Neutrophils (Absolute) 5.48 2.00 - 7.15 K/uL    Lymphs (Absolute) 1.84 1.00 - 4.80 K/uL    Monos (Absolute) 0.56 0.00 - 0.85 K/uL    Eos (Absolute) 0.09 0.00 - 0.51 K/uL    Baso (Absolute) 0.04 0.00 - 0.12 K/uL    Immature Granulocytes (abs) 0.02 0.00 - 0.11 K/uL    NRBC (Absolute) 0.00 K/uL   COMP METABOLIC PANEL    Collection Time: 05/06/19  3:15 AM   Result Value Ref Range    Sodium 137 135 - 145 mmol/L    Potassium 3.6 3.6 - 5.5 mmol/L    Chloride 103 96 - 112 mmol/L    Co2 25 20 - 33 mmol/L    Anion Gap 9.0 0.0 - 11.9    Glucose 104 (H) 65 - 99 mg/dL    Bun 9 8 - 22 mg/dL    Creatinine 0.52 0.50 - 1.40 mg/dL    Calcium 9.3 8.5 - 10.5 mg/dL    AST(SGOT) 21 12 - 45 U/L    ALT(SGPT) 23 2 - 50 U/L    Alkaline  Phosphatase 84 30 - 99 U/L    Total Bilirubin 1.1 0.1 - 1.5 mg/dL    Albumin 4.1 3.2 - 4.9 g/dL    Total Protein 6.8 6.0 - 8.2 g/dL    Globulin 2.7 1.9 - 3.5 g/dL    A-G Ratio 1.5 g/dL   LIPASE    Collection Time: 05/06/19  3:15 AM   Result Value Ref Range    Lipase 7 (L) 11 - 82 U/L   ESTIMATED GFR    Collection Time: 05/06/19  3:15 AM   Result Value Ref Range    GFR If African American >60 >60 mL/min/1.73 m 2    GFR If Non African American >60 >60 mL/min/1.73 m 2       Imaging/Procedures Review:    Independant Imaging Review: Completed  CT-ABDOMEN-PELVIS WITH   Final Result      1.  Ongoing dilated small bowel in the left abdomen, again suggestive of incomplete mechanical small bowel obstruction. The degree of distention is somewhat increased compared with the recent prior scan of 5/3. There is no significant change otherwise.   2.  Minimal right lower lobe atelectasis, improved.               EKG:   Records reviewed and summarized in current documentation :  Yes  [employer] teaching service handout given to patient:  No         Assessment/Plan     * Epigastric pain   Assessment & Plan    Abdominal pain x 1 weeks, CT evidence of possible partial small bowel obstruction. Has history of UC in remission. Suspect  enteritis vs complication from chronic constipation   - CT reveals increased small bowel dilation suggestive of possible small bowel obstruction/ enteritis   - no N/V, NG tube not indicated at this time   - no electrolyte derangement, no evidence of infectious source  - NPO for now, consider clear liquid with advancement as tolerated   - GI cocktail PRN   - diarrhea has since subsided, C.diff still pending   - cont. IVF         Chronic ulcerative colitis (HCC)- (present on admission)   Assessment & Plan    Currently in remission per patients   - states she had colonoscopy 6-8 months ago which was normal      Diarrhea   Assessment & Plan    Patient with abdominal pain and 6-8 episodes of diarrhea over the last 24  hours, may be secondary to enteritis vs overflow from partial small bowel obstruction, normally has chronic constipation   - did complete 10 day antibiotic course approximately 3 weeks ago   - no signs of sepsis   - C.diff ordered by ED, results pending   - no bowel movement since arrival to ED      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 Has had 3 falls in the last month per patient and daughter   - uses walker for ambulation   - declines evaluation by PT and/or home health at this time   - fall precautions      Mild intermittent asthma without complication- (present on admission)   Assessment & Plan    Stable, continue home montelukast      Urinary retention- (present on admission)   Assessment & Plan    History of chronic urinary retention, self caths at home   - UA negative for infection   - continue self cath PRN          Anticipated Hospital stay:  >2 midnights        Quality Measures  Quality-Core Measures   Reviewed items::  Labs reviewed, Medications reviewed and Radiology images reviewed  Comer catheter::  Urinary Tract Retention or Urinary Tract Obstruction  DVT prophylaxis pharmacological::  Enoxaparin (Lovenox)  Ulcer Prophylaxis::  No    PCP: Dar Fraire M.D.

## 2019-05-06 NOTE — ED NOTES
Break RN:  PIV started, labs sent & fluid bolus infusing.  Pt medicated for pain as ordered.  Will continue to monitor.

## 2019-05-06 NOTE — ASSESSMENT & PLAN NOTE
-Chronic.  Follows neurology.  -declines evaluation by PT and/or home health at this time.  Uses walker to ambulate.  -maintain on fall precautions.

## 2019-05-06 NOTE — ED TRIAGE NOTES
Ana Paula Ordonez  74 y.o.  Chief Complaint   Patient presents with   • Epigastric Pain     acute on chronic   • Joint Pain     acute on chronic   • Headache     acute on chronic   • Diarrhea     watery x 24 hours     Patient to triage via wheelchair for above.    Patient see and treated in this ED 5/3/19 for all the same symptoms EXCEPT constipation instead of diarrhea. Has been on liquid diet since then, and now has diarrhea. Spoke with GI Dr. Olmstead who recommended that she present to the ED.      Triage process explained to patient, apologized for wait time, and placed in [facility].

## 2019-05-06 NOTE — ASSESSMENT & PLAN NOTE
- History of chronic urinary retention, self caths at home   - UA negative for infection   - continue self cath PRN    Plastic Surgeon Procedure Text (C): After obtaining clear surgical margins the patient was sent to plastics for surgical repair.  The patient understands they will receive post-surgical care and follow-up from the referring physician's office.

## 2019-05-06 NOTE — ASSESSMENT & PLAN NOTE
- Currently in remission.  Had a normal colonoscopy 6 to 8 months ago.  -Continue to follow-up with outpatient GI. She has not had an EGD recently.

## 2019-05-07 ENCOUNTER — APPOINTMENT (OUTPATIENT)
Dept: RADIOLOGY | Facility: MEDICAL CENTER | Age: 74
DRG: 389 | End: 2019-05-07
Attending: INTERNAL MEDICINE
Payer: MEDICARE

## 2019-05-07 PROBLEM — H92.02 LEFT EAR PAIN: Status: ACTIVE | Noted: 2019-05-07

## 2019-05-07 PROBLEM — R94.31 PROLONGED QT INTERVAL: Status: ACTIVE | Noted: 2019-05-07

## 2019-05-07 LAB
ALBUMIN SERPL BCP-MCNC: 3.6 G/DL (ref 3.2–4.9)
ALBUMIN/GLOB SERPL: 1.4 G/DL
ALP SERPL-CCNC: 74 U/L (ref 30–99)
ALT SERPL-CCNC: 30 U/L (ref 2–50)
ANION GAP SERPL CALC-SCNC: 7 MMOL/L (ref 0–11.9)
AST SERPL-CCNC: 32 U/L (ref 12–45)
BASOPHILS # BLD AUTO: 0.5 % (ref 0–1.8)
BASOPHILS # BLD: 0.04 K/UL (ref 0–0.12)
BILIRUB SERPL-MCNC: 1 MG/DL (ref 0.1–1.5)
BUN SERPL-MCNC: 5 MG/DL (ref 8–22)
CALCIUM SERPL-MCNC: 8.6 MG/DL (ref 8.5–10.5)
CHLORIDE SERPL-SCNC: 106 MMOL/L (ref 96–112)
CO2 SERPL-SCNC: 24 MMOL/L (ref 20–33)
CREAT SERPL-MCNC: 0.39 MG/DL (ref 0.5–1.4)
EOSINOPHIL # BLD AUTO: 0.12 K/UL (ref 0–0.51)
EOSINOPHIL NFR BLD: 1.6 % (ref 0–6.9)
ERYTHROCYTE [DISTWIDTH] IN BLOOD BY AUTOMATED COUNT: 41.7 FL (ref 35.9–50)
GLOBULIN SER CALC-MCNC: 2.6 G/DL (ref 1.9–3.5)
GLUCOSE SERPL-MCNC: 92 MG/DL (ref 65–99)
HCT VFR BLD AUTO: 41.9 % (ref 37–47)
HGB BLD-MCNC: 13.8 G/DL (ref 12–16)
IMM GRANULOCYTES # BLD AUTO: 0.02 K/UL (ref 0–0.11)
IMM GRANULOCYTES NFR BLD AUTO: 0.3 % (ref 0–0.9)
LYMPHOCYTES # BLD AUTO: 2.5 K/UL (ref 1–4.8)
LYMPHOCYTES NFR BLD: 33.2 % (ref 22–41)
MAGNESIUM SERPL-MCNC: 1.9 MG/DL (ref 1.5–2.5)
MCH RBC QN AUTO: 31.9 PG (ref 27–33)
MCHC RBC AUTO-ENTMCNC: 32.9 G/DL (ref 33.6–35)
MCV RBC AUTO: 96.8 FL (ref 81.4–97.8)
MONOCYTES # BLD AUTO: 0.64 K/UL (ref 0–0.85)
MONOCYTES NFR BLD AUTO: 8.5 % (ref 0–13.4)
NEUTROPHILS # BLD AUTO: 4.21 K/UL (ref 2–7.15)
NEUTROPHILS NFR BLD: 55.9 % (ref 44–72)
NRBC # BLD AUTO: 0 K/UL
NRBC BLD-RTO: 0 /100 WBC
PLATELET # BLD AUTO: 247 K/UL (ref 164–446)
PMV BLD AUTO: 9.1 FL (ref 9–12.9)
POTASSIUM SERPL-SCNC: 3.6 MMOL/L (ref 3.6–5.5)
PROT SERPL-MCNC: 6.2 G/DL (ref 6–8.2)
RBC # BLD AUTO: 4.33 M/UL (ref 4.2–5.4)
SODIUM SERPL-SCNC: 137 MMOL/L (ref 135–145)
WBC # BLD AUTO: 7.5 K/UL (ref 4.8–10.8)

## 2019-05-07 PROCEDURE — 36415 COLL VENOUS BLD VENIPUNCTURE: CPT

## 2019-05-07 PROCEDURE — A9270 NON-COVERED ITEM OR SERVICE: HCPCS | Performed by: INTERNAL MEDICINE

## 2019-05-07 PROCEDURE — 70480 CT ORBIT/EAR/FOSSA W/O DYE: CPT

## 2019-05-07 PROCEDURE — 770006 HCHG ROOM/CARE - MED/SURG/GYN SEMI*

## 2019-05-07 PROCEDURE — 700101 HCHG RX REV CODE 250: Performed by: STUDENT IN AN ORGANIZED HEALTH CARE EDUCATION/TRAINING PROGRAM

## 2019-05-07 PROCEDURE — 83735 ASSAY OF MAGNESIUM: CPT

## 2019-05-07 PROCEDURE — 700102 HCHG RX REV CODE 250 W/ 637 OVERRIDE(OP): Performed by: STUDENT IN AN ORGANIZED HEALTH CARE EDUCATION/TRAINING PROGRAM

## 2019-05-07 PROCEDURE — 99233 SBSQ HOSP IP/OBS HIGH 50: CPT | Performed by: INTERNAL MEDICINE

## 2019-05-07 PROCEDURE — 80053 COMPREHEN METABOLIC PANEL: CPT

## 2019-05-07 PROCEDURE — 700102 HCHG RX REV CODE 250 W/ 637 OVERRIDE(OP): Performed by: INTERNAL MEDICINE

## 2019-05-07 PROCEDURE — A9270 NON-COVERED ITEM OR SERVICE: HCPCS | Performed by: STUDENT IN AN ORGANIZED HEALTH CARE EDUCATION/TRAINING PROGRAM

## 2019-05-07 PROCEDURE — 85025 COMPLETE CBC W/AUTO DIFF WBC: CPT

## 2019-05-07 RX ORDER — POTASSIUM CHLORIDE 20 MEQ/1
40 TABLET, EXTENDED RELEASE ORAL ONCE
Status: COMPLETED | OUTPATIENT
Start: 2019-05-07 | End: 2019-05-07

## 2019-05-07 RX ORDER — TRAMADOL HYDROCHLORIDE 50 MG/1
50 TABLET ORAL EVERY 6 HOURS PRN
Status: DISCONTINUED | OUTPATIENT
Start: 2019-05-07 | End: 2019-05-08 | Stop reason: HOSPADM

## 2019-05-07 RX ADMIN — TRAMADOL HYDROCHLORIDE 50 MG: 50 TABLET, FILM COATED ORAL at 19:18

## 2019-05-07 RX ADMIN — COLISTIN SULFATE, NEOMYCIN SULFATE, THONZONIUM BROMIDE AND HYDROCORTISONE ACETATE 5 DROP: 3; 3.3; .5; 1 SUSPENSION AURICULAR (OTIC) at 17:15

## 2019-05-07 RX ADMIN — ACETAMINOPHEN 650 MG: 325 TABLET, FILM COATED ORAL at 21:54

## 2019-05-07 RX ADMIN — ACETAMINOPHEN 650 MG: 325 TABLET, FILM COATED ORAL at 14:22

## 2019-05-07 RX ADMIN — Medication 6 DROP: at 08:14

## 2019-05-07 RX ADMIN — MONTELUKAST SODIUM 10 MG: 10 TABLET, COATED ORAL at 17:15

## 2019-05-07 RX ADMIN — COLISTIN SULFATE, NEOMYCIN SULFATE, THONZONIUM BROMIDE AND HYDROCORTISONE ACETATE 5 DROP: 3; 3.3; .5; 1 SUSPENSION AURICULAR (OTIC) at 09:36

## 2019-05-07 RX ADMIN — ACETAMINOPHEN 650 MG: 325 TABLET, FILM COATED ORAL at 01:55

## 2019-05-07 RX ADMIN — POTASSIUM CHLORIDE 40 MEQ: 1500 TABLET, EXTENDED RELEASE ORAL at 09:38

## 2019-05-07 RX ADMIN — ACETAMINOPHEN 650 MG: 325 TABLET, FILM COATED ORAL at 08:14

## 2019-05-07 RX ADMIN — COLISTIN SULFATE, NEOMYCIN SULFATE, THONZONIUM BROMIDE AND HYDROCORTISONE ACETATE 5 DROP: 3; 3.3; .5; 1 SUSPENSION AURICULAR (OTIC) at 12:18

## 2019-05-07 RX ADMIN — Medication 6 DROP: at 01:58

## 2019-05-07 RX ADMIN — Medication 6 DROP: at 19:19

## 2019-05-07 NOTE — PROGRESS NOTES
Patient A/Ox4, up with one person assist to bathroom. Pt exhibits generalized weakness and has hx of cerebellar ataxia. Pt pain being managed with PRN Tylenol. Receiving IVF of NS running at 75 mL/hr. During shift pt began complaining of severe pain in her L ear. BS RN paged [employer] [facility] and obtained an order for an Otic soln. Pt states relief after application of soln. Pt self caths, supplies brought from home. No complaints of n/v/d. Bed in low and locked position. No signs of distress.

## 2019-05-07 NOTE — PROGRESS NOTES
Pt pleasant, tolerating diet with adequate appetite. Declines tramadol and only wishing to take tylenol for ear pain. Improved abdominal pain, mostly distracted by ear pain. Ear drops administered as ordered. Ambulated unit 100 ft. Discussed questions and concerns, needs met at this time.

## 2019-05-07 NOTE — DIETARY
"Nutrition services: Day 1 of admit.  Ana Paula Ordonez is a 74 y.o. female with admitting DX of partial small bowel obstruction   Consult received for low BMI     Assessment:  Height: 160 cm (5' 3\")  Weight: 46.7 kg (103 lb)  Body mass index is 18.25 kg/m².  Diet/Intake: Low fiber GI soft     Evaluation:   1. PO intake is good    2. No weight loss   3. Patient with chronic diarrhea   4. Labs unremarkable     Malnutrition Risk: No risk ID  patient does have a bowel obstruction but po intake is 50-75 %  No weight loss   BMI is 18.25 which is low range some weight gain would benefit patient     Recommendations/Plan:  1. Diet GI soft is appropriate   2. Encourage intake of meal and fluids   3. Document intake of all PO as % taken in ADL's to provide interdisciplinary communication across all shifts.   4. Monitor weight.  5. Nutrition rep will continue to see patient for ongoing meal and snack preferences.           "

## 2019-05-07 NOTE — CARE PLAN
Problem: Discharge Barriers/Planning  Goal: Patient's continuum of care needs will be met  Outcome: PROGRESSING AS EXPECTED  CT fossa completed this afternoon.  Antibiotic ear drops administered as ordered.  Bowel movement today and improved abdominal pain    Problem: Pain Management  Goal: Pain level will decrease to patient's comfort goal  Outcome: PROGRESSING AS EXPECTED  Tylenol, rest, ice and quiet.

## 2019-05-07 NOTE — PROGRESS NOTES
Internal Medicine Transfer Note  Note Author: Irtqderian Hooker D.O.     Name Ana Paula Ordonez     1945   Age/Sex 74 y.o. female   MRN [id number]   Code Status Full Code      Attending/Team: Dr. Krishna / ALEJANDRA Lawrence   Intern (R1): Dr. Nhung Tapia Resident (R2/R3): Dr. Lai    Transfer Summary   Ms. Ordonez is a 74 year old female with past medical history of cerebellar ataxia, chronic constipation, urinary retention with intermittent self catheterization, history of ulcerative colitis in remission, asthma and arthritis who came in on 19 with a one week history of generalized abdominal pain. Imaging at the time of admission suggested incomplete mechanical small bowel obstruction with somewhat increased distension from previous imaging on 5/3/19. Patient was treated with conservative measures.  Diet was gently advanced to full clears which she tolerated well. She has been able to pass flatus and abdominal pain has improved.     Of note, patient was having 6-8 episodes of diarrhea prior to admission. Patient has not had any diarrhea since admission. However, she recently completed a course of antibiotics. If diarrhea returns, would consider checking for Clostridium Difficile. She does not exhibit any signs of infection at this time.     At this time, it is the patient's wish to be be transferred to the hospitalist service.

## 2019-05-07 NOTE — PROGRESS NOTES
[facility] Medicine Daily Progress Note    Date of Service  5/7/2019    Chief Complaint  Abdominal pain    Hospital Course    74 y.o. female with cerebellar ataxia, chronic constipation, ulcerative colitis in remission (normal colonoscopy 6 to 8 months ago), GERD, Raynaud's disease, asthma, and arthritis, admitted 5/6/2019 with abdominal pain in the epigastric area for 1 week.  Initial CT on 5/30 showed mild fluid and gas distention of small bowel, otherwise no other acute findings.  Initial labs are unremarkable, and had no leukocytosis.  Lipase was low.  Urinalysis was otherwise negative.  Repeat CT showed ongoing dilated small bowel in the left abdomen, with some increase in the distention.  Patient placed on bowel rest, and supportive care with IV fluids.  C. difficile PCR was ordered.  Patient responded well with conservative measures, and her abdominal pain improved, and her diet was advanced.  Her diarrhea has subsided, and C. difficile PCR was not able to be sent.        Interval Problem Update  5/7/2019 - I reviewed the patient's chart. There were no significant overnight events. Remains hemodynamically stable and afebrile. Stable on RA.  Remains without leukocytosis.  BMP remained unimpressive.  Electrolytes are normal.  She complains of left ear pain yesterday, and started on Otic solution.    > I have personally seen and examined the patient today.  Her abdomen feels better today.  She is not nauseated, and does not have further diarrhea.  She does not complain of any abdominal pain.  Her main complaint is her left ear pain.  No ear discharge.  She states she recently completed course of antibiotics for ear infection. No fevers or chills.  No chest pain or shortness of breath.        Consultants/Specialty  none    Code Status  Full    Disposition  Monitor on the floors.     Review of Systems  ROS     Pertinent positives/negatives as mentioned above.     A complete review of systems was personally done by  me. All other systems were negative.       Physical Exam  Temp:  [36.3 °C (97.3 °F)-37.1 °C (98.8 °F)] 36.3 °C (97.3 °F)  Pulse:  [58-76] 66  Resp:  [16-18] 18  BP: (129-155)/(60-80) 155/80  SpO2:  [91 %-100 %] 95 %    Physical Exam   Constitutional: She appears well-developed and well-nourished. No distress.   HENT:   Head: Normocephalic and atraumatic.   Mouth/Throat: Oropharynx is clear and moist. No oropharyngeal exudate.   No pain on external ear manipulation.  No ear discharge.  Otoscopy done, difficult to visualize tympanic membrane. Minimal external ear canal discharge.    Eyes: Pupils are equal, round, and reactive to light. Conjunctivae are normal. No scleral icterus.   Neck: Normal range of motion. Neck supple.   Cardiovascular: Normal rate and regular rhythm.  Exam reveals no gallop and no friction rub.    No murmur heard.  Pulmonary/Chest: Effort normal and breath sounds normal. No respiratory distress. She has no wheezes. She has no rales. She exhibits no tenderness.   Abdominal: Soft. Bowel sounds are normal. She exhibits no distension. There is no tenderness. There is no rebound and no guarding.   Musculoskeletal: Normal range of motion. She exhibits no edema or tenderness.   Lymphadenopathy:     She has no cervical adenopathy.   Neurological: She is alert. No cranial nerve deficit.   Skin: Skin is warm and dry. No rash noted. She is not diaphoretic. No erythema. No pallor.   Psychiatric: She has a normal mood and affect. Her behavior is normal. Judgment and thought content normal.   Nursing note and vitals reviewed.       Fluids    Intake/Output Summary (Last 24 hours) at 05/07/19 0919  Last data filed at 05/06/19 2138   Gross per 24 hour   Intake             1800 ml   Output                0 ml   Net             1800 ml       Laboratory  Recent Labs      05/06/19 0315 05/07/19 0319   WBC  8.0  7.5   RBC  4.34  4.33   HEMOGLOBIN  14.1  13.8   HEMATOCRIT  41.6  41.9   MCV  95.9  96.8   MCH  32.5   31.9   MCHC  33.9  32.9*   RDW  41.0  41.7   PLATELETCT  273  247   MPV  8.9*  9.1     Recent Labs      05/06/19   0315  05/07/19   0319   SODIUM  137  137   POTASSIUM  3.6  3.6   CHLORIDE  103  106   CO2  25  24   GLUCOSE  104*  92   BUN  9  5*   CREATININE  0.52  0.39*   CALCIUM  9.3  8.6                   Imaging  CT-ABDOMEN-PELVIS WITH   Final Result      1.  Ongoing dilated small bowel in the left abdomen, again suggestive of incomplete mechanical small bowel obstruction. The degree of distention is somewhat increased compared with the recent prior scan of 5/3. There is no significant change otherwise.   2.  Minimal right lower lobe atelectasis, improved.      CT-POST-FOSSA-EAR W/O    (Results Pending)        Assessment/Plan  * Partial small bowel obstruction (HCC)- (present on admission)   Assessment & Plan    -Appears to have resolved.  No further GI symptoms.  -Continue supportive care with IV fluid, as needed antiemetics.  Trial to advance diet to GI soft.    - Low threshold to obtain abdominal x ray and possible consult to surgery if patient's abdominal pain recurs or worsens.     Left ear pain- (present on admission)   Assessment & Plan    -Was on antibiotics for an ear infection 3 weeks ago.  Difficult to visualize tympanic membrane with otoscopy.  -Pain is severe.  I would want to rule out cholesteatoma.  I will obtain CT of the temporal bone.  -Continue PRN Tylenol. I will also start her on PRN tramadol.      Diarrhea- (present on admission)   Assessment & Plan    -resolved, so far no recurrence.   -May be related to enteritis versus overflow from partial small bowel obstruction, normally has chronic constipation.  She also had an antibiotic course approximately 3 weeks ago.  -Monitor her for recurrence.  If so, sent for C. Difficile PCR.      Prolonged QT interval- (present on admission)   Assessment & Plan    -Avoid QT prolonging medications.     Urinary retention- (present on admission)    Assessment & Plan    - History of chronic urinary retention, self caths at home   - UA negative for infection   - continue self cath PRN      Cerebellar ataxia (HCC)- (present on admission)   Assessment & Plan    -Chronic.  Follows neurology.  -declines evaluation by PT and/or home health at this time.  Uses walker to ambulate.  -maintain on fall precautions.     Mild intermittent asthma without complication- (present on admission)   Assessment & Plan    - Stable.  Not in acute exacerbation.  - Continue home montelukast and albuterol, along with PRN RT protocol.     Chronic ulcerative colitis (HCC)- (present on admission)   Assessment & Plan    - Currently in remission.  Had a normal colonoscopy 6 to 8 months ago.  -Continue to follow-up with outpatient GI. She has not had an EGD recently.          VTE prophylaxis: lovenox

## 2019-05-07 NOTE — CARE PLAN
Problem: Venous Thromboembolism (VTW)/Deep Vein Thrombosis (DVT) Prevention:  Goal: Patient will participate in Venous Thrombosis (VTE)/Deep Vein Thrombosis (DVT)Prevention Measures  Outcome: PROGRESSING AS EXPECTED  Patient is ambulatory and is compliant with accepting Lovenox injections. SCD machine ordered.    Problem: Pain Management  Goal: Pain level will decrease to patient's comfort goal  Outcome: PROGRESSING AS EXPECTED  Patient complaints of pain medicated per MAR. Heat and ice offered but declined due to pt hx of Raynaud's Syndrome.

## 2019-05-07 NOTE — ASSESSMENT & PLAN NOTE
-Was on antibiotics for an ear infection 3 weeks ago.  Difficult to visualize tympanic membrane with otoscopy.  -Pain is severe.  I would want to rule out cholesteatoma.  I will obtain CT of the temporal bone.  -Continue PRN Tylenol. I will also start her on PRN tramadol.

## 2019-05-07 NOTE — PROGRESS NOTES
Patient complaining that ear pain is coming back. When BS RN explained that ear drops were ordered BID pt became tearful. Heat pack refused as it did not provide adequate pain coverage. Pt requesting that MD see her as soon as possible.

## 2019-05-08 ENCOUNTER — PATIENT OUTREACH (OUTPATIENT)
Dept: HEALTH INFORMATION MANAGEMENT | Facility: OTHER | Age: 74
End: 2019-05-08

## 2019-05-08 VITALS
HEIGHT: 63 IN | RESPIRATION RATE: 18 BRPM | WEIGHT: 103 LBS | HEART RATE: 78 BPM | TEMPERATURE: 97.8 F | DIASTOLIC BLOOD PRESSURE: 58 MMHG | SYSTOLIC BLOOD PRESSURE: 116 MMHG | BODY MASS INDEX: 18.25 KG/M2 | OXYGEN SATURATION: 95 %

## 2019-05-08 PROBLEM — K56.609 SMALL BOWEL OBSTRUCTION (HCC): Status: RESOLVED | Noted: 2019-05-06 | Resolved: 2019-05-08

## 2019-05-08 PROBLEM — R19.7 DIARRHEA: Status: RESOLVED | Noted: 2019-05-06 | Resolved: 2019-05-08

## 2019-05-08 PROCEDURE — A9270 NON-COVERED ITEM OR SERVICE: HCPCS | Performed by: INTERNAL MEDICINE

## 2019-05-08 PROCEDURE — 99239 HOSP IP/OBS DSCHRG MGMT >30: CPT | Performed by: INTERNAL MEDICINE

## 2019-05-08 PROCEDURE — A9270 NON-COVERED ITEM OR SERVICE: HCPCS | Performed by: STUDENT IN AN ORGANIZED HEALTH CARE EDUCATION/TRAINING PROGRAM

## 2019-05-08 PROCEDURE — 700102 HCHG RX REV CODE 250 W/ 637 OVERRIDE(OP): Performed by: INTERNAL MEDICINE

## 2019-05-08 PROCEDURE — 700102 HCHG RX REV CODE 250 W/ 637 OVERRIDE(OP): Performed by: STUDENT IN AN ORGANIZED HEALTH CARE EDUCATION/TRAINING PROGRAM

## 2019-05-08 RX ORDER — NEOMYCIN SULFATE, POLYMYXIN B SULFATE, HYDROCORTISONE 3.5; 10000; 1 MG/ML; [USP'U]/ML; MG/ML
5 SOLUTION/ DROPS AURICULAR (OTIC) 3 TIMES DAILY
Qty: 1 BOTTLE | Refills: 0 | Status: SHIPPED | OUTPATIENT
Start: 2019-05-08 | End: 2019-05-15

## 2019-05-08 RX ORDER — TRAMADOL HYDROCHLORIDE 50 MG/1
50 TABLET ORAL EVERY 6 HOURS PRN
Qty: 20 TAB | Refills: 0 | Status: SHIPPED | OUTPATIENT
Start: 2019-05-08 | End: 2019-05-13

## 2019-05-08 RX ADMIN — TRAMADOL HYDROCHLORIDE 50 MG: 50 TABLET, FILM COATED ORAL at 01:18

## 2019-05-08 RX ADMIN — Medication 6 DROP: at 09:37

## 2019-05-08 RX ADMIN — ACETAMINOPHEN 650 MG: 325 TABLET, FILM COATED ORAL at 04:53

## 2019-05-08 RX ADMIN — TRAMADOL HYDROCHLORIDE 50 MG: 50 TABLET, FILM COATED ORAL at 09:36

## 2019-05-08 RX ADMIN — COLISTIN SULFATE, NEOMYCIN SULFATE, THONZONIUM BROMIDE AND HYDROCORTISONE ACETATE 5 DROP: 3; 3.3; .5; 1 SUSPENSION AURICULAR (OTIC) at 04:53

## 2019-05-08 NOTE — DISCHARGE SUMMARY
Discharge Summary    CHIEF COMPLAINT ON ADMISSION  Chief Complaint   Patient presents with   • Epigastric Pain     acute on chronic   • Joint Pain     acute on chronic   • Headache     acute on chronic   • Diarrhea     watery x 24 hours       Reason for Admission  diarrhea     Admission Date  5/6/2019    CODE STATUS  Full Code    HPI & HOSPITAL COURSE  74 y.o. female with cerebellar ataxia, chronic constipation, ulcerative colitis in remission (normal colonoscopy 6 to 8 months ago), GERD, Raynaud's disease, asthma, and arthritis, admitted 5/6/2019 with abdominal pain in the epigastric area for 1 week.  Initial CT on 5/30 showed mild fluid and gas distention of small bowel, otherwise no other acute findings.  Initial labs were unremarkable, and had no leukocytosis.  Lipase was low.  Urinalysis was otherwise negative.  Repeat CT showed ongoing dilated small bowel in the left abdomen, with some increase in the distention.  Patient placed on bowel rest, and supportive care with IV fluids.  C. difficile PCR was ordered.  Patient responded well with conservative measures, and her abdominal pain improved, and her diet was advanced.  Her diarrhea has subsided, and C. difficile PCR was not able to be sent.  She tolerated advancing her diet to GI soft.    She complained of left ear pain, with no obvious medial or external ear infection on otoscopy.  CT of the temporal bones without contrast was within normal limits without any evidence of cholesteatoma.  It was felt that the pain was due to pressure from her recent ear infection as pain was worse when she is upright and better when she lies down, and she was started on Otic corticosporin.  Her pain was controlled with oral Tylenol and tramadol.      I have personally seen and examined the patient on the day of discharge. With her clinical improvement, she was deemed ready to discharge from the hospital as she did not have any further hospitalization needs. Patient felt  comfortable going home. The discharge plan was discussed with the patient, with which she was agreeable to.      Therefore, she is discharged in good and stable condition to home with close outpatient follow-up.    The patient met 2-midnight criteria for an inpatient stay at the time of discharge.    Discharge Date  5/8/2019      FOLLOW UP ITEMS POST DISCHARGE  -She was advised to maintain GI soft diet for several days, and advance diet as tolerated.  -She was prescribed as needed tramadol, and she can take oral Tylenol PRN for her ear pain.  She will continue to take Corticosporin eardrops.  She will follow-up with her ENT providers.    DISCHARGE DIAGNOSES  Principal Problem (Resolved):    Partial small bowel obstruction (HCC) POA: Yes  Active Problems:    Left ear pain, likely residual pressure post-ear infection POA: Yes    Chronic ulcerative colitis (HCC) POA: Yes      Overview: Cecum, ascending colon and sigmoid on colonoscopy done June 2018    Mild intermittent asthma without complication POA: Yes    Cerebellar ataxia (HCC) POA: Yes      Overview: Due to fall 2004    Urinary retention POA: Yes    Prolonged QT interval POA: Yes  Resolved Problems:    Diarrhea POA: Yes      FOLLOW UP  Future Appointments  Date Time Provider Department Center   5/22/2019 10:45 AM NABEEL ALLISON BD 1 [facility]   10/18/2019 1:00 PM Zain Mckenzie M.D. [facility] None     No follow-up provider specified.    MEDICATIONS ON DISCHARGE     Medication List      START taking these medications      Instructions   carbamide peroxide 6.5 % Soln  Commonly known as:  DEBROX   Place 6 Drops in left ear 2 times a day as needed (ear pressure, ear wax impaction).  Dose:  6 Drop     NEOMYCIN-POLYMYXIN-HC (OTIC) 1 % Soln   Place 5 Drops in ear 3 times a day for 14 days. To left ear  Dose:  5 Drop     tramadol 50 MG Tabs  Commonly known as:  ULTRAM   Take 1 Tab by mouth every 6 hours as needed for Moderate Pain or Severe Pain for up to 5 days.  Dose:   "50 mg        CONTINUE taking these medications      Instructions   montelukast 10 MG Tabs  Commonly known as:  SINGULAIR   Take 1 Tab by mouth every day.  Dose:  10 mg        STOP taking these medications    AUGMENTIN 125-31.25 mg/5mL Susr  Generic drug:  amoxicillin-clavulanate            Allergies  Allergies   Allergen Reactions   • Hydromorphone Anaphylaxis     \"went into code blue\"    • Azithromycin Unspecified     \" I don't know\"   • Clindamycin    • Codeine Unspecified     ?   • Erythromycin Unspecified     ?   • Keflex      ?   • Levaquin Unspecified     \"i dont know\"    • Lyrica Unspecified     ?   • Quinolones Unspecified     ?   • Sulfa Drugs Unspecified     \"i dont know\"    • Tetracyclines Unspecified     ?   • Other Food Unspecified     Dairy-mucus (butter is okay). spices-cough       DIET  Orders Placed This Encounter   Procedures   • Diet Order Low Fiber(GI Soft)     Standing Status:   Standing     Number of Occurrences:   1     Order Specific Question:   Diet:     Answer:   Low Fiber(GI Soft) [2]       ACTIVITY  As tolerated.  Weight bearing as tolerated    CONSULTATIONS  none    PROCEDURES  none    LABORATORY  Lab Results   Component Value Date    SODIUM 137 05/07/2019    POTASSIUM 3.6 05/07/2019    CHLORIDE 106 05/07/2019    CO2 24 05/07/2019    GLUCOSE 92 05/07/2019    BUN 5 (L) 05/07/2019    CREATININE 0.39 (L) 05/07/2019    CREATININE 0.7 09/28/2008        Lab Results   Component Value Date    WBC 7.5 05/07/2019    HEMOGLOBIN 13.8 05/07/2019    HEMATOCRIT 41.9 05/07/2019    PLATELETCT 247 05/07/2019        Total time of the discharge process = 35 minutes.  "

## 2019-05-08 NOTE — DISCHARGE INSTRUCTIONS
Discharge Instructions    Discharged to home by car with relative. Discharged via wheelchair, hospital escort: Yes.  Special equipment needed: Not Applicable    Be sure to schedule a follow-up appointment with your primary care doctor or any specialists as instructed.     Discharge Plan:   Diet Plan: Discussed  Activity Level: Discussed  Confirmed Follow up Appointment: Appointment Scheduled  Confirmed Symptoms Management: Discussed  Medication Reconciliation Updated: Yes  Pneumococcal Vaccine Administered/Refused: Not given - Patient refused pneumococcal vaccine  Influenza Vaccine Indication: Patient Refuses    I understand that a diet low in cholesterol, fat, and sodium is recommended for good health. Unless I have been given specific instructions below for another diet, I accept this instruction as my diet prescription.   Other diet: low fiber GI soft    Special Instructions: None    · Is patient discharged on Warfarin / Coumadin?   No     Depression / Suicide Risk    As you are discharged from this [facility] facility, it is important to learn how to keep safe from harming yourself.    Recognize the warning signs:  · Abrupt changes in personality, positive or negative- including increase in energy   · Giving away possessions  · Change in eating patterns- significant weight changes-  positive or negative  · Change in sleeping patterns- unable to sleep or sleeping all the time   · Unwillingness or inability to communicate  · Depression  · Unusual sadness, discouragement and loneliness  · Talk of wanting to die  · Neglect of personal appearance   · Rebelliousness- reckless behavior  · Withdrawal from people/activities they love  · Confusion- inability to concentrate     If you or a loved one observes any of these behaviors or has concerns about self-harm, here's what you can do:  · Talk about it- your feelings and reasons for harming yourself  · Remove any means that you might use to hurt yourself (examples:  pills, rope, extension cords, firearm)  · Get professional help from the community (Mental Health, Substance Abuse, psychological counseling)  · Do not be alone:Call your Safe Contact- someone whom you trust who will be there for you.  · Call your local CRISIS HOTLINE 581-4514 or 768-776-4477  · Call your local Children's Mobile Crisis Response Team [place] (591) 493-2458 or www.[employer]  · Call the toll free National Suicide Prevention Hotlines   · National Suicide Prevention Lifeline 621-077-EJIR (1180)  · [employer] [facility] Line Network 800-SUICIDE (364-4898)      Small Bowel Obstruction  A small bowel obstruction means that something is blocking the small bowel. The small bowel is also called the small intestine. It is the long tube that connects the stomach to the colon. An obstruction will stop food and fluids from passing through the small bowel. Treatment depends on what is causing the problem and how bad the problem is.  Follow these instructions at home:  · Get a lot of rest.  · Follow your diet as told by your doctor. You may need to:  ¨ Only drink clear liquids until you start to get better.  ¨ Avoid solid foods as told by your doctor.  · Take over-the-counter and prescription medicines only as told by your doctor.  · Keep all follow-up visits as told by your doctor. This is important.  Contact a doctor if:  · You have a fever.  · You have chills.  Get help right away if:  · You have pain or cramps that get worse.  · You throw up (vomit) blood.  · You have a feeling of being sick to your stomach (nausea) that does not go away.  · You cannot stop throwing up.  · You cannot drink fluids.  · You feel confused.  · You feel dry or thirsty (dehydrated).  · Your belly gets more bloated.  · You feel weak or you pass out (faint).  This information is not intended to replace advice given to you by your health care provider. Make sure you discuss any questions you have with your health care  provider.  Document Released: 01/25/2006 Document Revised: 08/14/2017 Document Reviewed: 02/11/2016  [employer] Patient Education © 2017 [employer].      Otitis Media, Adult  Otitis media is redness, soreness, and puffiness (swelling) in the space just behind your eardrum (middle ear). It may be caused by allergies or infection. It often happens along with a cold.  Follow these instructions at home:  · Take your medicine as told. Finish it even if you start to feel better.  · Only take over-the-counter or prescription medicines for pain, discomfort, or fever as told by your doctor.  · Follow up with your doctor as told.  Contact a doctor if:  · You have otitis media only in one ear, or bleeding from your nose, or both.  · You notice a lump on your neck.  · You are not getting better in 3-5 days.  · You feel worse instead of better.  Get help right away if:  · You have pain that is not helped with medicine.  · You have puffiness, redness, or pain around your ear.  · You get a stiff neck.  · You cannot move part of your face (paralysis).  · You notice that the bone behind your ear hurts when you touch it.  This information is not intended to replace advice given to you by your health care provider. Make sure you discuss any questions you have with your health care provider.  Document Released: 06/05/2009 Document Revised: 05/25/2017 Document Reviewed: 07/15/2014  [employer] Patient Education © 2017 Elsevier Inc.      How can pain medicine affect me?  You were given a prescription for pain medicine. This medicine may make you tired or drowsy and may affect your ability to think clearly. Pain medicine may also affect your ability to drive or perform certain physical activities. It may not be possible to make all of your pain go away, but you should be comfortable enough to move, breathe, and take care of yourself.  How often should I take pain medicine and how much should I take?  · Take pain  medicine only as directed by your health care provider and only as needed for pain.  · You do not need to take pain medicine if you are not having pain, unless directed by your health care provider.  · You can take less than the prescribed dose if you find that a smaller amount of medicine controls your pain.  What restrictions do I have while taking pain medicine?  Follow these instructions after you start taking pain medicine, while you are taking the medicine, and for 8 hours after you stop taking the medicine:  · Do not drive.  · Do not operate machinery.  · Do not operate power tools.  · Do not sign legal documents.  · Do not drink alcohol.  · Do not take sleeping pills.  · Do not supervise children by yourself.  · Do not participate in activities that require climbing or being in high places.  · Do not enter a body of water--such as a lake, river, ocean, spa, or swimming pool--without an adult nearby who can monitor and help you.  How can I keep others safe while I am taking pain medicine?  · Store your pain medicine as directed by your health care provider. Make sure that it is placed where children and pets cannot reach it.  · Never share your pain medicine with anyone.  · Do not save any leftover pills. If you have any leftover pain medicine, get rid of it or destroy it as directed by your health care provider.  What else do I need to know about taking pain medicine?  · Use a stool softener if you become constipated from your pain medicine. Increasing your intake of fruits and vegetables will also help with constipation.  · Write down the times when you take your pain medicine. Look at the times before you take your next dose of medicine. It is easy to become confused while on pain medicine. Recording the times helps you to avoid an overdose.  · If your pain is severe, do not try to treat it yourself by taking more pills than instructed on your prescription. Contact your health care provider for  help.  · You may have been prescribed a pain medicine that contains acetaminophen. Do not take any other acetaminophen while taking this medicine. An overdose of acetaminophen can result in severe liver damage. Acetaminophen is found in many over-the-counter (OTC) and prescription medicines. If you are taking any medicines in addition to your pain medicine, check the active ingredients on those medicines to see if acetaminophen is listed.  When should I call my health care provider?  · Your medicine is not helping to make the pain go away.  · You vomit or have diarrhea shortly after taking the medicine.  · You develop new pain in areas that did not hurt before.  · You have an allergic reaction to your medicine. This may include:  ¨ Itchiness.  ¨ Swelling.  ¨ Dizziness.  ¨ Developing a new rash.  When should I call 911 or go to the emergency room?  · You feel dizzy or you faint.  · You are very confused or disoriented.  · You repeatedly vomit.  · Your skin or lips turn pale or bluish in color.  · You have shortness of breath or you are breathing much more slowly than usual.  · You have a severe allergic reaction to your medicine. This includes:  ¨ Developing tongue swelling.  ¨ Having difficulty breathing.  This information is not intended to replace advice given to you by your health care provider. Make sure you discuss any questions you have with your health care provider.  Document Released: 03/26/2002 Document Revised: 07/07/2017 Document Reviewed: 10/22/2015  Elsevier Interactive Patient Education © 2017 Elsevier Inc.

## 2019-05-08 NOTE — CARE PLAN
Problem: Discharge Barriers/Planning  Goal: Patient's continuum of care needs will be met  Outcome: PROGRESSING AS EXPECTED  Discuss discharge plan, medications, and follow up appointments.  Questions and concerns adddressed

## 2019-05-08 NOTE — PROGRESS NOTES
Patient A/Ox4 up with hand held assist for general weakness and for hx of cerebellar ataxia. Pt complaining of ear pain and repeatedly asks for ear drops. When BS RN explained dosing schedule of medication pt began crying and asking for MD to come to bedside. Further education about medication administration given, pt began to calm down but still complaining of pain. Best position to relieve some pain is for pt to lay flat on her rt side, pain increases when pt is upright in sitting position. Pt requested RN to remove PIV, RN obliged. Fluids stopped and IV removed at 2200. Pt also stated that she will not participate in any more lab draws. Bed in low and locked position. Call light within reach and used appropriately.

## 2019-05-08 NOTE — PROGRESS NOTES
Pt and daughter educated regarding discharge instructions and follow up appointments. Questions and concerns addressed. Prescriptions and belongings with pt. IV removed. Wheelchair escort provided and ride home with daughter.

## 2019-05-10 ENCOUNTER — OFFICE VISIT (OUTPATIENT)
Dept: INTERNAL MEDICINE | Facility: IMAGING CENTER | Age: 74
End: 2019-05-10
Payer: MEDICARE

## 2019-05-10 VITALS — HEART RATE: 86 BPM | DIASTOLIC BLOOD PRESSURE: 80 MMHG | SYSTOLIC BLOOD PRESSURE: 120 MMHG | OXYGEN SATURATION: 95 %

## 2019-05-10 DIAGNOSIS — K56.600 PARTIAL SMALL BOWEL OBSTRUCTION (HCC): ICD-10-CM

## 2019-05-10 DIAGNOSIS — K59.00 CONSTIPATION, UNSPECIFIED CONSTIPATION TYPE: ICD-10-CM

## 2019-05-10 PROCEDURE — 99214 OFFICE O/P EST MOD 30 MIN: CPT | Performed by: INTERNAL MEDICINE

## 2019-05-10 ASSESSMENT — ENCOUNTER SYMPTOMS: CONSTIPATION: 1

## 2019-05-10 NOTE — PROGRESS NOTES
"Established Patient Note   HPI:        Ana Paula is here for follow up from hospital. She was released 2 days ago after she presented with partial bowel obstruction. She states she has had a BM today. She states her tremor worsens when she is in pain. Her neck is stiff after her fall 2 weeks ago.    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    Due to fall 2004   • Chronic ulcerative colitis (HCC) 6/26/2013   • Cough    • GERD (gastroesophageal reflux disease)    • Hay fever 4/19/2019   • HYPOTENSION    • Inner ear disease    • Leg cramps, sleep related 5/2/2018   • Near-syncope    • Neck injury     fracture, hand and leg numbness and tingling   • Osteoporosis    • Peripheral neuropathy    • Polyp of sigmoid colon 04/10/2018    Hyperplastic polyp   • Raynaud disease    • Tremor    • Vitamin D deficiency        Current Outpatient Prescriptions   Medication Sig Dispense Refill   • montelukast (SINGULAIR) 10 MG Tab Take 1 Tab by mouth every day. 30 Tab 11   • carbamide peroxide (DEBROX) 6.5 % Solution Place 6 Drops in left ear 2 times a day as needed (ear pressure, ear wax impaction). 1 Bottle 0   • NEOMYCIN-POLYMYXIN-HC, OTIC, 1 % Solution Place 5 Drops in ear 3 times a day for 14 days. To left ear 1 Bottle 0   • tramadol (ULTRAM) 50 MG Tab Take 1 Tab by mouth every 6 hours as needed for Moderate Pain or Severe Pain for up to 5 days. 20 Tab 0     No current facility-administered medications for this visit.          Allergies   Allergen Reactions   • Hydromorphone Anaphylaxis     \"went into code blue\"    • Azithromycin Unspecified     \" I don't know\"   • Clindamycin    • Codeine Unspecified     ?   • Erythromycin Unspecified     ?   • Keflex      ?   • Levaquin Unspecified     \"i dont know\"    • Lyrica Unspecified     ?   • Quinolones Unspecified     ?   • Sulfa Drugs Unspecified     \"i dont know\"    • Tetracyclines Unspecified     ?   • Other Food Unspecified "     Dairy-mucus (butter is okay). spices-cough         Social History   Substance Use Topics   • Smoking status: Never Smoker   • Smokeless tobacco: Never Used   • Alcohol use No     History   Alcohol Use No     History   Drug Use No       Review of Systems   Gastrointestinal: Positive for constipation.        States she has been prone to some constipation. She has been eating prunes to help.       Ambulatory Vitals  /80 (BP Location: Right arm, Patient Position: Sitting, BP Cuff Size: Adult)   Pulse 86   SpO2 95%     Physical Exam   Cardiovascular: Normal rate, regular rhythm and normal heart sounds.  Exam reveals no gallop and no friction rub.    No murmur heard.  Pulmonary/Chest: Effort normal and breath sounds normal. She has no wheezes. She has no rales.   Abdominal: Soft. She exhibits no mass. There is no tenderness. There is no guarding.   Musculoskeletal: She exhibits no edema.   Neurological:   Gait unsteady due to cerebellar ataxia         Assessment and Plan:     1. Partial small bowel obstruction (HCC)      Resolving   2. Constipation, unspecified constipation type       Discussed if prunes are keeping her from getting constipated she may continue; if her constipation worsens I have proposed considering low dose linzess (72 mg) qd. Discussed continue to drink plenty of water and foods high in magnesium can also help keep her bowels regular.  Face to face time: 30 minutes with greater than 50% of time spent with direct patient contact and medical management.   Dar Fraire M.D.

## 2019-05-15 ENCOUNTER — HOSPITAL ENCOUNTER (EMERGENCY)
Facility: MEDICAL CENTER | Age: 74
End: 2019-05-15
Attending: EMERGENCY MEDICINE
Payer: MEDICARE

## 2019-05-15 ENCOUNTER — APPOINTMENT (OUTPATIENT)
Dept: RADIOLOGY | Facility: MEDICAL CENTER | Age: 74
End: 2019-05-15
Attending: EMERGENCY MEDICINE
Payer: MEDICARE

## 2019-05-15 VITALS
OXYGEN SATURATION: 95 % | RESPIRATION RATE: 16 BRPM | TEMPERATURE: 98.1 F | HEART RATE: 94 BPM | DIASTOLIC BLOOD PRESSURE: 77 MMHG | SYSTOLIC BLOOD PRESSURE: 119 MMHG | BODY MASS INDEX: 17.97 KG/M2 | HEIGHT: 63 IN | WEIGHT: 101.41 LBS

## 2019-05-15 DIAGNOSIS — K59.00 CONSTIPATION, UNSPECIFIED CONSTIPATION TYPE: ICD-10-CM

## 2019-05-15 DIAGNOSIS — R10.84 GENERALIZED ABDOMINAL PAIN: ICD-10-CM

## 2019-05-15 LAB
ALBUMIN SERPL BCP-MCNC: 4.2 G/DL (ref 3.2–4.9)
ALBUMIN/GLOB SERPL: 1.4 G/DL
ALP SERPL-CCNC: 88 U/L (ref 30–99)
ALT SERPL-CCNC: 23 U/L (ref 2–50)
ANION GAP SERPL CALC-SCNC: 11 MMOL/L (ref 0–11.9)
AST SERPL-CCNC: 16 U/L (ref 12–45)
BASOPHILS # BLD AUTO: 0.5 % (ref 0–1.8)
BASOPHILS # BLD: 0.04 K/UL (ref 0–0.12)
BILIRUB SERPL-MCNC: 1.1 MG/DL (ref 0.1–1.5)
BUN SERPL-MCNC: 12 MG/DL (ref 8–22)
CALCIUM SERPL-MCNC: 9.4 MG/DL (ref 8.5–10.5)
CHLORIDE SERPL-SCNC: 103 MMOL/L (ref 96–112)
CO2 SERPL-SCNC: 23 MMOL/L (ref 20–33)
CREAT SERPL-MCNC: 0.45 MG/DL (ref 0.5–1.4)
EOSINOPHIL # BLD AUTO: 0.1 K/UL (ref 0–0.51)
EOSINOPHIL NFR BLD: 1.2 % (ref 0–6.9)
ERYTHROCYTE [DISTWIDTH] IN BLOOD BY AUTOMATED COUNT: 41.2 FL (ref 35.9–50)
GLOBULIN SER CALC-MCNC: 3 G/DL (ref 1.9–3.5)
GLUCOSE SERPL-MCNC: 108 MG/DL (ref 65–99)
HCT VFR BLD AUTO: 44.1 % (ref 37–47)
HGB BLD-MCNC: 14.5 G/DL (ref 12–16)
IMM GRANULOCYTES # BLD AUTO: 0.02 K/UL (ref 0–0.11)
IMM GRANULOCYTES NFR BLD AUTO: 0.2 % (ref 0–0.9)
LIPASE SERPL-CCNC: 4 U/L (ref 11–82)
LYMPHOCYTES # BLD AUTO: 2.47 K/UL (ref 1–4.8)
LYMPHOCYTES NFR BLD: 29.7 % (ref 22–41)
MCH RBC QN AUTO: 31.4 PG (ref 27–33)
MCHC RBC AUTO-ENTMCNC: 32.9 G/DL (ref 33.6–35)
MCV RBC AUTO: 95.5 FL (ref 81.4–97.8)
MONOCYTES # BLD AUTO: 0.65 K/UL (ref 0–0.85)
MONOCYTES NFR BLD AUTO: 7.8 % (ref 0–13.4)
NEUTROPHILS # BLD AUTO: 5.04 K/UL (ref 2–7.15)
NEUTROPHILS NFR BLD: 60.6 % (ref 44–72)
NRBC # BLD AUTO: 0 K/UL
NRBC BLD-RTO: 0 /100 WBC
PLATELET # BLD AUTO: 275 K/UL (ref 164–446)
PMV BLD AUTO: 8.5 FL (ref 9–12.9)
POTASSIUM SERPL-SCNC: 4.2 MMOL/L (ref 3.6–5.5)
PROT SERPL-MCNC: 7.2 G/DL (ref 6–8.2)
RBC # BLD AUTO: 4.62 M/UL (ref 4.2–5.4)
SODIUM SERPL-SCNC: 137 MMOL/L (ref 135–145)
WBC # BLD AUTO: 8.3 K/UL (ref 4.8–10.8)

## 2019-05-15 PROCEDURE — 74176 CT ABD & PELVIS W/O CONTRAST: CPT

## 2019-05-15 PROCEDURE — 80053 COMPREHEN METABOLIC PANEL: CPT

## 2019-05-15 PROCEDURE — 85025 COMPLETE CBC W/AUTO DIFF WBC: CPT

## 2019-05-15 PROCEDURE — 83690 ASSAY OF LIPASE: CPT

## 2019-05-15 PROCEDURE — 99284 EMERGENCY DEPT VISIT MOD MDM: CPT

## 2019-05-15 NOTE — ED TRIAGE NOTES
Chief Complaint   Patient presents with   • Follow-Up     Pt BIB self to triage, states abd pain/distention since last Wed. pt reports to have been admitted for bowel obstruction. pt denies diarrhea,pt explains pain to be tenderness in nature and to be in upper abd.    • Abdominal Pain   Pt explains that she has concern for follow and was told by GI to come to the ER for further eval/recheck.  Explained to pt triage process, made pt aware to tell this RN/staff of any changes/concerns, pt verbalized understanding of process and instructions given. Pt to ER lobby.

## 2019-05-15 NOTE — ED NOTES
Attempted hand draw. Pt jerked and refused once needle was in. (1 attempt only) Told Pt would wait to do draw w IV later w RN.

## 2019-05-15 NOTE — ED PROVIDER NOTES
ER Provider Note     Scribed for Manoj Sanders M.D. by Nona Warren. 5/15/2019, 4:49 PM.    Primary Care Provider: Dar Fraire M.D.  Means of Arrival: walk in   History obtained from: Patient  History limited by: None     CHIEF COMPLAINT  Chief Complaint   Patient presents with   • Follow-Up     Pt BIB self to triage, states abd pain/distention since last Wed. pt reports to have been admitted for bowel obstruction. pt denies diarrhea,pt explains pain to be tenderness in nature and to be in upper abd.    • Abdominal Pain       HPI  Ana Paula Ordonez is a 74 y.o. female with who presents to the Emergency Department complaining of constant, progressively worsening mid epigastric abdominal pain onset 7 days ago. She states that her pain is in the area of her previous bowel obstruction. No exacerbating or alleviating factors are noted.The patient denies diarrhea or any other symptoms.   On 5/3/19 she was in the hospital with a small bowel obstruction in the left central abdomen.     REVIEW OF SYSTEMS  See HPI for further details. All other systems are negative.     PAST MEDICAL HISTORY   has a past medical history of Arthritis; ASTHMA; Cerebellar ataxia (HCC) (3/30/2018); Chronic ulcerative colitis (HCC) (6/26/2013); Cough; GERD (gastroesophageal reflux disease); Hay fever (4/19/2019); HYPOTENSION; Inner ear disease; Leg cramps, sleep related (5/2/2018); Near-syncope; Neck injury; Osteoporosis; Peripheral neuropathy; Polyp of sigmoid colon (04/10/2018); Raynaud disease; Tremor; and Vitamin D deficiency.    SURGICAL HISTORY   has a past surgical history that includes nasal fracture reduction closed (10/8/08); hip nailing intramedullary (7/30/2011); tonsillectomy and adenoidectomy; bladder suspension; nissen fundoplication laparoscopic (2005); and cholecystectomy (2008).    SOCIAL HISTORY  Social History   Substance Use Topics   • Smoking status: Never Smoker   • Smokeless tobacco: Never Used   • Alcohol use No     "  History   Drug Use No       FAMILY HISTORY  Family History   Problem Relation Age of Onset   • Non-contributory Father         Parkinson's disease   • Non-contributory Mother         old age with mild dementia   • Heart Disease Neg Hx    • Hypertension Neg Hx    • Hyperlipidemia Neg Hx    • Diabetes Neg Hx        CURRENT MEDICATIONS  Home Medications     Reviewed by Zully Moctezuma PhT (Pharmacy Tech) on 05/15/19 at 1640  Med List Status: Complete   Medication Last Dose Status   montelukast (SINGULAIR) 10 MG Tab 5/14/2019 Active                ALLERGIES  Allergies   Allergen Reactions   • Hydromorphone Anaphylaxis     \"went into code blue\"    • Azithromycin Unspecified     \" I don't know\"   • Clindamycin    • Codeine Unspecified     ?   • Erythromycin Unspecified     ?   • Keflex      ?   • Levaquin Unspecified     \"i dont know\"    • Lyrica Unspecified     ?   • Quinolones Unspecified     ?   • Sulfa Drugs Unspecified     \"i dont know\"    • Tetracyclines Unspecified     ?   • Other Food Unspecified     Dairy-mucus (butter is okay). spices-cough       PHYSICAL EXAM  VITAL SIGNS: /82   Pulse 88   Temp 36.7 °C (98.1 °F) (Temporal)   Resp 16   Ht 1.6 m (5' 3\")   Wt 46 kg (101 lb 6.6 oz)   SpO2 95%   BMI 17.96 kg/m²       Constitutional: Alert in no apparent distress.  HENT: No signs of trauma, Bilateral external ears normal, Nose normal.   Eyes: Pupils are equal and reactive, Conjunctiva normal, Non-icteric.   Neck: Normal range of motion, No tenderness, Supple, No stridor.   Lymphatic: No lymphadenopathy noted.   Cardiovascular: Regular rate and rhythm, no murmurs.   Thorax & Lungs: Normal breath sounds, No respiratory distress, No wheezing, No chest tenderness.   Abdomen: Bowel sounds normal, Soft, No tenderness, No masses, No pulsatile masses. No peritoneal signs.  Skin: Warm, Dry, No erythema, No rash.   Back: No bony tenderness, No CVA tenderness.   Extremities: Intact distal pulses, No edema, No " tenderness, No cyanosis.  Musculoskeletal: Good range of motion in all major joints. No tenderness to palpation or major deformities noted.   Neurologic: Alert , Normal motor function, Normal sensory function, No focal deficits noted.   Psychiatric: Affect normal, Judgment normal, Mood normal.     DIAGNOSTIC STUDIES / PROCEDURES      LABS  Labs Reviewed   CBC WITH DIFFERENTIAL - Abnormal; Notable for the following:        Result Value    MCHC 32.9 (*)     MPV 8.5 (*)     All other components within normal limits   COMP METABOLIC PANEL - Abnormal; Notable for the following:     Glucose 108 (*)     Creatinine 0.45 (*)     All other components within normal limits   LIPASE - Abnormal; Notable for the following:     Lipase 4 (*)     All other components within normal limits   ESTIMATED GFR   URINALYSIS,CULTURE IF INDICATED     All labs reviewed by me.    RADIOLOGY  CT-ABDOMEN-PELVIS W/O   Final Result      1.  Moderate to large amount of colonic stool, increased from the prior exam.      2.  No significant bowel distention or transition point is identified. Contents of small bowel loops in the left abdomen are suggestive of bowel stasis as on the prior exam.         The radiologist's interpretation of all radiological studies have been reviewed by me.    COURSE & MEDICAL DECISION MAKING  Pertinent Labs & Imaging studies reviewed. (See chart for details)    This is a 74 y.o. female that presents with abdominal pain.  She does have a history of small bowel obstructions.  She is having some mild abdominal pain.  This could present urinary tract infection versus small bowel obstruction versus pancreatitis.  I will perform the below work-up.  And then reassess the patient.    4:49 PM - Patient seen and examined at bedside. Ordered urinalysis, lipase, CMP, CBC with differential, estimated GFR, and CT-Abdomen.      8:00 PM - I reevaluated patient at bedside and discussed diagnostic study results. I also discussed the plan for  discharge as outlined below.     The patient has no small bowel obstruction.  She does appear to have a large amount of colonic stool.  I discussed prunes amongst dietary options to help with constipation.  Her lipase is normal.  Her creatinine is normal.  She is well-appearing at this time.  I will discharge her home with strict return precautions.    DISPOSITION:  Patient will be discharged home in stable condition.    FOLLOW UP:  Dar Fraire M.D.  [address] [facility] [id number]  [phone]    In 2 days        OUTPATIENT MEDICATIONS:  Discharge Medication List as of 5/15/2019  7:56 PM           FINAL IMPRESSION  1. Generalized abdominal pain    2. Constipation, unspecified constipation type          I, Nona Warren (Scribe), am scribing for, and in the presence of, Manoj Sanders M.D..    Electronically signed by: Nona Warren (Scribe), 5/15/2019    I, Manoj Sanders M.D. personally performed the services described in this documentation, as scribed by Nona Warren in my presence, and it is both accurate and complete.     C    The note accurately reflects work and decisions made by me.  Manoj Sanders  5/15/2019  11:48 PM

## 2019-05-15 NOTE — ED NOTES
Pt walking with wheeled walker in [facility] talking on phone to PMD office.  Is to follow up with PMD in morning but states will stay for an x-ray here.  To BR prior to exam room

## 2019-05-15 NOTE — ED NOTES
To exam room, gowned.  Pt states +gas, passed small amt stool 1-2 days ago.  Pt extremely anxious about possible SBO.

## 2019-05-16 NOTE — ED NOTES
Walking around in exam room, states unable to remain on stretcher.  No distress noted.  Awaiting CT, aware of status.

## 2019-05-22 ENCOUNTER — APPOINTMENT (OUTPATIENT)
Dept: RADIOLOGY | Facility: MEDICAL CENTER | Age: 74
End: 2019-05-22
Attending: INTERNAL MEDICINE
Payer: MEDICARE

## 2019-06-05 ENCOUNTER — OFFICE VISIT (OUTPATIENT)
Dept: INTERNAL MEDICINE | Facility: IMAGING CENTER | Age: 74
End: 2019-06-05
Payer: MEDICARE

## 2019-06-05 VITALS
BODY MASS INDEX: 18.07 KG/M2 | RESPIRATION RATE: 14 BRPM | HEIGHT: 63 IN | WEIGHT: 102 LBS | DIASTOLIC BLOOD PRESSURE: 66 MMHG | HEART RATE: 83 BPM | OXYGEN SATURATION: 93 % | TEMPERATURE: 99.5 F | SYSTOLIC BLOOD PRESSURE: 100 MMHG

## 2019-06-05 DIAGNOSIS — J20.9 ACUTE BRONCHITIS, UNSPECIFIED ORGANISM: ICD-10-CM

## 2019-06-05 PROCEDURE — 99213 OFFICE O/P EST LOW 20 MIN: CPT | Performed by: INTERNAL MEDICINE

## 2019-06-05 RX ORDER — BENZONATATE 200 MG/1
200 CAPSULE ORAL 3 TIMES DAILY PRN
Qty: 30 CAP | Refills: 0 | Status: SHIPPED | OUTPATIENT
Start: 2019-06-05 | End: 2019-07-24

## 2019-06-05 RX ORDER — AZITHROMYCIN 500 MG/1
500 TABLET, FILM COATED ORAL DAILY
Qty: 6 TAB | Refills: 0 | Status: SHIPPED | OUTPATIENT
Start: 2019-06-05 | End: 2019-06-11

## 2019-06-05 ASSESSMENT — ENCOUNTER SYMPTOMS
SHORTNESS OF BREATH: 0
WHEEZING: 0

## 2019-06-05 NOTE — PROGRESS NOTES
"Established Patient Note   HPI:        Ana Paula comes in today with cough with phlegm that began couple days ago that is yellowish color. Her daughter was sick starting one week ago.    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    Due to fall 2004   • Chronic ulcerative colitis (HCC) 6/26/2013   • Cough    • GERD (gastroesophageal reflux disease)    • Hay fever 4/19/2019   • HYPOTENSION    • Inner ear disease    • Leg cramps, sleep related 5/2/2018   • Near-syncope    • Neck injury     fracture, hand and leg numbness and tingling   • Osteoporosis    • Peripheral neuropathy    • Polyp of sigmoid colon 04/10/2018    Hyperplastic polyp   • Raynaud disease    • Tremor    • Vitamin D deficiency        Current Outpatient Prescriptions   Medication Sig Dispense Refill   • azithromycin (ZITHROMAX) 500 MG tablet Take 1 Tab by mouth every day for 6 days. 6 Tab 0   • montelukast (SINGULAIR) 10 MG Tab Take 1 Tab by mouth every day. 30 Tab 11     No current facility-administered medications for this visit.          Allergies   Allergen Reactions   • Hydromorphone Anaphylaxis     \"went into code blue\"    • Azithromycin Unspecified     \" I don't know\"   • Clindamycin    • Codeine Unspecified     ?   • Erythromycin Unspecified     ?   • Keflex      ?   • Levaquin Unspecified     \"i dont know\"    • Lyrica Unspecified     ?   • Quinolones Unspecified     ?   • Sulfa Drugs Unspecified     \"i dont know\"    • Tetracyclines Unspecified     ?   • Other Food Unspecified     Dairy-mucus (butter is okay). spices-cough         Social History   Substance Use Topics   • Smoking status: Never Smoker   • Smokeless tobacco: Never Used   • Alcohol use No     History   Alcohol Use No     History   Drug Use No       Review of Systems   Respiratory: Negative for shortness of breath and wheezing.        Ambulatory Vitals  /66 (BP Location: Right arm, Patient Position: Sitting, BP Cuff " "Size: Adult)   Pulse 83   Temp 37.5 °C (99.5 °F) (Temporal)   Resp 14   Ht 1.6 m (5' 3\")   Wt 46.3 kg (102 lb)   SpO2 93%   BMI 18.07 kg/m²     Physical Exam   HENT:   Throat without exudates. Sinuses nontender over frontal and maxillary.   Pulmonary/Chest: Effort normal and breath sounds normal. She has no wheezes. She has no rales.   Lymphadenopathy:     She has no cervical adenopathy.         Assessment and Plan:     1. Acute bronchitis, unspecified organism  azithromycin (ZITHROMAX) 500 MG tablet     She does not wish to start antibiotic as yet but is willing to take Rx and hold in case she worsens. Have also offered rocephin 500 mg IM for next couple days if she worsens.    Dar Fraire M.D.  "

## 2019-06-06 ENCOUNTER — NON-PROVIDER VISIT (OUTPATIENT)
Dept: INTERNAL MEDICINE | Facility: IMAGING CENTER | Age: 74
End: 2019-06-06
Payer: MEDICARE

## 2019-06-06 DIAGNOSIS — J20.9 ACUTE BRONCHITIS, UNSPECIFIED ORGANISM: ICD-10-CM

## 2019-06-06 PROCEDURE — 96372 THER/PROPH/DIAG INJ SC/IM: CPT | Performed by: INTERNAL MEDICINE

## 2019-06-06 RX ORDER — AZITHROMYCIN 250 MG/1
500 TABLET, FILM COATED ORAL DAILY
Qty: 12 TAB | Refills: 0 | Status: SHIPPED | OUTPATIENT
Start: 2019-06-06 | End: 2019-06-12

## 2019-06-07 ENCOUNTER — NON-PROVIDER VISIT (OUTPATIENT)
Dept: INTERNAL MEDICINE | Facility: IMAGING CENTER | Age: 74
End: 2019-06-07
Payer: MEDICARE

## 2019-06-07 DIAGNOSIS — J20.9 ACUTE BRONCHITIS, UNSPECIFIED ORGANISM: ICD-10-CM

## 2019-06-07 PROCEDURE — 96372 THER/PROPH/DIAG INJ SC/IM: CPT | Performed by: INTERNAL MEDICINE

## 2019-06-10 ENCOUNTER — NON-PROVIDER VISIT (OUTPATIENT)
Dept: INTERNAL MEDICINE | Facility: IMAGING CENTER | Age: 74
End: 2019-06-10
Payer: MEDICARE

## 2019-06-10 DIAGNOSIS — J20.9 ACUTE BRONCHITIS, UNSPECIFIED ORGANISM: ICD-10-CM

## 2019-06-10 PROCEDURE — 96372 THER/PROPH/DIAG INJ SC/IM: CPT | Performed by: INTERNAL MEDICINE

## 2019-06-11 ENCOUNTER — NON-PROVIDER VISIT (OUTPATIENT)
Dept: INTERNAL MEDICINE | Facility: IMAGING CENTER | Age: 74
End: 2019-06-11
Payer: MEDICARE

## 2019-06-11 DIAGNOSIS — J20.9 ACUTE BRONCHITIS, UNSPECIFIED ORGANISM: ICD-10-CM

## 2019-06-11 PROCEDURE — 96372 THER/PROPH/DIAG INJ SC/IM: CPT | Performed by: INTERNAL MEDICINE

## 2019-06-12 ENCOUNTER — NON-PROVIDER VISIT (OUTPATIENT)
Dept: INTERNAL MEDICINE | Facility: IMAGING CENTER | Age: 74
End: 2019-06-12
Payer: MEDICARE

## 2019-06-12 DIAGNOSIS — J20.9 ACUTE BRONCHITIS, UNSPECIFIED ORGANISM: ICD-10-CM

## 2019-06-12 PROCEDURE — 96372 THER/PROPH/DIAG INJ SC/IM: CPT | Performed by: INTERNAL MEDICINE

## 2019-06-13 ENCOUNTER — NON-PROVIDER VISIT (OUTPATIENT)
Dept: INTERNAL MEDICINE | Facility: IMAGING CENTER | Age: 74
End: 2019-06-13
Payer: MEDICARE

## 2019-06-13 DIAGNOSIS — J20.9 ACUTE BRONCHITIS, UNSPECIFIED ORGANISM: ICD-10-CM

## 2019-06-13 PROCEDURE — 96372 THER/PROPH/DIAG INJ SC/IM: CPT | Performed by: INTERNAL MEDICINE

## 2019-06-14 ENCOUNTER — NON-PROVIDER VISIT (OUTPATIENT)
Dept: INTERNAL MEDICINE | Facility: IMAGING CENTER | Age: 74
End: 2019-06-14
Payer: MEDICARE

## 2019-06-14 DIAGNOSIS — J20.9 ACUTE BRONCHITIS, UNSPECIFIED ORGANISM: ICD-10-CM

## 2019-06-14 PROCEDURE — 96372 THER/PROPH/DIAG INJ SC/IM: CPT | Performed by: INTERNAL MEDICINE

## 2019-06-18 ENCOUNTER — TELEPHONE (OUTPATIENT)
Dept: INTERNAL MEDICINE | Facility: IMAGING CENTER | Age: 74
End: 2019-06-18

## 2019-06-18 NOTE — TELEPHONE ENCOUNTER
Persistent cough after Rocephin 500 mg IM x 7 days.  Otherwise well.  Will try Advair Diskus 100/50 per Dr Ramos. Appointment with pulmonologist (Dr Mckenzie) in August.

## 2019-06-21 ENCOUNTER — NON-PROVIDER VISIT (OUTPATIENT)
Dept: INTERNAL MEDICINE | Facility: IMAGING CENTER | Age: 74
End: 2019-06-21
Payer: MEDICARE

## 2019-06-21 VITALS
DIASTOLIC BLOOD PRESSURE: 70 MMHG | WEIGHT: 102 LBS | BODY MASS INDEX: 18.07 KG/M2 | HEART RATE: 77 BPM | OXYGEN SATURATION: 93 % | TEMPERATURE: 98.6 F | HEIGHT: 63 IN | RESPIRATION RATE: 14 BRPM | SYSTOLIC BLOOD PRESSURE: 104 MMHG

## 2019-06-21 DIAGNOSIS — J45.20 MILD INTERMITTENT ASTHMA WITHOUT COMPLICATION: ICD-10-CM

## 2019-06-21 DIAGNOSIS — R05.3 COUGH, PERSISTENT: ICD-10-CM

## 2019-06-21 DIAGNOSIS — J20.9 ACUTE BRONCHITIS, UNSPECIFIED ORGANISM: ICD-10-CM

## 2019-06-21 RX ORDER — IPRATROPIUM BROMIDE AND ALBUTEROL SULFATE 2.5; .5 MG/3ML; MG/3ML
3 SOLUTION RESPIRATORY (INHALATION) EVERY 6 HOURS PRN
Qty: 28 BULLET | Refills: 0 | Status: SHIPPED | OUTPATIENT
Start: 2019-06-21 | End: 2019-09-15

## 2019-06-21 RX ADMIN — IPRATROPIUM BROMIDE AND ALBUTEROL SULFATE 3 ML: 2.5; .5 SOLUTION RESPIRATORY (INHALATION) at 16:00

## 2019-06-21 NOTE — NON-PROVIDER
Persistent cough x 3 weeks, S/P rocephin 500 mg IM daily for 7 days.  Lungs clear throughout, afebrile, not ill appearing.  DuoNeb nebulizer treatment in office with improvement in cough.  Nebulizer and DuoNeb prescriptions to preferred pharmacy per Dr Sosa.  CXR pending.

## 2019-06-24 ENCOUNTER — OFFICE VISIT (OUTPATIENT)
Dept: PULMONOLOGY | Facility: HOSPICE | Age: 74
End: 2019-06-24
Payer: MEDICARE

## 2019-06-24 VITALS
DIASTOLIC BLOOD PRESSURE: 72 MMHG | HEIGHT: 63 IN | SYSTOLIC BLOOD PRESSURE: 124 MMHG | BODY MASS INDEX: 18.25 KG/M2 | TEMPERATURE: 97.4 F | HEART RATE: 62 BPM | WEIGHT: 103 LBS | OXYGEN SATURATION: 95 % | RESPIRATION RATE: 68 BRPM

## 2019-06-24 DIAGNOSIS — G11.9 CEREBELLAR ATAXIA (HCC): ICD-10-CM

## 2019-06-24 DIAGNOSIS — Z91.09 ENVIRONMENTAL ALLERGIES: ICD-10-CM

## 2019-06-24 DIAGNOSIS — J45.20 MILD INTERMITTENT ASTHMA WITHOUT COMPLICATION: ICD-10-CM

## 2019-06-24 PROCEDURE — 99214 OFFICE O/P EST MOD 30 MIN: CPT | Performed by: INTERNAL MEDICINE

## 2019-06-24 RX ORDER — TRIAMCINOLONE ACETONIDE 1 MG/G
CREAM TOPICAL
Refills: 2 | COMMUNITY
Start: 2019-06-12 | End: 2019-09-15

## 2019-06-24 RX ORDER — IPRATROPIUM BROMIDE AND ALBUTEROL SULFATE 2.5; .5 MG/3ML; MG/3ML
3 SOLUTION RESPIRATORY (INHALATION) ONCE
Status: COMPLETED | OUTPATIENT
Start: 2019-06-24 | End: 2019-06-21

## 2019-06-24 RX ORDER — TRAMADOL HYDROCHLORIDE 50 MG/1
TABLET ORAL
Refills: 0 | COMMUNITY
Start: 2019-05-22 | End: 2019-09-15

## 2019-06-24 RX ORDER — AZELASTINE 1 MG/ML
2 SPRAY, METERED NASAL 2 TIMES DAILY PRN
Refills: 11 | COMMUNITY
End: 2021-02-18 | Stop reason: SDUPTHER

## 2019-06-24 ASSESSMENT — PAIN SCALES - GENERAL: PAINLEVEL: NO PAIN

## 2019-06-25 ENCOUNTER — APPOINTMENT (OUTPATIENT)
Dept: PULMONOLOGY | Facility: HOSPICE | Age: 74
End: 2019-06-25
Payer: MEDICARE

## 2019-06-26 NOTE — PROGRESS NOTES
Chief Complaint   Patient presents with   • Follow-Up     Mild Asthma       HPI:    Patient is a 73-year-old woman with mild asthma.  She uses pro-air on an as-needed basis and has an antihistamine nasal spray.    The patient fell several years ago and suffered a cerebellar injury.  She has cerebellar ataxia.  She does not like to use inhaled medications.  Other problems include ulcerative colitis, reflux, and tremors.    She did have a viral infection several months ago and developed bronchitis.  She was treated with Rocephin IM for 7 days as an outpatient.  She did develop an increased cough.  She has been using her nebulizer a little bit more and that helps her cough.  She did not require steroids.  She prefers to avoid steroids because of osteoporosis.  She continues to use only the DuoNeb via nebulizer.  Pulmonary function testing was reviewed.  Her FEV1 is 1.74 L which is 82% of predicted.  FEV1/FVC ratio is 66%.  She has a 9% improvement after bronchodilator.  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    Due to fall 2004   • Chronic ulcerative colitis (HCC) 6/26/2013   • Cough    • GERD (gastroesophageal reflux disease)    • Hay fever 4/19/2019   • HYPOTENSION    • Inner ear disease    • Leg cramps, sleep related 5/2/2018   • Near-syncope    • Neck injury     fracture, hand and leg numbness and tingling   • Osteoporosis    • Peripheral neuropathy    • Polyp of sigmoid colon 04/10/2018    Hyperplastic polyp   • Raynaud disease    • Tremor    • Vitamin D deficiency        ROS:   Constitutional: Denies fevers, chills, night sweats, fatigue or weight loss  Eyes: Denies vision loss, pain, drainage, double vision  Ears, Nose, Throat: Denies earache, tinnitus, hoarseness  Cardiovascular: Denies chest pain, tightness, palpitations  Respiratory: See HPI  Sleep: Denies, snoring, apnea  GI: Denies abdominal pain, nausea, vomiting, diarrhea  : Denies  "frequent urination, hematuria, painful urination  Musculoskeletal: Denies back pain, painful joints, sore muscles  Neurological: See HPI  Skin: Denies rashes, color changes  Psychiatric: Denies depression or thoughts of suicide  Hematologic: Denies bleeding tendency or clotting tendency  Allergic/Immunologic: Denies rhinitis, skin sensitivity    Social History     Social History   • Marital status: Single     Spouse name: N/A   • Number of children: N/A   • Years of education: N/A     Occupational History   • Not on file.     Social History Main Topics   • Smoking status: Never Smoker   • Smokeless tobacco: Never Used   • Alcohol use No   • Drug use: No   • Sexual activity: Not on file     Other Topics Concern   • Not on file     Social History Narrative   • No narrative on file     Hydromorphone; Azithromycin; Clindamycin; Codeine; Erythromycin; Keflex; Levaquin; Lyrica; Quinolones; Sulfa drugs; Tetracyclines; and Other food  Current Outpatient Prescriptions on File Prior to Visit   Medication Sig Dispense Refill   • montelukast (SINGULAIR) 10 MG Tab Take 1 Tab by mouth every day. 30 Tab 11   • Nebulizers (COMPRESSOR/NEBULIZER) Misc Use as directed with nebulizer medication 1 Each 0   • ipratropium-albuterol (DUONEB) 0.5-2.5 (3) MG/3ML nebulizer solution 3 mL by Nebulization route every 6 hours as needed for Shortness of Breath (cough). 28 Bullet 0   • fluticasone-salmeterol (ADVAIR) 100-50 MCG/DOSE AEROSOL POWDER, BREATH ACTIVATED Inhale 1 Puff by mouth every 12 hours. (Patient not taking: Reported on 6/24/2019) 1 Inhaler 2   • benzonatate (TESSALON) 200 MG capsule Take 1 Cap by mouth 3 times a day as needed for Cough. (Patient not taking: Reported on 6/24/2019) 30 Cap 0     No current facility-administered medications on file prior to visit.      /72   Pulse 62   Temp 36.3 °C (97.4 °F) (Oral)   Resp (!) 68   Ht 1.6 m (5' 3\")   Wt 46.7 kg (103 lb)   SpO2 95%   Family History   Problem Relation Age of " Onset   • Non-contributory Father         Parkinson's disease   • Non-contributory Mother         old age with mild dementia   • Heart Disease Neg Hx    • Hypertension Neg Hx    • Hyperlipidemia Neg Hx    • Diabetes Neg Hx        Physical Exam:    HEENT: PERRLA, EOMI, no scleral icterus, no nasal or oral lesions  Neck: No thyromegaly, no adenopathy, no bruits  Mallampatti: Grade II  Lungs: Equal breath sounds, no wheezes or crackles  Heart: Regular rate and rhythm, no gallops or murmurs  Abdomen: Soft, benign, no organomegaly  Extremities: No clubbing, cyanosis, or edema  Neurologic: She uses a walker.  Somewhat unsteady with a wide-based gait.    1. Mild intermittent asthma without complication    2. Cerebellar ataxia (HCC)    3. Environmental allergies      She did have a brief flare due to an upper respiratory infection but she seems back to baseline over the past several days.  We will make no changes in her current medications.  We will see her in follow-up in October.

## 2019-07-17 ENCOUNTER — APPOINTMENT (RX ONLY)
Dept: URBAN - METROPOLITAN AREA CLINIC 4 | Facility: CLINIC | Age: 74
Setting detail: DERMATOLOGY
End: 2019-07-17

## 2019-07-17 DIAGNOSIS — L82.0 INFLAMED SEBORRHEIC KERATOSIS: ICD-10-CM

## 2019-07-17 PROBLEM — F32.9 MAJOR DEPRESSIVE DISORDER, SINGLE EPISODE, UNSPECIFIED: Status: ACTIVE | Noted: 2019-07-17

## 2019-07-17 PROCEDURE — ? COUNSELING

## 2019-07-17 PROCEDURE — ? LIQUID NITROGEN

## 2019-07-17 PROCEDURE — 17110 DESTRUCTION B9 LES UP TO 14: CPT

## 2019-07-17 ASSESSMENT — LOCATION DETAILED DESCRIPTION DERM: LOCATION DETAILED: LEFT SUPERIOR LATERAL UPPER BACK

## 2019-07-17 ASSESSMENT — LOCATION SIMPLE DESCRIPTION DERM: LOCATION SIMPLE: LEFT UPPER BACK

## 2019-07-17 ASSESSMENT — LOCATION ZONE DERM: LOCATION ZONE: TRUNK

## 2019-07-24 ENCOUNTER — OFFICE VISIT (OUTPATIENT)
Dept: INTERNAL MEDICINE | Facility: IMAGING CENTER | Age: 74
End: 2019-07-24
Payer: MEDICARE

## 2019-07-24 ENCOUNTER — HOSPITAL ENCOUNTER (OUTPATIENT)
Dept: LAB | Facility: MEDICAL CENTER | Age: 74
End: 2019-07-24
Attending: PSYCHIATRY & NEUROLOGY
Payer: MEDICARE

## 2019-07-24 VITALS
SYSTOLIC BLOOD PRESSURE: 100 MMHG | WEIGHT: 101 LBS | HEIGHT: 63 IN | DIASTOLIC BLOOD PRESSURE: 60 MMHG | RESPIRATION RATE: 14 BRPM | TEMPERATURE: 99 F | HEART RATE: 71 BPM | BODY MASS INDEX: 17.89 KG/M2 | OXYGEN SATURATION: 93 %

## 2019-07-24 DIAGNOSIS — R05.9 COUGH: ICD-10-CM

## 2019-07-24 DIAGNOSIS — J30.1 HAY FEVER: ICD-10-CM

## 2019-07-24 DIAGNOSIS — J45.20 MILD INTERMITTENT ASTHMA WITHOUT COMPLICATION: ICD-10-CM

## 2019-07-24 LAB
CK SERPL-CCNC: 70 U/L (ref 0–154)
ERYTHROCYTE [SEDIMENTATION RATE] IN BLOOD BY WESTERGREN METHOD: 15 MM/HOUR (ref 0–30)

## 2019-07-24 PROCEDURE — 82784 ASSAY IGA/IGD/IGG/IGM EACH: CPT

## 2019-07-24 PROCEDURE — 82550 ASSAY OF CK (CPK): CPT

## 2019-07-24 PROCEDURE — 99213 OFFICE O/P EST LOW 20 MIN: CPT | Performed by: INTERNAL MEDICINE

## 2019-07-24 PROCEDURE — 86334 IMMUNOFIX E-PHORESIS SERUM: CPT

## 2019-07-24 PROCEDURE — 85652 RBC SED RATE AUTOMATED: CPT

## 2019-07-24 PROCEDURE — 84160 ASSAY OF PROTEIN ANY SOURCE: CPT

## 2019-07-24 PROCEDURE — 82746 ASSAY OF FOLIC ACID SERUM: CPT

## 2019-07-24 PROCEDURE — 82607 VITAMIN B-12: CPT

## 2019-07-24 PROCEDURE — 84165 PROTEIN E-PHORESIS SERUM: CPT

## 2019-07-24 RX ORDER — ALBUTEROL SULFATE 90 UG/1
2 AEROSOL, METERED RESPIRATORY (INHALATION) EVERY 6 HOURS PRN
COMMUNITY
End: 2022-01-14

## 2019-07-24 NOTE — PROGRESS NOTES
Established Patient Note   HPI:        Ana Paula comes in today for increased cough over past 2 weeks. She did not ever start breo. Denies any recent URI symptoms of sore throat or fever. Denies wheezing but phlegm is clear in color.    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    Due to fall 2004   • Chronic ulcerative colitis (HCC) 6/26/2013   • Cough    • GERD (gastroesophageal reflux disease)    • Hay fever 4/19/2019   • History of falling 5/2/2018   • HYPOTENSION    • Inner ear disease    • Leg cramps, sleep related 5/2/2018   • Near-syncope    • Neck injury     fracture, hand and leg numbness and tingling   • Osteoporosis    • Peripheral neuropathy    • Polyp of sigmoid colon 04/10/2018    Hyperplastic polyp   • Raynaud disease    • Tremor    • Vitamin D deficiency        Current Outpatient Prescriptions   Medication Sig Dispense Refill   • albuterol (PROAIR HFA) 108 (90 Base) MCG/ACT Aero Soln inhalation aerosol Inhale 2 Puffs by mouth every 6 hours as needed for Shortness of Breath.     • azelastine (ASTELIN) 137 MCG/SPRAY nasal spray USE 2 SPRAY IEN BID  11   • montelukast (SINGULAIR) 10 MG Tab Take 1 Tab by mouth every day. 30 Tab 11   • Fluticasone Furoate-Vilanterol (BREO) 100-25 MCG/INH AEROSOL POWDER, BREATH ACTIVATED Inhale 1 Puff by mouth every day. (Patient not taking: Reported on 7/24/2019) 1 Each 2   • tramadol (ULTRAM) 50 MG Tab TK 1 T PO Q 6 H PRF MODERATE OR SEVERE PAIN FOR UP TO 5 DAYS  0   • triamcinolone acetonide (KENALOG) 0.1 % Cream APPLY TO BACK 1 TO 2 TIMES A DAY  2   • Nebulizers (COMPRESSOR/NEBULIZER) Misc Use as directed with nebulizer medication (Patient not taking: Reported on 7/24/2019) 1 Each 0   • ipratropium-albuterol (DUONEB) 0.5-2.5 (3) MG/3ML nebulizer solution 3 mL by Nebulization route every 6 hours as needed for Shortness of Breath (cough). (Patient not taking: Reported on 7/24/2019) 28 Bullet 0     No current  "facility-administered medications for this visit.          Allergies   Allergen Reactions   • Hydromorphone Anaphylaxis     \"went into code blue\"    • Azithromycin Diarrhea and Unspecified     Diarrhea     • Clindamycin    • Codeine Unspecified     ?   • Erythromycin Unspecified     ?   • Keflex      ?   • Levaquin Unspecified     \"i dont know\"    • Lyrica Unspecified     ?   • Quinolones Unspecified     ?   • Sulfa Drugs Unspecified     \"i dont know\"    • Tetracyclines Unspecified     ?   • Other Food Unspecified     Dairy-mucus (butter is okay). spices-cough         Social History   Substance Use Topics   • Smoking status: Never Smoker   • Smokeless tobacco: Never Used   • Alcohol use No     History   Alcohol Use No     History   Drug Use No       ROS    Ambulatory Vitals  /60 (BP Location: Left arm, Patient Position: Sitting, BP Cuff Size: Adult)   Pulse 71   Temp 37.2 °C (99 °F) (Temporal)   Resp 14   Ht 1.6 m (5' 3\")   Wt 45.8 kg (101 lb)   SpO2 93%   BMI 17.89 kg/m²     Physical Exam      Assessment and Plan:     1. Mild intermittent asthma without complication     2. Cough     3. Hay fever       She does not wish to start breo or any similar maintenance inhaler at this time; I have advised her to fill the Rx and use it if her symptoms do not improve by adding in oral antihistamine such as zyrtec, allegra or claritin otc. For nasal congestion I have advised her to use flonase nasal.    Dar Fraire M.D.  "

## 2019-07-25 LAB
FOLATE SERPL-MCNC: >23.8 NG/ML
VIT B12 SERPL-MCNC: 1045 PG/ML (ref 211–911)

## 2019-07-27 LAB
ALBUMIN SERPL ELPH-MCNC: 4.46 G/DL (ref 3.75–5.01)
ALPHA1 GLOB SERPL ELPH-MCNC: 0.28 G/DL (ref 0.19–0.46)
ALPHA2 GLOB SERPL ELPH-MCNC: 0.69 G/DL (ref 0.48–1.05)
B-GLOBULIN SERPL ELPH-MCNC: 0.9 G/DL (ref 0.48–1.1)
GAMMA GLOB SERPL ELPH-MCNC: 1.17 G/DL (ref 0.62–1.51)
IGA SERPL-MCNC: 323 MG/DL (ref 68–408)
IGG SERPL-MCNC: 1220 MG/DL (ref 768–1632)
IGM SERPL-MCNC: 94 MG/DL (ref 35–263)
INTERPRETATION SERPL IFE-IMP: NORMAL
INTERPRETATION SERPL IFE-IMP: NORMAL
PATHOLOGY STUDY: NORMAL
PROT SERPL-MCNC: 7.5 G/DL (ref 6–8.3)

## 2019-08-20 ENCOUNTER — TELEPHONE (OUTPATIENT)
Dept: INTERNAL MEDICINE | Facility: IMAGING CENTER | Age: 74
End: 2019-08-20

## 2019-08-20 PROBLEM — I73.00 RAYNAUD DISEASE: Status: ACTIVE | Noted: 2019-08-20

## 2019-08-21 NOTE — TELEPHONE ENCOUNTER
She states he Raynaud disease was diagnosed about 30 years ago. She was given what she thinks was a vasodilator but did not tolerate. She states she has been having more symptoms recently. I have discussed with her that from my standpoint I could treat with vasodilator such as calcium channel blocker or alpha blocker but I feel they would cause more side effect with BP lowering and hypotension than benefit. She agrees and does not wish to start Rx. I have suggested if Raynauds worsens consider referral to [facility] for recommendation.

## 2019-08-22 DIAGNOSIS — N30.00 ACUTE CYSTITIS WITHOUT HEMATURIA: ICD-10-CM

## 2019-08-22 RX ORDER — NITROFURANTOIN 25; 75 MG/1; MG/1
100 CAPSULE ORAL 2 TIMES DAILY
Qty: 14 CAP | Refills: 1 | Status: SHIPPED | OUTPATIENT
Start: 2019-08-22 | End: 2019-09-15

## 2019-08-23 NOTE — PROGRESS NOTES
Ana Paula states she needs Rx for macrobid since she is starting a UTI and is unable to get hold of her urologist.

## 2019-09-03 ENCOUNTER — APPOINTMENT (RX ONLY)
Dept: URBAN - METROPOLITAN AREA CLINIC 4 | Facility: CLINIC | Age: 74
Setting detail: DERMATOLOGY
End: 2019-09-03

## 2019-09-03 DIAGNOSIS — L81.4 OTHER MELANIN HYPERPIGMENTATION: ICD-10-CM

## 2019-09-03 DIAGNOSIS — Z85.828 PERSONAL HISTORY OF OTHER MALIGNANT NEOPLASM OF SKIN: ICD-10-CM

## 2019-09-03 DIAGNOSIS — L82.1 OTHER SEBORRHEIC KERATOSIS: ICD-10-CM

## 2019-09-03 DIAGNOSIS — D22 MELANOCYTIC NEVI: ICD-10-CM

## 2019-09-03 DIAGNOSIS — D18.0 HEMANGIOMA: ICD-10-CM

## 2019-09-03 DIAGNOSIS — L85.3 XEROSIS CUTIS: ICD-10-CM

## 2019-09-03 PROBLEM — L50.3 DERMATOGRAPHIC URTICARIA: Status: ACTIVE | Noted: 2019-09-03

## 2019-09-03 PROBLEM — D22.5 MELANOCYTIC NEVI OF TRUNK: Status: ACTIVE | Noted: 2019-09-03

## 2019-09-03 PROBLEM — D18.01 HEMANGIOMA OF SKIN AND SUBCUTANEOUS TISSUE: Status: ACTIVE | Noted: 2019-09-03

## 2019-09-03 PROBLEM — D23.71 OTHER BENIGN NEOPLASM OF SKIN OF RIGHT LOWER LIMB, INCLUDING HIP: Status: ACTIVE | Noted: 2019-09-03

## 2019-09-03 PROCEDURE — 99214 OFFICE O/P EST MOD 30 MIN: CPT

## 2019-09-03 PROCEDURE — ? COUNSELING

## 2019-09-03 PROCEDURE — ? TREATMENT REGIMEN

## 2019-09-03 PROCEDURE — ? MEDICATION COUNSELING

## 2019-09-03 PROCEDURE — ? PRESCRIPTION

## 2019-09-03 RX ORDER — TRIAMCINOLONE ACETONIDE 1 MG/G
CREAM TOPICAL
Qty: 1 | Refills: 3 | Status: ERX

## 2019-09-03 ASSESSMENT — LOCATION DETAILED DESCRIPTION DERM
LOCATION DETAILED: RIGHT INFERIOR MEDIAL UPPER BACK
LOCATION DETAILED: RIGHT SUPERIOR UPPER BACK
LOCATION DETAILED: LEFT SUPERIOR UPPER BACK
LOCATION DETAILED: RIGHT POSTERIOR ANKLE

## 2019-09-03 ASSESSMENT — LOCATION SIMPLE DESCRIPTION DERM
LOCATION SIMPLE: RIGHT UPPER BACK
LOCATION SIMPLE: LEFT UPPER BACK
LOCATION SIMPLE: RIGHT ANKLE

## 2019-09-03 ASSESSMENT — LOCATION ZONE DERM
LOCATION ZONE: TRUNK
LOCATION ZONE: LEG

## 2019-09-03 NOTE — PROCEDURE: TREATMENT REGIMEN
Continue Regimen: Over the counter antihistamines
Detail Level: Zone
Plan: Discussed with patient that she can continue using the Allegra and Claritin that she has been taking everyday. Discussed with patient that we can send her a prescription of atarax, patient declines today as she is trying to get her strength back.

## 2019-09-03 NOTE — PROCEDURE: MEDICATION COUNSELING
Drysol Pregnancy And Lactation Text: This medication is considered safe during pregnancy and breast feeding.
Dupixent Counseling: I discussed with the patient the risks of dupilumab including but not limited to eye infection and irritation, cold sores, injection site reactions, worsening of asthma, allergic reactions and increased risk of parasitic infection.  Live vaccines should be avoided while taking dupilumab. Dupilumab will also interact with certain medications such as warfarin and cyclosporine. The patient understands that monitoring is required and they must alert us or the primary physician if symptoms of infection or other concerning signs are noted.
SSKI Counseling:  I discussed with the patient the risks of SSKI including but not limited to thyroid abnormalities, metallic taste, GI upset, fever, headache, acne, arthralgias, paraesthesias, lymphadenopathy, easy bleeding, arrhythmias, and allergic reaction.
Prednisone Pregnancy And Lactation Text: This medication is Pregnancy Category C and it isn't know if it is safe during pregnancy. This medication is excreted in breast milk.
Arava Pregnancy And Lactation Text: This medication is Pregnancy Category X and is absolutely contraindicated during pregnancy. It is unknown if it is excreted in breast milk.
Taltz Pregnancy And Lactation Text: The risk during pregnancy and breastfeeding is uncertain with this medication.
Erythromycin Counseling:  I discussed with the patient the risks of erythromycin including but not limited to GI upset, allergic reaction, drug rash, diarrhea, increase in liver enzymes, and yeast infections.
Doxycycline Pregnancy And Lactation Text: This medication is Pregnancy Category D and not consider safe during pregnancy. It is also excreted in breast milk but is considered safe for shorter treatment courses.
Itraconazole Pregnancy And Lactation Text: This medication is Pregnancy Category C and it isn't know if it is safe during pregnancy. It is also excreted in breast milk.
Solaraze Counseling:  I discussed with the patient the risks of Solaraze including but not limited to erythema, scaling, itching, weeping, crusting, and pain.
Nsaids Counseling: NSAID Counseling: I discussed with the patient that NSAIDs should be taken with food. Prolonged use of NSAIDs can result in the development of stomach ulcers.  Patient advised to stop taking NSAIDs if abdominal pain occurs.  The patient verbalized understanding of the proper use and possible adverse effects of NSAIDs.  All of the patient's questions and concerns were addressed.
Clofazimine Counseling:  I discussed with the patient the risks of clofazimine including but not limited to skin and eye pigmentation, liver damage, nausea/vomiting, gastrointestinal bleeding and allergy.
Dupixent Pregnancy And Lactation Text: This medication likely crosses the placenta but the risk for the fetus is uncertain. This medication is excreted in breast milk.
Elidel Counseling: Patient may experience a mild burning sensation during topical application. Elidel is not approved in children less than 2 years of age. There have been case reports of hematologic and skin malignancies in patients using topical calcineurin inhibitors although causality is questionable.
Erythromycin Pregnancy And Lactation Text: This medication is Pregnancy Category B and is considered safe during pregnancy. It is also excreted in breast milk.
Tremfya Counseling: I discussed with the patient the risks of guselkumab including but not limited to immunosuppression, serious infections, worsening of inflammatory bowel disease and drug reactions.  The patient understands that monitoring is required including a PPD at baseline and must alert us or the primary physician if symptoms of infection or other concerning signs are noted.
Ketoconazole Pregnancy And Lactation Text: This medication is Pregnancy Category C and it isn't know if it is safe during pregnancy. It is also excreted in breast milk and breast feeding isn't recommended.
Ketoconazole Counseling:   Patient counseled regarding improving absorption with orange juice.  Adverse effects include but are not limited to breast enlargement, headache, diarrhea, nausea, upset stomach, liver function test abnormalities, taste disturbance, and stomach pain.  There is a rare possibility of liver failure that can occur when taking ketoconazole. The patient understands that monitoring of LFTs may be required, especially at baseline. The patient verbalized understanding of the proper use and possible adverse effects of ketoconazole.  All of the patient's questions and concerns were addressed.
Nsaids Pregnancy And Lactation Text: These medications are considered safe up to 30 weeks gestation. It is excreted in breast milk.
Sski Pregnancy And Lactation Text: This medication is Pregnancy Category D and isn't considered safe during pregnancy. It is excreted in breast milk.
Solaraze Pregnancy And Lactation Text: This medication is Pregnancy Category B and is considered safe. There is some data to suggest avoiding during the third trimester. It is unknown if this medication is excreted in breast milk.
Metronidazole Counseling:  I discussed with the patient the risks of metronidazole including but not limited to seizures, nausea/vomiting, a metallic taste in the mouth, nausea/vomiting and severe allergy.
Terbinafine Counseling: Patient counseling regarding adverse effects of terbinafine including but not limited to headache, diarrhea, rash, upset stomach, liver function test abnormalities, itching, taste/smell disturbance, nausea, abdominal pain, and flatulence.  There is a rare possibility of liver failure that can occur when taking terbinafine.  The patient understands that a baseline LFT and kidney function test may be required. The patient verbalized understanding of the proper use and possible adverse effects of terbinafine.  All of the patient's questions and concerns were addressed.
Enbrel Counseling:  I discussed with the patient the risks of etanercept including but not limited to myelosuppression, immunosuppression, autoimmune hepatitis, demyelinating diseases, lymphoma, and infections.  The patient understands that monitoring is required including a PPD at baseline and must alert us or the primary physician if symptoms of infection or other concerning signs are noted.
Elidel Pregnancy And Lactation Text: This medication is Pregnancy Category C. It is unknown if this medication is excreted in breast milk.
Acitretin Counseling:  I discussed with the patient the risks of acitretin including but not limited to hair loss, dry lips/skin/eyes, liver damage, hyperlipidemia, depression/suicidal ideation, photosensitivity.  Serious rare side effects can include but are not limited to pancreatitis, pseudotumor cerebri, bony changes, clot formation/stroke/heart attack.  Patient understands that alcohol is contraindicated since it can result in liver toxicity and significantly prolong the elimination of the drug by many years.
Enbrel Pregnancy And Lactation Text: This medication is Pregnancy Category B and is considered safe during pregnancy. It is unknown if this medication is excreted in breast milk.
Eucrisa Counseling: Patient may experience a mild burning sensation during topical application. Eucrisa is not approved in children less than 2 years of age.
Odomzo Counseling- I discussed with the patient the risks of Odomzo including but not limited to nausea, vomiting, diarrhea, constipation, weight loss, changes in the sense of taste, decreased appetite, muscle spasms, and hair loss.  The patient verbalized understanding of the proper use and possible adverse effects of Odomzo.  All of the patient's questions and concerns were addressed.
Thalidomide Counseling: I discussed with the patient the risks of thalidomide including but not limited to birth defects, anxiety, weakness, chest pain, dizziness, cough and severe allergy.
Topical Retinoid counseling:  Patient advised to apply a pea-sized amount only at bedtime and wait 30 minutes after washing their face before applying.  If too drying, patient may add a non-comedogenic moisturizer. The patient verbalized understanding of the proper use and possible adverse effects of retinoids.  All of the patient's questions and concerns were addressed.
Clofazimine Pregnancy And Lactation Text: This medication is Pregnancy Category C and isn't considered safe during pregnancy. It is excreted in breast milk.
Terbinafine Pregnancy And Lactation Text: This medication is Pregnancy Category B and is considered safe during pregnancy. It is also excreted in breast milk and breast feeding isn't recommended.
Acitretin Pregnancy And Lactation Text: This medication is Pregnancy Category X and should not be given to women who are pregnant or may become pregnant in the future. This medication is excreted in breast milk.
Otezla Counseling: The side effects of Otezla were discussed with the patient, including but not limited to worsening or new depression, weight loss, diarrhea, nausea, upper respiratory tract infection, and headache. Patient instructed to call the office should any adverse effect occur.  The patient verbalized understanding of the proper use and possible adverse effects of Otezla.  All the patient's questions and concerns were addressed.
Tazorac Counseling:  Patient advised that medication is irritating and drying.  Patient may need to apply sparingly and wash off after an hour before eventually leaving it on overnight.  The patient verbalized understanding of the proper use and possible adverse effects of tazorac.  All of the patient's questions and concerns were addressed.
Xeljanz Counseling: I discussed with the patient the risks of Xeljanz therapy including increased risk of infection, liver issues, headache, diarrhea, or cold symptoms. Live vaccines should be avoided. They were instructed to call if they have any problems.
Valtrex Counseling: I discussed with the patient the risks of valacyclovir including but not limited to kidney damage, nausea, vomiting and severe allergy.  The patient understands that if the infection seems to be worsening or is not improving, they are to call.
Xelchuyitaz Pregnancy And Lactation Text: This medication is Pregnancy Category D and is not considered safe during pregnancy.  The risk during breast feeding is also uncertain.
Colchicine Counseling:  Patient counseled regarding adverse effects including but not limited to stomach upset (nausea, vomiting, stomach pain, or diarrhea).  Patient instructed to limit alcohol consumption while taking this medication.  Colchicine may reduce blood counts especially with prolonged use.  The patient understands that monitoring of kidney function and blood counts may be required, especially at baseline. The patient verbalized understanding of the proper use and possible adverse effects of colchicine.  All of the patient's questions and concerns were addressed.
Eucrisa Pregnancy And Lactation Text: This medication has not been assigned a Pregnancy Risk Category but animal studies failed to show danger with the topical medication. It is unknown if the medication is excreted in breast milk.
Humira Counseling:  I discussed with the patient the risks of adalimumab including but not limited to myelosuppression, immunosuppression, autoimmune hepatitis, demyelinating diseases, lymphoma, and serious infections.  The patient understands that monitoring is required including a PPD at baseline and must alert us or the primary physician if symptoms of infection or other concerning signs are noted.
Detail Level: Simple
Metronidazole Pregnancy And Lactation Text: This medication is Pregnancy Category B and considered safe during pregnancy.  It is also excreted in breast milk.
Otezla Pregnancy And Lactation Text: This medication is Pregnancy Category C and it isn't known if it is safe during pregnancy. It is unknown if it is excreted in breast milk.
Valtrex Pregnancy And Lactation Text: this medication is Pregnancy Category B and is considered safe during pregnancy. This medication is not directly found in breast milk but it's metabolite acyclovir is present.
Tazorac Pregnancy And Lactation Text: This medication is not safe during pregnancy. It is unknown if this medication is excreted in breast milk.
Dapsone Counseling: I discussed with the patient the risks of dapsone including but not limited to hemolytic anemia, agranulocytosis, rashes, methemoglobinemia, kidney failure, peripheral neuropathy, headaches, GI upset, and liver toxicity.  Patients who start dapsone require monitoring including baseline LFTs and weekly CBCs for the first month, then every month thereafter.  The patient verbalized understanding of the proper use and possible adverse effects of dapsone.  All of the patient's questions and concerns were addressed.
Bexarotene Counseling:  I discussed with the patient the risks of bexarotene including but not limited to hair loss, dry lips/skin/eyes, liver abnormalities, hyperlipidemia, pancreatitis, depression/suicidal ideation, photosensitivity, drug rash/allergic reactions, hypothyroidism, anemia, leukopenia, infection, cataracts, and teratogenicity.  Patient understands that they will need regular blood tests to check lipid profile, liver function tests, white blood cell count, thyroid function tests and pregnancy test if applicable.
Bexarotene Pregnancy And Lactation Text: This medication is Pregnancy Category X and should not be given to women who are pregnant or may become pregnant. This medication should not be used if you are breast feeding.
Minocycline Counseling: Patient advised regarding possible photosensitivity and discoloration of the teeth, skin, lips, tongue and gums.  Patient instructed to avoid sunlight, if possible.  When exposed to sunlight, patients should wear protective clothing, sunglasses, and sunscreen.  The patient was instructed to call the office immediately if the following severe adverse effects occur:  hearing changes, easy bruising/bleeding, severe headache, or vision changes.  The patient verbalized understanding of the proper use and possible adverse effects of minocycline.  All of the patient's questions and concerns were addressed.
Xolair Counseling:  Patient informed of potential adverse effects including but not limited to fever, muscle aches, rash and allergic reactions.  The patient verbalized understanding of the proper use and possible adverse effects of Xolair.  All of the patient's questions and concerns were addressed.
Hydroquinone Counseling:  Patient advised that medication may result in skin irritation, lightening (hypopigmentation), dryness, and burning.  In the event of skin irritation, the patient was advised to reduce the amount of the drug applied or use it less frequently.  Rarely, spots that are treated with hydroquinone can become darker (pseudoochronosis).  Should this occur, patient instructed to stop medication and call the office. The patient verbalized understanding of the proper use and possible adverse effects of hydroquinone.  All of the patient's questions and concerns were addressed.
Ilumya Counseling: I discussed with the patient the risks of tildrakizumab including but not limited to immunosuppression, malignancy, posterior leukoencephalopathy syndrome, and serious infections.  The patient understands that monitoring is required including a PPD at baseline and must alert us or the primary physician if symptoms of infection or other concerning signs are noted.
Use Enhanced Medication Counseling?: No
Topical Clindamycin Counseling: Patient counseled that this medication may cause skin irritation or allergic reactions.  In the event of skin irritation, the patient was advised to reduce the amount of the drug applied or use it less frequently.   The patient verbalized understanding of the proper use and possible adverse effects of clindamycin.  All of the patient's questions and concerns were addressed.
Dapsone Pregnancy And Lactation Text: This medication is Pregnancy Category C and is not considered safe during pregnancy or breast feeding.
Oxybutynin Counseling:  I discussed with the patient the risks of oxybutynin including but not limited to skin rash, drowsiness, dry mouth, difficulty urinating, and blurred vision.
Quinolones Counseling:  I discussed with the patient the risks of fluoroquinolones including but not limited to GI upset, allergic reaction, drug rash, diarrhea, dizziness, photosensitivity, yeast infections, liver function test abnormalities, tendonitis/tendon rupture.
Minocycline Pregnancy And Lactation Text: This medication is Pregnancy Category D and not consider safe during pregnancy. It is also excreted in breast milk.
Cimetidine Counseling:  I discussed with the patient the risks of Cimetidine including but not limited to gynecomastia, headache, diarrhea, nausea, drowsiness, arrhythmias, pancreatitis, skin rashes, psychosis, bone marrow suppression and kidney toxicity.
Isotretinoin Counseling: Patient should get monthly blood tests, not donate blood, not drive at night if vision affected, not share medication, and not undergo elective surgery for 6 months after tx completed. Side effects reviewed, pt to contact office should one occur.
Xolair Pregnancy And Lactation Text: This medication is Pregnancy Category B and is considered safe during pregnancy. This medication is excreted in breast milk.
Erivedge Counseling- I discussed with the patient the risks of Erivedge including but not limited to nausea, vomiting, diarrhea, constipation, weight loss, changes in the sense of taste, decreased appetite, muscle spasms, and hair loss.  The patient verbalized understanding of the proper use and possible adverse effects of Erivedge.  All of the patient's questions and concerns were addressed.
Oxybutynin Pregnancy And Lactation Text: This medication is Pregnancy Category B and is considered safe during pregnancy. It is unknown if it is excreted in breast milk.
Imiquimod Counseling:  I discussed with the patient the risks of imiquimod including but not limited to erythema, scaling, itching, weeping, crusting, and pain.  Patient understands that the inflammatory response to imiquimod is variable from person to person and was educated regarded proper titration schedule.  If flu-like symptoms develop, patient knows to discontinue the medication and contact us.
Doxepin Counseling:  Patient advised that the medication is sedating and not to drive a car after taking this medication. Patient informed of potential adverse effects including but not limited to dry mouth, urinary retention, and blurry vision.  The patient verbalized understanding of the proper use and possible adverse effects of doxepin.  All of the patient's questions and concerns were addressed.
Isotretinoin Pregnancy And Lactation Text: This medication is Pregnancy Category X and is considered extremely dangerous during pregnancy. It is unknown if it is excreted in breast milk.
Rifampin Counseling: I discussed with the patient the risks of rifampin including but not limited to liver damage, kidney damage, red-orange body fluids, nausea/vomiting and severe allergy.
High Dose Vitamin A Counseling: Side effects reviewed, pt to contact office should one occur.
Doxepin Pregnancy And Lactation Text: This medication is Pregnancy Category C and it isn't known if it is safe during pregnancy. It is also excreted in breast milk and breast feeding isn't recommended.
Azathioprine Counseling:  I discussed with the patient the risks of azathioprine including but not limited to myelosuppression, immunosuppression, hepatotoxicity, lymphoma, and infections.  The patient understands that monitoring is required including baseline LFTs, Creatinine, possible TPMP genotyping and weekly CBCs for the first month and then every 2 weeks thereafter.  The patient verbalized understanding of the proper use and possible adverse effects of azathioprine.  All of the patient's questions and concerns were addressed.
Infliximab Counseling:  I discussed with the patient the risks of infliximab including but not limited to myelosuppression, immunosuppression, autoimmune hepatitis, demyelinating diseases, lymphoma, and serious infections.  The patient understands that monitoring is required including a PPD at baseline and must alert us or the primary physician if symptoms of infection or other concerning signs are noted.
Topical Sulfur Applications Counseling: Topical Sulfur Counseling: Patient counseled that this medication may cause skin irritation or allergic reactions.  In the event of skin irritation, the patient was advised to reduce the amount of the drug applied or use it less frequently.   The patient verbalized understanding of the proper use and possible adverse effects of topical sulfur application.  All of the patient's questions and concerns were addressed.
Propranolol Counseling:  I discussed with the patient the risks of propranolol including but not limited to low heart rate, low blood pressure, low blood sugar, restlessness and increased cold sensitivity. They should call the office if they experience any of these side effects.
Hydroxyzine Counseling: Patient advised that the medication is sedating and not to drive a car after taking this medication.  Patient informed of potential adverse effects including but not limited to dry mouth, urinary retention, and blurry vision.  The patient verbalized understanding of the proper use and possible adverse effects of hydroxyzine.  All of the patient's questions and concerns were addressed.
Minoxidil Counseling: Minoxidil is a topical medication which can increase blood flow where it is applied. It is uncertain how this medication increases hair growth. Side effects are uncommon and include stinging and allergic reactions.
Azithromycin Counseling:  I discussed with the patient the risks of azithromycin including but not limited to GI upset, allergic reaction, drug rash, diarrhea, and yeast infections.
High Dose Vitamin A Pregnancy And Lactation Text: High dose vitamin A therapy is contraindicated during pregnancy and breast feeding.
Wartpeel Counseling:  I discussed with the patient the risks of Wartpeel including but not limited to erythema, scaling, itching, weeping, crusting, and pain.
Azathioprine Pregnancy And Lactation Text: This medication is Pregnancy Category D and isn't considered safe during pregnancy. It is unknown if this medication is excreted in breast milk.
Topical Sulfur Applications Pregnancy And Lactation Text: This medication is Pregnancy Category C and has an unknown safety profile during pregnancy. It is unknown if this topical medication is excreted in breast milk.
Cellcept Counseling:  I discussed with the patient the risks of mycophenolate mofetil including but not limited to infection/immunosuppression, GI upset, hypokalemia, hypercholesterolemia, bone marrow suppression, lymphoproliferative disorders, malignancy, GI ulceration/bleed/perforation, colitis, interstitial lung disease, kidney failure, progressive multifocal leukoencephalopathy, and birth defects.  The patient understands that monitoring is required including a baseline creatinine and regular CBC testing. In addition, patient must alert us immediately if symptoms of infection or other concerning signs are noted.
Finasteride Male Counseling: Finasteride Counseling:  I discussed with the patient the risks of use of finasteride including but not limited to decreased libido, decreased ejaculate volume, gynecomastia, and depression. Women should not handle medication.  All of the patient's questions and concerns were addressed.
Rituxan Counseling:  I discussed with the patient the risks of Rituxan infusions. Side effects can include infusion reactions, severe drug rashes including mucocutaneous reactions, reactivation of latent hepatitis and other infections and rarely progressive multifocal leukoencephalopathy.  All of the patient's questions and concerns were addressed.
Rifampin Pregnancy And Lactation Text: This medication is Pregnancy Category C and it isn't know if it is safe during pregnancy. It is also excreted in breast milk and should not be used if you are breast feeding.
Wartpeel Pregnancy And Lactation Text: This medication is Pregnancy Category X and contraindicated in pregnancy and in women who may become pregnant. It is unknown if this medication is excreted in breast milk.
Gabapentin Counseling: I discussed with the patient the risks of gabapentin including but not limited to dizziness, somnolence, fatigue and ataxia.
Mirvaso Counseling: Mirvaso is a topical medication which can decrease superficial blood flow where applied. Side effects are uncommon and include stinging, redness and allergic reactions.
Finasteride Pregnancy And Lactation Text: This medication is absolutely contraindicated during pregnancy. It is unknown if it is excreted in breast milk.
Tetracycline Counseling: Patient counseled regarding possible photosensitivity and increased risk for sunburn.  Patient instructed to avoid sunlight, if possible.  When exposed to sunlight, patients should wear protective clothing, sunglasses, and sunscreen.  The patient was instructed to call the office immediately if the following severe adverse effects occur:  hearing changes, easy bruising/bleeding, severe headache, or vision changes.  The patient verbalized understanding of the proper use and possible adverse effects of tetracycline.  All of the patient's questions and concerns were addressed. Patient understands to avoid pregnancy while on therapy due to potential birth defects.
Hydroxyzine Pregnancy And Lactation Text: This medication is not safe during pregnancy and should not be taken. It is also excreted in breast milk and breast feeding isn't recommended.
Azithromycin Pregnancy And Lactation Text: This medication is considered safe during pregnancy and is also secreted in breast milk.
Rituxan Pregnancy And Lactation Text: This medication is Pregnancy Category C and it isn't know if it is safe during pregnancy. It is unknown if this medication is excreted in breast milk but similar antibodies are known to be excreted.
Zyclara Counseling:  I discussed with the patient the risks of imiquimod including but not limited to erythema, scaling, itching, weeping, crusting, and pain.  Patient understands that the inflammatory response to imiquimod is variable from person to person and was educated regarded proper titration schedule.  If flu-like symptoms develop, patient knows to discontinue the medication and contact us.
Siliq Counseling:  I discussed with the patient the risks of Siliq including but not limited to new or worsening depression, suicidal thoughts and behavior, immunosuppression, malignancy, posterior leukoencephalopathy syndrome, and serious infections.  The patient understands that monitoring is required including a PPD at baseline and must alert us or the primary physician if symptoms of infection or other concerning signs are noted. There is also a special program designed to monitor depression which is required with Siliq.
Albendazole Counseling:  I discussed with the patient the risks of albendazole including but not limited to cytopenia, kidney damage, nausea/vomiting and severe allergy.  The patient understands that this medication is being used in an off-label manner.
Mirvaso Pregnancy And Lactation Text: This medication has not been assigned a Pregnancy Risk Category. It is unknown if the medication is excreted in breast milk.
Bactrim Counseling:  I discussed with the patient the risks of sulfa antibiotics including but not limited to GI upset, allergic reaction, drug rash, diarrhea, dizziness, photosensitivity, and yeast infections.  Rarely, more serious reactions can occur including but not limited to aplastic anemia, agranulocytosis, methemoglobinemia, blood dyscrasias, liver or kidney failure, lung infiltrates or desquamative/blistering drug rashes.
Benzoyl Peroxide Counseling: Patient counseled that medicine may cause skin irritation and bleach clothing.  In the event of skin irritation, the patient was advised to reduce the amount of the drug applied or use it less frequently.   The patient verbalized understanding of the proper use and possible adverse effects of benzoyl peroxide.  All of the patient's questions and concerns were addressed.
Cyclophosphamide Counseling:  I discussed with the patient the risks of cyclophosphamide including but not limited to hair loss, hormonal abnormalities, decreased fertility, abdominal pain, diarrhea, nausea and vomiting, bone marrow suppression and infection. The patient understands that monitoring is required while taking this medication.
Cyclophosphamide Pregnancy And Lactation Text: This medication is Pregnancy Category D and it isn't considered safe during pregnancy. This medication is excreted in breast milk.
Cephalexin Counseling: I counseled the patient regarding use of cephalexin as an antibiotic for prophylactic and/or therapeutic purposes. Cephalexin (commonly prescribed under brand name Keflex) is a cephalosporin antibiotic which is active against numerous classes of bacteria, including most skin bacteria. Side effects may include nausea, diarrhea, gastrointestinal upset, rash, hives, yeast infections, and in rare cases, hepatitis, kidney disease, seizures, fever, confusion, neurologic symptoms, and others. Patients with severe allergies to penicillin medications are cautioned that there is about a 10% incidence of cross-reactivity with cephalosporins. When possible, patients with penicillin allergies should use alternatives to cephalosporins for antibiotic therapy.
Albendazole Pregnancy And Lactation Text: This medication is Pregnancy Category C and it isn't known if it is safe during pregnancy. It is also excreted in breast milk.
Bactrim Pregnancy And Lactation Text: This medication is Pregnancy Category D and is known to cause fetal risk.  It is also excreted in breast milk.
Picato Counseling:  I discussed with the patient the risks of Picato including but not limited to erythema, scaling, itching, weeping, crusting, and pain.
Benzoyl Peroxide Pregnancy And Lactation Text: This medication is Pregnancy Category C. It is unknown if benzoyl peroxide is excreted in breast milk.
Glycopyrrolate Counseling:  I discussed with the patient the risks of glycopyrrolate including but not limited to skin rash, drowsiness, dry mouth, difficulty urinating, and blurred vision.
Ivermectin Counseling:  Patient instructed to take medication on an empty stomach with a full glass of water.  Patient informed of potential adverse effects including but not limited to nausea, diarrhea, dizziness, itching, and swelling of the extremities or lymph nodes.  The patient verbalized understanding of the proper use and possible adverse effects of ivermectin.  All of the patient's questions and concerns were addressed.
Carac Counseling:  I discussed with the patient the risks of Carac including but not limited to erythema, scaling, itching, weeping, crusting, and pain.
Cyclosporine Counseling:  I discussed with the patient the risks of cyclosporine including but not limited to hypertension, gingival hyperplasia,myelosuppression, immunosuppression, liver damage, kidney damage, neurotoxicity, lymphoma, and serious infections. The patient understands that monitoring is required including baseline blood pressure, CBC, CMP, lipid panel and uric acid, and then 1-2 times monthly CMP and blood pressure.
Cephalexin Pregnancy And Lactation Text: This medication is Pregnancy Category B and considered safe during pregnancy.  It is also excreted in breast milk but can be used safely for shorter doses.
Simponi Counseling:  I discussed with the patient the risks of golimumab including but not limited to myelosuppression, immunosuppression, autoimmune hepatitis, demyelinating diseases, lymphoma, and serious infections.  The patient understands that monitoring is required including a PPD at baseline and must alert us or the primary physician if symptoms of infection or other concerning signs are noted.
Fluconazole Counseling:  Patient counseled regarding adverse effects of fluconazole including but not limited to headache, diarrhea, nausea, upset stomach, liver function test abnormalities, taste disturbance, and stomach pain.  There is a rare possibility of liver failure that can occur when taking fluconazole.  The patient understands that monitoring of LFTs and kidney function test may be required, especially at baseline. The patient verbalized understanding of the proper use and possible adverse effects of fluconazole.  All of the patient's questions and concerns were addressed.
Cimzia Counseling:  I discussed with the patient the risks of Cimzia including but not limited to immunosuppression, allergic reactions and infections.  The patient understands that monitoring is required including a PPD at baseline and must alert us or the primary physician if symptoms of infection or other concerning signs are noted.
Glycopyrrolate Pregnancy And Lactation Text: This medication is Pregnancy Category B and is considered safe during pregnancy. It is unknown if it is excreted breast milk.
Propranolol Pregnancy And Lactation Text: This medication is Pregnancy Category C and it isn't known if it is safe during pregnancy. It is excreted in breast milk.
Clindamycin Counseling: I counseled the patient regarding use of clindamycin as an antibiotic for prophylactic and/or therapeutic purposes. Clindamycin is active against numerous classes of bacteria, including skin bacteria. Side effects may include nausea, diarrhea, gastrointestinal upset, rash, hives, yeast infections, and in rare cases, colitis.
Griseofulvin Counseling:  I discussed with the patient the risks of griseofulvin including but not limited to photosensitivity, cytopenia, liver damage, nausea/vomiting and severe allergy.  The patient understands that this medication is best absorbed when taken with a fatty meal (e.g., ice cream or french fries).
Protopic Counseling: Patient may experience a mild burning sensation during topical application. Protopic is not approved in children less than 2 years of age. There have been case reports of hematologic and skin malignancies in patients using topical calcineurin inhibitors although causality is questionable.
Hydroxychloroquine Pregnancy And Lactation Text: This medication has been shown to cause fetal harm but it isn't assigned a Pregnancy Risk Category. There are small amounts excreted in breast milk.
5-Fu Counseling: 5-Fluorouracil Counseling:  I discussed with the patient the risks of 5-fluorouracil including but not limited to erythema, scaling, itching, weeping, crusting, and pain.
Opioid Counseling: I discussed with the patient the potential side effects of opioids including but not limited to addiction, altered mental status, and depression. I stressed avoiding alcohol, benzodiazepines, muscle relaxants and sleep aids unless specifically okayed by a physician. The patient verbalized understanding of the proper use and possible adverse effects of opioids. All of the patient's questions and concerns were addressed. They were instructed to flush the remaining pills down the toilet if they did not need them for pain.
Hydroxychloroquine Counseling:  I discussed with the patient that a baseline ophthalmologic exam is needed at the start of therapy and every year thereafter while on therapy. A CBC may also be warranted for monitoring.  The side effects of this medication were discussed with the patient, including but not limited to agranulocytosis, aplastic anemia, seizures, rashes, retinopathy, and liver toxicity. Patient instructed to call the office should any adverse effect occur.  The patient verbalized understanding of the proper use and possible adverse effects of Plaquenil.  All the patient's questions and concerns were addressed.
Methotrexate Counseling:  Patient counseled regarding adverse effects of methotrexate including but not limited to nausea, vomiting, abnormalities in liver function tests. Patients may develop mouth sores, rash, diarrhea, and abnormalities in blood counts. The patient understands that monitoring is required including LFT's and blood counts.  There is a rare possibility of scarring of the liver and lung problems that can occur when taking methotrexate. Persistent nausea, loss of appetite, pale stools, dark urine, cough, and shortness of breath should be reported immediately. Patient advised to discontinue methotrexate treatment at least three months before attempting to become pregnant.  I discussed the need for folate supplements while taking methotrexate.  These supplements can decrease side effects during methotrexate treatment. The patient verbalized understanding of the proper use and possible adverse effects of methotrexate.  All of the patient's questions and concerns were addressed.
Stelara Counseling:  I discussed with the patient the risks of ustekinumab including but not limited to immunosuppression, malignancy, posterior leukoencephalopathy syndrome, and serious infections.  The patient understands that monitoring is required including a PPD at baseline and must alert us or the primary physician if symptoms of infection or other concerning signs are noted.
Birth Control Pills Counseling: Birth Control Pill Counseling: I discussed with the patient the potential side effects of OCPs including but not limited to increased risk of stroke, heart attack, thrombophlebitis, deep venous thrombosis, hepatic adenomas, breast changes, GI upset, headaches, and depression.  The patient verbalized understanding of the proper use and possible adverse effects of OCPs. All of the patient's questions and concerns were addressed.
Cimzia Pregnancy And Lactation Text: This medication crosses the placenta but can be considered safe in certain situations. Cimzia may be excreted in breast milk.
Griseofulvin Pregnancy And Lactation Text: This medication is Pregnancy Category X and is known to cause serious birth defects. It is unknown if this medication is excreted in breast milk but breast feeding should be avoided.
Niacinamide Counseling: I recommended taking niacin or niacinamide, also know as vitamin B3, twice daily. Recent evidence suggests that taking vitamin B3 (500 mg twice daily) can reduce the risk of actinic keratoses and non-melanoma skin cancers. Side effects of vitamin B3 include flushing and headache.
Protopic Pregnancy And Lactation Text: This medication is Pregnancy Category C. It is unknown if this medication is excreted in breast milk when applied topically.
Spironolactone Counseling: Patient advised regarding risks of diarrhea, abdominal pain, hyperkalemia, birth defects (for female patients), liver toxicity and renal toxicity. The patient may need blood work to monitor liver and kidney function and potassium levels while on therapy. The patient verbalized understanding of the proper use and possible adverse effects of spironolactone.  All of the patient's questions and concerns were addressed.
Rhofade Counseling: Rhofade is a topical medication which can decrease superficial blood flow where applied. Side effects are uncommon and include stinging, redness and allergic reactions.
Birth Control Pills Pregnancy And Lactation Text: This medication should be avoided if pregnant and for the first 30 days post-partum.
Cosentyx Counseling:  I discussed with the patient the risks of Cosentyx including but not limited to worsening of Crohn's disease, immunosuppression, allergic reactions and infections.  The patient understands that monitoring is required including a PPD at baseline and must alert us or the primary physician if symptoms of infection or other concerning signs are noted.
Opioid Pregnancy And Lactation Text: These medications can lead to premature delivery and should be avoided during pregnancy. These medications are also present in breast milk in small amounts.
Methotrexate Pregnancy And Lactation Text: This medication is Pregnancy Category X and is known to cause fetal harm. This medication is excreted in breast milk.
Clindamycin Pregnancy And Lactation Text: This medication can be used in pregnancy if certain situations. Clindamycin is also present in breast milk.
Taltz Counseling: I discussed with the patient the risks of ixekizumab including but not limited to immunosuppression, serious infections, worsening of inflammatory bowel disease and drug reactions.  The patient understands that monitoring is required including a PPD at baseline and must alert us or the primary physician if symptoms of infection or other concerning signs are noted.
Niacinamide Pregnancy And Lactation Text: These medications are considered safe during pregnancy.
Spironolactone Pregnancy And Lactation Text: This medication can cause feminization of the male fetus and should be avoided during pregnancy. The active metabolite is also found in breast milk.
Arava Counseling:  Patient counseled regarding adverse effects of Arava including but not limited to nausea, vomiting, abnormalities in liver function tests. Patients may develop mouth sores, rash, diarrhea, and abnormalities in blood counts. The patient understands that monitoring is required including LFTs and blood counts.  There is a rare possibility of scarring of the liver and lung problems that can occur when taking methotrexate. Persistent nausea, loss of appetite, pale stools, dark urine, cough, and shortness of breath should be reported immediately. Patient advised to discontinue Arava treatment and consult with a physician prior to attempting conception. The patient will have to undergo a treatment to eliminate Arava from the body prior to conception.
Drysol Counseling:  I discussed with the patient the risks of drysol/aluminum chloride including but not limited to skin rash, itching, irritation, burning.
Doxycycline Counseling:  Patient counseled regarding possible photosensitivity and increased risk for sunburn.  Patient instructed to avoid sunlight, if possible.  When exposed to sunlight, patients should wear protective clothing, sunglasses, and sunscreen.  The patient was instructed to call the office immediately if the following severe adverse effects occur:  hearing changes, easy bruising/bleeding, severe headache, or vision changes.  The patient verbalized understanding of the proper use and possible adverse effects of doxycycline.  All of the patient's questions and concerns were addressed.
Itraconazole Counseling:  I discussed with the patient the risks of itraconazole including but not limited to liver damage, nausea/vomiting, neuropathy, and severe allergy.  The patient understands that this medication is best absorbed when taken with acidic beverages such as non-diet cola or ginger ale.  The patient understands that monitoring is required including baseline LFTs and repeat LFTs at intervals.  The patient understands that they are to contact us or the primary physician if concerning signs are noted.
Prednisone Counseling:  I discussed with the patient the risks of prolonged use of prednisone including but not limited to weight gain, insomnia, osteoporosis, mood changes, diabetes, susceptibility to infection, glaucoma and high blood pressure.  In cases where prednisone use is prolonged, patients should be monitored with blood pressure checks, serum glucose levels and an eye exam.  Additionally, the patient may need to be placed on GI prophylaxis, PCP prophylaxis, and calcium and vitamin D supplementation and/or a bisphosphonate.  The patient verbalized understanding of the proper use and the possible adverse effects of prednisone.  All of the patient's questions and concerns were addressed.

## 2019-09-03 NOTE — HPI: SECONDARY COMPLAINT
How Severe Is This Condition?: mild
Additional History: Patient has noticed an increase in the sensitivity of her skin

## 2019-09-15 ENCOUNTER — APPOINTMENT (OUTPATIENT)
Dept: RADIOLOGY | Facility: MEDICAL CENTER | Age: 74
End: 2019-09-15
Attending: HOSPITALIST
Payer: MEDICARE

## 2019-09-15 ENCOUNTER — APPOINTMENT (OUTPATIENT)
Dept: RADIOLOGY | Facility: MEDICAL CENTER | Age: 74
End: 2019-09-15
Attending: EMERGENCY MEDICINE
Payer: MEDICARE

## 2019-09-15 ENCOUNTER — HOSPITAL ENCOUNTER (OUTPATIENT)
Facility: MEDICAL CENTER | Age: 74
End: 2019-09-16
Attending: EMERGENCY MEDICINE | Admitting: HOSPITALIST
Payer: MEDICARE

## 2019-09-15 ENCOUNTER — APPOINTMENT (OUTPATIENT)
Dept: CARDIOLOGY | Facility: MEDICAL CENTER | Age: 74
End: 2019-09-15
Attending: HOSPITALIST
Payer: MEDICARE

## 2019-09-15 DIAGNOSIS — M25.512 ACUTE PAIN OF LEFT SHOULDER: ICD-10-CM

## 2019-09-15 DIAGNOSIS — I95.1 ORTHOSTATIC SYNCOPE: ICD-10-CM

## 2019-09-15 DIAGNOSIS — R94.31 ABNORMAL EKG: ICD-10-CM

## 2019-09-15 PROBLEM — R55 SYNCOPE: Status: ACTIVE | Noted: 2019-09-15

## 2019-09-15 LAB
ALBUMIN SERPL BCP-MCNC: 3.6 G/DL (ref 3.2–4.9)
ALBUMIN/GLOB SERPL: 1.3 G/DL
ALP SERPL-CCNC: 78 U/L (ref 30–99)
ALT SERPL-CCNC: 20 U/L (ref 2–50)
ANION GAP SERPL CALC-SCNC: 6 MMOL/L (ref 0–11.9)
AST SERPL-CCNC: 22 U/L (ref 12–45)
BASOPHILS # BLD AUTO: 0.4 % (ref 0–1.8)
BASOPHILS # BLD: 0.03 K/UL (ref 0–0.12)
BILIRUB SERPL-MCNC: 0.7 MG/DL (ref 0.1–1.5)
BUN SERPL-MCNC: 23 MG/DL (ref 8–22)
CALCIUM SERPL-MCNC: 8.8 MG/DL (ref 8.5–10.5)
CHLORIDE SERPL-SCNC: 104 MMOL/L (ref 96–112)
CO2 SERPL-SCNC: 31 MMOL/L (ref 20–33)
CORTIS SERPL-MCNC: 22.5 UG/DL (ref 0–23)
CREAT SERPL-MCNC: 0.64 MG/DL (ref 0.5–1.4)
D DIMER PPP IA.FEU-MCNC: 0.99 UG/ML (FEU) (ref 0–0.5)
EKG IMPRESSION: NORMAL
EOSINOPHIL # BLD AUTO: 0.19 K/UL (ref 0–0.51)
EOSINOPHIL NFR BLD: 2.7 % (ref 0–6.9)
ERYTHROCYTE [DISTWIDTH] IN BLOOD BY AUTOMATED COUNT: 43.8 FL (ref 35.9–50)
EST. AVERAGE GLUCOSE BLD GHB EST-MCNC: 108 MG/DL
GLOBULIN SER CALC-MCNC: 2.7 G/DL (ref 1.9–3.5)
GLUCOSE SERPL-MCNC: 113 MG/DL (ref 65–99)
HBA1C MFR BLD: 5.4 % (ref 0–5.6)
HCT VFR BLD AUTO: 42.1 % (ref 37–47)
HGB BLD-MCNC: 13.3 G/DL (ref 12–16)
IMM GRANULOCYTES # BLD AUTO: 0.03 K/UL (ref 0–0.11)
IMM GRANULOCYTES NFR BLD AUTO: 0.4 % (ref 0–0.9)
LV EJECT FRACT  99904: 60
LYMPHOCYTES # BLD AUTO: 1.78 K/UL (ref 1–4.8)
LYMPHOCYTES NFR BLD: 25.5 % (ref 22–41)
MCH RBC QN AUTO: 31.8 PG (ref 27–33)
MCHC RBC AUTO-ENTMCNC: 31.6 G/DL (ref 33.6–35)
MCV RBC AUTO: 100.7 FL (ref 81.4–97.8)
MONOCYTES # BLD AUTO: 0.48 K/UL (ref 0–0.85)
MONOCYTES NFR BLD AUTO: 6.9 % (ref 0–13.4)
NEUTROPHILS # BLD AUTO: 4.46 K/UL (ref 2–7.15)
NEUTROPHILS NFR BLD: 64.1 % (ref 44–72)
NRBC # BLD AUTO: 0 K/UL
NRBC BLD-RTO: 0 /100 WBC
NT-PROBNP SERPL IA-MCNC: 58 PG/ML (ref 0–125)
PLATELET # BLD AUTO: 220 K/UL (ref 164–446)
PMV BLD AUTO: 9 FL (ref 9–12.9)
POTASSIUM SERPL-SCNC: 4.2 MMOL/L (ref 3.6–5.5)
PROT SERPL-MCNC: 6.3 G/DL (ref 6–8.2)
RBC # BLD AUTO: 4.18 M/UL (ref 4.2–5.4)
SODIUM SERPL-SCNC: 141 MMOL/L (ref 135–145)
TROPONIN T SERPL-MCNC: 6 NG/L (ref 6–19)
TROPONIN T SERPL-MCNC: 7 NG/L (ref 6–19)
TSH SERPL DL<=0.005 MIU/L-ACNC: 2.45 UIU/ML (ref 0.38–5.33)
WBC # BLD AUTO: 7 K/UL (ref 4.8–10.8)

## 2019-09-15 PROCEDURE — A9270 NON-COVERED ITEM OR SERVICE: HCPCS | Performed by: HOSPITALIST

## 2019-09-15 PROCEDURE — 84443 ASSAY THYROID STIM HORMONE: CPT

## 2019-09-15 PROCEDURE — 85379 FIBRIN DEGRADATION QUANT: CPT

## 2019-09-15 PROCEDURE — 99285 EMERGENCY DEPT VISIT HI MDM: CPT

## 2019-09-15 PROCEDURE — 93005 ELECTROCARDIOGRAM TRACING: CPT | Performed by: EMERGENCY MEDICINE

## 2019-09-15 PROCEDURE — 700102 HCHG RX REV CODE 250 W/ 637 OVERRIDE(OP): Performed by: HOSPITALIST

## 2019-09-15 PROCEDURE — G0378 HOSPITAL OBSERVATION PER HR: HCPCS

## 2019-09-15 PROCEDURE — 70551 MRI BRAIN STEM W/O DYE: CPT

## 2019-09-15 PROCEDURE — 83036 HEMOGLOBIN GLYCOSYLATED A1C: CPT

## 2019-09-15 PROCEDURE — 93308 TTE F-UP OR LMTD: CPT | Mod: 26 | Performed by: INTERNAL MEDICINE

## 2019-09-15 PROCEDURE — 83880 ASSAY OF NATRIURETIC PEPTIDE: CPT

## 2019-09-15 PROCEDURE — 71045 X-RAY EXAM CHEST 1 VIEW: CPT

## 2019-09-15 PROCEDURE — 99220 PR INITIAL OBSERVATION CARE,LEVL III: CPT | Performed by: HOSPITALIST

## 2019-09-15 PROCEDURE — 82533 TOTAL CORTISOL: CPT

## 2019-09-15 PROCEDURE — 73030 X-RAY EXAM OF SHOULDER: CPT | Mod: LT

## 2019-09-15 PROCEDURE — 93325 DOPPLER ECHO COLOR FLOW MAPG: CPT

## 2019-09-15 PROCEDURE — 70450 CT HEAD/BRAIN W/O DYE: CPT

## 2019-09-15 PROCEDURE — 72125 CT NECK SPINE W/O DYE: CPT

## 2019-09-15 PROCEDURE — 51702 INSERT TEMP BLADDER CATH: CPT

## 2019-09-15 PROCEDURE — 303105 HCHG CATHETER EXTRA

## 2019-09-15 PROCEDURE — 85025 COMPLETE CBC W/AUTO DIFF WBC: CPT

## 2019-09-15 PROCEDURE — 700105 HCHG RX REV CODE 258: Performed by: HOSPITALIST

## 2019-09-15 PROCEDURE — 73010 X-RAY EXAM OF SHOULDER BLADE: CPT | Mod: LT

## 2019-09-15 PROCEDURE — 84484 ASSAY OF TROPONIN QUANT: CPT

## 2019-09-15 PROCEDURE — 306588 SLEEVE,VASO CALF MED: Performed by: HOSPITALIST

## 2019-09-15 PROCEDURE — 80053 COMPREHEN METABOLIC PANEL: CPT

## 2019-09-15 PROCEDURE — 700105 HCHG RX REV CODE 258: Performed by: EMERGENCY MEDICINE

## 2019-09-15 RX ORDER — MONTELUKAST SODIUM 10 MG/1
10 TABLET ORAL DAILY
Status: DISCONTINUED | OUTPATIENT
Start: 2019-09-15 | End: 2019-09-16 | Stop reason: HOSPADM

## 2019-09-15 RX ORDER — ALBUTEROL SULFATE 90 UG/1
2 AEROSOL, METERED RESPIRATORY (INHALATION) EVERY 6 HOURS PRN
Status: DISCONTINUED | OUTPATIENT
Start: 2019-09-15 | End: 2019-09-16 | Stop reason: HOSPADM

## 2019-09-15 RX ORDER — ONDANSETRON 2 MG/ML
4 INJECTION INTRAMUSCULAR; INTRAVENOUS EVERY 4 HOURS PRN
Status: DISCONTINUED | OUTPATIENT
Start: 2019-09-15 | End: 2019-09-16 | Stop reason: HOSPADM

## 2019-09-15 RX ORDER — TRAZODONE HYDROCHLORIDE 50 MG/1
50 TABLET ORAL
Status: DISCONTINUED | OUTPATIENT
Start: 2019-09-15 | End: 2019-09-16 | Stop reason: HOSPADM

## 2019-09-15 RX ORDER — AMOXICILLIN 250 MG
2 CAPSULE ORAL 2 TIMES DAILY
Status: DISCONTINUED | OUTPATIENT
Start: 2019-09-15 | End: 2019-09-16 | Stop reason: HOSPADM

## 2019-09-15 RX ORDER — IBUPROFEN 200 MG
500 CAPSULE ORAL 2 TIMES DAILY
Status: DISCONTINUED | OUTPATIENT
Start: 2019-09-15 | End: 2019-09-16 | Stop reason: HOSPADM

## 2019-09-15 RX ORDER — LABETALOL HYDROCHLORIDE 5 MG/ML
10 INJECTION, SOLUTION INTRAVENOUS EVERY 4 HOURS PRN
Status: DISCONTINUED | OUTPATIENT
Start: 2019-09-15 | End: 2019-09-16 | Stop reason: HOSPADM

## 2019-09-15 RX ORDER — IBUPROFEN 200 MG
1 CAPSULE ORAL 2 TIMES DAILY
Status: DISCONTINUED | OUTPATIENT
Start: 2019-09-15 | End: 2019-09-15

## 2019-09-15 RX ORDER — SODIUM CHLORIDE 9 MG/ML
INJECTION, SOLUTION INTRAVENOUS CONTINUOUS
Status: ACTIVE | OUTPATIENT
Start: 2019-09-15 | End: 2019-09-15

## 2019-09-15 RX ORDER — ONDANSETRON 4 MG/1
4 TABLET, ORALLY DISINTEGRATING ORAL EVERY 4 HOURS PRN
Status: DISCONTINUED | OUTPATIENT
Start: 2019-09-15 | End: 2019-09-16 | Stop reason: HOSPADM

## 2019-09-15 RX ORDER — VITS A,C,E/LUTEIN/MINERALS 300MCG-200
1 TABLET ORAL DAILY
COMMUNITY
End: 2020-11-05

## 2019-09-15 RX ORDER — PREDNISOLONE ACETATE 10 MG/ML
SUSPENSION/ DROPS OPHTHALMIC
Refills: 1 | COMMUNITY
Start: 2019-08-12 | End: 2019-09-15

## 2019-09-15 RX ORDER — ACETAMINOPHEN 325 MG/1
650 TABLET ORAL EVERY 6 HOURS PRN
Status: DISCONTINUED | OUTPATIENT
Start: 2019-09-15 | End: 2019-09-16 | Stop reason: HOSPADM

## 2019-09-15 RX ORDER — CETIRIZINE HYDROCHLORIDE 10 MG/1
10 TABLET ORAL DAILY
Status: DISCONTINUED | OUTPATIENT
Start: 2019-09-15 | End: 2019-09-16 | Stop reason: HOSPADM

## 2019-09-15 RX ORDER — AZELASTINE 1 MG/ML
2 SPRAY, METERED NASAL 2 TIMES DAILY PRN
Status: DISCONTINUED | OUTPATIENT
Start: 2019-09-15 | End: 2019-09-15

## 2019-09-15 RX ORDER — MOXIFLOXACIN 5 MG/ML
SOLUTION/ DROPS OPHTHALMIC
Refills: 2 | COMMUNITY
Start: 2019-08-11 | End: 2019-09-15

## 2019-09-15 RX ORDER — CETIRIZINE HYDROCHLORIDE 10 MG/1
10 TABLET ORAL DAILY
COMMUNITY

## 2019-09-15 RX ORDER — MELATONIN 10 MG
10 CAPSULE ORAL
Status: SHIPPED | COMMUNITY
End: 2021-08-11

## 2019-09-15 RX ORDER — POLYETHYLENE GLYCOL 3350 17 G/17G
1 POWDER, FOR SOLUTION ORAL
Status: DISCONTINUED | OUTPATIENT
Start: 2019-09-15 | End: 2019-09-16 | Stop reason: HOSPADM

## 2019-09-15 RX ORDER — SODIUM CHLORIDE 9 MG/ML
1000 INJECTION, SOLUTION INTRAVENOUS ONCE
Status: COMPLETED | OUTPATIENT
Start: 2019-09-15 | End: 2019-09-15

## 2019-09-15 RX ORDER — BISACODYL 10 MG
10 SUPPOSITORY, RECTAL RECTAL
Status: DISCONTINUED | OUTPATIENT
Start: 2019-09-15 | End: 2019-09-16 | Stop reason: HOSPADM

## 2019-09-15 RX ADMIN — MONTELUKAST 10 MG: 10 TABLET, FILM COATED ORAL at 19:42

## 2019-09-15 RX ADMIN — CETIRIZINE HYDROCHLORIDE 10 MG: 10 TABLET, FILM COATED ORAL at 19:42

## 2019-09-15 RX ADMIN — TRAZODONE HYDROCHLORIDE 50 MG: 50 TABLET ORAL at 21:40

## 2019-09-15 RX ADMIN — SODIUM CHLORIDE: 9 INJECTION, SOLUTION INTRAVENOUS at 14:12

## 2019-09-15 RX ADMIN — ACETAMINOPHEN 650 MG: 325 TABLET, FILM COATED ORAL at 21:29

## 2019-09-15 RX ADMIN — SODIUM CHLORIDE 1000 ML: 9 INJECTION, SOLUTION INTRAVENOUS at 09:30

## 2019-09-15 ASSESSMENT — ENCOUNTER SYMPTOMS
BRUISES/BLEEDS EASILY: 0
RESPIRATORY NEGATIVE: 1
PSYCHIATRIC NEGATIVE: 1
DIAPHORESIS: 0
DIZZINESS: 0
DIARRHEA: 0
DOUBLE VISION: 0
HEARTBURN: 0
ABDOMINAL PAIN: 0
CHILLS: 0
NAUSEA: 0
WHEEZING: 0
SEIZURES: 0
HEMOPTYSIS: 0
BLOOD IN STOOL: 0
VOMITING: 0
PALPITATIONS: 0
HEADACHES: 1
CONSTITUTIONAL NEGATIVE: 1
NERVOUS/ANXIOUS: 0
GASTROINTESTINAL NEGATIVE: 1
MUSCULOSKELETAL NEGATIVE: 1
COUGH: 0
CONSTIPATION: 0
FEVER: 0
CARDIOVASCULAR NEGATIVE: 1
FOCAL WEAKNESS: 0
EYES NEGATIVE: 1
LOSS OF CONSCIOUSNESS: 1

## 2019-09-15 ASSESSMENT — LIFESTYLE VARIABLES
HAVE YOU EVER FELT YOU SHOULD CUT DOWN ON YOUR DRINKING: NO
DOES PATIENT WANT TO STOP DRINKING: CANNOT ASSESS
EVER_SMOKED: NEVER
TOTAL SCORE: 0
ON A TYPICAL DAY WHEN YOU DRINK ALCOHOL HOW MANY DRINKS DO YOU HAVE: 0
TOTAL SCORE: 0
EVER_SMOKED: NEVER
HAVE PEOPLE ANNOYED YOU BY CRITICIZING YOUR DRINKING: NO
ALCOHOL_USE: NO
AVERAGE NUMBER OF DAYS PER WEEK YOU HAVE A DRINK CONTAINING ALCOHOL: 0
TOTAL SCORE: 0
EVER HAD A DRINK FIRST THING IN THE MORNING TO STEADY YOUR NERVES TO GET RID OF A HANGOVER: NO
EVER FELT BAD OR GUILTY ABOUT YOUR DRINKING: NO
HOW MANY TIMES IN THE PAST YEAR HAVE YOU HAD 5 OR MORE DRINKS IN A DAY: 0
CONSUMPTION TOTAL: NEGATIVE

## 2019-09-15 ASSESSMENT — COPD QUESTIONNAIRES
HAVE YOU SMOKED AT LEAST 100 CIGARETTES IN YOUR ENTIRE LIFE: NO/DON'T KNOW
DO YOU EVER COUGH UP ANY MUCUS OR PHLEGM?: NO/ONLY WITH OCCASIONAL COLDS OR INFECTIONS
COPD SCREENING SCORE: 2
IN THE PAST 12 MONTHS DO YOU DO LESS THAN YOU USED TO BECAUSE OF YOUR BREATHING PROBLEMS: DISAGREE/UNSURE
HAVE YOU SMOKED AT LEAST 100 CIGARETTES IN YOUR ENTIRE LIFE: NO/DON'T KNOW
DO YOU EVER COUGH UP ANY MUCUS OR PHLEGM?: NO/ONLY WITH OCCASIONAL COLDS OR INFECTIONS
DURING THE PAST 4 WEEKS HOW MUCH DID YOU FEEL SHORT OF BREATH: NONE/LITTLE OF THE TIME
COPD SCREENING SCORE: 2
DURING THE PAST 4 WEEKS HOW MUCH DID YOU FEEL SHORT OF BREATH: NONE/LITTLE OF THE TIME

## 2019-09-15 ASSESSMENT — PATIENT HEALTH QUESTIONNAIRE - PHQ9
1. LITTLE INTEREST OR PLEASURE IN DOING THINGS: NOT AT ALL
2. FEELING DOWN, DEPRESSED, IRRITABLE, OR HOPELESS: NOT AT ALL
SUM OF ALL RESPONSES TO PHQ9 QUESTIONS 1 AND 2: 0

## 2019-09-15 NOTE — ED NOTES
Med Rec completed per patient and med list (returned)   Allergies reviewed  No ORAL antibiotics in last 14 days

## 2019-09-15 NOTE — PROGRESS NOTES
Pt arrive to floor. Pt transferred to bed with slide board. Admit profile completed. Pt provided with water. Pt provided with alternate menu upon request. Call light and personal belongings within reach of pt. Pt agrees to call when needing assistance.

## 2019-09-15 NOTE — ED TRIAGE NOTES
Patient had a syncopal episode while on toilet, unwitnessed. Patient woke up on the floor and called [facility].     Posterior head and left shoulder pain; Hypotensive on scene, EMS administered 500 ml NS and BP now WNL.

## 2019-09-15 NOTE — CARE PLAN
Pt instructed on use of call light, pt agrees to call when needing assistance. Call light and personal belongings within reach of pt. Bed alarm on. Will continue to monitor.

## 2019-09-15 NOTE — ED PROVIDER NOTES
"ED Provider Note    Scribed for Juhi Lopez M.D. by Asia Sandhu. 9/15/2019, 9:00 AM.    Primary care provider: Dar Fraire M.D.  Means of arrival: Ambulance  History obtained from: Patient  History limited by: None    CHIEF COMPLAINT  Chief Complaint   Patient presents with   • Syncope     HPI  Ana Paula Ordonez is a 74 y.o. female who presents to the Emergency Department brought in by ambulance complaining of posterior head and left shoulder pain secondary to unwitnessed syncopal episode while using the bathroom. Patient confirms that she felt well at baseline yesterday but today woke up feeling \"off\" prior to her syncopal episode. Per nursing note, she woke up on the floor and called Life Alert. She was noted to by hypotensive at 79/46 on scene by EMS and therefore was administered 500 mL NS with improvement in her blood pressure. Family member at bedside states that she has had an episode like this in the past and fractured her neck and injured her cerebellum and she is concerned about her head and shoulder today. She denies changes to medications. Denies a past medical history of cardiac issues or diabetes. Denies numbness or tingling to upper or lower extremities, chest pain, dyspnea, melena, abdominal pain, or nausea. She is not on blood thinners.  REVIEW OF SYSTEMS  Pertinent positives include syncope, shoulder pain, head pain. Pertinent negatives include no numbness or tingling, chest pain, dyspnea, melena, abdominal pain, or nausea, shortness of breath, hyperglycemia, bleeding problems. All other systems reviewed and negative.     PAST MEDICAL HISTORY   has a past medical history of Arthritis, ASTHMA, Cerebellar ataxia (HCC) (3/30/2018), Chronic ulcerative colitis (HCC) (6/26/2013), Cough, GERD (gastroesophageal reflux disease), Hay fever (4/19/2019), History of falling (5/2/2018), HYPOTENSION, Inner ear disease, Leg cramps, sleep related (5/2/2018), Near-syncope, Neck injury, Osteoporosis, " Peripheral neuropathy, Polyp of sigmoid colon (04/10/2018), Raynaud disease, Tremor, and Vitamin D deficiency.    SURGICAL HISTORY   has a past surgical history that includes nasal fracture reduction closed (10/8/08); hip nailing intramedullary (7/30/2011); tonsillectomy and adenoidectomy; bladder suspension; nissen fundoplication laparoscopic (2005); and cholecystectomy (2008).    SOCIAL HISTORY  Social History     Tobacco Use   • Smoking status: Never Smoker   • Smokeless tobacco: Never Used   Substance Use Topics   • Alcohol use: No     Alcohol/week: 0.0 oz   • Drug use: No      Social History     Substance and Sexual Activity   Drug Use No       FAMILY HISTORY  Family History   Problem Relation Age of Onset   • Non-contributory Father         Parkinson's disease   • Non-contributory Mother         old age with mild dementia   • Heart Disease Neg Hx    • Hypertension Neg Hx    • Hyperlipidemia Neg Hx    • Diabetes Neg Hx        CURRENT MEDICATIONS    Current Facility-Administered Medications:   •  NS infusion 1,000 mL, 1,000 mL, Intravenous, Once, Juhi Lopez M.D.    Current Outpatient Medications:   •  nitrofurantoin monohyd macro (MACROBID) 100 MG Cap, Take 1 Cap by mouth 2 times a day., Disp: 14 Cap, Rfl: 1  •  albuterol (PROAIR HFA) 108 (90 Base) MCG/ACT Aero Soln inhalation aerosol, Inhale 2 Puffs by mouth every 6 hours as needed for Shortness of Breath., Disp: , Rfl:   •  Fluticasone Furoate-Vilanterol (BREO) 100-25 MCG/INH AEROSOL POWDER, BREATH ACTIVATED, Inhale 1 Puff by mouth every day. (Patient not taking: Reported on 7/24/2019), Disp: 1 Each, Rfl: 2  •  azelastine (ASTELIN) 137 MCG/SPRAY nasal spray, USE 2 SPRAY IEN BID, Disp: , Rfl: 11  •  tramadol (ULTRAM) 50 MG Tab, TK 1 T PO Q 6 H PRF MODERATE OR SEVERE PAIN FOR UP TO 5 DAYS, Disp: , Rfl: 0  •  triamcinolone acetonide (KENALOG) 0.1 % Cream, APPLY TO BACK 1 TO 2 TIMES A DAY, Disp: , Rfl: 2  •  Nebulizers (COMPRESSOR/NEBULIZER) Misc, Use as  "directed with nebulizer medication (Patient not taking: Reported on 7/24/2019), Disp: 1 Each, Rfl: 0  •  ipratropium-albuterol (DUONEB) 0.5-2.5 (3) MG/3ML nebulizer solution, 3 mL by Nebulization route every 6 hours as needed for Shortness of Breath (cough). (Patient not taking: Reported on 7/24/2019), Disp: 28 Bullet, Rfl: 0  •  montelukast (SINGULAIR) 10 MG Tab, Take 1 Tab by mouth every day., Disp: 30 Tab, Rfl: 11    ALLERGIES  Allergies   Allergen Reactions   • Hydromorphone Anaphylaxis     \"went into code blue\"    • Azithromycin Diarrhea and Unspecified     Diarrhea     • Clindamycin    • Codeine Unspecified     ?   • Erythromycin Unspecified     ?   • Keflex      ?   • Levaquin Unspecified     \"i dont know\"    • Lyrica Unspecified     ?   • Quinolones Unspecified     ?   • Sulfa Drugs Unspecified     \"i dont know\"    • Tetracyclines Unspecified     ?   • Other Food Unspecified     Dairy-mucus (butter is okay). spices-cough       PHYSICAL EXAM  VITAL SIGNS: /63   Pulse (!) 50   Temp 36.7 °C (98.1 °F) (Temporal)   Resp 16   Ht 1.6 m (5' 3\")   Wt 45.4 kg (100 lb)   BMI 17.71 kg/m²     Constitutional: Thin and frail appearing. No acute distress, Non-toxic appearance.   HENT: Normocephalic, Atraumatic, Bilateral external ears normal, Oropharynx moist, No oral exudates, Nose normal.   Eyes: PERRL, EOMI, Conjunctiva normal  Neck: Tenderness on left neck. Left paraspinal tenderness and trapezius tenderness with muscle spasms. Normal range of motion, Supple  Cardiovascular: Normal heart rate, Normal rhythm  Thorax & Lungs: Normal breath sounds, No respiratory distress, No wheezing, No chest tenderness.   Abdomen: Benign abdominal exam, no guarding no rebound, no pulsatile mass, no tenderness, no distention  Skin: Warm, Dry, No erythema, No abrasions  Back: No midline tenderness, Normal ROM without pain. No CVA tenderness.   Extremities: Intact distal pulses, 2+ radial pulse, No edema  Musculoskeletal: . She " is able to raise her shoulder but with some pain. No anterior fullness or deformity. Good  strength  Neurologic: Alert & oriented x 3, Normal motor function, Normal sensory function, No focal deficits noted.   Psychiatric: Appropriate                  DIAGNOSTIC STUDIES / PROCEDURES\    LABS  Results for orders placed or performed during the hospital encounter of 09/15/19   CBC WITH DIFFERENTIAL   Result Value Ref Range    WBC 7.0 4.8 - 10.8 K/uL    RBC 4.18 (L) 4.20 - 5.40 M/uL    Hemoglobin 13.3 12.0 - 16.0 g/dL    Hematocrit 42.1 37.0 - 47.0 %    .7 (H) 81.4 - 97.8 fL    MCH 31.8 27.0 - 33.0 pg    MCHC 31.6 (L) 33.6 - 35.0 g/dL    RDW 43.8 35.9 - 50.0 fL    Platelet Count 220 164 - 446 K/uL    MPV 9.0 9.0 - 12.9 fL    Neutrophils-Polys 64.10 44.00 - 72.00 %    Lymphocytes 25.50 22.00 - 41.00 %    Monocytes 6.90 0.00 - 13.40 %    Eosinophils 2.70 0.00 - 6.90 %    Basophils 0.40 0.00 - 1.80 %    Immature Granulocytes 0.40 0.00 - 0.90 %    Nucleated RBC 0.00 /100 WBC    Neutrophils (Absolute) 4.46 2.00 - 7.15 K/uL    Lymphs (Absolute) 1.78 1.00 - 4.80 K/uL    Monos (Absolute) 0.48 0.00 - 0.85 K/uL    Eos (Absolute) 0.19 0.00 - 0.51 K/uL    Baso (Absolute) 0.03 0.00 - 0.12 K/uL    Immature Granulocytes (abs) 0.03 0.00 - 0.11 K/uL    NRBC (Absolute) 0.00 K/uL   COMP METABOLIC PANEL   Result Value Ref Range    Sodium 141 135 - 145 mmol/L    Potassium 4.2 3.6 - 5.5 mmol/L    Chloride 104 96 - 112 mmol/L    Co2 31 20 - 33 mmol/L    Anion Gap 6.0 0.0 - 11.9    Glucose 113 (H) 65 - 99 mg/dL    Bun 23 (H) 8 - 22 mg/dL    Creatinine 0.64 0.50 - 1.40 mg/dL    Calcium 8.8 8.5 - 10.5 mg/dL    AST(SGOT) 22 12 - 45 U/L    ALT(SGPT) 20 2 - 50 U/L    Alkaline Phosphatase 78 30 - 99 U/L    Total Bilirubin 0.7 0.1 - 1.5 mg/dL    Albumin 3.6 3.2 - 4.9 g/dL    Total Protein 6.3 6.0 - 8.2 g/dL    Globulin 2.7 1.9 - 3.5 g/dL    A-G Ratio 1.3 g/dL   TROPONIN   Result Value Ref Range    Troponin T 7 6 - 19 ng/L   ESTIMATED GFR    Result Value Ref Range    GFR If African American >60 >60 mL/min/1.73 m 2    GFR If Non African American >60 >60 mL/min/1.73 m 2   TSH   Result Value Ref Range    TSH 2.450 0.380 - 5.330 uIU/mL   CORTISOL   Result Value Ref Range    Cortisol 22.5 0.0 - 23.0 ug/dL   EKG   Result Value Ref Range    Report       [facility]    Test Date:  2019-09-15  Pt Name:    TORY HERNANDEZ                Department: ER  MRN:        [id number]                      Room:       [facility]  Gender:     Female                       Technician: [id number]  :        [id number]                   Requested By:ER TRIAGE PROTOCOL  Order #:    [id number]                    Reading MD: Juhi Dalton    Measurements  Intervals                                Axis  Rate:       61                           P:          68  NY:         184                          QRS:        62  QRSD:       82                           T:          64  QT:         460  QTc:        464    Interpretive Statements  SINUS RHYTHM  ABNRM R PROG, CONSIDER ASMI OR LEAD PLACEMENT  ABNORMAL T, PROBABLE ISCHEMIA, ANTERIOR LEADS  Compared to ECG 2019 14:55:47  T-wave abnormality now present  Possible ischemia now present    Electronically Signed On 9- 9:13:25 PDT by Juhi Dalton       All labs reviewed by me.    EKG  12 lead EKG interpreted by me and signed as shown above.    RADIOLOGY  DX-CHEST-PORTABLE (1 VIEW)   Final Result      No acute cardiopulmonary disease.      DX-SHOULDER 2+ LEFT   Final Result      No fracture or dislocation of LEFT shoulder.      CT-HEAD W/O   Final Result      1.  No acute intracranial abnormality.   2.  Mild diffuse atrophy.   3.  LEFT occipital scalp swelling.      CT-CSPINE WITHOUT PLUS RECONS   Final Result      1.  No acute cervical spine fracture.   2.  Straightening again noted with chronic burst fracture at C7, unchanged.   3.  Mild multilevel degenerative change.      EC-ECHOCARDIOGRAM  COMPLETE W/ CONT    (Results Pending)   MR-BRAIN-W/O    (Results Pending)     The radiologist's interpretation of all radiological studies have been reviewed by me.    COURSE & MEDICAL DECISION MAKING  Nursing notes, VS, PMSFHx reviewed in chart.     Patient presents to the emergency department status post a syncopal episode at home.  Patient fainted while going to the bathroom.  She did not have any preceding dizziness or chest pain.  She was found by paramedics hypotensive.  She responded well to a fluid bolus.  She has a history of prior syncope that resulted in a injury to her cellebellum and a C-spine fracture.  She presented today because she has having some left shoulder and paraspinal neck pain and therefore wanted a evaluation.  She has a GCS of 15.  She is alert and overall looks well here.  Her blood pressure has improved with fluids.  She denies any chest pain or shortness of breath.  However EKG upon presentation has changes from her previous EKG.  She has flipped T waves in the anterior leads.    9:00 AM - Patient seen and examined at bedside. Plan of care was discussed with the patient and includes CT head and neck and imaging her shoulder. Informed the patient and family member that her EKG is abnormal and she will be admitted for possible stress test or echocardiogram and observed over night. . Ordered for DX-shoulder, CT-C spine, CT-head, DX-chest, CBC with differential, CMP, troponin, and EKG to evaluate. Patient was treated with IV fluids for her symptoms.    HYDRATION: Based on the patient's presentation of Hypotension the patient was given IV fluids. IV Hydration was used because oral hydration was not adequate alone. Upon recheck following hydration, the patient was blood pressure improved.    Patient's laboratory studies have returned and show a normal troponin.  There is no anemia.  There is no evidence of a gap acidosis and no electrolyte abnormalities.  CT of the head and C-spine did not  show any acute injuries except for some left occipital scalp swelling.    Discussed the case with Dr. Shah who will admit the patient for further monitoring and evaluation.  Patient and family understand the plan.    DISPOSITION:  Patient will be admitted to the hospital in guarded condition.       FINAL IMPRESSION  1. Orthostatic syncope    2. Acute pain of left shoulder    3. Abnormal EKG          Asia GUADARRAMA (Scribe), am scribing for, and in the presence of, Juhi Lopez M.D..    Electronically signed by: Asia Sandhu (Scribe), 9/15/2019    IJuhi M.D. personally performed the services described in this documentation, as scribed by Asia Sandhu in my presence, and it is both accurate and complete. C    The note accurately reflects work and decisions made by me.  Juhi Lopez  9/15/2019  11:16 AM

## 2019-09-15 NOTE — PROGRESS NOTES
Report received from LUKAS Ramirez. Per Ashley GAYTAN, pt not currently ready for transfer as MD is at bedside.

## 2019-09-15 NOTE — ASSESSMENT & PLAN NOTE
Currently not on oxygen and is without any exacerbation continue to monitor if necessary nebulizer treatments and oxygen can be provided

## 2019-09-15 NOTE — H&P
Hospital Medicine History & Physical Note    Date of Service  9/15/2019    Primary Care Physician  Dar Fraire M.D.    Consultants  None    Code Status  Full code    Chief Complaint  Syncope    History of Presenting Illness  74 y.o. female who presented 9/15/2019 with syncope.  The patient today went to the bathroom early this morning.  Afterwards the patient stood up and passed out.  The patient twisted somehow around and hit the back of her head on the left side.  She does have a goose egg on there now.  The patient at this point also hurt her left scapular area but there is no fracture and no bruising as of yet.  Patient otherwise is able to ambulate and is close to her baseline.  In order to evaluate the possible syncopal event the patient will have at this point an echocardiogram, MRI of the brain, and we will at this point monitor telemetric especially with a history of prolonged QT interval.  Monitor at this point troponins as well as check a TSH a hemoglobin A1c level.  Patient at this point will be observed in the clinical decision unit overnight and stabilized if we do find that she is orthostatic she will be given fluid resuscitation until she is normotensive.    Review of Systems  Review of Systems   Constitutional: Negative.  Negative for chills, diaphoresis and fever.   HENT: Negative.    Eyes: Negative.  Negative for double vision.   Respiratory: Negative.  Negative for cough, hemoptysis and wheezing.    Cardiovascular: Negative.  Negative for chest pain, palpitations and leg swelling.   Gastrointestinal: Negative.  Negative for abdominal pain, blood in stool, constipation, diarrhea, heartburn, nausea and vomiting.   Genitourinary: Negative.  Negative for frequency, hematuria and urgency.   Musculoskeletal: Negative.  Negative for joint pain.   Skin: Negative.  Negative for itching and rash.   Neurological: Positive for loss of consciousness and headaches. Negative for dizziness, focal weakness and  "seizures.   Endo/Heme/Allergies: Negative.  Does not bruise/bleed easily.   Psychiatric/Behavioral: Negative.  Negative for suicidal ideas. The patient is not nervous/anxious.    All other systems reviewed and are negative.      Past Medical History   has a past medical history of Arthritis, ASTHMA, Cerebellar ataxia (HCC) (3/30/2018), Chronic ulcerative colitis (HCC) (6/26/2013), Cough, GERD (gastroesophageal reflux disease), Hay fever (4/19/2019), History of falling (5/2/2018), HYPOTENSION, Inner ear disease, Leg cramps, sleep related (5/2/2018), Near-syncope, Neck injury, Osteoporosis, Peripheral neuropathy, Polyp of sigmoid colon (04/10/2018), Raynaud disease, Tremor, and Vitamin D deficiency.    Surgical History   has a past surgical history that includes nasal fracture reduction closed (10/8/08); hip nailing intramedullary (7/30/2011); tonsillectomy and adenoidectomy; bladder suspension; nissen fundoplication laparoscopic (2005); and cholecystectomy (2008).     Family History  family history includes Non-contributory in her father and mother.     Social History   reports that she has never smoked. She has never used smokeless tobacco. She reports that she does not drink alcohol or use drugs.    Allergies  Allergies   Allergen Reactions   • Hydromorphone Anaphylaxis     \"went into code blue\"    • Azithromycin Diarrhea and Unspecified     Diarrhea     • Clindamycin    • Codeine Unspecified     ?   • Erythromycin Unspecified     ?   • Keflex      ?   • Levaquin Unspecified     \"i dont know\"    • Lyrica Unspecified     ?   • Quinolones Unspecified     ?   • Sulfa Drugs Unspecified     \"i dont know\"    • Tetracyclines Unspecified     ?   • Other Food Unspecified     Dairy-mucus (butter is okay). spices-cough       Medications  Prior to Admission Medications   Prescriptions Last Dose Informant Patient Reported? Taking?   BIOTIN PO 9/14/2019 at PM Patient Yes Yes   Sig: Take 1 Tab by mouth every day.   CALCIUM PO " 9/14/2019 at PM Patient Yes Yes   Sig: Take 2 Tabs by mouth 2 Times a Day.   Cholecalciferol (VITAMIN D3 PO) 9/14/2019 at PM Patient Yes Yes   Sig: Take 1 Tab by mouth every day.   Melatonin 10 MG Cap 9/14/2019 at PM Patient Yes Yes   Sig: Take 10 mg by mouth every bedtime.   Multiple Vitamins-Minerals (OCUVITE-LUTEIN) Tab 9/14/2019 at PM Patient Yes Yes   Sig: Take 1 tablet by mouth every day.   Multiple Vitamins-Minerals (WOMENS 50+ MULTI VITAMIN/MIN PO) 9/14/2019 at PM Patient Yes Yes   Sig: Take 1 Tab by mouth every day.   Potassium (POTASSIMIN PO) 9/14/2019 at PM Patient Yes Yes   Sig: Take 2 Drops by mouth 2 Times a Day.   albuterol (PROAIR HFA) 108 (90 Base) MCG/ACT Aero Soln inhalation aerosol PRN at PRN Patient Yes No   Sig: Inhale 2 Puffs by mouth every 6 hours as needed for Shortness of Breath.   azelastine (ASTELIN) 137 MCG/SPRAY nasal spray PRN at PRN Patient Yes No   Sig: Spray 2 Sprays in nose 2 times a day as needed.   cetirizine (ZYRTEC) 10 MG Tab 9/14/2019 at PM Patient Yes Yes   Sig: Take 10 mg by mouth every day.   montelukast (SINGULAIR) 10 MG Tab 9/14/2019 at PM Patient No No   Sig: Take 1 Tab by mouth every day.      Facility-Administered Medications: None       Physical Exam  Temp:  [36.7 °C (98.1 °F)] 36.7 °C (98.1 °F)  Pulse:  [50] 50  Resp:  [16] 16  BP: (130)/(63) 130/63    Physical Exam   Constitutional: She is oriented to person, place, and time. She appears well-developed and well-nourished.   HENT:   Head: Normocephalic and atraumatic.       Right Ear: External ear normal.   Left Ear: External ear normal.   Nose: Nose normal.   Mouth/Throat: Oropharynx is clear and moist.   Eyes: Pupils are equal, round, and reactive to light. Conjunctivae and EOM are normal.   Neck: Normal range of motion. Neck supple. No JVD present. No thyromegaly present.   Cardiovascular: Normal rate and regular rhythm.   No murmur heard.  Pulmonary/Chest: Effort normal and breath sounds normal. She has no  wheezes. She has no rales. She exhibits no tenderness.   Abdominal: Soft. Bowel sounds are normal. She exhibits no distension and no mass. There is no tenderness. There is no rebound and no guarding.   Musculoskeletal: Normal range of motion. She exhibits no edema.        Left shoulder: She exhibits tenderness.   Lymphadenopathy:     She has no cervical adenopathy.   Neurological: She is alert and oriented to person, place, and time. She has normal reflexes. No cranial nerve deficit.   Skin: Skin is warm and dry. No rash noted. No erythema.   Psychiatric: She has a normal mood and affect. Her behavior is normal. Judgment and thought content normal.   Nursing note and vitals reviewed.      Laboratory:  Recent Labs     09/15/19  0850   WBC 7.0   RBC 4.18*   HEMOGLOBIN 13.3   HEMATOCRIT 42.1   .7*   MCH 31.8   MCHC 31.6*   RDW 43.8   PLATELETCT 220   MPV 9.0     Recent Labs     09/15/19  0850   SODIUM 141   POTASSIUM 4.2   CHLORIDE 104   CO2 31   GLUCOSE 113*   BUN 23*   CREATININE 0.64   CALCIUM 8.8     Recent Labs     09/15/19  0850   ALTSGPT 20   ASTSGOT 22   ALKPHOSPHAT 78   TBILIRUBIN 0.7   GLUCOSE 113*         No results for input(s): NTPROBNP in the last 72 hours.      Recent Labs     09/15/19  0912   TROPONINT 7       Urinalysis:    No results found     Imaging:  DX-CHEST-PORTABLE (1 VIEW)   Final Result      No acute cardiopulmonary disease.      DX-SHOULDER 2+ LEFT   Final Result      No fracture or dislocation of LEFT shoulder.      CT-HEAD W/O   Final Result      1.  No acute intracranial abnormality.   2.  Mild diffuse atrophy.   3.  LEFT occipital scalp swelling.      CT-CSPINE WITHOUT PLUS RECONS   Final Result      1.  No acute cervical spine fracture.   2.  Straightening again noted with chronic burst fracture at C7, unchanged.   3.  Mild multilevel degenerative change.      EC-ECHOCARDIOGRAM COMPLETE W/ CONT    (Results Pending)   MR-BRAIN-W/O    (Results Pending)         Assessment/Plan:  I  anticipate this patient will require at least two midnights for appropriate medical management, necessitating inpatient admission.    Syncope  Assessment & Plan  Patient today went to the bathroom.  After completing the task at hand the patient stood up and passed out.  The patient at this point will be evaluate orthostatics as well as echocardiogram and MRI of the brain.  The patient did have some EKG changes on the initial evaluation in the emergency room.  Patient will be given at this point fluid resuscitation.    Raynaud disease- (present on admission)  Assessment & Plan  3 kn at this point is under control.  Patient should keep the hands warm if necessary calcium channel blockers may be utilized    Peripheral neuropathy- (present on admission)  Assessment & Plan  Continue at this point with monitoring for pain management.  Currently patient does not report any neuropathic pain.    Prolonged QT interval- (present on admission)  Assessment & Plan  Monitor EKG and telemetric monitoring    Vitamin D deficiency- (present on admission)  Assessment & Plan  Continue with vitamin D supplementation    Cerebellar ataxia (HCC)- (present on admission)  Assessment & Plan  Patient may need PT/OT evaluation patient does have some cerebral ataxia for which she uses assistive devices at home    Mild intermittent asthma without complication- (present on admission)  Assessment & Plan  Currently not on oxygen and is without any exacerbation continue to monitor if necessary nebulizer treatments and oxygen can be provided    Chronic ulcerative colitis (HCC)- (present on admission)  Assessment & Plan  Patient denies currently any bleeding from the gastrointestinal tract.  She denies any abdominal pain.  Currently not on any medical management and the patient in 2018 June had a colonoscopy that was relatively clear.      VTE prophylaxis: Heparin

## 2019-09-15 NOTE — ASSESSMENT & PLAN NOTE
Patient may need PT/OT evaluation patient does have some cerebral ataxia for which she uses assistive devices at home

## 2019-09-15 NOTE — ASSESSMENT & PLAN NOTE
Continue at this point with monitoring for pain management.  Currently patient does not report any neuropathic pain.

## 2019-09-15 NOTE — PROGRESS NOTES
Per LUKAS Ramirez, MD Saroj at pt bedside currently. LUKAS Ramirez to call when pt is ready for transport to CDU.

## 2019-09-15 NOTE — ASSESSMENT & PLAN NOTE
Patient denies currently any bleeding from the gastrointestinal tract.  She denies any abdominal pain.  Currently not on any medical management and the patient in 2018 June had a colonoscopy that was relatively clear.

## 2019-09-15 NOTE — ASSESSMENT & PLAN NOTE
Patient today went to the bathroom.  After completing the task at hand the patient stood up and passed out.  The patient at this point will be evaluate orthostatics as well as echocardiogram and MRI of the brain.  The patient did have some EKG changes on the initial evaluation in the emergency room.  Patient will be given at this point fluid resuscitation.

## 2019-09-16 ENCOUNTER — APPOINTMENT (OUTPATIENT)
Dept: RADIOLOGY | Facility: MEDICAL CENTER | Age: 74
End: 2019-09-16
Attending: NURSE PRACTITIONER
Payer: MEDICARE

## 2019-09-16 VITALS
HEART RATE: 72 BPM | WEIGHT: 101.19 LBS | TEMPERATURE: 97.3 F | RESPIRATION RATE: 16 BRPM | DIASTOLIC BLOOD PRESSURE: 73 MMHG | OXYGEN SATURATION: 96 % | SYSTOLIC BLOOD PRESSURE: 135 MMHG | BODY MASS INDEX: 17.93 KG/M2 | HEIGHT: 63 IN

## 2019-09-16 PROBLEM — R55 SYNCOPE: Status: RESOLVED | Noted: 2019-09-15 | Resolved: 2019-09-16

## 2019-09-16 LAB
CHOLEST SERPL-MCNC: 155 MG/DL (ref 100–199)
HDLC SERPL-MCNC: 44 MG/DL
LDLC SERPL CALC-MCNC: 95 MG/DL
TRIGL SERPL-MCNC: 80 MG/DL (ref 0–149)

## 2019-09-16 PROCEDURE — 80061 LIPID PANEL: CPT

## 2019-09-16 PROCEDURE — G0378 HOSPITAL OBSERVATION PER HR: HCPCS

## 2019-09-16 PROCEDURE — 99217 PR OBSERVATION CARE DISCHARGE: CPT | Performed by: HOSPITALIST

## 2019-09-16 PROCEDURE — 700117 HCHG RX CONTRAST REV CODE 255: Performed by: HOSPITALIST

## 2019-09-16 PROCEDURE — 71275 CT ANGIOGRAPHY CHEST: CPT

## 2019-09-16 RX ADMIN — IOHEXOL 50 ML: 350 INJECTION, SOLUTION INTRAVENOUS at 10:06

## 2019-09-16 ASSESSMENT — PATIENT HEALTH QUESTIONNAIRE - PHQ9
SUM OF ALL RESPONSES TO PHQ9 QUESTIONS 1 AND 2: 0
1. LITTLE INTEREST OR PLEASURE IN DOING THINGS: NOT AT ALL
2. FEELING DOWN, DEPRESSED, IRRITABLE, OR HOPELESS: NOT AT ALL

## 2019-09-16 NOTE — PROGRESS NOTES
Pt O2 dropped to 87% while sleeping. Attempted to apply O2 at 1.5L on nasal cannula. Pt refused, states she will cough to much. Will CTM.

## 2019-09-16 NOTE — DISCHARGE SUMMARY
Discharge Summary    CHIEF COMPLAINT ON ADMISSION  Chief Complaint   Patient presents with   • Syncope       Reason for Admission  EMS     Admission Date  9/15/2019    CODE STATUS  Full Code    HPI & HOSPITAL COURSE  This is a 74 y.o. female here with syncope.  Please see the dictated HPI 9/15/2019 for complete details.  In short, patient has history of multiple prior syncopal episodes, peripheral neuropathy, Raynaud's disease, UC, mild intermittent asthma, cerebellar ataxia, vitamin D deficiency and prolonged QT interval who passed out after rising from her toilet early in the morning.  Laboratory evaluation was unremarkable with the exception of a mildly elevated d-dimer at 0.99.  Troponins were negative and stable.  MRI of the brain was negative for acute pathology.  Echocardiogram showed preserved EF without valvular disease or evidence of heart failure.  EKG revealed normal sinus rhythm.  Due to the patient's elevated d-dimer she was sent for a CT-CTA of the chest to rule out PE.  This was completed and was negative for thrombosis.  Patient has normal vital signs, saturating above 90% on room air, with normal sinus rhythm. Therefore, she is discharged in good and stable condition to home with close outpatient follow-up.    Discharge Date  9/16/2019    FOLLOW UP ITEMS POST DISCHARGE  PCP follow-up    DISCHARGE DIAGNOSES  Active Problems:    Syncope POA: Unknown    Peripheral neuropathy POA: Yes    Raynaud disease POA: Yes    Chronic ulcerative colitis (HCC) POA: Yes      Overview: Cecum, ascending colon and sigmoid on colonoscopy done June 2018    Mild intermittent asthma without complication POA: Yes    Cerebellar ataxia (HCC) POA: Yes      Overview: Due to fall 2004    Vitamin D deficiency POA: Yes    Prolonged QT interval POA: Yes  Resolved Problems:    * No resolved hospital problems. *      FOLLOW UP  Future Appointments   Date Time Provider Department Center   9/23/2019  2:30 PM Ila Celaya M.D.  "[facility] None   9/23/2019  4:00 PM Dar Fraire M.D. [facility] None   10/4/2019  1:15 [facility]   10/18/2019  1:15 PM Zain Mckenzie M.D. [facility] None       MEDICATIONS ON DISCHARGE     Medication List      ASK your doctor about these medications      Instructions   azelastine 137 MCG/SPRAY nasal spray  Commonly known as:  ASTELIN   Spray 2 Sprays in nose 2 times a day as needed.  Dose:  2 Spray     BIOTIN PO   Take 1 Tab by mouth every day.  Dose:  1 Tab     CALCIUM PO   Take 2 Tabs by mouth 2 Times a Day.  Dose:  2 Tab     cetirizine 10 MG Tabs  Commonly known as:  ZYRTEC   Take 10 mg by mouth every day.  Dose:  10 mg     Melatonin 10 MG Caps   Take 10 mg by mouth every bedtime.  Dose:  10 mg     montelukast 10 MG Tabs  Commonly known as:  SINGULAIR   Take 1 Tab by mouth every day.  Dose:  10 mg     * OCUVITE-LUTEIN Tabs   Take 1 tablet by mouth every day.  Dose:  1 tablet     * WOMENS 50+ MULTI VITAMIN/MIN PO   Take 1 Tab by mouth every day.  Dose:  1 Tab     POTASSIMIN PO   Take 2 Drops by mouth 2 Times a Day.  Dose:  2 Drop     PROAIR  (90 Base) MCG/ACT Aers inhalation aerosol  Generic drug:  albuterol   Inhale 2 Puffs by mouth every 6 hours as needed for Shortness of Breath.  Dose:  2 Puff     VITAMIN D3 PO   Take 1 Tab by mouth every day.  Dose:  1 Tab         * This list has 2 medication(s) that are the same as other medications prescribed for you. Read the directions carefully, and ask your doctor or other care provider to review them with you.                Allergies  Allergies   Allergen Reactions   • Hydromorphone Anaphylaxis     \"went into code blue\"    • Azithromycin Diarrhea and Unspecified     Diarrhea     • Clindamycin    • Codeine Unspecified     ?   • Erythromycin Unspecified     ?   • Keflex      ?   • Levaquin Unspecified     \"i dont know\"    • Lyrica Unspecified     ?   • Quinolones Unspecified     ?   • Sulfa Drugs Unspecified     \"i dont know\"    • Tetracyclines " Unspecified     ?   • Other Food Unspecified     Dairy-mucus (butter is okay). spices-cough       DIET  Orders Placed This Encounter   Procedures   • Diet Order Regular     Standing Status:   Standing     Number of Occurrences:   1     Order Specific Question:   Diet:     Answer:   Regular [1]       ACTIVITY  As tolerated.  Weight bearing as tolerated    LABORATORY  Lab Results   Component Value Date    SODIUM 141 09/15/2019    POTASSIUM 4.2 09/15/2019    CHLORIDE 104 09/15/2019    CO2 31 09/15/2019    GLUCOSE 113 (H) 09/15/2019    BUN 23 (H) 09/15/2019    CREATININE 0.64 09/15/2019    CREATININE 0.7 09/28/2008        Lab Results   Component Value Date    WBC 7.0 09/15/2019    HEMOGLOBIN 13.3 09/15/2019    HEMATOCRIT 42.1 09/15/2019    PLATELETCT 220 09/15/2019        Total time of the discharge process exceeds 36 minutes.

## 2019-09-16 NOTE — CARE PLAN
Problem: Communication  Goal: The ability to communicate needs accurately and effectively will improve  Outcome: PROGRESSING AS EXPECTED     Problem: Safety  Goal: Will remain free from injury  Outcome: PROGRESSING AS EXPECTED  Goal: Will remain free from falls  Outcome: PROGRESSING AS EXPECTED     Problem: Pain Management  Goal: Pain level will decrease to patient's comfort goal  Outcome: PROGRESSING AS EXPECTED     Problem: Infection  Goal: Will remain free from infection  Outcome: PROGRESSING AS EXPECTED     Problem: Venous Thromboembolism (VTW)/Deep Vein Thrombosis (DVT) Prevention:  Goal: Patient will participate in Venous Thrombosis (VTE)/Deep Vein Thrombosis (DVT)Prevention Measures  Outcome: PROGRESSING AS EXPECTED     Problem: Psychosocial Needs:  Goal: Level of anxiety will decrease  Outcome: PROGRESSING AS EXPECTED     Problem: Fluid Volume:  Goal: Will maintain balanced intake and output  Outcome: PROGRESSING AS EXPECTED     Problem: Respiratory:  Goal: Respiratory status will improve  Outcome: PROGRESSING AS EXPECTED     Problem: Bowel/Gastric:  Goal: Normal bowel function is maintained or improved  Outcome: PROGRESSING AS EXPECTED  Goal: Will not experience complications related to bowel motility  Outcome: PROGRESSING AS EXPECTED     Problem: Urinary Elimination:  Goal: Ability to reestablish a normal urinary elimination pattern will improve  Outcome: PROGRESSING AS EXPECTED     Problem: Skin Integrity  Goal: Risk for impaired skin integrity will decrease  Outcome: PROGRESSING AS EXPECTED     Problem: Mobility  Goal: Risk for activity intolerance will decrease  Outcome: PROGRESSING AS EXPECTED     Problem: Medication  Goal: Compliance with prescribed medication will improve  Outcome: PROGRESSING AS EXPECTED     Problem: Knowledge Deficit  Goal: Knowledge of disease process/condition, treatment plan, diagnostic tests, and medications will improve  Outcome: PROGRESSING AS EXPECTED  Goal: Knowledge of  the prescribed therapeutic regimen will improve  Outcome: PROGRESSING AS EXPECTED     Problem: Discharge Barriers/Planning  Goal: Patient's continuum of care needs will be met  Outcome: PROGRESSING AS EXPECTED

## 2019-09-16 NOTE — PROGRESS NOTES
Pt sitting in bed with family at bedside. No c/o cp, sob or pain at this time. Pt watching tv with family. States she does not want to be woken in the middle of the night for vitals unless she gets up to for the toilet. Will CTM.

## 2019-09-16 NOTE — DISCHARGE INSTRUCTIONS
Discharge Instructions    Discharged to home by car with relative. Discharged via wheelchair, hospital escort: Yes.  Special equipment needed: Not Applicable    Be sure to schedule a follow-up appointment with your primary care doctor or any specialists as instructed.     Discharge Plan:   Diet Plan: Discussed  Activity Level: Discussed  Confirmed Follow up Appointment: Appointment Scheduled  Confirmed Symptoms Management: Discussed  Medication Reconciliation Updated: Yes  Influenza Vaccine Indication: Patient Refuses    I understand that a diet low in cholesterol, fat, and sodium is recommended for good health. Unless I have been given specific instructions below for another diet, I accept this instruction as my diet prescription.   Other diet: Heart Healthy     Special Instructions: None    · Is patient discharged on Warfarin / Coumadin?   No     Depression / Suicide Risk    As you are discharged from this [facility] facility, it is important to learn how to keep safe from harming yourself.    Recognize the warning signs:  · Abrupt changes in personality, positive or negative- including increase in energy   · Giving away possessions  · Change in eating patterns- significant weight changes-  positive or negative  · Change in sleeping patterns- unable to sleep or sleeping all the time   · Unwillingness or inability to communicate  · Depression  · Unusual sadness, discouragement and loneliness  · Talk of wanting to die  · Neglect of personal appearance   · Rebelliousness- reckless behavior  · Withdrawal from people/activities they love  · Confusion- inability to concentrate     If you or a loved one observes any of these behaviors or has concerns about self-harm, here's what you can do:  · Talk about it- your feelings and reasons for harming yourself  · Remove any means that you might use to hurt yourself (examples: pills, rope, extension cords, firearm)  · Get professional help from the community (Mental Health,  Substance Abuse, psychological counseling)  · Do not be alone:Call your Safe Contact- someone whom you trust who will be there for you.  · Call your local CRISIS HOTLINE 537-9719 or 293-221-7282  · Call your local Children's Mobile Crisis Response Team [place] (048) 238-7904 or www.Zjdg.cn  · Call the toll free National Suicide Prevention Hotlines   · National Suicide Prevention Lifeline 995-392-VNTJ (3513)  · National Hope Line Network 800-SUICIDE (790-9331)      Discharge Instructions per Roshan Lyle, A.P.N.    1.  Review your discharge Medication Reconciliation form as you may be provided with new prescriptions or changes to previously prescribed medications.  Be sure to take all of your prescribed medications exactly as directed.  Further questions can be directed to your local pharmacist or primary care provider (PCP).    2. Follow-up with your PCP.  An appointment may have already been scheduled for you.  Please review your discharge paperwork and locate this information.  Please be sure to call your PCP to cancel if you are unable to make this appointment or require schedule changes.  It is critical to to follow-up with your PCP to review hospital records and ensure any further diagnostic work-up, prescription refills, or referrals.     3. ACTIVITIES: After discharge from the hospital, you may resume routine activities including walking and going u/down stairs. However, no strenuous activity. For further clarification, call your PCP     4. DRIVING: You may drive whenever you are off pain medications and are able to perform the activities needed to drive, i.e. turning, bending, twisting, etc.     5. BOWEL FUNCTION: A few patients, after hospitalizations, will develop bowel irregularity. You could also experience constipation due to pain medication and decreased activity level. If you feel this is occurring, please take an over-the-counter laxative (Milk of Magnesia, Ex-Lax, Senokot, etc.) until  the problem has resolved.     Return to ER if you develop recurrent chest pain, shortness of breath, bloody sputum, fever of 101.5 or greater, intractable nausea or vomiting, blood in the vomit or urine, intractable pain, new onset inability to ambulate, focal motor weakness, acute vision disturbance, acute speech disturbance, intractable abdominal pain, or any other worrisome symptoms.

## 2019-09-23 ENCOUNTER — OFFICE VISIT (OUTPATIENT)
Dept: CARDIOLOGY | Facility: MEDICAL CENTER | Age: 74
End: 2019-09-23
Payer: MEDICARE

## 2019-09-23 VITALS
OXYGEN SATURATION: 98 % | WEIGHT: 106 LBS | DIASTOLIC BLOOD PRESSURE: 50 MMHG | SYSTOLIC BLOOD PRESSURE: 90 MMHG | HEART RATE: 76 BPM | BODY MASS INDEX: 18.78 KG/M2 | HEIGHT: 63 IN

## 2019-09-23 DIAGNOSIS — I25.10 CORONARY ARTERY DISEASE INVOLVING NATIVE CORONARY ARTERY OF NATIVE HEART WITHOUT ANGINA PECTORIS: ICD-10-CM

## 2019-09-23 DIAGNOSIS — R55 SYNCOPE AND COLLAPSE: ICD-10-CM

## 2019-09-23 PROCEDURE — 99215 OFFICE O/P EST HI 40 MIN: CPT | Performed by: INTERNAL MEDICINE

## 2019-09-23 ASSESSMENT — ENCOUNTER SYMPTOMS
BLURRED VISION: 0
PND: 0
SPEECH CHANGE: 0
FOCAL WEAKNESS: 1
WEAKNESS: 1
PALPITATIONS: 0
CHILLS: 0
WEIGHT LOSS: 0
ORTHOPNEA: 0
DEPRESSION: 0
ABDOMINAL PAIN: 0
EYE DISCHARGE: 0
LOSS OF CONSCIOUSNESS: 0
MYALGIAS: 0
DIZZINESS: 0
NAUSEA: 0
VOMITING: 0
SENSORY CHANGE: 1
CLAUDICATION: 0
SHORTNESS OF BREATH: 0
HALLUCINATIONS: 0
HEADACHES: 0
COUGH: 0
FEVER: 0
DOUBLE VISION: 0
FALLS: 0
BRUISES/BLEEDS EASILY: 0
EYE PAIN: 0
BLOOD IN STOOL: 0

## 2019-09-23 NOTE — PROGRESS NOTES
Chief Complaint   Patient presents with   • Hospital Follow-up       Subjective:   Ana Paula Ordonez is a 74 y.o. female who presents today for cardiac care and evaluation after recent fall.  She was seen in the hospital.  She was in the bathroom and fell on her toilet.  She does not remember what happened.  From documentation, episode of fall was thought to be related to vasovagal reaction.  Patient also has a history of cerebellar ataxia.  She has had an balance issue.  This is a chronic problem.  No prior cardiac problems.  No prior cardiac procedures.  No prior cardiac surgeries.    I have independently interpreted and reviewed patient's ECG with patient today, which showed normal sinus rhythm, normal NM, QT intervals. No evidence of acute coronary syndrome.    I have independently interpreted and reviewed echocardiogram's actual images with patient which showed normal left ventricular systolic function. No wall motion abnormality. No evidence of pulmonary hypertension. No significant valvular disease.      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    Due to fall 2004   • Chronic ulcerative colitis (HCC) 6/26/2013   • Cough    • GERD (gastroesophageal reflux disease)    • Hay fever 4/19/2019   • History of falling 5/2/2018   • HYPOTENSION    • Inner ear disease    • Leg cramps, sleep related 5/2/2018   • Near-syncope    • Neck injury     fracture, hand and leg numbness and tingling   • Osteoporosis    • Peripheral neuropathy    • Polyp of sigmoid colon 04/10/2018    Hyperplastic polyp   • Raynaud disease    • Tremor    • Vitamin D deficiency      Past Surgical History:   Procedure Laterality Date   • HIP NAILING INTRAMEDULLARY  7/30/2011    Performed by KALEB RAGSDALE at SURGERY [facility] ORS   • NASAL FRACTURE REDUCTION CLOSED  10/8/08    Performed by DON BARRIOS at SURGERY SAME DAY [facility] ORS   • CHOLECYSTECTOMY  2008   • NISSEN  "FUNDOPLICATION LAPAROSCOPIC  2005   • BLADDER SUSPENSION      X 2   • TONSILLECTOMY AND ADENOIDECTOMY       Family History   Problem Relation Age of Onset   • Non-contributory Father         Parkinson's disease   • Non-contributory Mother         old age with mild dementia   • Heart Disease Neg Hx    • Hypertension Neg Hx    • Hyperlipidemia Neg Hx    • Diabetes Neg Hx      Social History     Socioeconomic History   • Marital status: Single     Spouse name: Not on file   • Number of children: Not on file   • Years of education: Not on file   • Highest education level: Not on file   Occupational History   • Not on file   Social Needs   • Financial resource strain: Not on file   • Food insecurity:     Worry: Not on file     Inability: Not on file   • Transportation needs:     Medical: Not on file     Non-medical: Not on file   Tobacco Use   • Smoking status: Never Smoker   • Smokeless tobacco: Never Used   Substance and Sexual Activity   • Alcohol use: No     Alcohol/week: 0.0 oz   • Drug use: No   • Sexual activity: Not on file   Lifestyle   • Physical activity:     Days per week: Not on file     Minutes per session: Not on file   • Stress: Not on file   Relationships   • Social connections:     Talks on phone: Not on file     Gets together: Not on file     Attends [employer] service: Not on file     Active member of club or organization: Not on file     Attends meetings of clubs or organizations: Not on file     Relationship status: Not on file   • Intimate partner violence:     Fear of current or ex partner: Not on file     Emotionally abused: Not on file     Physically abused: Not on file     Forced sexual activity: Not on file   Other Topics Concern   • Not on file   Social History Narrative   • Not on file     Allergies   Allergen Reactions   • Hydromorphone Anaphylaxis     \"went into code blue\"    • Azithromycin Diarrhea and Unspecified     Diarrhea     • Clindamycin    • Codeine Unspecified     ?   • " "Erythromycin Unspecified     ?   • Keflex      ?   • Levaquin Unspecified     \"i dont know\"    • Lyrica Unspecified     ?   • Quinolones Unspecified     ?   • Sulfa Drugs Unspecified     \"i dont know\"    • Tetracyclines Unspecified     ?   • Other Food Unspecified     Dairy-mucus (butter is okay). spices-cough     Outpatient Encounter Medications as of 9/23/2019   Medication Sig Dispense Refill   • MAGNESIUM PO Take  by mouth. Liquid     • cetirizine (ZYRTEC) 10 MG Tab Take 10 mg by mouth every day.     • Cholecalciferol (VITAMIN D3 PO) Take 1 Tab by mouth every day.     • Multiple Vitamins-Minerals (WOMENS 50+ MULTI VITAMIN/MIN PO) Take 1 Tab by mouth every day.     • BIOTIN PO Take 1 Tab by mouth every day.     • Potassium (POTASSIMIN PO) Take 2 Drops by mouth 2 Times a Day.     • Melatonin 10 MG Cap Take 10 mg by mouth every bedtime.     • CALCIUM PO Take 2 Tabs by mouth 2 Times a Day.     • albuterol (PROAIR HFA) 108 (90 Base) MCG/ACT Aero Soln inhalation aerosol Inhale 2 Puffs by mouth every 6 hours as needed for Shortness of Breath.     • azelastine (ASTELIN) 137 MCG/SPRAY nasal spray [place] 2 Sprays in nose 2 times a day as needed.  11   • montelukast (SINGULAIR) 10 MG Tab Take 1 Tab by mouth every day. 30 Tab 11   • Multiple Vitamins-Minerals (OCUVITE-LUTEIN) Tab Take 1 tablet by mouth every day.       No facility-administered encounter medications on file as of 9/23/2019.      Review of Systems   Constitutional: Negative for chills, fever, malaise/fatigue and weight loss.   HENT: Negative for ear discharge, ear pain, hearing loss and nosebleeds.    Eyes: Negative for blurred vision, double vision, pain and discharge.   Respiratory: Negative for cough and shortness of breath.    Cardiovascular: Negative for chest pain, palpitations, orthopnea, claudication, leg swelling and PND.   Gastrointestinal: Negative for abdominal pain, blood in stool, melena, nausea and vomiting.   Genitourinary: Negative for dysuria " "and hematuria.   Musculoskeletal: Negative for falls, joint pain and myalgias.   Skin: Negative for itching and rash.   Neurological: Positive for sensory change, focal weakness and weakness. Negative for dizziness, speech change, loss of consciousness and headaches.   Endo/Heme/Allergies: Negative for environmental allergies. Does not bruise/bleed easily.   Psychiatric/Behavioral: Negative for depression, hallucinations and suicidal ideas.        Objective:   BP (!) 90/50 (BP Location: Left arm, Patient Position: Sitting, BP Cuff Size: Adult)   Pulse 76   Ht 1.6 m (5' 3\")   Wt 48.1 kg (106 lb)   SpO2 98%   BMI 18.78 kg/m²     Physical Exam   Constitutional: She is oriented to person, place, and time. No distress.   HENT:   Head: Normocephalic and atraumatic.   Right Ear: External ear normal.   Left Ear: External ear normal.   Eyes: Right eye exhibits no discharge. Left eye exhibits no discharge.   Neck: No JVD present. No thyromegaly present.   Cardiovascular: Normal rate, regular rhythm, normal heart sounds and intact distal pulses. Exam reveals no gallop and no friction rub.   No murmur heard.  Pulmonary/Chest: Breath sounds normal. No respiratory distress.   Abdominal: Bowel sounds are normal. She exhibits no distension. There is no tenderness.   Musculoskeletal: She exhibits no edema or tenderness.   Neurological: She is alert and oriented to person, place, and time.   +cerebellar ataxia   Skin: Skin is warm and dry. She is not diaphoretic.   Psychiatric: She has a normal mood and affect. Her behavior is normal.   Nursing note and vitals reviewed.      Assessment:     1. Coronary artery disease involving native coronary artery of native heart without angina pectoris  NM-CARDIAC PET   2. Syncope and collapse  [facility]       Medical Decision Making:  Today's Assessment / Status / Plan:   At this time, to further risk stratify, I will order a myocardial PET scan stress test to assess for coronary " ischemia.  I will also obtain home long-term event monitoring with zio patch.

## 2019-09-24 ENCOUNTER — HOSPITAL ENCOUNTER (EMERGENCY)
Facility: MEDICAL CENTER | Age: 74
End: 2019-09-24
Attending: EMERGENCY MEDICINE
Payer: MEDICARE

## 2019-09-24 ENCOUNTER — APPOINTMENT (OUTPATIENT)
Dept: RADIOLOGY | Facility: MEDICAL CENTER | Age: 74
End: 2019-09-24
Attending: EMERGENCY MEDICINE
Payer: MEDICARE

## 2019-09-24 VITALS
OXYGEN SATURATION: 98 % | DIASTOLIC BLOOD PRESSURE: 64 MMHG | HEART RATE: 72 BPM | WEIGHT: 106 LBS | HEIGHT: 63 IN | TEMPERATURE: 98.2 F | RESPIRATION RATE: 18 BRPM | SYSTOLIC BLOOD PRESSURE: 112 MMHG | BODY MASS INDEX: 18.78 KG/M2

## 2019-09-24 DIAGNOSIS — S20.211A CONTUSION OF RIB ON RIGHT SIDE, INITIAL ENCOUNTER: ICD-10-CM

## 2019-09-24 PROCEDURE — 99285 EMERGENCY DEPT VISIT HI MDM: CPT

## 2019-09-24 PROCEDURE — 36415 COLL VENOUS BLD VENIPUNCTURE: CPT

## 2019-09-24 PROCEDURE — 71101 X-RAY EXAM UNILAT RIBS/CHEST: CPT | Mod: RT

## 2019-09-24 PROCEDURE — 700101 HCHG RX REV CODE 250: Performed by: EMERGENCY MEDICINE

## 2019-09-24 RX ORDER — LIDOCAINE 50 MG/G
1 PATCH TOPICAL EVERY 24 HOURS
Qty: 10 PATCH | Refills: 0 | Status: SHIPPED | OUTPATIENT
Start: 2019-09-24 | End: 2020-11-09

## 2019-09-24 RX ORDER — LIDOCAINE 50 MG/G
1 PATCH TOPICAL EVERY 24 HOURS
Status: DISCONTINUED | OUTPATIENT
Start: 2019-09-24 | End: 2019-09-24 | Stop reason: HOSPADM

## 2019-09-24 RX ADMIN — LIDOCAINE 1 PATCH: 50 PATCH TOPICAL at 05:33

## 2019-09-24 NOTE — ED PROVIDER NOTES
ED Provider Note    Scribed for Valentin Engle M.D. by Yunior Lagunas. 9/24/2019, 5:03 AM.    Primary care provider: Dar Fraire M.D.  Means of arrival: EMS  History obtained from: Patient  History limited by: None    CHIEF COMPLAINT  Chief Complaint   Patient presents with   • Fall     Pt was sitting on the toilet- fell sleep resulting in her falling onto the side of the bathtub. Hit her right rib area on the bathtub edge, also left knee hurts.        HPI  Ana Paula Ordonez is a 74 y.o. female who presents to the Emergency Department following a ground level fall early this morning. Patient was sitting on the toilet and fell asleep, falling onto her right side hitting the side of the bathtub. She is complaining of right rib pain and left knee pain. She was seen by a cardiologist yesterday, her blood pressure was 90/70. She denies any loss of consciousness or hitting her head.    REVIEW OF SYSTEMS  See HPI for further details.      PAST MEDICAL HISTORY   has a past medical history of Arthritis, ASTHMA, Cerebellar ataxia (HCC) (3/30/2018), Chronic ulcerative colitis (HCC) (6/26/2013), Cough, GERD (gastroesophageal reflux disease), Hay fever (4/19/2019), History of falling (5/2/2018), HYPOTENSION, Inner ear disease, Leg cramps, sleep related (5/2/2018), Near-syncope, Neck injury, Osteoporosis, Peripheral neuropathy, Polyp of sigmoid colon (04/10/2018), Raynaud disease, Tremor, and Vitamin D deficiency.    SURGICAL HISTORY   has a past surgical history that includes nasal fracture reduction closed (10/8/08); hip nailing intramedullary (7/30/2011); tonsillectomy and adenoidectomy; bladder suspension; nissen fundoplication laparoscopic (2005); and cholecystectomy (2008).    SOCIAL HISTORY  Social History     Tobacco Use   • Smoking status: Never Smoker   • Smokeless tobacco: Never Used   Substance Use Topics   • Alcohol use: No     Alcohol/week: 0.0 oz   • Drug use: No      Social History     Substance and Sexual  "Activity   Drug Use No       FAMILY HISTORY  Family History   Problem Relation Age of Onset   • Non-contributory Father         Parkinson's disease   • Non-contributory Mother         old age with mild dementia   • Heart Disease Neg Hx    • Hypertension Neg Hx    • Hyperlipidemia Neg Hx    • Diabetes Neg Hx        CURRENT MEDICATIONS  Reviewed.  See Encounter Summary.     ALLERGIES  Allergies   Allergen Reactions   • Hydromorphone Anaphylaxis     \"went into code blue\"    • Azithromycin Diarrhea and Unspecified     Diarrhea     • Clindamycin    • Codeine Unspecified     ?   • Erythromycin Unspecified     ?   • Keflex      ?   • Levaquin Unspecified     \"i dont know\"    • Lyrica Unspecified     ?   • Quinolones Unspecified     ?   • Sulfa Drugs Unspecified     \"i dont know\"    • Tetracyclines Unspecified     ?   • Other Food Unspecified     Dairy-mucus (butter is okay). spices-cough       PHYSICAL EXAM  VITAL SIGNS: Ht 1.6 m (5' 3\")   Wt 48.1 kg (106 lb)   BMI 18.78 kg/m²   Constitutional: Alert in no apparent distress.  HENT: No signs of trauma, Bilateral external ears normal, Nose normal.   Eyes: Pupils are equal and reactive, Conjunctiva normal, Non-icteric.   Neck: Normal range of motion, No tenderness, Supple, No stridor.   Cardiovascular: Regular rate and rhythm, no murmurs.   Thorax & Lungs: Equal breath sounds bilaterally, No respiratory distress, No wheezing, Right chest wall tenderness, no ecchymosis no abrasion, no bony step offs or deformities, no crepitance, no subcutaneous air.  Abdomen: Bowel sounds normal, Soft, No tenderness, No masses, No pulsatile masses. No peritoneal signs.  Skin: Warm, Dry, No erythema, No rash.   Back: No bony tenderness, No CVA tenderness.   Extremities: Intact distal pulses, No edema, bilateral lower extremities non tender, No cyanosis Non tender hips, knees, ankles.   Musculoskeletal: Full range of motion in all major joints. No major deformities noted.   Neurologic: Alert " , Normal motor function, Normal sensory function, No focal deficits noted.   Psychiatric: Affect normal, Judgment normal, Mood normal.     DIAGNOSTIC STUDIES / PROCEDURES       RADIOLOGY  [id number] (WITH 1-VIEW CXR) RIGHT   Final Result      No acute cardiopulmonary abnormality.      No displaced right rib fracture is seen.        The radiologist's interpretation of all radiological studies and images have been reviewed by me.    COURSE & MEDICAL DECISION MAKING  Pertinent Labs & Imaging studies reviewed. (See chart for details)    5:03 AM - Patient seen and examined at bedside. Presents with right side chest wall pain following a ground level fall. She has right chest wall tenderness, no ecchymosis no abrasion, no bony step offs or deformities, no crepitance, no subcutaneous air. Patient will be treated with lidocaine patch. Ordered right ribs xray to evaluate her symptoms.     Decision Making:  This is a 74 y.o. year old female who presents with above complaints.  No evidence of acute fracture of ribs.  No pneumothorax or evidence of pulmonary contusion.  Patient is feeling better after Lidoderm patch.  She will be discharged home with the same.  I had long bedside conversation with the patient and her sisters about ongoing home care and safety measures to be taken to assure no further falls at this seems to be increasing risk and recurrent problem.  Fortunately no significant injury to date.  There is any return precautions here to outpatient follow-up within the given timeframe with a primary care physician.        FINAL IMPRESSION  1. Contusion of rib on right side, initial encounter          Yunior GUADARRAMA (Jb), am scribing for, and in the presence of, Valentin Engle M.D..    Electronically signed by: Yunior Lagunas (Jb), 9/24/2019    Valentin GUADARRAMA M.D. personally performed the services described in this documentation, as scribed by Yunior Lagunas in my presence, and it is  both accurate and complete. E.    The note accurately reflects work and decisions made by me.  Valentin Engle  9/24/2019  10:41 PM

## 2019-09-24 NOTE — ED NOTES
Discharge instructions given to pt, pt verbalized understanding. VSS. Sent one prescription. All personal belongings accounted for. Pt left via wheelchair. IV was removed.

## 2019-09-24 NOTE — ED NOTES
Pt placed in most comfortable position, pillows between knees, blankets under and between feet. Call light within reach. Fall risk wristband in place and door sign placed. Waiting on Lidoderm patch from pharmacy.   Pt aware of plan of care- will need X-ray.

## 2019-09-24 NOTE — ED NOTES
Pt reports she self-catheterizes, request writer to catheterize her d/t current limited mobility. Completed- approx 400mL clear, light yellow urine removed.

## 2019-09-24 NOTE — ED TRIAGE NOTES
Chief Complaint   Patient presents with   • Fall     Pt was sitting on the toilet- fell sleep resulting in her falling onto the side of the bathtub. Hit her right rib area on the bathtub edge, also left knee hurts.      [facility] Bp 139/68, pulse 64, RR 15, 95% on room air. Pt was here a week ago for another fall from her toilet. No LOC, No blood thinners.

## 2019-09-28 ENCOUNTER — APPOINTMENT (OUTPATIENT)
Dept: RADIOLOGY | Facility: MEDICAL CENTER | Age: 74
End: 2019-09-28
Attending: EMERGENCY MEDICINE
Payer: MEDICARE

## 2019-09-28 ENCOUNTER — HOSPITAL ENCOUNTER (OUTPATIENT)
Facility: MEDICAL CENTER | Age: 74
End: 2019-09-30
Attending: EMERGENCY MEDICINE | Admitting: INTERNAL MEDICINE
Payer: MEDICARE

## 2019-09-28 ENCOUNTER — APPOINTMENT (OUTPATIENT)
Dept: RADIOLOGY | Facility: MEDICAL CENTER | Age: 74
End: 2019-09-28
Attending: SURGERY
Payer: MEDICARE

## 2019-09-28 DIAGNOSIS — R10.84 GENERALIZED ABDOMINAL PAIN: ICD-10-CM

## 2019-09-28 DIAGNOSIS — R11.2 NON-INTRACTABLE VOMITING WITH NAUSEA, UNSPECIFIED VOMITING TYPE: ICD-10-CM

## 2019-09-28 DIAGNOSIS — K56.600 PARTIAL SMALL BOWEL OBSTRUCTION (HCC): Primary | ICD-10-CM

## 2019-09-28 PROBLEM — K59.00 CN (CONSTIPATION): Status: ACTIVE | Noted: 2019-09-28

## 2019-09-28 LAB
ALBUMIN SERPL BCP-MCNC: 3.8 G/DL (ref 3.2–4.9)
ALBUMIN/GLOB SERPL: 1.5 G/DL
ALP SERPL-CCNC: 87 U/L (ref 30–99)
ALT SERPL-CCNC: 14 U/L (ref 2–50)
ANION GAP SERPL CALC-SCNC: 6 MMOL/L (ref 0–11.9)
APPEARANCE UR: CLEAR
AST SERPL-CCNC: 18 U/L (ref 12–45)
BASOPHILS # BLD AUTO: 0.3 % (ref 0–1.8)
BASOPHILS # BLD: 0.03 K/UL (ref 0–0.12)
BILIRUB SERPL-MCNC: 0.7 MG/DL (ref 0.1–1.5)
BILIRUB UR QL STRIP.AUTO: NEGATIVE
BUN SERPL-MCNC: 9 MG/DL (ref 8–22)
CALCIUM SERPL-MCNC: 9.1 MG/DL (ref 8.5–10.5)
CHLORIDE SERPL-SCNC: 104 MMOL/L (ref 96–112)
CO2 SERPL-SCNC: 26 MMOL/L (ref 20–33)
COLOR UR: YELLOW
CREAT SERPL-MCNC: 0.33 MG/DL (ref 0.5–1.4)
EOSINOPHIL # BLD AUTO: 0.05 K/UL (ref 0–0.51)
EOSINOPHIL NFR BLD: 0.5 % (ref 0–6.9)
ERYTHROCYTE [DISTWIDTH] IN BLOOD BY AUTOMATED COUNT: 42.1 FL (ref 35.9–50)
GLOBULIN SER CALC-MCNC: 2.5 G/DL (ref 1.9–3.5)
GLUCOSE SERPL-MCNC: 113 MG/DL (ref 65–99)
GLUCOSE UR STRIP.AUTO-MCNC: NEGATIVE MG/DL
HCT VFR BLD AUTO: 38.1 % (ref 37–47)
HGB BLD-MCNC: 12.5 G/DL (ref 12–16)
IMM GRANULOCYTES # BLD AUTO: 0.05 K/UL (ref 0–0.11)
IMM GRANULOCYTES NFR BLD AUTO: 0.5 % (ref 0–0.9)
KETONES UR STRIP.AUTO-MCNC: NEGATIVE MG/DL
LEUKOCYTE ESTERASE UR QL STRIP.AUTO: NEGATIVE
LIPASE SERPL-CCNC: 5 U/L (ref 11–82)
LYMPHOCYTES # BLD AUTO: 1.38 K/UL (ref 1–4.8)
LYMPHOCYTES NFR BLD: 14.9 % (ref 22–41)
MAGNESIUM SERPL-MCNC: 2.4 MG/DL (ref 1.5–2.5)
MCH RBC QN AUTO: 32.2 PG (ref 27–33)
MCHC RBC AUTO-ENTMCNC: 32.8 G/DL (ref 33.6–35)
MCV RBC AUTO: 98.2 FL (ref 81.4–97.8)
MICRO URNS: NORMAL
MONOCYTES # BLD AUTO: 0.48 K/UL (ref 0–0.85)
MONOCYTES NFR BLD AUTO: 5.2 % (ref 0–13.4)
NEUTROPHILS # BLD AUTO: 7.26 K/UL (ref 2–7.15)
NEUTROPHILS NFR BLD: 78.6 % (ref 44–72)
NITRITE UR QL STRIP.AUTO: NEGATIVE
NRBC # BLD AUTO: 0 K/UL
NRBC BLD-RTO: 0 /100 WBC
PH UR STRIP.AUTO: 7 [PH] (ref 5–8)
PLATELET # BLD AUTO: 250 K/UL (ref 164–446)
PMV BLD AUTO: 8.6 FL (ref 9–12.9)
POTASSIUM SERPL-SCNC: 3.9 MMOL/L (ref 3.6–5.5)
PROT SERPL-MCNC: 6.3 G/DL (ref 6–8.2)
PROT UR QL STRIP: NEGATIVE MG/DL
RBC # BLD AUTO: 3.88 M/UL (ref 4.2–5.4)
RBC UR QL AUTO: NEGATIVE
SODIUM SERPL-SCNC: 136 MMOL/L (ref 135–145)
SP GR UR STRIP.AUTO: 1.01
UROBILINOGEN UR STRIP.AUTO-MCNC: 0.2 MG/DL
WBC # BLD AUTO: 9.3 K/UL (ref 4.8–10.8)

## 2019-09-28 PROCEDURE — 700102 HCHG RX REV CODE 250 W/ 637 OVERRIDE(OP): Performed by: INTERNAL MEDICINE

## 2019-09-28 PROCEDURE — 700111 HCHG RX REV CODE 636 W/ 250 OVERRIDE (IP): Performed by: INTERNAL MEDICINE

## 2019-09-28 PROCEDURE — 85025 COMPLETE CBC W/AUTO DIFF WBC: CPT

## 2019-09-28 PROCEDURE — 306588 SLEEVE,VASO CALF MED: Performed by: INTERNAL MEDICINE

## 2019-09-28 PROCEDURE — 304561 HCHG STAT O2

## 2019-09-28 PROCEDURE — 700117 HCHG RX CONTRAST REV CODE 255: Performed by: EMERGENCY MEDICINE

## 2019-09-28 PROCEDURE — 700101 HCHG RX REV CODE 250: Performed by: EMERGENCY MEDICINE

## 2019-09-28 PROCEDURE — G0378 HOSPITAL OBSERVATION PER HR: HCPCS

## 2019-09-28 PROCEDURE — 83690 ASSAY OF LIPASE: CPT

## 2019-09-28 PROCEDURE — 302092 REMEDY SKIN REPAIR CREAM

## 2019-09-28 PROCEDURE — 99285 EMERGENCY DEPT VISIT HI MDM: CPT

## 2019-09-28 PROCEDURE — 700105 HCHG RX REV CODE 258: Performed by: INTERNAL MEDICINE

## 2019-09-28 PROCEDURE — 74177 CT ABD & PELVIS W/CONTRAST: CPT

## 2019-09-28 PROCEDURE — 96372 THER/PROPH/DIAG INJ SC/IM: CPT

## 2019-09-28 PROCEDURE — A9270 NON-COVERED ITEM OR SERVICE: HCPCS | Performed by: INTERNAL MEDICINE

## 2019-09-28 PROCEDURE — 83735 ASSAY OF MAGNESIUM: CPT

## 2019-09-28 PROCEDURE — 99220 PR INITIAL OBSERVATION CARE,LEVL III: CPT | Performed by: INTERNAL MEDICINE

## 2019-09-28 PROCEDURE — 81003 URINALYSIS AUTO W/O SCOPE: CPT

## 2019-09-28 PROCEDURE — 80053 COMPREHEN METABOLIC PANEL: CPT

## 2019-09-28 RX ORDER — MONTELUKAST SODIUM 10 MG/1
10 TABLET ORAL DAILY
Status: DISCONTINUED | OUTPATIENT
Start: 2019-09-28 | End: 2019-09-30 | Stop reason: HOSPADM

## 2019-09-28 RX ORDER — CETIRIZINE HYDROCHLORIDE 10 MG/1
10 TABLET ORAL DAILY
Status: DISCONTINUED | OUTPATIENT
Start: 2019-09-28 | End: 2019-09-30 | Stop reason: HOSPADM

## 2019-09-28 RX ORDER — AMOXICILLIN 250 MG
2 CAPSULE ORAL 2 TIMES DAILY
Status: DISCONTINUED | OUTPATIENT
Start: 2019-09-28 | End: 2019-09-29

## 2019-09-28 RX ORDER — ACETAMINOPHEN 325 MG/1
650 TABLET ORAL EVERY 6 HOURS PRN
Status: DISCONTINUED | OUTPATIENT
Start: 2019-09-28 | End: 2019-09-30 | Stop reason: HOSPADM

## 2019-09-28 RX ORDER — ONDANSETRON 2 MG/ML
4 INJECTION INTRAMUSCULAR; INTRAVENOUS EVERY 4 HOURS PRN
Status: DISCONTINUED | OUTPATIENT
Start: 2019-09-28 | End: 2019-09-30 | Stop reason: HOSPADM

## 2019-09-28 RX ORDER — POLYETHYLENE GLYCOL 3350 17 G/17G
1 POWDER, FOR SOLUTION ORAL
Status: DISCONTINUED | OUTPATIENT
Start: 2019-09-28 | End: 2019-09-29

## 2019-09-28 RX ORDER — LABETALOL HYDROCHLORIDE 5 MG/ML
10 INJECTION, SOLUTION INTRAVENOUS EVERY 4 HOURS PRN
Status: DISCONTINUED | OUTPATIENT
Start: 2019-09-28 | End: 2019-09-30 | Stop reason: HOSPADM

## 2019-09-28 RX ORDER — ENEMA 19; 7 G/133ML; G/133ML
399 ENEMA RECTAL ONCE
Status: COMPLETED | OUTPATIENT
Start: 2019-09-28 | End: 2019-09-28

## 2019-09-28 RX ORDER — ALBUTEROL SULFATE 90 UG/1
2 AEROSOL, METERED RESPIRATORY (INHALATION) EVERY 6 HOURS PRN
Status: DISCONTINUED | OUTPATIENT
Start: 2019-09-28 | End: 2019-09-30 | Stop reason: HOSPADM

## 2019-09-28 RX ORDER — ONDANSETRON 4 MG/1
4 TABLET, ORALLY DISINTEGRATING ORAL EVERY 4 HOURS PRN
Status: DISCONTINUED | OUTPATIENT
Start: 2019-09-28 | End: 2019-09-30 | Stop reason: HOSPADM

## 2019-09-28 RX ORDER — BISACODYL 10 MG
10 SUPPOSITORY, RECTAL RECTAL
Status: DISCONTINUED | OUTPATIENT
Start: 2019-09-28 | End: 2019-09-29

## 2019-09-28 RX ORDER — AZELASTINE 1 MG/ML
2 SPRAY, METERED NASAL 2 TIMES DAILY PRN
Status: DISCONTINUED | OUTPATIENT
Start: 2019-09-28 | End: 2019-09-28

## 2019-09-28 RX ORDER — SODIUM CHLORIDE, SODIUM LACTATE, POTASSIUM CHLORIDE, CALCIUM CHLORIDE 600; 310; 30; 20 MG/100ML; MG/100ML; MG/100ML; MG/100ML
INJECTION, SOLUTION INTRAVENOUS CONTINUOUS
Status: DISCONTINUED | OUTPATIENT
Start: 2019-09-28 | End: 2019-09-30 | Stop reason: HOSPADM

## 2019-09-28 RX ORDER — ENALAPRILAT 1.25 MG/ML
1.25 INJECTION INTRAVENOUS EVERY 6 HOURS PRN
Status: DISCONTINUED | OUTPATIENT
Start: 2019-09-28 | End: 2019-09-30 | Stop reason: HOSPADM

## 2019-09-28 RX ORDER — ENEMA 19; 7 G/133ML; G/133ML
1 ENEMA RECTAL
Status: COMPLETED | OUTPATIENT
Start: 2019-09-28 | End: 2019-09-28

## 2019-09-28 RX ADMIN — Medication 1 EACH: at 14:10

## 2019-09-28 RX ADMIN — SENNOSIDES, DOCUSATE SODIUM 2 TABLET: 50; 8.6 TABLET, FILM COATED ORAL at 16:52

## 2019-09-28 RX ADMIN — MAGNESIUM HYDROXIDE 30 ML: 400 SUSPENSION ORAL at 18:47

## 2019-09-28 RX ADMIN — SODIUM PHOSPHATE 133 ML: 7; 19 ENEMA RECTAL at 11:32

## 2019-09-28 RX ADMIN — SODIUM CHLORIDE, POTASSIUM CHLORIDE, SODIUM LACTATE AND CALCIUM CHLORIDE: 600; 310; 30; 20 INJECTION, SOLUTION INTRAVENOUS at 10:06

## 2019-09-28 RX ADMIN — SODIUM PHOSPHATE 133 ML: 7; 19 ENEMA RECTAL at 12:15

## 2019-09-28 RX ADMIN — SODIUM PHOSPHATE 399 ML: 7; 19 ENEMA RECTAL at 10:04

## 2019-09-28 RX ADMIN — SODIUM PHOSPHATE 133 ML: 7; 19 ENEMA RECTAL at 08:30

## 2019-09-28 RX ADMIN — IOHEXOL 80 ML: 350 INJECTION, SOLUTION INTRAVENOUS at 07:18

## 2019-09-28 RX ADMIN — METHYLNALTREXONE BROMIDE 8 MG: 8 INJECTION, SOLUTION SUBCUTANEOUS at 16:52

## 2019-09-28 ASSESSMENT — LIFESTYLE VARIABLES
HAVE YOU EVER FELT YOU SHOULD CUT DOWN ON YOUR DRINKING: NO
EVER HAD A DRINK FIRST THING IN THE MORNING TO STEADY YOUR NERVES TO GET RID OF A HANGOVER: NO
DOES PATIENT WANT TO STOP DRINKING: NO
AVERAGE NUMBER OF DAYS PER WEEK YOU HAVE A DRINK CONTAINING ALCOHOL: 0
EVER_SMOKED: NEVER
EVER FELT BAD OR GUILTY ABOUT YOUR DRINKING: NO
HOW MANY TIMES IN THE PAST YEAR HAVE YOU HAD 5 OR MORE DRINKS IN A DAY: 0
ALCOHOL_USE: NO
EVER_SMOKED: NEVER
CONSUMPTION TOTAL: NEGATIVE
TOTAL SCORE: 0
ON A TYPICAL DAY WHEN YOU DRINK ALCOHOL HOW MANY DRINKS DO YOU HAVE: 0
TOTAL SCORE: 0
HAVE PEOPLE ANNOYED YOU BY CRITICIZING YOUR DRINKING: NO
TOTAL SCORE: 0

## 2019-09-28 ASSESSMENT — COPD QUESTIONNAIRES
DURING THE PAST 4 WEEKS HOW MUCH DID YOU FEEL SHORT OF BREATH: NONE/LITTLE OF THE TIME
DO YOU EVER COUGH UP ANY MUCUS OR PHLEGM?: YES, A FEW DAYS A WEEK OR MONTH
COPD SCREENING SCORE: 3
HAVE YOU SMOKED AT LEAST 100 CIGARETTES IN YOUR ENTIRE LIFE: NO/DON'T KNOW

## 2019-09-28 NOTE — ED TRIAGE NOTES
"Ana Paula Ordonez  74 y.o. female  Chief Complaint   Patient presents with   • Constipation   • Nausea     Pt BIB EMS for above complaint.      Pt reports falling off her toilet on Tuesday, bruising her right rib cage. Since leaving the hospital Tuesday, the patient reports constipation, nausea and a cough. Pt has been taking over the counter pain medications for the rib pain, denies narcotic use. Pt took mag citrate yesterday without any resolution of her constipation. States she is passing gas.   Pt given PIV, 500cc fluid and zofran by EMS.     Blood Pressure : 116/57, Pulse: 67, Respiration: 16, Temperature: 36.1 °C (97 °F), Height: 160 cm (5' 3\"), Weight: 47.2 kg (104 lb), BMI (Calculated): 18.42, BSA (Calculated): 1.4, Pulse Oximetry: 94 %, O2 (LPM): 2, O2 Delivery: Nasal Cannula    "

## 2019-09-28 NOTE — ED NOTES
Received report from LUKAS Esqueda, taking over pt care, pt sitting up comfortably, no needs at this time,  Bed in lowered position side rails up, call light in reach daughter at bedside

## 2019-09-28 NOTE — PROGRESS NOTES
Patient transferred to floor with ACLS RN. Patient is A/O x4 and co abd pain rated 6/10, declines intervention at this time. Patient back to bed with hand held assistance.

## 2019-09-28 NOTE — ASSESSMENT & PLAN NOTE
General surgery consulted- Multiple enemas given per their recommendations   Pending small bowel series with water-soluble contrast. Awaiting results for further plan from surgery   -N.p.o. for now.  Start IV fluids with LR at 83 cc/h.  Encouraged to ambulate.  -Continue supportive care with symptomatic therapy with as needed antiemetics.

## 2019-09-28 NOTE — ED PROVIDER NOTES
ED Provider Note    CHIEF COMPLAINT  Chief Complaint   Patient presents with   • Constipation   • Nausea       HPI  Ana Paula Ordonez is a 74 y.o. female who presents to the emergency department by ambulance for abdominal pain and constipation.  Patient with distant history for ulcerative colitis, followed by Dr. Lai.  Patient also with history for partial small bowel obstruction last spring, managed medically.  Patient states she took 3 narcotic pills orally this week for her shoulder pain, and has since been constipated.  Last normal bowel movement 5 days ago.  8 ounces of magnesium citrate without relief.  Patient initially passing gas but has not been able to do so overnight.  Generalized abdominal discomfort and cramping.  Nausea, dry heaving but no vomiting and she is not eating much.  Denies fever.    REVIEW OF SYSTEMS  See HPI for further details. All other systems are negative.     PAST MEDICAL HISTORY   has a past medical history of Arthritis, ASTHMA, Cerebellar ataxia (HCC) (3/30/2018), Chronic ulcerative colitis (HCC) (6/26/2013), Cough, GERD (gastroesophageal reflux disease), Hay fever (4/19/2019), History of falling (5/2/2018), HYPOTENSION, Inner ear disease, Leg cramps, sleep related (5/2/2018), Near-syncope, Neck injury, Osteoporosis, Peripheral neuropathy, Polyp of sigmoid colon (04/10/2018), Raynaud disease, Tremor, and Vitamin D deficiency.    SOCIAL HISTORY  Social History     Tobacco Use   • Smoking status: Never Smoker   • Smokeless tobacco: Never Used   Substance and Sexual Activity   • Alcohol use: No     Alcohol/week: 0.0 oz   • Drug use: No   • Sexual activity: Not on file       SURGICAL HISTORY   has a past surgical history that includes nasal fracture reduction closed (10/8/08); hip nailing intramedullary (7/30/2011); tonsillectomy and adenoidectomy; bladder suspension; nissen fundoplication laparoscopic (2005); and cholecystectomy (2008).    CURRENT MEDICATIONS  Home Medications      "Reviewed by Dheeraj Thibodeaux R.N. (Registered Nurse) on 09/28/19 at 0429  Med List Status: Partial   Medication Last Dose Status   albuterol (PROAIR HFA) 108 (90 Base) MCG/ACT Aero Soln inhalation aerosol  Active   azelastine (ASTELIN) 137 MCG/SPRAY nasal spray  Active   BIOTIN PO  Active   CALCIUM PO  Active   cetirizine (ZYRTEC) 10 MG Tab  Active   Cholecalciferol (VITAMIN D3 PO)  Active   lidocaine (LIDODERM) 5 % Patch  Active   MAGNESIUM PO  Active   Melatonin 10 MG Cap  Active   montelukast (SINGULAIR) 10 MG Tab  Active   Multiple Vitamins-Minerals (OCUVITE-LUTEIN) Tab  Active   Multiple Vitamins-Minerals (WOMENS 50+ MULTI VITAMIN/MIN PO)  Active   Potassium (POTASSIMIN PO)  Active                ALLERGIES  Allergies   Allergen Reactions   • Hydromorphone Anaphylaxis     \"went into code blue\"    • Azithromycin Diarrhea and Unspecified     Diarrhea     • Clindamycin    • Codeine Unspecified     ?   • Erythromycin Unspecified     ?   • Keflex      ?   • Levaquin Unspecified     \"i dont know\"    • Lyrica Unspecified     ?   • Quinolones Unspecified     ?   • Sulfa Drugs Unspecified     \"i dont know\"    • Tetracyclines Unspecified     ?   • Other Food Unspecified     Dairy-mucus (butter is okay). spices-cough       PHYSICAL EXAM  VITAL SIGNS: /68   Pulse 71   Temp 36.1 °C (97 °F) (Temporal)   Resp 16   Ht 1.6 m (5' 3\")   Wt 47.2 kg (104 lb)   SpO2 99%   BMI 18.42 kg/m²   Pulse ox interpretation: I interpret this pulse ox as normal.  Constitutional: Alert in no apparent distress.  HENT: Normocephalic, atraumatic. Bilateral external ears normal, Nose normal. Moist mucous membranes.    Eyes: Pupils are equal and reactive, Conjunctiva normal.   Neck: Normal range of motion, Supple  Lymphatic: No lymphadenopathy noted.   Cardiovascular: Regular rate and rhythm. Distal pulses intact.  Thorax & Lungs: Normal breath sounds.  No wheezing/rales/ronchi. No increased work of breathing  Abdomen: Soft, distended.  " Generalized discomfort with palpation, no rebound, guarding or peritonitis.  No palpable pulsatile mass.  Skin: Warm, Dry  Musculoskeletal: Good range of motion in all major joints.    Neurologic: Alert and oriented x4.  Speech clear cohesive.  Moves 4 extremity spontaneously.  Psychiatric: Affect normal, Judgment normal, Mood normal.       DIAGNOSTIC STUDIES / PROCEDURES    LABS  Results for orders placed or performed during the hospital encounter of 09/28/19   CBC WITH DIFFERENTIAL   Result Value Ref Range    WBC 9.3 4.8 - 10.8 K/uL    RBC 3.88 (L) 4.20 - 5.40 M/uL    Hemoglobin 12.5 12.0 - 16.0 g/dL    Hematocrit 38.1 37.0 - 47.0 %    MCV 98.2 (H) 81.4 - 97.8 fL    MCH 32.2 27.0 - 33.0 pg    MCHC 32.8 (L) 33.6 - 35.0 g/dL    RDW 42.1 35.9 - 50.0 fL    Platelet Count 250 164 - 446 K/uL    MPV 8.6 (L) 9.0 - 12.9 fL    Neutrophils-Polys 78.60 (H) 44.00 - 72.00 %    Lymphocytes 14.90 (L) 22.00 - 41.00 %    Monocytes 5.20 0.00 - 13.40 %    Eosinophils 0.50 0.00 - 6.90 %    Basophils 0.30 0.00 - 1.80 %    Immature Granulocytes 0.50 0.00 - 0.90 %    Nucleated RBC 0.00 /100 WBC    Neutrophils (Absolute) 7.26 (H) 2.00 - 7.15 K/uL    Lymphs (Absolute) 1.38 1.00 - 4.80 K/uL    Monos (Absolute) 0.48 0.00 - 0.85 K/uL    Eos (Absolute) 0.05 0.00 - 0.51 K/uL    Baso (Absolute) 0.03 0.00 - 0.12 K/uL    Immature Granulocytes (abs) 0.05 0.00 - 0.11 K/uL    NRBC (Absolute) 0.00 K/uL   COMP METABOLIC PANEL   Result Value Ref Range    Sodium 136 135 - 145 mmol/L    Potassium 3.9 3.6 - 5.5 mmol/L    Chloride 104 96 - 112 mmol/L    Co2 26 20 - 33 mmol/L    Anion Gap 6.0 0.0 - 11.9    Glucose 113 (H) 65 - 99 mg/dL    Bun 9 8 - 22 mg/dL    Creatinine 0.33 (L) 0.50 - 1.40 mg/dL    Calcium 9.1 8.5 - 10.5 mg/dL    AST(SGOT) 18 12 - 45 U/L    ALT(SGPT) 14 2 - 50 U/L    Alkaline Phosphatase 87 30 - 99 U/L    Total Bilirubin 0.7 0.1 - 1.5 mg/dL    Albumin 3.8 3.2 - 4.9 g/dL    Total Protein 6.3 6.0 - 8.2 g/dL    Globulin 2.5 1.9 - 3.5 g/dL     A-G Ratio 1.5 g/dL   LIPASE   Result Value Ref Range    Lipase 5 (L) 11 - 82 U/L   URINALYSIS,CULTURE IF INDICATED   Result Value Ref Range    Color Yellow     Character Clear     Specific Gravity 1.007 <1.035    Ph 7.0 5.0 - 8.0    Glucose Negative Negative mg/dL    Ketones Negative Negative mg/dL    Protein Negative Negative mg/dL    Bilirubin Negative Negative    Urobilinogen, Urine 0.2 Negative    Nitrite Negative Negative    Leukocyte Esterase Negative Negative    Occult Blood Negative Negative    Micro Urine Req see below    ESTIMATED GFR   Result Value Ref Range    GFR If African American >60 >60 mL/min/1.73 m 2    GFR If Non African American >60 >60 mL/min/1.73 m 2     RADIOLOGY  CT-ABDOMEN-PELVIS WITH   Final Result      1.  Markedly increased colonic fecal material consistent with constipation. Additionally, there are multiple loops of dilated fluid and gas-filled small bowel suggesting an obstructive pattern.   2.  Bilateral lower lobe atelectasis and tiny bilateral pleural effusions.      DX-SMALL BOWEL SERIES    (Results Pending)   DX-SMALL BOWEL SERIES    (Results Pending)       COURSE & MEDICAL DECISION MAKING  Nursing notes and vital signs were reviewed. (See chart for details)  The patients records were reviewed, history was obtained from the patient;     ED evaluation confirms constipation, however CT concerning for obstructive pattern as well.  No perforation.  No colitis or diverticulitis.  Labs within normal limits, no leukocytosis or electrolyte derangement.  No UTI.  Patient's pain is controlled with out medication in the emergency department.  Abdomen without peritonitis or acute abdomen.  Vital signs are stable without fever tachycardia.  She remains n.p.o.. She will be admitted to the hospital for further evaluation and treatment.  She is aware the findings and agreeable to the disposition plan.    8:03 AM Dr. Lebron is aware the patient has reviewed the imaging.  Agreeable to consultation  at the request hospitalist admission.  Advises enema x3 every 2 hours this morning, then small bowel series with water-soluble contrast.  I have placed these orders.    8:03 AM Dr. Farias is aware the patient agreeable to admission.      FINAL IMPRESSION  (K56.600) Partial small bowel obstruction (HCC)  (primary encounter diagnosis)  (R10.84) Generalized abdominal pain  (R11.2) Non-intractable vomiting with nausea, unspecified vomiting type      Electronically signed by: Karis Pereira, 9/28/2019 8:02 AM      This dictation was created using voice recognition software. The accuracy of the dictation is limited to the abilities of the software. I expect there may be some errors of grammar and possibly content. The nursing notes were reviewed and certain aspects of this information were incorporated into this note.

## 2019-09-28 NOTE — CARE PLAN
Problem: Communication  Goal: The ability to communicate needs accurately and effectively will improve  9/28/2019 1335 by Christian Puente R.N.  Outcome: PROGRESSING AS EXPECTED    Problem: Safety  Goal: Will remain free from injury  Outcome: PROGRESSING AS EXPECTED  Goal: Will remain free from falls  9/28/2019 1335 by ARACELI Oliveira.N.  Outcome: PROGRESSING AS EXPECTED

## 2019-09-28 NOTE — ED NOTES
Pt reporting she straight caths herself, requesting to be straight cathed.   Female RN accompanied this RN bedside for straight cath. 450ml collected. Urine sample tubed to lab.  Pt also reports being diagnosed with UTI on Thursday, missed her dose of Macrobid last night.     ERP to bedside.

## 2019-09-28 NOTE — PROGRESS NOTES
Call back received from Vi WHYTE. Vi updated on patient status. No new orders, continue current plan.

## 2019-09-28 NOTE — CARE PLAN
Problem: Communication  Goal: The ability to communicate needs accurately and effectively will improve  Outcome: PROGRESSING AS EXPECTED     Problem: Safety  Goal: Will remain free from falls  Outcome: PROGRESSING AS EXPECTED     Call light education provide, patient completed return demonstration successfully. Re-enforced use of call light to ensure patient safety and decrease risk of fall.

## 2019-09-28 NOTE — CONSULTS
"General Surgery Consult    CHIEF COMPLAINT: Abdominal pain nausea and vomiting.     HISTORY OF PRESENT ILLNESS: The patient is a 74 y.o. female, who presents with abdominal pain nausea and vomiting.  She did have a fall last week requiring narcotics while in the hospital.  She already suffered from constipation however has not had a bowel movement in several days.  She presented to the hospital and underwent a work-up to include a CT scan revealing severe constipation with a possible bowel obstruction.  General surgery was consulted for evaluation and management.  The patient describes diffuse abdominal pain without exacerbating or relieving factors.  The patient is been admitted to the medicine service.     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     PAST SURGICAL HISTORY:  has a past surgical history that includes nasal fracture reduction closed (10/8/08); hip nailing intramedullary (7/30/2011); tonsillectomy and adenoidectomy; bladder suspension; nissen fundoplication laparoscopic (2005); and cholecystectomy (2008).     ALLERGIES:   Allergies   Allergen Reactions   • Hydromorphone Anaphylaxis     \"went into code blue\"    • Azithromycin Diarrhea and Unspecified     Diarrhea     • Clindamycin    • Codeine Unspecified     ?   • Erythromycin Unspecified     ?   • Keflex      ?   • Levaquin Unspecified     \"i dont know\"    • Lyrica Unspecified     ?   • Quinolones Unspecified     ?   • Sulfa Drugs Unspecified     \"i dont know\"    • Tetracyclines Unspecified     ?   • Other Food " "Unspecified     Dairy-mucus (butter is okay). spices-cough        CURRENT MEDICATIONS:   Home Medications     Reviewed by Dheeraj Thibodeaux R.N. (Registered Nurse) on 09/28/19 at 0429  Med List Status: Partial   Medication Last Dose Status   albuterol (PROAIR HFA) 108 (90 Base) MCG/ACT Aero Soln inhalation aerosol  Active   azelastine (ASTELIN) 137 MCG/SPRAY nasal spray  Active   BIOTIN PO  Active   CALCIUM PO  Active   cetirizine (ZYRTEC) 10 MG Tab  Active   Cholecalciferol (VITAMIN D3 PO)  Active   lidocaine (LIDODERM) 5 % Patch  Active   MAGNESIUM PO  Active   Melatonin 10 MG Cap  Active   montelukast (SINGULAIR) 10 MG Tab  Active   Multiple Vitamins-Minerals (OCUVITE-LUTEIN) Tab  Active   Multiple Vitamins-Minerals (WOMENS 50+ MULTI VITAMIN/MIN PO)  Active   Potassium (POTASSIMIN PO)  Active                FAMILY HISTORY:   Family History   Problem Relation Age of Onset   • Non-contributory Father         Parkinson's disease   • Non-contributory Mother         old age with mild dementia   • Heart Disease Neg Hx    • Hypertension Neg Hx    • Hyperlipidemia Neg Hx    • Diabetes Neg Hx         SOCIAL HISTORY:   Social History     Tobacco Use   • Smoking status: Never Smoker   • Smokeless tobacco: Never Used   Substance and Sexual Activity   • Alcohol use: No     Alcohol/week: 0.0 oz   • Drug use: No   • Sexual activity: Not on file       REVIEW OF SYSTEMS: Comprehensive review of systems was negative aside from abdominal pain nausea and vomiting with constipation    PHYSICAL EXAMINATION:     GENERAL: The patient is in no acute distress.   VITAL SIGNS: /57   Pulse 71   Temp 36.2 °C (97.1 °F) (Temporal)   Resp 16   Ht 1.6 m (5' 3\")   Wt 48.7 kg (107 lb 5.8 oz)   SpO2 91%   HEAD AND NECK: Demonstrates symmetric, reactive pupils. Extraocular muscles   are intact. Nares and oropharynx are clear.   NECK: Supple. No adenopathy.  CHEST:No respiratory distress.    CARDIOVASCULAR: Regular rate. The extremities " are well perfused.   ABDOMEN: Soft.  Mild distention.  Mild tenderness without rebound or guarding.   EXTREMITIES: Examination of the upper and lower extremities demonstrates no cyanosis edema or clubbing.  NEUROLOGIC: Alert & oriented x 3, Normal motor function, Normal sensory function, No focal deficits noted.    LABORATORY VALUES:   Recent Labs     09/28/19  0549   WBC 9.3   RBC 3.88*   HEMOGLOBIN 12.5   HEMATOCRIT 38.1   MCV 98.2*   MCH 32.2   MCHC 32.8*   RDW 42.1   PLATELETCT 250   MPV 8.6*     Recent Labs     09/28/19  0549   SODIUM 136   POTASSIUM 3.9   CHLORIDE 104   CO2 26   GLUCOSE 113*   BUN 9   CREATININE 0.33*   CALCIUM 9.1     Recent Labs     09/28/19  0549   ASTSGOT 18   ALTSGPT 14   TBILIRUBIN 0.7   ALKPHOSPHAT 87   GLOBULIN 2.5            IMAGING:   CT-ABDOMEN-PELVIS WITH   Final Result      1.  Markedly increased colonic fecal material consistent with constipation. Additionally, there are multiple loops of dilated fluid and gas-filled small bowel suggesting an obstructive pattern.   2.  Bilateral lower lobe atelectasis and tiny bilateral pleural effusions.      DX-SMALL BOWEL SERIES    (Results Pending)   DX-SMALL BOWEL SERIES    (Results Pending)       IMPRESSION AND PLAN:     1.  Constipation.  2.  Possible small bowel obstruction    1.  Plan for enemas from below today.  I will order a small bowel series with water-soluble contrast for tomorrow.  Please place a nasogastric tube for any nausea or vomiting.  If she does not have any nausea or vomiting this can be omitted.    2.  Plan pending the results of interventions and of the study tomorrow.    ___________________________________   Ramsey Lebron M.D.    DD: 9/28/2019 DT: 10:39 AM

## 2019-09-28 NOTE — H&P
Hospital Medicine History & Physical Note    Date of Service  9/28/2019    Primary Care Physician  Dar Fraire M.D.    Consultants  Surgery (Dr. Lebron)    Code Status  Full    Chief Complaint  Abdominal pain, constipation    History of Presenting Illness  74 y.o. female with cerebellar ataxia, chronic constipation, ulcerative colitis in remission, GERD, Raynaud's disease, asthma, and arthritis who presented 9/28/2019 with abdominal pain and constipation.    She had a recent fall last week, with work-up showing rib contusion without fractures.  She was sent home on lidocaine patch, and she has been taking over-the-counter Tylenol for the pain.  She is not on any narcotics.  Since then (5 days prior to admission), she started to notice crampy abdominal pain, which was diffuse, rated 2 out of 10 in severity, with associated constipation.  She has not had a bowel movement since.  She then started to have nausea with dry heaving, but no vomiting.  She denied any other complaint such as fevers, chills, chest pain or shortness of breath, or leg edema.  She decided to go to the ED today.    ED course:  The patient was initially evaluated.  Vital signs were stable.  Initial blood work-up are unremarkable with unimpressive CBC and BMP.  CT scan however showed markedly increased colonic fecal material consistent with constipation, with multiple loops of dilated fluid and gas-filled small bowel suggesting obstructive pattern, with bilateral lower lobe atelectasis.  Surgery was consulted, who recommended Fleet enema x3.  She was subsequently admitted to the hospital service.    Review of Systems  ROS     Pertinent positives/negatives as mentioned above.     A complete review of systems was personally done by me. All other systems were negative.       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    Surgical History   has a past surgical history that includes nasal fracture reduction closed (10/8/08); hip nailing intramedullary (7/30/2011); tonsillectomy and adenoidectomy; bladder suspension; nissen fundoplication laparoscopic (2005); and cholecystectomy (2008).     Family History  family history includes Non-contributory in her father and mother.     Social History   reports that she has never smoked. She has never used smokeless tobacco. She reports that she does not drink alcohol or use drugs.    Allergies  Allergies   Allergen Reactions   • Hydromorphone Anaphylaxis     \"went into code blue\"    • Azithromycin Diarrhea and Unspecified     Diarrhea     • Clindamycin    • Codeine Unspecified     ?   • Erythromycin Unspecified     ?   • Keflex      ?   • Levaquin Unspecified     \"i dont know\"    • Lyrica Unspecified     ?   • Quinolones Unspecified     ?   • Sulfa Drugs Unspecified     \"i dont know\"    • Tetracyclines Unspecified     ?   • Other Food Unspecified     Dairy-mucus (butter is okay). spices-cough       Medications  Prior to Admission Medications   Prescriptions Last Dose Informant Patient Reported? Taking?   BIOTIN PO  Patient Yes No   Sig: Take 1 Tab by mouth every day.   CALCIUM PO  Patient Yes No   Sig: Take 2 Tabs by mouth 2 Times a Day.   Cholecalciferol (VITAMIN D3 PO)  Patient Yes No   Sig: Take 1 Tab by mouth every day.   MAGNESIUM PO   Yes No   Sig: Take  by mouth. Liquid   Melatonin 10 MG Cap  Patient Yes No   Sig: Take 10 mg by mouth every bedtime.   Multiple Vitamins-Minerals (OCUVITE-LUTEIN) Tab  Patient Yes No   Sig: Take 1 tablet by mouth every day.   Multiple " Vitamins-Minerals (WOMENS 50+ MULTI VITAMIN/MIN PO)  Patient Yes No   Sig: Take 1 Tab by mouth every day.   Potassium (POTASSIMIN PO)  Patient Yes No   Sig: Take 2 Drops by mouth 2 Times a Day.   albuterol (PROAIR HFA) 108 (90 Base) MCG/ACT Aero Soln inhalation aerosol  Patient Yes No   Sig: Inhale 2 Puffs by mouth every 6 hours as needed for Shortness of Breath.   azelastine (ASTELIN) 137 MCG/SPRAY nasal spray  Patient Yes No   Sig: Spray 2 Sprays in nose 2 times a day as needed.   cetirizine (ZYRTEC) 10 MG Tab  Patient Yes No   Sig: Take 10 mg by mouth every day.   lidocaine (LIDODERM) 5 % Patch   No No   Sig: Apply 1 Patch to skin as directed every 24 hours.   montelukast (SINGULAIR) 10 MG Tab  Patient No No   Sig: Take 1 Tab by mouth every day.      Facility-Administered Medications: None       Physical Exam  Temp:  [36.1 °C (97 °F)] 36.1 °C (97 °F)  Pulse:  [58-82] 82  Resp:  [16-18] 16  BP: (116-142)/(56-78) 131/69  SpO2:  [94 %-99 %] 95 %    Physical Exam   Constitutional: She is oriented to person, place, and time. She appears well-developed and well-nourished. No distress.   HENT:   Head: Normocephalic and atraumatic.   Mouth/Throat: Oropharynx is clear and moist. No oropharyngeal exudate.   Eyes: Pupils are equal, round, and reactive to light. Conjunctivae are normal. No scleral icterus.   Neck: Normal range of motion. Neck supple.   Cardiovascular: Normal rate and regular rhythm. Exam reveals no gallop and no friction rub.   No murmur heard.  Pulmonary/Chest: Effort normal and breath sounds normal. No respiratory distress. She has no wheezes. She has no rales. She exhibits no tenderness.   Abdominal: Soft. She exhibits no distension. There is tenderness ( Diffusely, more on the lower abdomen). There is no rebound and no guarding.   Hyperactive bowel sounds   Musculoskeletal: Normal range of motion. She exhibits no edema or tenderness.   Lymphadenopathy:     She has no cervical adenopathy.   Neurological:  She is alert and oriented to person, place, and time. No cranial nerve deficit.   Skin: Skin is warm and dry. No rash noted. She is not diaphoretic. No erythema. No pallor.   Psychiatric: She has a normal mood and affect. Her behavior is normal. Judgment and thought content normal.   Nursing note and vitals reviewed.      Laboratory:  Recent Labs     09/28/19  0549   WBC 9.3   RBC 3.88*   HEMOGLOBIN 12.5   HEMATOCRIT 38.1   MCV 98.2*   MCH 32.2   MCHC 32.8*   RDW 42.1   PLATELETCT 250   MPV 8.6*     Recent Labs     09/28/19  0549   SODIUM 136   POTASSIUM 3.9   CHLORIDE 104   CO2 26   GLUCOSE 113*   BUN 9   CREATININE 0.33*   CALCIUM 9.1     Recent Labs     09/28/19  0549   ALTSGPT 14   ASTSGOT 18   ALKPHOSPHAT 87   TBILIRUBIN 0.7   LIPASE 5*   GLUCOSE 113*         No results for input(s): NTPROBNP in the last 72 hours.      No results for input(s): TROPONINT in the last 72 hours.    Urinalysis:    Recent Labs     09/28/19  0445   SPECGRAVITY 1.007   GLUCOSEUR Negative   KETONES Negative   NITRITE Negative   LEUKESTERAS Negative        Imaging:  CT-ABDOMEN-PELVIS WITH   Final Result      1.  Markedly increased colonic fecal material consistent with constipation. Additionally, there are multiple loops of dilated fluid and gas-filled small bowel suggesting an obstructive pattern.   2.  Bilateral lower lobe atelectasis and tiny bilateral pleural effusions.      DX-SMALL BOWEL SERIES    (Results Pending)   DX-SMALL BOWEL SERIES    (Results Pending)         Assessment/Plan:  I anticipate this patient is appropriate for observation status at this time.    * CN (constipation) with early obstructive pattern- (present on admission)  Assessment & Plan  -I will continue her on every 2 hours Fleet Enema x3 per surgery recommendations.  I will also start her on stool softeners, and MiraLAX.  - Will obtain small bowel series with water-soluble contrast after.  If still no bowel movements, will probably need further aggressive  bowel regimen.  -N.p.o. for now.  Start IV fluids with LR at 83 cc/h.  Encouraged to ambulate.  -Continue supportive care with symptomatic therapy with as needed antiemetics.    Cerebellar ataxia (HCC)- (present on admission)  Assessment & Plan  -Appears to be stable.  Outpatient follow-up.    Mild intermittent asthma without complication- (present on admission)  Assessment & Plan  -Not in acute exacerbation.  I will place her on RT protocol.  Resume home Singulair, and albuterol.      VTE prophylaxis: lovenox SQ

## 2019-09-28 NOTE — PROGRESS NOTES
Pink Lady enema administered. Patient held enema for approximately 1 minute. Mostly mucus observed.

## 2019-09-29 ENCOUNTER — APPOINTMENT (OUTPATIENT)
Dept: RADIOLOGY | Facility: MEDICAL CENTER | Age: 74
End: 2019-09-29
Attending: EMERGENCY MEDICINE
Payer: MEDICARE

## 2019-09-29 LAB
ANION GAP SERPL CALC-SCNC: 11 MMOL/L (ref 0–11.9)
ANION GAP SERPL CALC-SCNC: 6 MMOL/L (ref 0–11.9)
BASOPHILS # BLD AUTO: 0.6 % (ref 0–1.8)
BASOPHILS # BLD: 0.06 K/UL (ref 0–0.12)
BUN SERPL-MCNC: 11 MG/DL (ref 8–22)
BUN SERPL-MCNC: 9 MG/DL (ref 8–22)
CALCIUM SERPL-MCNC: 6.6 MG/DL (ref 8.5–10.5)
CALCIUM SERPL-MCNC: 8.7 MG/DL (ref 8.5–10.5)
CHLORIDE SERPL-SCNC: 105 MMOL/L (ref 96–112)
CHLORIDE SERPL-SCNC: 114 MMOL/L (ref 96–112)
CO2 SERPL-SCNC: 22 MMOL/L (ref 20–33)
CO2 SERPL-SCNC: 25 MMOL/L (ref 20–33)
CREAT SERPL-MCNC: 0.29 MG/DL (ref 0.5–1.4)
CREAT SERPL-MCNC: 0.48 MG/DL (ref 0.5–1.4)
EOSINOPHIL # BLD AUTO: 0.08 K/UL (ref 0–0.51)
EOSINOPHIL NFR BLD: 0.7 % (ref 0–6.9)
ERYTHROCYTE [DISTWIDTH] IN BLOOD BY AUTOMATED COUNT: 42.3 FL (ref 35.9–50)
GLUCOSE SERPL-MCNC: 69 MG/DL (ref 65–99)
GLUCOSE SERPL-MCNC: 86 MG/DL (ref 65–99)
HCT VFR BLD AUTO: 40.5 % (ref 37–47)
HGB BLD-MCNC: 13.1 G/DL (ref 12–16)
IMM GRANULOCYTES # BLD AUTO: 0.09 K/UL (ref 0–0.11)
IMM GRANULOCYTES NFR BLD AUTO: 0.8 % (ref 0–0.9)
LYMPHOCYTES # BLD AUTO: 1.89 K/UL (ref 1–4.8)
LYMPHOCYTES NFR BLD: 17.6 % (ref 22–41)
MAGNESIUM SERPL-MCNC: 2.5 MG/DL (ref 1.5–2.5)
MCH RBC QN AUTO: 31.4 PG (ref 27–33)
MCHC RBC AUTO-ENTMCNC: 32.3 G/DL (ref 33.6–35)
MCV RBC AUTO: 97.1 FL (ref 81.4–97.8)
MONOCYTES # BLD AUTO: 1.01 K/UL (ref 0–0.85)
MONOCYTES NFR BLD AUTO: 9.4 % (ref 0–13.4)
NEUTROPHILS # BLD AUTO: 7.61 K/UL (ref 2–7.15)
NEUTROPHILS NFR BLD: 70.9 % (ref 44–72)
NRBC # BLD AUTO: 0 K/UL
NRBC BLD-RTO: 0 /100 WBC
PLATELET # BLD AUTO: 193 K/UL (ref 164–446)
PMV BLD AUTO: 8.6 FL (ref 9–12.9)
POTASSIUM SERPL-SCNC: 2.9 MMOL/L (ref 3.6–5.5)
POTASSIUM SERPL-SCNC: 4.1 MMOL/L (ref 3.6–5.5)
RBC # BLD AUTO: 4.17 M/UL (ref 4.2–5.4)
SODIUM SERPL-SCNC: 141 MMOL/L (ref 135–145)
SODIUM SERPL-SCNC: 142 MMOL/L (ref 135–145)
WBC # BLD AUTO: 10.7 K/UL (ref 4.8–10.8)

## 2019-09-29 PROCEDURE — 36415 COLL VENOUS BLD VENIPUNCTURE: CPT

## 2019-09-29 PROCEDURE — A9270 NON-COVERED ITEM OR SERVICE: HCPCS | Performed by: INTERNAL MEDICINE

## 2019-09-29 PROCEDURE — G0378 HOSPITAL OBSERVATION PER HR: HCPCS

## 2019-09-29 PROCEDURE — 99226 PR SUBSEQUENT OBSERVATION CARE,LEVEL III: CPT | Performed by: INTERNAL MEDICINE

## 2019-09-29 PROCEDURE — 83735 ASSAY OF MAGNESIUM: CPT

## 2019-09-29 PROCEDURE — 74250 X-RAY XM SM INT 1CNTRST STD: CPT

## 2019-09-29 PROCEDURE — 700102 HCHG RX REV CODE 250 W/ 637 OVERRIDE(OP): Performed by: INTERNAL MEDICINE

## 2019-09-29 PROCEDURE — 80048 BASIC METABOLIC PNL TOTAL CA: CPT

## 2019-09-29 PROCEDURE — 700105 HCHG RX REV CODE 258: Performed by: INTERNAL MEDICINE

## 2019-09-29 PROCEDURE — 85025 COMPLETE CBC W/AUTO DIFF WBC: CPT

## 2019-09-29 PROCEDURE — 700117 HCHG RX CONTRAST REV CODE 255: Performed by: EMERGENCY MEDICINE

## 2019-09-29 RX ORDER — AMOXICILLIN 250 MG
2 CAPSULE ORAL 2 TIMES DAILY
Status: DISCONTINUED | OUTPATIENT
Start: 2019-09-29 | End: 2019-09-30 | Stop reason: HOSPADM

## 2019-09-29 RX ORDER — BISACODYL 10 MG
10 SUPPOSITORY, RECTAL RECTAL
Status: DISCONTINUED | OUTPATIENT
Start: 2019-09-29 | End: 2019-09-30 | Stop reason: HOSPADM

## 2019-09-29 RX ORDER — POLYETHYLENE GLYCOL 3350 17 G/17G
1 POWDER, FOR SOLUTION ORAL 2 TIMES DAILY
Status: DISCONTINUED | OUTPATIENT
Start: 2019-09-29 | End: 2019-09-30 | Stop reason: HOSPADM

## 2019-09-29 RX ADMIN — SODIUM CHLORIDE, POTASSIUM CHLORIDE, SODIUM LACTATE AND CALCIUM CHLORIDE: 600; 310; 30; 20 INJECTION, SOLUTION INTRAVENOUS at 07:26

## 2019-09-29 RX ADMIN — SENNOSIDES, DOCUSATE SODIUM 2 TABLET: 50; 8.6 TABLET, FILM COATED ORAL at 05:12

## 2019-09-29 RX ADMIN — ACETAMINOPHEN 650 MG: 325 TABLET, FILM COATED ORAL at 23:11

## 2019-09-29 RX ADMIN — CETIRIZINE HYDROCHLORIDE 10 MG: 10 TABLET, FILM COATED ORAL at 05:12

## 2019-09-29 RX ADMIN — IOHEXOL 300 ML: 300 INJECTION, SOLUTION INTRAVENOUS at 12:14

## 2019-09-29 RX ADMIN — MONTELUKAST 10 MG: 10 TABLET, FILM COATED ORAL at 05:12

## 2019-09-29 ASSESSMENT — ENCOUNTER SYMPTOMS
NAUSEA: 1
NERVOUS/ANXIOUS: 1
CONSTIPATION: 1
EYE PAIN: 0
SHORTNESS OF BREATH: 0
SORE THROAT: 0
HEADACHES: 0
MEMORY LOSS: 0
WEAKNESS: 1
ABDOMINAL PAIN: 1
EYE DISCHARGE: 0
FEVER: 0
DEPRESSION: 0
VOMITING: 0
DIZZINESS: 0
CHILLS: 0
MYALGIAS: 1
BACK PAIN: 1
COUGH: 0

## 2019-09-29 ASSESSMENT — COGNITIVE AND FUNCTIONAL STATUS - GENERAL
DRESSING REGULAR UPPER BODY CLOTHING: A LITTLE
CLIMB 3 TO 5 STEPS WITH RAILING: A LITTLE
HELP NEEDED FOR BATHING: A LITTLE
WALKING IN HOSPITAL ROOM: A LITTLE
TURNING FROM BACK TO SIDE WHILE IN FLAT BAD: A LITTLE
DRESSING REGULAR LOWER BODY CLOTHING: A LITTLE
MOVING FROM LYING ON BACK TO SITTING ON SIDE OF FLAT BED: A LITTLE
DAILY ACTIVITIY SCORE: 21
SUGGESTED CMS G CODE MODIFIER DAILY ACTIVITY: CJ
STANDING UP FROM CHAIR USING ARMS: A LITTLE
MOBILITY SCORE: 18
SUGGESTED CMS G CODE MODIFIER MOBILITY: CK
MOVING TO AND FROM BED TO CHAIR: A LITTLE

## 2019-09-29 NOTE — CARE PLAN
Pt has not had bm for 5 days, bowel protocol in place, medicated per MAR, pt ambulated around mendiola, small bowel series in AM . Fall precautions in place, educated to call for assistance. POC discussed, verbalized understanding. Pain assessed, declines pain meds given ice pack.

## 2019-09-29 NOTE — PROGRESS NOTES
Bedside report received at 1900. Assessment done. VSS. AOx4, no neuro deficits. Unlabored and even breathing,RA. Diminished lung sounds. Pt states pain during movement R ribs 6/10 at this time, declines pain meds, given ice pack. Skin intact. IV infusing per MAR. Pt denies nausea/vomiting at this time, no abdominal tenderness, normoactive bs. NPO at this time, verbalized understanding. Pt assisted to brush teeth. Seated 45 degrees or higher. Hourly patient rounding done. Fall precautions in place.  Call light in place, bed locked and in lowest position. White board updated. Reviewed POC. All questions answered at this time.

## 2019-09-29 NOTE — PROGRESS NOTES
Surgery  SBS predictably with no obstruction  Surgery signing off  Constipation per primary team, likely solved by sbs.

## 2019-09-29 NOTE — PROGRESS NOTES
"Pt straight cathed, 400ml UO clear yellow. Pt soiled pad, pt cleaned and linen changed. VSS. Pt refuses lab draw states she prefers to \"call phlebotomist when I wake up in the morning\"  "

## 2019-09-29 NOTE — PROGRESS NOTES
Repeat Lab draw done by . Schedule for xray this morning. For Floor transfer today. Did own straight cath using patient own supply.

## 2019-09-29 NOTE — PROGRESS NOTES
[facility] Medicine Daily Progress Note    Date of Service  9/29/2019    Chief Complaint  74 y.o. female admitted 9/28/2019 with abdominal pain and constipation.    Hospital Course    Patient is a 74-year-old female with known history of cerebellar ataxia, chronic constipation, ulcerative colitis in remission, GERD, Raynaud's disease, asthma, arthritis.  Patient presented to the emergency room with complaints of abdominal pain and constipation.  Patient was recently at the hospital after a fall approximately a week ago showing rib contusions without fractures and was sent home with lidocaine patch and has been taking over-the-counter Tylenol for pain.  Patient states approximately 5 days prior to admission she started to notice crampy abdominal pain with associated constipation.  Patient stated that she had not had a bowel movement in approximately 5 days on admission and was now having nausea with dry heaving.  Patient presented to the emergency room for further evaluation of her constipation and abdominal pain.  CBC and BMP in the emergency room were essentially benign and noncontributory.  CAT scan of the abdomen was obtained which showed markedly increased colonic fecal material consistent with constipation, with multiple loops of dilated fluid and gas-filled small bowel suggesting obstructive pattern.  General surgery was consulted and recommended fleets enema x3 and admission to the hospital for further medical management and work-up.      Interval Problem Update  9/29- Patient has been given multiple fleet enema and pink lady enema without significant success. Continues to have abdominal pain. Patient to have small bowel follow through diagnostic test this am with plan to be determined based on results. General surgery following. Patient to transfer to surgical floor for continued management. Labs were noted to be significantly different this am and requested redraw, which shows within normal. Trend labs in am.      Consultants/Specialty  General surgery     Code Status  Full     Disposition  Transfer to surgical floor     Review of Systems  Review of Systems   Constitutional: Positive for malaise/fatigue. Negative for chills and fever.   HENT: Negative for congestion and sore throat.    Eyes: Negative for pain and discharge.   Respiratory: Negative for cough and shortness of breath.    Cardiovascular: Positive for chest pain (recent rib trauma ). Negative for leg swelling.   Gastrointestinal: Positive for abdominal pain, constipation and nausea. Negative for vomiting.   Genitourinary: Negative for dysuria, frequency and urgency.   Musculoskeletal: Positive for back pain and myalgias.   Skin: Negative for rash.   Neurological: Positive for weakness. Negative for dizziness and headaches.   Psychiatric/Behavioral: Negative for depression and memory loss. The patient is nervous/anxious.         Physical Exam  Temp:  [36.2 °C (97.1 °F)-37.1 °C (98.7 °F)] 36.7 °C (98.1 °F)  Pulse:  [54-77] 66  Resp:  [16-17] 16  BP: (120-144)/(58-67) 120/58  SpO2:  [91 %-97 %] 93 %    Physical Exam   Constitutional: She is oriented to person, place, and time. Vital signs are normal. She is cooperative. No distress.   Thin elderly female resting in bed   HENT:   Head: Normocephalic.   Mouth/Throat: Oropharynx is clear and moist.   Eyes: Conjunctivae and lids are normal.   Neck: Normal range of motion.   Cardiovascular: Normal rate. Exam reveals no gallop.   Pulmonary/Chest: Effort normal. No respiratory distress. She has decreased breath sounds in the right lower field and the left lower field. She has no wheezes.   Abdominal: Bowel sounds are increased. There is generalized tenderness. There is no guarding.   Musculoskeletal:   [place]   Neurological: She is alert and oriented to person, place, and time.   Skin: Skin is warm and dry. She is not diaphoretic.   Psychiatric: Her speech is normal. Her mood appears anxious.   Patient is very needy with  multiple requests, appears very anxious    Nursing note and vitals reviewed.      Fluids    Intake/Output Summary (Last 24 hours) at 9/29/2019 1217  Last data filed at 9/28/2019 1854  Gross per 24 hour   Intake --   Output 150 ml   Net -150 ml       Laboratory  Recent Labs     09/28/19  0549 09/29/19 0918   WBC 9.3 10.7   RBC 3.88* 4.17*   HEMOGLOBIN 12.5 13.1   HEMATOCRIT 38.1 40.5   MCV 98.2* 97.1   MCH 32.2 31.4   MCHC 32.8* 32.3*   RDW 42.1 42.3   PLATELETCT 250 193   MPV 8.6* 8.6*     Recent Labs     09/28/19  0549 09/29/19  0730 09/29/19 0918   SODIUM 136 142 141   POTASSIUM 3.9 2.9* 4.1   CHLORIDE 104 114* 105   CO2 26 22 25   GLUCOSE 113* 69 86   BUN 9 9 11   CREATININE 0.33* 0.29* 0.48*   CALCIUM 9.1 6.6* 8.7                   Imaging  CT-ABDOMEN-PELVIS WITH   Final Result      1.  Markedly increased colonic fecal material consistent with constipation. Additionally, there are multiple loops of dilated fluid and gas-filled small bowel suggesting an obstructive pattern.   2.  Bilateral lower lobe atelectasis and tiny bilateral pleural effusions.      DX-SMALL BOWEL SERIES    (Results Pending)        Assessment/Plan  * CN (constipation) with early obstructive pattern- (present on admission)  Assessment & Plan  General surgery consulted- Multiple enemas given per their recommendations   Pending small bowel series with water-soluble contrast. Awaiting results for further plan from surgery   -N.p.o. for now.  Start IV fluids with LR at 83 cc/h.  Encouraged to ambulate.  -Continue supportive care with symptomatic therapy with as needed antiemetics.    Cerebellar ataxia (HCC)- (present on admission)  Assessment & Plan  -Appears to be stable.  Outpatient follow-up.    Mild intermittent asthma without complication- (present on admission)  Assessment & Plan  -Not in acute exacerbation  -RT protocol   -Resume home Singulair, and albuterol.       VTE prophylaxis: Lovenox

## 2019-09-29 NOTE — PROGRESS NOTES
Pt arrived to T414-2.  Pt ambulated from wheelchair to bed. Steady gait. Up self.   VSS.  Saturating >90% on RA.  Pt denies pain at this time.   PIV right AC running LR at 83ml/hr.   NPO at this time.   + BM 9/29, large formed brown stool.   + void, per patient she straight catheterizes self at home.   Updated on plan of care. Safety education provided. Bed locked in low. Call light within reach.

## 2019-09-29 NOTE — PROGRESS NOTES
Assumed patient care, did update plan of care. Still constipated, aware about test today. MD at bedside. Did do her own straight catheter. Kept NPO. Refused Lovenox dose.

## 2019-09-29 NOTE — PROGRESS NOTES
Surgery  SBS with water soluble contrast pending  Minimal results with enemas  Plan pending results.

## 2019-09-29 NOTE — PROGRESS NOTES
Pt soiled bed with small loose watery mucus brown stool  in bed. Linen change and perineal care performed.

## 2019-09-30 VITALS
HEIGHT: 63 IN | RESPIRATION RATE: 16 BRPM | OXYGEN SATURATION: 91 % | DIASTOLIC BLOOD PRESSURE: 89 MMHG | TEMPERATURE: 97.8 F | SYSTOLIC BLOOD PRESSURE: 143 MMHG | WEIGHT: 107.36 LBS | HEART RATE: 90 BPM | BODY MASS INDEX: 19.02 KG/M2

## 2019-09-30 LAB
ANION GAP SERPL CALC-SCNC: 8 MMOL/L (ref 0–11.9)
BUN SERPL-MCNC: 8 MG/DL (ref 8–22)
CALCIUM SERPL-MCNC: 8.7 MG/DL (ref 8.5–10.5)
CHLORIDE SERPL-SCNC: 102 MMOL/L (ref 96–112)
CO2 SERPL-SCNC: 29 MMOL/L (ref 20–33)
CREAT SERPL-MCNC: 0.41 MG/DL (ref 0.5–1.4)
ERYTHROCYTE [DISTWIDTH] IN BLOOD BY AUTOMATED COUNT: 41.9 FL (ref 35.9–50)
GLUCOSE SERPL-MCNC: 105 MG/DL (ref 65–99)
HCT VFR BLD AUTO: 38.9 % (ref 37–47)
HGB BLD-MCNC: 12.5 G/DL (ref 12–16)
MCH RBC QN AUTO: 31.5 PG (ref 27–33)
MCHC RBC AUTO-ENTMCNC: 32.1 G/DL (ref 33.6–35)
MCV RBC AUTO: 98 FL (ref 81.4–97.8)
PLATELET # BLD AUTO: 225 K/UL (ref 164–446)
PMV BLD AUTO: 8.7 FL (ref 9–12.9)
POTASSIUM SERPL-SCNC: 3.1 MMOL/L (ref 3.6–5.5)
RBC # BLD AUTO: 3.97 M/UL (ref 4.2–5.4)
SODIUM SERPL-SCNC: 139 MMOL/L (ref 135–145)
WBC # BLD AUTO: 9.1 K/UL (ref 4.8–10.8)

## 2019-09-30 PROCEDURE — 80048 BASIC METABOLIC PNL TOTAL CA: CPT

## 2019-09-30 PROCEDURE — A9270 NON-COVERED ITEM OR SERVICE: HCPCS | Performed by: INTERNAL MEDICINE

## 2019-09-30 PROCEDURE — 302146: Performed by: HOSPITALIST

## 2019-09-30 PROCEDURE — 99217 PR OBSERVATION CARE DISCHARGE: CPT | Performed by: HOSPITALIST

## 2019-09-30 PROCEDURE — 85027 COMPLETE CBC AUTOMATED: CPT

## 2019-09-30 PROCEDURE — 700102 HCHG RX REV CODE 250 W/ 637 OVERRIDE(OP): Performed by: INTERNAL MEDICINE

## 2019-09-30 PROCEDURE — 302146: Performed by: INTERNAL MEDICINE

## 2019-09-30 PROCEDURE — 36415 COLL VENOUS BLD VENIPUNCTURE: CPT

## 2019-09-30 PROCEDURE — G0378 HOSPITAL OBSERVATION PER HR: HCPCS

## 2019-09-30 RX ORDER — POTASSIUM CHLORIDE 20 MEQ/1
20 TABLET, EXTENDED RELEASE ORAL 2 TIMES DAILY
Qty: 6 TAB | Refills: 0 | Status: SHIPPED | OUTPATIENT
Start: 2019-09-30 | End: 2019-10-03

## 2019-09-30 RX ORDER — NITROFURANTOIN 25; 75 MG/1; MG/1
100 CAPSULE ORAL 2 TIMES DAILY
COMMUNITY
Start: 2019-09-27 | End: 2019-10-03

## 2019-09-30 RX ORDER — NITROFURANTOIN 25; 75 MG/1; MG/1
100 CAPSULE ORAL 2 TIMES DAILY
Status: DISCONTINUED | OUTPATIENT
Start: 2019-09-30 | End: 2019-09-30 | Stop reason: HOSPADM

## 2019-09-30 RX ADMIN — MONTELUKAST 10 MG: 10 TABLET, FILM COATED ORAL at 06:29

## 2019-09-30 RX ADMIN — CETIRIZINE HYDROCHLORIDE 10 MG: 10 TABLET, FILM COATED ORAL at 06:29

## 2019-09-30 NOTE — DISCHARGE PLANNING
Care Transition Team Assessment    Anticipated Discharge Disposition: Home    Action: RN CM assessed pt at bedside. Pt reports she lives with her daughter in a single story apartment in [place]. She reports being independent with all her ADLs and IADLs, and uses no DME. She has prescription drug coverage, uses [facility] on [address], and reports no financial barriers to discharge at this time.     Barriers to Discharge: Medical clearance for discharge home.     Plan: Await medical clearance for discharge home.     Information Source  Orientation : Oriented x 4  Information Given By: Patient  Who is responsible for making decisions for patient? : Patient    Readmission Evaluation  Is this a readmission?: No    Elopement Risk  Legal Hold: No  Ambulatory or Self Mobile in Wheelchair: Yes  Disoriented: No  Psychiatric Symptoms: None  History of Wandering: No  Elopement this Admit: No  Vocalizing Wanting to Leave: No  Displays Behaviors, Body Language Wanting to Leave: No-Not at Risk for Elopement  Elopement Risk: Not at Risk for Elopement    Interdisciplinary Discharge Planning  Primary Care Physician: Dar Fraire MD  Lives with - Patient's Self Care Capacity: Adult Children  Patient or legal guardian wants to designate a caregiver (see row info): No  Support Systems: Children  Housing / Facility: 1 Story Apartment / [facility]  Do You Take your Prescribed Medications Regularly: Yes  Able to Return to Previous ADL's: Yes  Mobility Issues: No  Prior Services: None, Home-Independent  Patient Expects to be Discharged to:: Home  Assistance Needed: Unknown at this Time  Durable Medical Equipment: Not Applicable    Discharge Preparedness  What is your plan after discharge?: Home with help  What are your discharge supports?: Child  Prior Functional Level: Ambulatory, Drives Self, Independent with Activities of Daily Living, Independent with Medication Management  Difficulity with ADLs: None  Difficulity with IADLs: None    Functional  Assesment  Prior Functional Level: Ambulatory, Drives Self, Independent with Activities of Daily Living, Independent with Medication Management    Finances  Financial Barriers to Discharge: No  Prescription Coverage: Yes    Vision / Hearing Impairment  Vision Impairment : No  Right Eye Vision: Wears Glasses, Impaired  Left Eye Vision: Wears Glasses, Impaired  Hearing Impairment : No         Advance Directive  Advance Directive?: DPOA for Health Care    Domestic Abuse  Have you ever been the victim of abuse or violence?: No  Physical Abuse or Sexual Abuse: No  Verbal Abuse or Emotional Abuse: No  Possible Abuse Reported to:: Not Applicable         Discharge Risks or Barriers  Discharge risks or barriers?: No    Anticipated Discharge Information  Anticipated discharge disposition: Home  Discharge Address: [address] [place] [address] [id number]  Discharge Contact Phone Number: [phone]

## 2019-09-30 NOTE — DISCHARGE PLANNING
Anticipated Discharge Disposition: Home    Action: Provided pt's daughter with a list of DME providers in [facility] to purchase a bedside commode.    Barriers to Discharge: none    Plan: Discharge home with no needs.

## 2019-09-30 NOTE — CARE PLAN
Problem: Safety  Goal: Will remain free from injury  Outcome: PROGRESSING AS EXPECTED   Educate patient on level of fall risk and ensure room is well lit and free of obstacles.    Problem: Knowledge Deficit  Goal: Knowledge of disease process/condition, treatment plan, diagnostic tests, and medications will improve  Outcome: PROGRESSING AS EXPECTED   Educate patient on plan of care and encourage to ask questions.

## 2019-09-30 NOTE — PROGRESS NOTES
RN received phone call from patient's daughter. Pt's daughter expressed concern about discharge. Pt's daughter stated she spoke to her mother (the patient) who stated that she was not being taken care of here. RN inquired if patient gave daughter any specific information about this. Daughter stated she did not. RN informed daughter that there were no orders for discharge at this time and RN would address this with both the patient and the doctor. RN took daughter's phone number: Juhi [phone]. Will follow up.

## 2019-09-30 NOTE — PROGRESS NOTES
Hospitalist paged. Pt stated dysuria and burning during urination. Per patient antibiotics were prescribed outpatient on Friday for UTI, pt states she does not have home meds with her. Pt requesting to take said antibiotics during hospital stay. Orders to collect urinalysis. Pt updated and refused urinalysis.

## 2019-09-30 NOTE — PROGRESS NOTES
Report received from day shift RN, assumed Care.   Patient is AOx4, responds appropriately.      Patient denies any pain at this time.   Patient is tolerating clear liquid diet, denies nausea/vomiting. + flatus. Patient experiencing loose stools at this time. Bowel sounds hyperactive x4 quads.   Up x1 assist with mildly unsteady gait.     Plan of care discussed, all questions answered.    Educated on use of call light and importance of calling before getting out of bed. Pt verbalizes understanding.    Call light and belongings within reach, treaded slipper socks on, bed alarm in use, bed in lowest locked position.  All needs met at this time.

## 2019-09-30 NOTE — DISCHARGE SUMMARY
Discharge Summary    CHIEF COMPLAINT ON ADMISSION  Chief Complaint   Patient presents with   • Constipation   • Nausea       Reason for Admission  EMS     Admission Date  9/28/2019    CODE STATUS  Full Code    HPI & HOSPITAL COURSE  This is a 74 y.o. female here with abdominal pain and constipation.  The patient recently had a ground-level fall.  It shows some rib contusions without fractures.  She was sent home with a lidocaine patch.  Patient apparently however was given some narcotics in the IV for pain management when she came to the emergency room although I do not see this charted.  Nevertheless the patient developed severe small bowel obstruction that was partial.  Patient does came in was evaluated in the emergency room and then with surgery.  The patient had a small bowel follow-through which at this point has completely disimpacted her and she is now actually moving her bowels quite frequently.  The patient's potassium has gone down thus I prescribed her some potassium.  Overall patient's condition at this point has been stabilized I am ordering a bedside commode for her so she can easily get to the bedside commode and then we are resuming her MicroBid which she was on for her urinary tract infection which was apparently not started back up yesterday.  The patient overall at this point has been stabilized can discharge home with outpatient follow-ups and monitoring.  I have recommended the patient have home health but she refuses this.  I have talked to the daughter who at this point is capable of taking care of her at home and will be coming to get her.       Therefore, she is discharged in good and stable condition to home with organized home healthcare and close outpatient follow-up.    The patient met 2-midnight criteria for an inpatient stay at the time of discharge.    Discharge Date  9/30/2019    FOLLOW UP ITEMS POST DISCHARGE  Follow-up with the primary care physician in 7 to 10 days    DISCHARGE  DIAGNOSES  Principal Problem:    CN (constipation) with early obstructive pattern POA: Yes  Active Problems:    Mild intermittent asthma without complication POA: Yes    Cerebellar ataxia (HCC) POA: Yes      Overview: Due to fall 2004  Resolved Problems:    * No resolved hospital problems. *      FOLLOW UP  Future Appointments   Date Time Provider Department Center   10/4/2019  1:15 PM NABEEL ALLISON BD 1 [facility]   10/7/2019  2:30 PM [facility] B [facility] None   10/18/2019  1:15 PM Zain Mckenzie M.D. PULM None   10/23/2019  1:00 PM [facility] CARDIAC PET [facility]   12/2/2019  1:45 PM Ila Celaya M.D. SNCAB None     No follow-up provider specified.    MEDICATIONS ON DISCHARGE     Medication List      START taking these medications      Instructions   potassium chloride SA 20 MEQ Tbcr  Commonly known as:  Kdur   Take 1 Tab by mouth 2 times a day for 3 days.  Dose:  20 mEq        CONTINUE taking these medications      Instructions   azelastine 137 MCG/SPRAY nasal spray  Commonly known as:  ASTELIN   Spray 2 Sprays in nose 2 times a day as needed.  Dose:  2 Spray     BIOTIN PO   Take 1 Tab by mouth every day.  Dose:  1 Tab     CALCIUM PO   Take 2 Tabs by mouth 2 Times a Day.  Dose:  2 Tab     cetirizine 10 MG Tabs  Commonly known as:  ZYRTEC   Take 10 mg by mouth every day.  Dose:  10 mg     lidocaine 5 % Ptch  Commonly known as:  LIDODERM   Apply 1 Patch to skin as directed every 24 hours.  Dose:  1 Patch     MAGNESIUM PO   Take  by mouth. Liquid     Melatonin 10 MG Caps   Take 10 mg by mouth every bedtime.  Dose:  10 mg     montelukast 10 MG Tabs  Commonly known as:  SINGULAIR   Take 1 Tab by mouth every day.  Dose:  10 mg     nitrofurantoin monohyd macro 100 MG Caps  Commonly known as:  MACROBID   Take 100 mg by mouth 2 times a day.  Dose:  100 mg     * OCUVITE-LUTEIN Tabs   Take 1 tablet by mouth every day.  Dose:  1 tablet     * WOMENS 50+ MULTI VITAMIN/MIN PO   Take 1 Tab by mouth every day.  Dose:  " 1 Tab     POTASSIMIN PO   Take 2 Drops by mouth 2 Times a Day.  Dose:  2 Drop     PROAIR  (90 Base) MCG/ACT Aers inhalation aerosol  Generic drug:  albuterol   Inhale 2 Puffs by mouth every 6 hours as needed for Shortness of Breath.  Dose:  2 Puff     VITAMIN D3 PO   Take 1 Tab by mouth every day.  Dose:  1 Tab         * This list has 2 medication(s) that are the same as other medications prescribed for you. Read the directions carefully, and ask your doctor or other care provider to review them with you.                Allergies  Allergies   Allergen Reactions   • Hydromorphone Anaphylaxis     \"went into code blue\"    • Azithromycin Diarrhea and Unspecified     Diarrhea     • Clindamycin    • Codeine Unspecified     ?   • Erythromycin Unspecified     ?   • Keflex      ?   • Levaquin Unspecified     \"i dont know\"    • Lyrica Unspecified     ?   • Quinolones Unspecified     ?   • Sulfa Drugs Unspecified     \"i dont know\"    • Tetracyclines Unspecified     ?   • Other Food Unspecified     Dairy-mucus (butter is okay). spices-cough       DIET  Orders Placed This Encounter   Procedures   • Diet Order Clear Liquid     Standing Status:   Standing     Number of Occurrences:   1     Order Specific Question:   Diet:     Answer:   Clear Liquid [10]       ACTIVITY  As tolerated.  Weight bearing as tolerated    CONSULTATIONS  Surgery Dr. Macdonald    PROCEDURES  None    LABORATORY  Lab Results   Component Value Date    SODIUM 139 09/30/2019    POTASSIUM 3.1 (L) 09/30/2019    CHLORIDE 102 09/30/2019    CO2 29 09/30/2019    GLUCOSE 105 (H) 09/30/2019    BUN 8 09/30/2019    CREATININE 0.41 (L) 09/30/2019    CREATININE 0.7 09/28/2008        Lab Results   Component Value Date    WBC 9.1 09/30/2019    HEMOGLOBIN 12.5 09/30/2019    HEMATOCRIT 38.9 09/30/2019    PLATELETCT 225 09/30/2019        Total time of the discharge process exceeds 42 minutes.  "

## 2019-09-30 NOTE — DISCHARGE INSTRUCTIONS
Discharge patient Home  Diet regular with 9-13 servings of fruits and vegetables  Activities as tolerated  Follow ups with PCP in 7-10 days, call for appointment  Meds per med rec sheet  No smoking, no alcohol, no caffeine  Wear seat belt in motorized vehicle  Take medications as perscribed  Keep appointments  If symptoms worsen call PCP, 911 or urgent care.    Discharge Instructions    Discharged to home by car with relative. Discharged via wheelchair, hospital escort: Yes.  Special equipment needed: Bedside Commode    Be sure to schedule a follow-up appointment with your primary care doctor or any specialists as instructed.     Discharge Plan:   Diet Plan: Discussed  Activity Level: Discussed  Confirmed Follow up Appointment: Patient to Call and Schedule Appointment  Confirmed Symptoms Management: Discussed  Medication Reconciliation Updated: Yes  Influenza Vaccine Indication: Patient Refuses    I understand that a diet low in cholesterol, fat, and sodium is recommended for good health. Unless I have been given specific instructions below for another diet, I accept this instruction as my diet prescription.   Other diet: Full liquids, advance as tolerated    Special Instructions: None    · Is patient discharged on Warfarin / Coumadin?   No     Depression / Suicide Risk    [facility], it is important to learn how to keep safe from harming yourself.    Recognize the warning signs:  · Abrupt changes in personality, positive or negative- including increase in energy   · Giving away possessions  · Change in eating patterns- significant weight changes-  positive or negative  · Change in sleeping patterns- unable to sleep or sleeping all the time   · Unwillingness or inability to communicate  · Depression  · Unusual sadness, discouragement and loneliness  · Talk of wanting to die  · Neglect of personal appearance   · Rebelliousness- reckless behavior  · Withdrawal from  people/activities they love  · Confusion- inability to concentrate     If you or a loved one observes any of these behaviors or has concerns about self-harm, here's what you can do:  · Talk about it- your feelings and reasons for harming yourself  · Remove any means that you might use to hurt yourself (examples: pills, rope, extension cords, firearm)  · Get professional help from the community (Mental Health, Substance Abuse, psychological counseling)  · Do not be alone:Call your Safe Contact- someone whom you trust who will be there for you.  · Call your local CRISIS HOTLINE 646-0767 or 971-992-3818  · Call your local Children's Mobile Crisis Response Team [place] (999) 353-8725 or www.[employer]  · Call the toll free National Suicide Prevention Hotlines   · National Suicide Prevention Lifeline 472-420-SPRN (0210)  · Centre for [employer] 800-SUICIDE (569-4712)      Constipation, Adult  Constipation is when a person has fewer bowel movements in a week than normal, has difficulty having a bowel movement, or has stools that are dry, hard, or larger than normal. Constipation may be caused by an underlying condition. It may become worse with age if a person takes certain medicines and does not take in enough fluids.  Follow these instructions at home:  Eating and drinking  · Eat foods that have a lot of fiber, such as fresh fruits and vegetables, whole grains, and beans.  · Limit foods that are high in fat, low in fiber, or overly processed, such as french fries, hamburgers, cookies, candies, and soda.  · Drink enough fluid to keep your urine clear or pale yellow.  General instructions  · Exercise regularly or as told by your health care provider.  · Go to the restroom when you have the urge to go. Do not hold it in.  · Take over-the-counter and prescription medicines only as told by your health care provider. These include any fiber supplements.  · Practice pelvic floor retraining exercises, such as  deep breathing while relaxing the lower abdomen and pelvic floor relaxation during bowel movements.  · Watch your condition for any changes.  · Keep all follow-up visits as told by your health care provider. This is important.  Contact a health care provider if:  · You have pain that gets worse.  · You have a fever.  · You do not have a bowel movement after 4 days.  · You vomit.  · You are not hungry.  · You lose weight.  · You are bleeding from the anus.  · You have thin, pencil-like stools.  Get help right away if:  · You have a fever and your symptoms suddenly get worse.  · You leak stool or have blood in your stool.  · Your abdomen is bloated.  · You have severe pain in your abdomen.  · You feel dizzy or you faint.  This information is not intended to replace advice given to you by your health care provider. Make sure you discuss any questions you have with your health care provider.  Document Released: 09/15/2005 Document Revised: 07/07/2017 Document Reviewed: 06/07/2017  [employer] Interactive Patient Education © 2017 [employer]    Full Liquid Diet  A full liquid diet may be used:  To help you transition from a clear liquid diet to a soft diet.  When your body is healing and can only tolerate foods that are easy to digest.  Before or after certain a procedure, test, or surgery (such as stomach or intestinal surgeries).  If you have trouble swallowing or chewing.  A full liquid diet includes fluids and foods that are liquid or will become liquid at room temperature. The full liquid diet gives you the proteins, fluids, salts, and minerals that you need for energy.  If you continue this diet for more than 72 hours, talk to your health care provider about how many calories you need to consume. If you continue the diet for more than 5 days, talk to your health care provider about taking a multivitamin or a nutritional supplement.  What do I need to know about a full liquid diet?  You may have any liquid.  You may  have any food that becomes a liquid at room temperature. The food is considered a liquid if it can be poured off a spoon at room temperature.  Drink one serving of citrus or vitamin C-enriched fruit juice daily.  What foods can I eat?  Grains   Any grain food that can be pureed in soup (such as crackers, pasta, and rice). Hot cereal (such as farina or oatmeal) that has been blended. Talk to your health care provider or dietitian about these foods.  Vegetables   Pulp-free tomato or vegetable juice. Vegetables pureed in soup.  Fruits   Fruit juice, including nectars and juices with pulp.  Meats and Other Protein Sources   Eggs in custard, eggnog mix, and eggs used in ice cream or pudding. Strained meats, like in baby food, may be allowed. Consult your health care provider.  Dairy   Milk and milk-based beverages, including milk shakes and instant breakfast mixes. Smooth yogurt. Pureed cottage cheese. Avoid these foods if they are not well tolerated.  Beverages   All beverages, including liquid nutritional supplements. Ask your health care provider if you can have carbonated beverages. They may not be well tolerated.  Condiments   Iodized salt, pepper, spices, and flavorings. Cocoa powder. Vinegar, ketchup, yellow mustard, smooth sauces (such as hollandaise, cheese sauce, or white sauce), and soy sauce.  Sweets and Desserts   Custard, smooth pudding. Flavored gelatin. Tapioca, junket. Plain ice cream, sherbet, fruit ices. Frozen ice pops, frozen fudge pops, pudding pops, and other frozen bars with cream. Syrups, including chocolate syrup. Sugar, honey, jelly.  Fats and Oils   Margarine, butter, cream, sour cream, and oils.  Other   Broth and cream soups. Strained, broth-based soups.  The items listed above may not be a complete list of recommended foods or beverages. Contact your dietitian for more options.   What foods can I not eat?  Grains   All breads. Grains are not allowed unless they are pureed into  soup.  Vegetables   Vegetables are not allowed unless they are juiced, or cooked and pureed into soup.  Fruits   Fruits are not allowed unless they are juiced.  Meats and Other Protein Sources   Any meat or fish. Cooked or raw eggs. Nut butters.  Dairy   Cheese.  Condiments   Stone ground mustards.  Fats and Oils   Fats that are coarse or chunky.  Sweets and Desserts   Ice cream or other frozen desserts that have any solids in them or on top, such as nuts, chocolate chips, and pieces of cookies. Cakes. Cookies. Candy.  Others   Soups with chunks or pieces in them.  The items listed above may not be a complete list of foods and beverages to avoid. Contact your dietitian for more information.   This information is not intended to replace advice given to you by your health care provider. Make sure you discuss any questions you have with your health care provider.  Document Released: 12/18/2006 Document Revised: 05/25/2017 Document Reviewed: 10/23/2014  [employer] Interactive Patient Education © 2017 [employer]

## 2019-09-30 NOTE — FACE TO FACE
Face to Face Note  -  Durable Medical Equipment    Lowell Kyle M.D. - NPI: [id number]  I certify that this patient is under my care and that they had a durable medical equipment(DME)face to face encounter by myself that meets the physician DME face-to-face encounter requirements with this patient on:    Date of encounter:   Patient:                    MRN:                       YOB: 2017  Ana Paula Ordonez  [id number]  1945     The encounter with the patient was in whole, or in part, for the following medical condition, which is the primary reason for durable medical equipment:  Other - sbo    I certify that, based on my findings, the following durable medical equipment is medically necessary:  3 in 1 Bedside Comode.    HOME O2 Saturation Measurements:(Values must be present for Home Oxygen orders)         ,     ,         My Clinical findings support the need for the above equipment due to:  Abnormal Gait    Supporting Symptoms: SBO with gait problem

## 2019-09-30 NOTE — PROGRESS NOTES
Patient called this RN to her room stating that her IV had accidentally been removed when she tried to take her gown off. IV catheter had been completely dislodged, and when this RN attempted to explain to patient that she would need another, she refused. Education provided on importance of receiving fluids, patient still refusing new IV.

## 2019-09-30 NOTE — PROGRESS NOTES
Pt discharged to home routine. No IV to remove. RN discussed discharge instructions with patient. Patient verbalized understanding of the teaching. Pt got dressed with assistance from daughter. Pt and daughter gathered belongings without assistance from staff. Pt brought down to private vehicle in wheelchair. Pt to be brought home in private vehicle by daughter.

## 2019-10-04 ENCOUNTER — NON-PROVIDER VISIT (OUTPATIENT)
Dept: INTERNAL MEDICINE | Facility: IMAGING CENTER | Age: 74
End: 2019-10-04
Payer: MEDICARE

## 2019-10-04 ENCOUNTER — APPOINTMENT (OUTPATIENT)
Dept: RADIOLOGY | Facility: MEDICAL CENTER | Age: 74
End: 2019-10-04
Attending: INTERNAL MEDICINE
Payer: MEDICARE

## 2019-10-04 NOTE — NON-PROVIDER
Tegaderm applied to both elbows and upper arms.  Supplies provided for second application (in 2-3 days).  Minimal redness noted on both elbows, no open lesions.

## 2019-10-18 ENCOUNTER — APPOINTMENT (OUTPATIENT)
Dept: PULMONOLOGY | Facility: HOSPICE | Age: 74
End: 2019-10-18
Payer: MEDICARE

## 2019-11-01 ENCOUNTER — HOSPITAL ENCOUNTER (EMERGENCY)
Facility: MEDICAL CENTER | Age: 74
End: 2019-11-01
Attending: EMERGENCY MEDICINE
Payer: MEDICARE

## 2019-11-01 ENCOUNTER — APPOINTMENT (OUTPATIENT)
Dept: RADIOLOGY | Facility: MEDICAL CENTER | Age: 74
End: 2019-11-01
Attending: EMERGENCY MEDICINE
Payer: MEDICARE

## 2019-11-01 VITALS
HEART RATE: 72 BPM | WEIGHT: 103 LBS | RESPIRATION RATE: 16 BRPM | HEIGHT: 63 IN | OXYGEN SATURATION: 92 % | TEMPERATURE: 97.3 F | SYSTOLIC BLOOD PRESSURE: 124 MMHG | DIASTOLIC BLOOD PRESSURE: 65 MMHG | BODY MASS INDEX: 18.25 KG/M2

## 2019-11-01 DIAGNOSIS — M54.50 ACUTE MIDLINE LOW BACK PAIN WITHOUT SCIATICA: ICD-10-CM
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
## 2019-11-01 DIAGNOSIS — S32.020A CLOSED COMPRESSION FRACTURE OF L2 LUMBAR VERTEBRA, INITIAL ENCOUNTER (HCC): ICD-10-CM

## 2019-11-01 LAB
ANION GAP SERPL CALC-SCNC: 9 MMOL/L (ref 0–11.9)
BASOPHILS # BLD AUTO: 0.4 % (ref 0–1.8)
BASOPHILS # BLD: 0.04 K/UL (ref 0–0.12)
BUN SERPL-MCNC: 18 MG/DL (ref 8–22)
CALCIUM SERPL-MCNC: 9.5 MG/DL (ref 8.5–10.5)
CHLORIDE SERPL-SCNC: 106 MMOL/L (ref 96–112)
CO2 SERPL-SCNC: 26 MMOL/L (ref 20–33)
CREAT SERPL-MCNC: 0.45 MG/DL (ref 0.5–1.4)
EOSINOPHIL # BLD AUTO: 0.12 K/UL (ref 0–0.51)
EOSINOPHIL NFR BLD: 1.2 % (ref 0–6.9)
ERYTHROCYTE [DISTWIDTH] IN BLOOD BY AUTOMATED COUNT: 41.8 FL (ref 35.9–50)
GLUCOSE SERPL-MCNC: 99 MG/DL (ref 65–99)
HCT VFR BLD AUTO: 45.6 % (ref 37–47)
HGB BLD-MCNC: 15 G/DL (ref 12–16)
IMM GRANULOCYTES # BLD AUTO: 0.07 K/UL (ref 0–0.11)
IMM GRANULOCYTES NFR BLD AUTO: 0.7 % (ref 0–0.9)
LYMPHOCYTES # BLD AUTO: 1.76 K/UL (ref 1–4.8)
LYMPHOCYTES NFR BLD: 17.4 % (ref 22–41)
MCH RBC QN AUTO: 31.9 PG (ref 27–33)
MCHC RBC AUTO-ENTMCNC: 32.9 G/DL (ref 33.6–35)
MCV RBC AUTO: 97 FL (ref 81.4–97.8)
MONOCYTES # BLD AUTO: 0.58 K/UL (ref 0–0.85)
MONOCYTES NFR BLD AUTO: 5.7 % (ref 0–13.4)
NEUTROPHILS # BLD AUTO: 7.55 K/UL (ref 2–7.15)
NEUTROPHILS NFR BLD: 74.6 % (ref 44–72)
NRBC # BLD AUTO: 0 K/UL
NRBC BLD-RTO: 0 /100 WBC
PLATELET # BLD AUTO: 223 K/UL (ref 164–446)
PMV BLD AUTO: 8.5 FL (ref 9–12.9)
POTASSIUM SERPL-SCNC: 3.9 MMOL/L (ref 3.6–5.5)
RBC # BLD AUTO: 4.7 M/UL (ref 4.2–5.4)
SODIUM SERPL-SCNC: 141 MMOL/L (ref 135–145)
WBC # BLD AUTO: 10.1 K/UL (ref 4.8–10.8)

## 2019-11-01 PROCEDURE — L0458 TLSO 2MOD SYMPHIS-XIPHO PRE: HCPCS

## 2019-11-01 PROCEDURE — 700102 HCHG RX REV CODE 250 W/ 637 OVERRIDE(OP): Performed by: EMERGENCY MEDICINE

## 2019-11-01 PROCEDURE — 72192 CT PELVIS W/O DYE: CPT

## 2019-11-01 PROCEDURE — 99285 EMERGENCY DEPT VISIT HI MDM: CPT

## 2019-11-01 PROCEDURE — 80048 BASIC METABOLIC PNL TOTAL CA: CPT

## 2019-11-01 PROCEDURE — A9270 NON-COVERED ITEM OR SERVICE: HCPCS | Performed by: EMERGENCY MEDICINE

## 2019-11-01 PROCEDURE — 85025 COMPLETE CBC W/AUTO DIFF WBC: CPT

## 2019-11-01 PROCEDURE — 72131 CT LUMBAR SPINE W/O DYE: CPT

## 2019-11-01 PROCEDURE — L0464 TLSO 4MOD SACRO-SCAP PRE: HCPCS

## 2019-11-01 RX ORDER — ACETAMINOPHEN 325 MG/1
650 TABLET ORAL EVERY 6 HOURS PRN
Qty: 30 TAB | Refills: 0 | Status: SHIPPED | OUTPATIENT
Start: 2019-11-01 | End: 2019-11-04

## 2019-11-01 RX ORDER — ACETAMINOPHEN 500 MG
1000 TABLET ORAL ONCE
Status: COMPLETED | OUTPATIENT
Start: 2019-11-01 | End: 2019-11-01

## 2019-11-01 RX ORDER — DIAZEPAM 2 MG/1
2 TABLET ORAL ONCE
Status: COMPLETED | OUTPATIENT
Start: 2019-11-01 | End: 2019-11-01

## 2019-11-01 RX ADMIN — DIAZEPAM 2 MG: 2 TABLET ORAL at 17:45

## 2019-11-01 RX ADMIN — ACETAMINOPHEN 1000 MG: 500 TABLET ORAL at 17:45

## 2019-11-01 NOTE — ED TRIAGE NOTES
".  Chief Complaint   Patient presents with   • T-5000 GLF     MGLF, denies hitting head   • Low Back Pain     [place] =, denies N/T     ../82   Pulse 67   Temp 36.3 °C (97.3 °F) (Temporal)   Resp 16   Ht 1.6 m (5' 3\")   Wt 46.7 kg (103 lb)   SpO2 96%   BMI 18.25 kg/m²     BIB EMS c/o low back pain after MGLF today  "

## 2019-11-01 NOTE — ED PROVIDER NOTES
"                                                                          ED Provider Note    Scribed for Kan Welch M.D. by Desi Fontanez. 11/1/2019  4:46 PM    CHIEF COMPLAINT  Chief Complaint   Patient presents with   • T-5000 GLF     MGLF, denies hitting head   • Low Back Pain     [place] =, denies N/T       HPI  Ana Paula Ordonez is a 74 y.o. female who presents to the ED via ambulance for a fall onset prior to arrival. She states she was making soup when she started to fall by the stove she was cooking at, so she grabbed the oven door, but it gave out. The patient claims fell to her side and states \"I lost my breath because the pain was so bad.\" The patient was able to call life alert for EMS to come to her aid. Patient has associated lower back pain, but denies head trauma, shortness of breath, chest pain, dizziness, pain or numbness in her legs, pain or numbness in her groin, urinating herself, fever, or chills. Patient adds she uses a walker and has a damaged cerebellum. She also has a urinary catheter due to an abnormally large birth delivery.     REVIEW OF SYSTEMS  Constitutional: No fevers or chills. No dizziness  HENT: No head trauma  Respiratory: No SOB  Thorax: No chest pain  GI: No Abdominal Pain  : No dysuria. No urinating herself.   Back: Positive for lower back pain  MSK: No pain in legs or groin  Neuro: No numbness in legs or groin  See HPI for further details. All other systems are negative.     PAST MEDICAL HISTORY   has a past medical history of Arthritis, ASTHMA, Cerebellar ataxia (HCC) (3/30/2018), Chronic ulcerative colitis (HCC) (6/26/2013), Cough, GERD (gastroesophageal reflux disease), Hay fever (4/19/2019), History of falling (5/2/2018), HYPOTENSION, Inner ear disease, Leg cramps, sleep related (5/2/2018), Near-syncope, Neck injury, Osteoporosis, Peripheral neuropathy, Polyp of sigmoid colon (04/10/2018), Raynaud disease, Tremor, and Vitamin D deficiency.    SOCIAL HISTORY  Social " "History     Tobacco Use   • Smoking status: Never Smoker   • Smokeless tobacco: Never Used   Substance and Sexual Activity   • Alcohol use: No     Alcohol/week: 0.0 oz   • Drug use: No   • Sexual activity: Not on file       SURGICAL HISTORY   has a past surgical history that includes nasal fracture reduction closed (10/8/08); hip nailing intramedullary (7/30/2011); tonsillectomy and adenoidectomy; bladder suspension; nissen fundoplication laparoscopic (2005); and cholecystectomy (2008).    CURRENT MEDICATIONS  Home Medications    **Home medications have not yet been reviewed for this encounter**         ALLERGIES  Allergies   Allergen Reactions   • Hydromorphone Anaphylaxis     \"went into code blue\"    • Azithromycin Diarrhea and Unspecified     Diarrhea     • Clindamycin    • Codeine Unspecified     ?   • Erythromycin Unspecified     ?   • Keflex      ?   • Levaquin Unspecified     \"i dont know\"    • Lyrica Unspecified     ?   • Quinolones Unspecified     ?   • Sulfa Drugs Unspecified     \"i dont know\"    • Tetracyclines Unspecified     ?   • Other Food Unspecified     Dairy-mucus (butter is okay). spices-cough       PHYSICAL EXAM  VITAL SIGNS: /82   Pulse 67   Temp 36.3 °C (97.3 °F) (Temporal)   Resp 16   Ht 1.6 m (5' 3\")   Wt 46.7 kg (103 lb)   SpO2 96%   BMI 18.25 kg/m²    Pulse ox interpretation: I interpret this pulse ox as normal.  Genl: Female sitting in chair comfortably, speaking clearly, appears in no acute distress, Alert, nontoxic  Head: NC/AT   ENT: Mucous membranes moist, posterior pharynx clear, uvula midline, nares patent bilaterally   Eyes: Normal sclera, pupils equal round reactive to light  Neck: Supple, FROM, no LAD appreciated, No midline neck tenderness. Free range of motion of neck and head  Pulmonary: Lungs are clear to auscultation bilaterally  Chest: No TTP  CV:  RRR, no murmur appreciated, pulses 2+ in both upper and lower extremities,  Abdomen: soft, NT/ND; no " rebound/guarding, no masses palpated, no HSM   : no CVA or suprapubic tenderness   Pelvis: Stable, tenderness globally   Back: No thoracic tenderness, nonspecific midline tenderness along lumbar and sacral spine, bilateral para lumbar muscular tenderness  Musculoskeletal: Pain free ROM of the neck. Moving upper and lower extremities and spontaneous in coordinated fashion  BLLE: Warm, No shortening, No baseline rotation, Very minimal pain in range of motion, worse on left.  Neuro: A&Ox4 (person, place, time, situation), speech fluent, gait steady, no focal deficits appreciated, No cerebellar signs, Sensation is grossly intact in the distal upper and lower extremities  5/5 strength in  and dorsiflexion/plantar flexion of the ankles  Psych: Patient has an appropriate affect and behavior  Skin: No rash or lesions.  No pallor or jaundice.  No cyanosis.  Warm and dry.      DIAGNOSTIC STUDIES / PROCEDURES    LABS  Labs Reviewed   CBC WITH DIFFERENTIAL - Abnormal; Notable for the following components:       Result Value    MCHC 32.9 (*)     MPV 8.5 (*)     Neutrophils-Polys 74.60 (*)     Lymphocytes 17.40 (*)     Neutrophils (Absolute) 7.55 (*)     All other components within normal limits   BASIC METABOLIC PANEL - Abnormal; Notable for the following components:    Creatinine 0.45 (*)     All other components within normal limits   ESTIMATED GFR       RADIOLOGY  CT-LSPINE W/O PLUS RECONS   Final Result      Mild compression deformity involving the superior endplate of L2 with approximately 20% loss of height.      CT-PELVIS W/O PLUS RECONS   Final Result      No acute pelvic or hip fracture.   Old left hip fracture and orthopedic fixation hardware.          COURSE & MEDICAL DECISION MAKING  Pertinent Labs & Imaging studies reviewed. (See chart for details)    Differential diagnoses include but not limited to: Vertebral fracture, vertebral ministerio. Pelvic ring fracture, contusion, and hematoma.      4:46 PM - Patient  seen and examined at bedside. Discussed plan of care with patient. I informed them that labs and imaging will be ordered to evaluate symptoms. The patient is understanding and agreeable with plan of care. Patient will be treated with 1,000 mg Tylenol and 2 mg Valium. Ordered CT-Pelvis w/o, CT-LSpine w/o, CBC w/ Differemtial, and BMP to evaluate her symptoms.     5:58 PM - Ordered Estimated GFR to evaluate the patient's symptoms.    7:11 PM - Patient was updated about medical imaging and informed she has a superior endplate fracture. Patient remains neurovascularly intact at bedside.    7:15 PM - Elda (Neurosurgery)    7:24 PM - Spoke with Dr. Quiros (Neurosurgery) about the patient's condition. Dr. Quiros is agreeing with the plan of care to assist the patient's superior endplate fracture.    8:05 PM - Patient was reevaluated at bedside. She was in an SLO brace and had a steady gate per baseline. Her daughter was at bedside and claimed her mother was acting normally. The patient states she is able to self cath with her brace on. Patient had the opportunity to ask any questions. Discussed lab and radiology results with the patient and informed them that there were no acute or abnormal findings. The plan for discharge was discussed and was informed of the risk of going home with a back fracture. The patient was informed to return for any new or worsening symptoms and to follow up with her PCP and Dr. Quiros (Neurosurgery). She is understanding and agreeable to the plan for discharge.       Medical Decision Making:   Patient presents emergency room for the symptoms as described above.  She has underlying cerebellar ataxia and has multiple braces around her house for helping her move.  She is chronically using a walker.  She describes no preceding prodromal symptomology such as shortness of breath, dizziness and has been acting at her baseline since.  She denied striking her head and had no memory loss/loss of  consciousness.  She has no evidence of other acute traumatic injuries and her only physical exam findings for isolated lower lumbar tenderness and pelvic tenderness along the pelvic rim.  Due to the diffuseness and mechanism of her injuries CT of the lumbar spine and pelvis is obtained.  Basic labs electrolytes are reviewed and show no gross kidney injury or other gross metabolic derangement.  She describes no preceding chest discomfort and remains at her neurological baseline with no focal neurological findings on exam.    Pain was easily tolerated with 2 mg of oral Valium for presumed muscular spasm and Tylenol.    CT of the L-spine shows an acute L spine compression deformity with 20% loss anterior endplate fracture.  Discussed with the neurosurgeon on-call, Dr. Craig, who recommends TLSO brace placement, follow-up with both him in clinic for follow-up and initiation by her primary for treatment of her osteoporosis.  Messages sent to the PCP regarding the findings and need for long-standing follow-up for osteoporosis.    TLSO brace is placed, after reevaluation at the bedside the patient was able to ambulate, was able to do the motions which she needs to self cath, at this time she says she feels much improved and wishes to be discharged home.  She was again able to ambulate for me and understood my concerns regarding her possible instability and falls at home.  We also discussed that this could only be treated with Tylenol for acute pain and as I felt the risk of adding Valium onto her home medications would increase the likelihood of falls.  She cannot tolerate nonsteroidal anti-inflammatory medications.  The patient verbally understood my concerns of possible repeat falls, understands that she has osteoporosis as she has had previous fractures in the past and does not want to start taking bisphosphonates or any other complicating medications.  She understands the need for following up with the neurosurgeon  provided in the clinic and also understands that it promptly for reevaluation should her pain worsen, developed groin numbness, inability to walk, or any other complicating factors.    DISPOSITION:  Patient will be discharged home in stable condition.    FOLLOW UP:  Dar Fraire M.D.  [address]  [facility] [id number] [phone]    Schedule an appointment as soon as possible for a visit       [facility], Emergency Dept  [address] [facility] [address] [phone]    As needed, If symptoms worsen    Hiram Quiros M.D.  [address]  [facility] [id number] [phone]    Schedule an appointment as soon as possible for a visit   [facility]      OUTPATIENT MEDICATIONS:  Discharge Medication List as of 11/1/2019  8:07 PM      START taking these medications    Details   acetaminophen (TYLENOL) 325 MG Tab Take 2 Tabs by mouth every 6 hours as needed for Moderate Pain for up to 3 days., Disp-30 Tab, R-0, Print Rx Paper           FINAL IMPRESSION  Visit Diagnoses     ICD-10-CM   1. Fall, initial encounter W19.XXXA   2. Acute midline low back pain without sciatica M54.5   3. Closed compression fracture of L2 lumbar vertebra, initial encounter ([facility]) S32.020A     Desi GUADARRAMA (Scribe), am scribing for, and in the presence of, Kan Welch M.D..    Electronically signed by: Desi Fontanez (Jb), 11/1/2019    Kan GUADARRAMA M.D. personally performed the services described in this documentation, as scribed by Desi Fontanez in my presence, and it is both accurate and complete. C    The note accurately reflects work and decisions made by me.  Kan Welch  11/1/2019  7:40 PM

## 2019-11-03 PROBLEM — S32.020A COMPRESSION FRACTURE OF L2 (HCC): Status: ACTIVE | Noted: 2019-11-03

## 2019-12-03 ENCOUNTER — HOSPITAL ENCOUNTER (OUTPATIENT)
Dept: RADIOLOGY | Facility: MEDICAL CENTER | Age: 74
End: 2019-12-03
Attending: NEUROLOGICAL SURGERY
Payer: MEDICARE

## 2019-12-03 ENCOUNTER — OFFICE VISIT (OUTPATIENT)
Dept: PULMONOLOGY | Facility: HOSPICE | Age: 74
End: 2019-12-03
Payer: MEDICARE

## 2019-12-03 VITALS
HEART RATE: 74 BPM | WEIGHT: 102 LBS | BODY MASS INDEX: 18.07 KG/M2 | HEIGHT: 63 IN | OXYGEN SATURATION: 97 % | DIASTOLIC BLOOD PRESSURE: 76 MMHG | SYSTOLIC BLOOD PRESSURE: 122 MMHG | TEMPERATURE: 97.5 F | RESPIRATION RATE: 14 BRPM

## 2019-12-03 DIAGNOSIS — G11.9 CEREBELLAR ATAXIA (HCC): ICD-10-CM

## 2019-12-03 DIAGNOSIS — Z91.09 ENVIRONMENTAL ALLERGIES: ICD-10-CM

## 2019-12-03 DIAGNOSIS — M48.56XA COLLAPSE OF LUMBAR VERTEBRA, INITIAL ENCOUNTER (HCC): ICD-10-CM

## 2019-12-03 DIAGNOSIS — J45.20 MILD INTERMITTENT ASTHMA WITHOUT COMPLICATION: ICD-10-CM

## 2019-12-03 PROCEDURE — 99214 OFFICE O/P EST MOD 30 MIN: CPT | Performed by: INTERNAL MEDICINE

## 2019-12-03 PROCEDURE — 72100 X-RAY EXAM L-S SPINE 2/3 VWS: CPT

## 2019-12-03 ASSESSMENT — PAIN SCALES - GENERAL: PAINLEVEL: 2=MINIMAL-SLIGHT

## 2019-12-03 NOTE — PROGRESS NOTES
Chief Complaint   Patient presents with   • Follow-Up     HPI:  The patient is a 74-year-old woman with mild asthma.  She uses pro-air on an as-needed basis and has an antihistamine nasal spray.    The patient fell several years ago and suffered a cerebellar injury.  She has cerebellar ataxia.  She does not like to use inhaled medications.  Other problems include ulcerative colitis, reflux, and tremors.     Pulmonary function testing reveals an FEV1 of 1.74 L which is 82% of predicted.  FEV1/FVC ratio is 66%.  She has a 9% improvement after bronchodilator.    The patient has had several ER visits and hospitalizations since September.  She is experienced several falls.  Most significantly she suffered a compression fracture of L2 in early November.  She is scheduled for follow-up with neurosurgery in several days.  Her pain was managed with Tylenol.  She was also hospitalized for significant constipation.  She has not had any respiratory issues.  She did tell me that she has had several occasions awakening from sleep where her vision seemed to be inverted.  Certainly I do not think this is related to her medications or her cerebellar problems.  She will discuss this issue with her neurologist.      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    Due to fall 2004   • Chronic ulcerative colitis (HCC) 6/26/2013   • Compression fracture of L2 (HCC) 11/3/2019    [facility]. 2019 [facility]   • Cough    • GERD (gastroesophageal reflux disease)    • Hay fever 4/19/2019   • History of falling 5/2/2018   • HYPOTENSION    • Inner ear disease    • Leg cramps, sleep related 5/2/2018   • Near-syncope    • Neck injury     fracture, hand and leg numbness and tingling   • Osteoporosis    • Peripheral neuropathy    • Polyp of sigmoid colon 04/10/2018    Hyperplastic polyp   • Raynaud disease    • Tremor    • Vitamin D deficiency        ROS:   Constitutional: Denies fevers, chills,  "night sweats, fatigue or weight loss  Eyes: Denies vision loss, pain, drainage, double vision.  See HPI \"inverted vision\"  Ears, Nose, Throat: Denies earache, tinnitus, hoarseness  Cardiovascular: Denies chest pain, tightness, palpitations  Respiratory: Denies shortness of breath, sputum production, cough, hemoptysis  Sleep: Denies, snoring, apnea  GI: Denies abdominal pain, nausea, vomiting, diarrhea  : Denies frequent urination, hematuria, painful urination  Musculoskeletal: See HPI  Neurological: See HPI  Skin: Denies rashes, color changes  Psychiatric: Denies depression or thoughts of suicide  Hematologic: Denies bleeding tendency or clotting tendency  Allergic/Immunologic: Denies rhinitis, skin sensitivity    Social History     Socioeconomic History   • Marital status: Single     Spouse name: Not on file   • Number of children: Not on file   • Years of education: Not on file   • Highest education level: Not on file   Occupational History   • Not on file   Social Needs   • Financial resource strain: Not on file   • Food insecurity:     Worry: Not on file     Inability: Not on file   • Transportation needs:     Medical: Not on file     Non-medical: Not on file   Tobacco Use   • Smoking status: Never Smoker   • Smokeless tobacco: Never Used   Substance and Sexual Activity   • Alcohol use: No     Alcohol/week: 0.0 oz   • Drug use: No   • Sexual activity: Not on file   Lifestyle   • Physical activity:     Days per week: Not on file     Minutes per session: Not on file   • Stress: Not on file   Relationships   • Social connections:     Talks on phone: Not on file     Gets together: Not on file     Attends Taoism service: Not on file     Active member of club or organization: Not on file     Attends meetings of clubs or organizations: Not on file     Relationship status: Not on file   • Intimate partner violence:     Fear of current or ex partner: Not on file     Emotionally abused: Not on file     Physically " abused: Not on file     Forced sexual activity: Not on file   Other Topics Concern   • Not on file   Social History Narrative   • Not on file     Hydromorphone; Azithromycin; Clindamycin; Codeine; Erythromycin; Keflex; Levaquin; Lyrica; Quinolones; Sulfa drugs; Tetracyclines; and Other food  Current Outpatient Medications on File Prior to Visit   Medication Sig Dispense Refill   • lidocaine (LIDODERM) 5 % Patch Apply 1 Patch to skin as directed every 24 hours. 10 Patch 0   • MAGNESIUM PO Take  by mouth. Liquid     • cetirizine (ZYRTEC) 10 MG Tab Take 10 mg by mouth every day.     • Cholecalciferol (VITAMIN D3 PO) Take 1 Tab by mouth every day.     • Multiple Vitamins-Minerals (OCUVITE-LUTEIN) Tab Take 1 tablet by mouth every day.     • Multiple Vitamins-Minerals (WOMENS 50+ MULTI VITAMIN/MIN PO) Take 1 Tab by mouth every day.     • BIOTIN PO Take 1 Tab by mouth every day.     • Potassium (POTASSIMIN PO) Take 2 Drops by mouth 2 Times a Day.     • Melatonin 10 MG Cap Take 10 mg by mouth every bedtime.     • CALCIUM PO Take 2 Tabs by mouth 2 Times a Day.     • albuterol (PROAIR HFA) 108 (90 Base) MCG/ACT Aero Soln inhalation aerosol Inhale 2 Puffs by mouth every 6 hours as needed for Shortness of Breath.     • azelastine (ASTELIN) 137 MCG/SPRAY nasal spray [facility] 2 Sprays in nose 2 times a day as needed.  11   • montelukast (SINGULAIR) 10 MG Tab Take 1 Tab by mouth every day. 30 Tab 11     No current facility-administered medications on file prior to visit.      There were no vitals taken for this visit.  Family History   Problem Relation Age of Onset   • Non-contributory Father         Parkinson's disease   • Non-contributory Mother         old age with mild dementia   • Heart Disease Neg Hx    • Hypertension Neg Hx    • Hyperlipidemia Neg Hx    • Diabetes Neg Hx        Physical Exam:  Uses a walker for support  HEENT: PERRLA, EOMI, no scleral icterus, no nasal or oral lesions  Neck: No thyromegaly, no adenopathy, no  bruits  Mallampatti: Grade II  Lungs: Equal breath sounds, no wheezes or crackles  Heart: Regular rate and rhythm, no gallops or murmurs  Abdomen: Soft, benign, no organomegaly  Extremities: No clubbing, cyanosis, or edema  Neurologic: She has balance issues and cerebellar ataxia    1. Mild intermittent asthma without complication    2. Cerebellar ataxia (HCC)    3. Environmental allergies      Her asthma seems to be at baseline.  She is not having any current significant cough or wheezing.  She will continue to use albuterol on an as-needed basis.  She will continue to follow-up with neurology and with neurosurgery.  We will see her back in 6 months or sooner if she has issues.

## 2019-12-03 NOTE — LETTER
Zain Mckenzie M.D.  [facility] Pulmonary Medicine   [address] [place] [address] Jr  [place] [address]  Phone: [phone] - Fax: [phone]           Encounter Date: 12/3/2019  Provider: Zain Mckenzie M.D.  Location of Care: [facility] PULMONARY MEDICINE      Patient:   Ana Paula Ordonez   MR Number: [id number]   YOB: 1945     PROGRESS NOTE:  Chief Complaint   Patient presents with   • Follow-Up     HPI:  The patient is a 74-year-old woman with mild asthma.  She uses pro-air on an as-needed basis and has an antihistamine nasal spray.    The patient fell several years ago and suffered a cerebellar injury.  She has cerebellar ataxia.  She does not like to use inhaled medications.  Other problems include ulcerative colitis, reflux, and tremors.     Pulmonary function testing reveals an FEV1 of 1.74 L which is 82% of predicted.  FEV1/FVC ratio is 66%.  She has a 9% improvement after bronchodilator.    The patient has had several ER visits and hospitalizations since September.  She is experienced several falls.  Most significantly she suffered a compression fracture of L2 in early November.  She is scheduled for follow-up with neurosurgery in several days.  Her pain was managed with Tylenol.  She was also hospitalized for significant constipation.  She has not had any respiratory issues.  She did tell me that she has had several occasions awakening from sleep where her vision seemed to be inverted.  Certainly I do not think this is related to her medications or her cerebellar problems.  She will discuss this issue with her neurologist.      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    Due to fall 2004   • Chronic ulcerative colitis (HCC) 6/26/2013   • Compression fracture of L2 (HCC) 11/3/2019    Nov. 2019 from [facility]   • Cough    • GERD (gastroesophageal reflux disease)    • Hay fever 4/19/2019   • History of falling  "5/2/2018   • HYPOTENSION    • Inner ear disease    • Leg cramps, sleep related 5/2/2018   • Near-syncope    • Neck injury     fracture, hand and leg numbness and tingling   • Osteoporosis    • Peripheral neuropathy    • Polyp of sigmoid colon 04/10/2018    Hyperplastic polyp   • Raynaud disease    • Tremor    • Vitamin D deficiency        ROS:   Constitutional: Denies fevers, chills, night sweats, fatigue or weight loss  Eyes: Denies vision loss, pain, drainage, double vision.  See HPI \"inverted vision\"  Ears, Nose, Throat: Denies earache, tinnitus, hoarseness  Cardiovascular: Denies chest pain, tightness, palpitations  Respiratory: Denies shortness of breath, sputum production, cough, hemoptysis  Sleep: Denies, snoring, apnea  GI: Denies abdominal pain, nausea, vomiting, diarrhea  : Denies frequent urination, hematuria, painful urination  Musculoskeletal: See HPI  Neurological: See HPI  Skin: Denies rashes, color changes  Psychiatric: Denies depression or thoughts of suicide  Hematologic: Denies bleeding tendency or clotting tendency  Allergic/Immunologic: Denies rhinitis, skin sensitivity    Social History     Socioeconomic History   • Marital status: Single     Spouse name: Not on file   • Number of children: Not on file   • Years of education: Not on file   • Highest education level: Not on file   Occupational History   • Not on file   Social Needs   • Financial resource strain: Not on file   • Food insecurity:     Worry: Not on file     Inability: Not on file   • Transportation needs:     Medical: Not on file     Non-medical: Not on file   Tobacco Use   • Smoking status: Never Smoker   • Smokeless tobacco: Never Used   Substance and Sexual Activity   • Alcohol use: No     Alcohol/week: 0.0 oz   • Drug use: No   • Sexual activity: Not on file   Lifestyle   • Physical activity:     Days per week: Not on file     Minutes per session: Not on file   • Stress: Not on file   Relationships   • Social connections:     " Talks on phone: Not on file     Gets together: Not on file     Attends [employer] service: Not on file     Active member of club or organization: Not on file     Attends meetings of clubs or organizations: Not on file     Relationship status: Not on file   • Intimate partner violence:     Fear of current or ex partner: Not on file     Emotionally abused: Not on file     Physically abused: Not on file     Forced sexual activity: Not on file   Other Topics Concern   • Not on file   Social History Narrative   • Not on file     Hydromorphone; Azithromycin; Clindamycin; Codeine; Erythromycin; Keflex; Levaquin; Lyrica; Quinolones; Sulfa drugs; Tetracyclines; and Other food  Current Outpatient Medications on File Prior to Visit   Medication Sig Dispense Refill   • lidocaine (LIDODERM) 5 % Patch Apply 1 Patch to skin as directed every 24 hours. 10 Patch 0   • MAGNESIUM PO Take  by mouth. Liquid     • cetirizine (ZYRTEC) 10 MG Tab Take 10 mg by mouth every day.     • Cholecalciferol (VITAMIN D3 PO) Take 1 Tab by mouth every day.     • Multiple Vitamins-Minerals (OCUVITE-LUTEIN) Tab Take 1 tablet by mouth every day.     • Multiple Vitamins-Minerals (WOMENS 50+ MULTI VITAMIN/MIN PO) Take 1 Tab by mouth every day.     • BIOTIN PO Take 1 Tab by mouth every day.     • Potassium (POTASSIMIN PO) Take 2 Drops by mouth 2 Times a Day.     • Melatonin 10 MG Cap Take 10 mg by mouth every bedtime.     • CALCIUM PO Take 2 Tabs by mouth 2 Times a Day.     • albuterol (PROAIR HFA) 108 (90 Base) MCG/ACT Aero Soln inhalation aerosol Inhale 2 Puffs by mouth every 6 hours as needed for Shortness of Breath.     • azelastine (ASTELIN) 137 MCG/SPRAY nasal spray [place] 2 Sprays in nose 2 times a day as needed.  11   • montelukast (SINGULAIR) 10 MG Tab Take 1 Tab by mouth every day. 30 Tab 11     No current facility-administered medications on file prior to visit.      There were no vitals taken for this visit.  Family History   Problem Relation Age  of Onset   • Non-contributory Father         Parkinson's disease   • Non-contributory Mother         old age with mild dementia   • Heart Disease Neg Hx    • Hypertension Neg Hx    • Hyperlipidemia Neg Hx    • Diabetes Neg Hx        Physical Exam:  Uses a walker for support  HEENT: PERRLA, EOMI, no scleral icterus, no nasal or oral lesions  Neck: No thyromegaly, no adenopathy, no bruits  Mallampatti: Grade II  Lungs: Equal breath sounds, no wheezes or crackles  Heart: Regular rate and rhythm, no gallops or murmurs  Abdomen: Soft, benign, no organomegaly  Extremities: No clubbing, cyanosis, or edema  Neurologic: She has balance issues and cerebellar ataxia    1. Mild intermittent asthma without complication    2. Cerebellar ataxia (HCC)    3. Environmental allergies      Her asthma seems to be at baseline.  She is not having any current significant cough or wheezing.  She will continue to use albuterol on an as-needed basis.  She will continue to follow-up with neurology and with neurosurgery.  We will see her back in 6 months or sooner if she has issues.        Electronically signed by Zain Mckenzie M.D.  on 12/03/19    No Recipients

## 2019-12-04 NOTE — TELEPHONE ENCOUNTER
Ana Paula states she fell backward yesterday and felt pain in left wrist. She went to [facility] urgent care and found to have fracture. She has been casted. She was given vicodin but she states she does not tolerate stating it makes her feel hot. I discussed she may try taking low dose ibuprofen 200 mg tid as long as it doesn't irritate her colitis for a few days. I will call in tramadol 50 mg qid prn pain for her.   Intractable pain

## 2020-01-30 DIAGNOSIS — K51.90 CHRONIC ULCERATIVE COLITIS WITHOUT COMPLICATION, UNSPECIFIED LOCATION (HCC): ICD-10-CM

## 2020-01-30 RX ORDER — MESALAMINE 1.2 G/1
2.4 TABLET, DELAYED RELEASE ORAL
Qty: 60 TAB | Refills: 2 | Status: SHIPPED | OUTPATIENT
Start: 2020-01-30 | End: 2020-01-30 | Stop reason: CLARIF

## 2020-01-30 RX ORDER — MESALAMINE 0.38 G/1
4 CAPSULE, EXTENDED RELEASE ORAL
Qty: 120 CAP | Refills: 2 | Status: SHIPPED | OUTPATIENT
Start: 2020-01-30 | End: 2020-02-02

## 2020-01-31 ENCOUNTER — HOSPITAL ENCOUNTER (OUTPATIENT)
Facility: MEDICAL CENTER | Age: 75
End: 2020-01-31
Attending: PHYSICIAN ASSISTANT
Payer: MEDICARE

## 2020-01-31 PROCEDURE — 83993 ASSAY FOR CALPROTECTIN FECAL: CPT

## 2020-01-31 NOTE — PROGRESS NOTES
Ana Paula states she has been having diarrhea associated with her colitis for past 4 days. She has been treated with mesalamine in past effectively. She has placed a call to her GI MD and is awaiting call back but states she would like to restart mesalamine as soon as possible.

## 2020-02-02 ENCOUNTER — TELEPHONE (OUTPATIENT)
Dept: INTERNAL MEDICINE | Facility: IMAGING CENTER | Age: 75
End: 2020-02-02

## 2020-02-03 ENCOUNTER — TELEPHONE (OUTPATIENT)
Dept: INTERNAL MEDICINE | Facility: IMAGING CENTER | Age: 75
End: 2020-02-03

## 2020-02-03 RX ORDER — METHYLPREDNISOLONE 4 MG/1
TABLET ORAL
Qty: 21 TAB | Refills: 0 | Status: ON HOLD | OUTPATIENT
Start: 2020-02-03 | End: 2020-11-06

## 2020-02-03 NOTE — TELEPHONE ENCOUNTER
Ana Paula states she developed raised circular bumps in skin that itch yesterday and is worse today. She recently started mesalamine for her colitis. She states she did not take dose today. No complaint of shortness of breath or swelling lips or tongue. She has started taking benadryl. I have discussed possible course of steroid. She will call office tomorrow. I advised her to remain off mesalamine since this is likely the cause of her hives. Will add mesalamine to list of allergies.

## 2020-02-03 NOTE — TELEPHONE ENCOUNTER
Using Benadryl, but still extremely itchy with hives from knees to scalp.  Medrol dose pack per Dr Sosa.

## 2020-02-04 LAB — CALPROTECTIN STL-MCNT: 355 UG/G

## 2020-02-27 ENCOUNTER — NON-PROVIDER VISIT (OUTPATIENT)
Dept: INTERNAL MEDICINE | Facility: IMAGING CENTER | Age: 75
End: 2020-02-27
Payer: MEDICARE

## 2020-02-28 DIAGNOSIS — G47.62 SLEEP RELATED LEG CRAMPS: ICD-10-CM

## 2020-02-28 RX ORDER — DIAZEPAM 5 MG/1
5 TABLET ORAL NIGHTLY PRN
Qty: 30 TAB | Refills: 0 | Status: SHIPPED
Start: 2020-02-28 | End: 2020-03-29

## 2020-02-28 NOTE — NON-PROVIDER
Bilateral ear canals lavaged with warm water.  Patient tolerated the procedure well.  Both TM's & canals WNL.

## 2020-03-28 DIAGNOSIS — G60.9 IDIOPATHIC PERIPHERAL NEUROPATHY: ICD-10-CM

## 2020-03-28 RX ORDER — GABAPENTIN 100 MG/1
100 CAPSULE ORAL 3 TIMES DAILY PRN
Qty: 30 CAP | Refills: 5 | Status: ON HOLD | OUTPATIENT
Start: 2020-03-28 | End: 2020-11-06

## 2020-03-28 RX ORDER — TRAMADOL HYDROCHLORIDE 50 MG/1
TABLET ORAL
Qty: 40 TAB | Refills: 1 | OUTPATIENT
Start: 2020-03-28 | End: 2020-04-18

## 2020-04-13 DIAGNOSIS — J30.1 HAY FEVER: ICD-10-CM

## 2020-04-14 RX ORDER — MONTELUKAST SODIUM 10 MG/1
TABLET ORAL
Qty: 90 TAB | Refills: 3 | Status: SHIPPED | OUTPATIENT
Start: 2020-04-14 | End: 2021-06-02

## 2020-04-20 ENCOUNTER — TELEPHONE (OUTPATIENT)
Dept: PULMONOLOGY | Facility: HOSPICE | Age: 75
End: 2020-04-20

## 2020-04-20 NOTE — TELEPHONE ENCOUNTER
Pt called states that she would like to speak with Dr. Mckenzie regarding her cough. Informed patient the Dr. Mckenzie is not currently in the office and I would be able to route message to a provider in clinic regarding her cough and sxs. Pt stated she would only like to speak with Dr. Mckenzie. Informed patient provider would not be in office until the week of 05/11/2020. Pt stated she would wait until appointment to speak with

## 2020-05-05 ENCOUNTER — TELEPHONE (OUTPATIENT)
Dept: PULMONOLOGY | Facility: HOSPICE | Age: 75
End: 2020-05-05

## 2020-05-05 NOTE — TELEPHONE ENCOUNTER
Caller: Ana Paula    Phone Number: [phone] (home)     Message: Pt called and requested to talk to Dr Mckenzie regarding her cough. Informed pt Dr Mckenzie is not in the office until 05/11/2020. She said she will wait until then. Only wants to talk with Dr Mckenzie.

## 2020-05-08 DIAGNOSIS — J30.1 HAY FEVER: ICD-10-CM

## 2020-05-08 RX ORDER — MONTELUKAST SODIUM 10 MG/1
TABLET ORAL
Qty: 30 TAB | OUTPATIENT
Start: 2020-05-08

## 2020-05-11 ENCOUNTER — TELEMEDICINE (OUTPATIENT)
Dept: PULMONOLOGY | Facility: HOSPICE | Age: 75
End: 2020-05-11
Payer: MEDICARE

## 2020-05-11 VITALS — WEIGHT: 107 LBS | HEIGHT: 63 IN | BODY MASS INDEX: 18.96 KG/M2

## 2020-05-11 DIAGNOSIS — Z91.09 ENVIRONMENTAL ALLERGIES: ICD-10-CM

## 2020-05-11 DIAGNOSIS — J45.20 MILD INTERMITTENT ASTHMA WITHOUT COMPLICATION: ICD-10-CM

## 2020-05-11 DIAGNOSIS — G11.9 CEREBELLAR ATAXIA (HCC): ICD-10-CM

## 2020-05-11 PROCEDURE — 99213 OFFICE O/P EST LOW 20 MIN: CPT | Mod: 95,CR | Performed by: INTERNAL MEDICINE

## 2020-05-11 RX ORDER — SULFASALAZINE 500 MG/1
1000 TABLET, DELAYED RELEASE ORAL 2 TIMES DAILY WITH MEALS
COMMUNITY
End: 2022-03-07 | Stop reason: SDUPTHER

## 2020-05-11 ASSESSMENT — FIBROSIS 4 INDEX: FIB4 SCORE: 1.57

## 2020-05-11 NOTE — PROGRESS NOTES
"Telemedicine Visit: Established Patient     This encounter was conducted via Zoom .   Verbal consent was obtained. Patient's identity was verified.    Subjective:   CC: Cough, asthma  Ana Paula Ordonez is a 75 y.o. female presenting for evaluation and management of cough and asthma.     She uses pro-air on an as-needed basis and has an antihistamine nasal spray.    The patient fell several years ago and suffered a cerebellar injury.  She has cerebellar ataxia.  She does not like to use inhaled medications.  Other problems include ulcerative colitis, reflux, and tremors.     Pulmonary function testing reveals an FEV1 of 1.74 L which is 82% of predicted.  FEV1/FVC ratio is 66%.  She has a 9% improvement after bronchodilator.    Over the past month or so she has been coughing more.  In the past she used codeine cough syrup to help with her cough.  As stated above she does not like to use inhalers.  She has previously been prescribed a Breo inhaler 100 mcg which she has not used.    I have recommended that she initiate Breo 1 puff every day for the next 2 months or so and see if that has an effect on her cough.    ROS   Denies any recent fevers or chills. No nausea or vomiting. No chest pains.     Allergies   Allergen Reactions   • Hydromorphone Anaphylaxis     \"went into code blue\"    • Azithromycin Diarrhea and Unspecified     Diarrhea     • Clindamycin    • Codeine Unspecified     ?   • Erythromycin Unspecified     ?   • Keflex      ?   • Levaquin Unspecified     \"i dont know\"    • Lyrica Unspecified     ?   • Mesalamine Hives   • Quinolones Unspecified     ?   • Sulfa Drugs Unspecified     \"i dont know\"    • Tetracyclines Unspecified     ?   • Other Food Unspecified     Dairy-mucus (butter is okay). spices-cough       Current medicines (including changes today)  Current Outpatient Medications   Medication Sig Dispense Refill   • sulfaSALAzine (AZULFIDINE EN-TAB) 500 MG EC tablet Take 500 mg by mouth 2 times a day, " with meals.     • montelukast (SINGULAIR) 10 MG Tab TAKE 1 TABLET BY MOUTH EVERY DAY 90 Tab 3   • gabapentin (NEURONTIN) 100 MG Cap Take 1 Cap by mouth 3 times a day as needed. 30 Cap 5   • NITROFURANTOIN PO TK 1 C PO BID  0   • NITROFURANTOIN MACROCRYSTAL PO      • Fexofenadine HCl (ALLEGRA ALLERGY PO) Take  by mouth.     • lidocaine (LIDODERM) 5 % Patch Apply 1 Patch to skin as directed every 24 hours. 10 Patch 0   • MAGNESIUM PO Take  by mouth. Liquid     • cetirizine (ZYRTEC) 10 MG Tab Take 10 mg by mouth every day.     • Cholecalciferol (VITAMIN D3 PO) Take 1 Tab by mouth every day.     • Multiple Vitamins-Minerals (OCUVITE-LUTEIN) Tab Take 1 tablet by mouth every day.     • Multiple Vitamins-Minerals (WOMENS 50+ MULTI VITAMIN/MIN PO) Take 1 Tab by mouth every day.     • BIOTIN PO Take 1 Tab by mouth every day.     • Potassium (POTASSIMIN PO) Take 2 Drops by mouth 2 Times a Day.     • Melatonin 10 MG Cap Take 10 mg by mouth every bedtime.     • CALCIUM PO Take 2 Tabs by mouth 2 Times a Day.     • albuterol (PROAIR HFA) 108 (90 Base) MCG/ACT Aero Soln inhalation aerosol Inhale 2 Puffs by mouth every 6 hours as needed for Shortness of Breath.     • azelastine (ASTELIN) 137 MCG/SPRAY nasal spray [place] 2 Sprays in nose 2 times a day as needed.  11   • methylPREDNISolone (MEDROL DOSEPAK) 4 MG Tablet Therapy Pack As directed on the packaging label. 21 Tab 0     No current facility-administered medications for this visit.        Patient Active Problem List    Diagnosis Date Noted   • CN (constipation) with early obstructive pattern 09/28/2019     Priority: High   • Raynaud disease 08/20/2019     Priority: Medium   • Left ear pain, likely residual pressure post-ear infection 05/07/2019     Priority: Medium   • Peripheral neuropathy      Priority: Medium   • Prolonged QT interval 05/07/2019     Priority: Low   • Urinary retention 05/06/2019     Priority: Low   • Cerebellar ataxia (HCC) 03/30/2018     Priority: Low   •  Vitamin D deficiency 03/30/2018     Priority: Low   • Mild intermittent asthma without complication 02/03/2017     Priority: Low   • Chronic ulcerative colitis ([facility]) 06/26/2013     Priority: Low   • Compression fracture of L2 ([facility]) 11/03/2019   • Hay fever 04/19/2019   • History of recurrent UTI (urinary tract infection) 05/02/2018   • History of falling 05/02/2018   • Leg cramps, sleep related 05/02/2018   • Polyp of sigmoid colon 04/10/2018   • Age related osteoporosis 03/30/2018       Family History   Problem Relation Age of Onset   • Non-contributory Father         Parkinson's disease   • Non-contributory Mother         old age with mild dementia   • Heart Disease Neg Hx    • Hypertension Neg Hx    • Hyperlipidemia Neg Hx    • Diabetes Neg Hx        She  has a past medical history of Arthritis, ASTHMA, Cerebellar ataxia ([facility]) (3/30/2018), Chronic ulcerative colitis ([facility]) (6/26/2013), Compression fracture of L2 ([facility]) (11/3/2019), Cough, GERD (gastroesophageal reflux disease), Hay fever (4/19/2019), History of falling (5/2/2018), HYPOTENSION, Inner ear disease, Leg cramps, sleep related (5/2/2018), Near-syncope, Neck injury, Osteoporosis, Peripheral neuropathy, Polyp of sigmoid colon (04/10/2018), Raynaud disease, Tremor, and Vitamin D deficiency.  She  has a past surgical history that includes nasal fracture reduction closed (10/8/08); hip nailing intramedullary (7/30/2011); tonsillectomy and adenoidectomy; bladder suspension; nissen fundoplication laparoscopic (2005); and cholecystectomy (2008).       Objective:   Vitals obtained by patient:      Physical Exam:  Constitutional: Alert, no distress, well-groomed.  Skin: No rashes in visible areas.  Eye: Round. Conjunctiva clear, lids normal. No icterus.   ENMT: Lips pink without lesions, good dentition, moist mucous membranes. Phonation normal.  Neck: No masses, no thyromegaly. Moves freely without pain.  CV: Pulse as reported by patient  Respiratory: Unlabored  respiratory effort, no cough or audible wheeze  Psych: Alert and oriented x3, normal affect and mood.       Assessment and Plan:   The following treatment plan was discussed:     1. Mild intermittent asthma without complication    2. Cerebellar ataxia (HCC)    3. Environmental allergies    Other orders  - sulfaSALAzine (AZULFIDINE EN-TAB) 500 MG EC tablet; Take 500 mg by mouth 2 times a day, with meals.    Her cough is most likely from her asthma.  She is not clinically toxic.  She has no fever or chills.  We will initiate Breo as mentioned above.  I would like to avoid any narcotic usage.  We will see her back in 2 months for repeat evaluation via telemedicine.

## 2020-06-10 DIAGNOSIS — G11.9 CEREBELLAR ATAXIA (HCC): ICD-10-CM

## 2020-06-10 DIAGNOSIS — G60.9 IDIOPATHIC PERIPHERAL NEUROPATHY: ICD-10-CM

## 2020-07-14 DIAGNOSIS — E78.00 HYPERCHOLESTEROLEMIA: ICD-10-CM

## 2020-07-14 DIAGNOSIS — Z79.899 LONG TERM USE OF DRUG: ICD-10-CM

## 2020-07-14 DIAGNOSIS — Z87.440 HISTORY OF RECURRENT UTI (URINARY TRACT INFECTION): ICD-10-CM

## 2020-07-14 DIAGNOSIS — R53.1 WEAKNESS: ICD-10-CM

## 2020-07-14 DIAGNOSIS — E55.9 VITAMIN D DEFICIENCY: ICD-10-CM

## 2020-07-14 DIAGNOSIS — G60.9 IDIOPATHIC PERIPHERAL NEUROPATHY: ICD-10-CM

## 2020-07-14 DIAGNOSIS — K51.90 CHRONIC ULCERATIVE COLITIS WITHOUT COMPLICATION, UNSPECIFIED LOCATION (HCC): ICD-10-CM

## 2020-07-14 DIAGNOSIS — G47.62 SLEEP RELATED LEG CRAMPS: ICD-10-CM

## 2020-07-15 ENCOUNTER — HOSPITAL ENCOUNTER (OUTPATIENT)
Facility: MEDICAL CENTER | Age: 75
End: 2020-07-15
Attending: INTERNAL MEDICINE
Payer: MEDICARE

## 2020-07-15 ENCOUNTER — NON-PROVIDER VISIT (OUTPATIENT)
Dept: INTERNAL MEDICINE | Facility: IMAGING CENTER | Age: 75
End: 2020-07-15
Payer: MEDICARE

## 2020-07-15 DIAGNOSIS — E55.9 VITAMIN D DEFICIENCY: ICD-10-CM

## 2020-07-15 DIAGNOSIS — Z87.440 HISTORY OF RECURRENT UTI (URINARY TRACT INFECTION): ICD-10-CM

## 2020-07-15 DIAGNOSIS — G60.9 IDIOPATHIC PERIPHERAL NEUROPATHY: ICD-10-CM

## 2020-07-15 DIAGNOSIS — Z79.899 LONG TERM USE OF DRUG: ICD-10-CM

## 2020-07-15 DIAGNOSIS — Z01.89 ENCOUNTER FOR ROUTINE LABORATORY TESTING: ICD-10-CM

## 2020-07-15 DIAGNOSIS — K51.90 CHRONIC ULCERATIVE COLITIS WITHOUT COMPLICATION, UNSPECIFIED LOCATION (HCC): ICD-10-CM

## 2020-07-15 DIAGNOSIS — G47.62 SLEEP RELATED LEG CRAMPS: ICD-10-CM

## 2020-07-15 DIAGNOSIS — R53.1 WEAKNESS: ICD-10-CM

## 2020-07-15 DIAGNOSIS — E78.00 HYPERCHOLESTEROLEMIA: ICD-10-CM

## 2020-07-15 LAB
25(OH)D3 SERPL-MCNC: 58 NG/ML (ref 30–100)
ALBUMIN SERPL BCP-MCNC: 4.4 G/DL (ref 3.2–4.9)
ALBUMIN/GLOB SERPL: 1.7 G/DL
ALP SERPL-CCNC: 118 U/L (ref 30–99)
ALT SERPL-CCNC: 24 U/L (ref 2–50)
ANION GAP SERPL CALC-SCNC: 10 MMOL/L (ref 7–16)
APPEARANCE UR: ABNORMAL
AST SERPL-CCNC: 21 U/L (ref 12–45)
BACTERIA #/AREA URNS HPF: NEGATIVE /HPF
BASOPHILS # BLD AUTO: 0.7 % (ref 0–1.8)
BASOPHILS # BLD: 0.04 K/UL (ref 0–0.12)
BILIRUB SERPL-MCNC: 0.6 MG/DL (ref 0.1–1.5)
BILIRUB UR QL STRIP.AUTO: NEGATIVE
BUN SERPL-MCNC: 18 MG/DL (ref 8–22)
CALCIUM SERPL-MCNC: 9.4 MG/DL (ref 8.5–10.5)
CHLORIDE SERPL-SCNC: 99 MMOL/L (ref 96–112)
CHOLEST SERPL-MCNC: 222 MG/DL (ref 100–199)
CO2 SERPL-SCNC: 29 MMOL/L (ref 20–33)
COLOR UR: YELLOW
CREAT SERPL-MCNC: 0.51 MG/DL (ref 0.5–1.4)
EOSINOPHIL # BLD AUTO: 0.09 K/UL (ref 0–0.51)
EOSINOPHIL NFR BLD: 1.6 % (ref 0–6.9)
EPI CELLS #/AREA URNS HPF: NEGATIVE /HPF
ERYTHROCYTE [DISTWIDTH] IN BLOOD BY AUTOMATED COUNT: 42.9 FL (ref 35.9–50)
ERYTHROCYTE [SEDIMENTATION RATE] IN BLOOD BY WESTERGREN METHOD: 8 MM/HOUR (ref 0–30)
GLOBULIN SER CALC-MCNC: 2.6 G/DL (ref 1.9–3.5)
GLUCOSE SERPL-MCNC: 89 MG/DL (ref 65–99)
GLUCOSE UR STRIP.AUTO-MCNC: NEGATIVE MG/DL
HCT VFR BLD AUTO: 45.5 % (ref 37–47)
HDLC SERPL-MCNC: 76 MG/DL
HGB BLD-MCNC: 14.5 G/DL (ref 12–16)
HYALINE CASTS #/AREA URNS LPF: NORMAL /LPF
IMM GRANULOCYTES # BLD AUTO: 0.02 K/UL (ref 0–0.11)
IMM GRANULOCYTES NFR BLD AUTO: 0.4 % (ref 0–0.9)
KETONES UR STRIP.AUTO-MCNC: NEGATIVE MG/DL
LDLC SERPL CALC-MCNC: 131 MG/DL
LEUKOCYTE ESTERASE UR QL STRIP.AUTO: ABNORMAL
LYMPHOCYTES # BLD AUTO: 1.8 K/UL (ref 1–4.8)
LYMPHOCYTES NFR BLD: 32.2 % (ref 22–41)
MAGNESIUM SERPL-MCNC: 2.4 MG/DL (ref 1.5–2.5)
MCH RBC QN AUTO: 32.7 PG (ref 27–33)
MCHC RBC AUTO-ENTMCNC: 31.9 G/DL (ref 33.6–35)
MCV RBC AUTO: 102.5 FL (ref 81.4–97.8)
MICRO URNS: ABNORMAL
MONOCYTES # BLD AUTO: 0.42 K/UL (ref 0–0.85)
MONOCYTES NFR BLD AUTO: 7.5 % (ref 0–13.4)
NEUTROPHILS # BLD AUTO: 3.22 K/UL (ref 2–7.15)
NEUTROPHILS NFR BLD: 57.6 % (ref 44–72)
NITRITE UR QL STRIP.AUTO: NEGATIVE
NRBC # BLD AUTO: 0 K/UL
NRBC BLD-RTO: 0 /100 WBC
PH UR STRIP.AUTO: 8 [PH] (ref 5–8)
PLATELET # BLD AUTO: 228 K/UL (ref 164–446)
PMV BLD AUTO: 9.2 FL (ref 9–12.9)
POTASSIUM SERPL-SCNC: 4.1 MMOL/L (ref 3.6–5.5)
PROT SERPL-MCNC: 7 G/DL (ref 6–8.2)
PROT UR QL STRIP: NEGATIVE MG/DL
RBC # BLD AUTO: 4.44 M/UL (ref 4.2–5.4)
RBC # URNS HPF: NORMAL /HPF
RBC UR QL AUTO: NEGATIVE
SODIUM SERPL-SCNC: 138 MMOL/L (ref 135–145)
SP GR UR STRIP.AUTO: 1.01
TRIGL SERPL-MCNC: 73 MG/DL (ref 0–149)
TSH SERPL DL<=0.005 MIU/L-ACNC: 3.68 UIU/ML (ref 0.38–5.33)
UROBILINOGEN UR STRIP.AUTO-MCNC: 0.2 MG/DL
VIT B12 SERPL-MCNC: 694 PG/ML (ref 211–911)
WBC # BLD AUTO: 5.6 K/UL (ref 4.8–10.8)
WBC #/AREA URNS HPF: NORMAL /HPF

## 2020-07-15 PROCEDURE — 80061 LIPID PANEL: CPT

## 2020-07-15 PROCEDURE — 80053 COMPREHEN METABOLIC PANEL: CPT

## 2020-07-15 PROCEDURE — 82306 VITAMIN D 25 HYDROXY: CPT

## 2020-07-15 PROCEDURE — 81001 URINALYSIS AUTO W/SCOPE: CPT

## 2020-07-15 PROCEDURE — 84443 ASSAY THYROID STIM HORMONE: CPT

## 2020-07-15 PROCEDURE — 85025 COMPLETE CBC W/AUTO DIFF WBC: CPT

## 2020-07-15 PROCEDURE — 85652 RBC SED RATE AUTOMATED: CPT

## 2020-07-15 PROCEDURE — 82607 VITAMIN B-12: CPT

## 2020-07-15 PROCEDURE — 83735 ASSAY OF MAGNESIUM: CPT

## 2020-07-16 ENCOUNTER — APPOINTMENT (OUTPATIENT)
Dept: PULMONOLOGY | Facility: HOSPICE | Age: 75
End: 2020-07-16
Payer: MEDICARE

## 2020-07-16 ENCOUNTER — DOCUMENTATION (OUTPATIENT)
Dept: PULMONOLOGY | Facility: HOSPICE | Age: 75
End: 2020-07-16

## 2020-07-16 DIAGNOSIS — R05.9 COUGH: ICD-10-CM

## 2020-07-16 NOTE — PROGRESS NOTES
There was some problem making assumed connection with Mrs. Ordonez today.  I called her.  Breo did not help her cough.  She tells me in the past steroids were entirely unsuccessful.  The only thing that helps her cough in the past was codeine syrup.  I did tell her that I was not willing to prescribe codeine long-term.  I will give her 1 course of Tussionex to see how that helps with her cough.

## 2020-07-17 NOTE — PROGRESS NOTES
Spoke with patient and she asked that I mail the prescription for cough liquid to her . Request completed.

## 2020-07-29 ENCOUNTER — TELEPHONE (OUTPATIENT)
Dept: PULMONOLOGY | Facility: HOSPICE | Age: 75
End: 2020-07-29

## 2020-07-29 NOTE — TELEPHONE ENCOUNTER
MEDICATION PRIOR AUTHORIZATION NEEDED:    1. Name of Medication: Tussionex     2. Requested By (Name of Pharmacy): [facility]     3. Is insurance on file current? yes    4. What is the name & phone number of the 3rd party payor? [employer] [phone]

## 2020-07-29 NOTE — TELEPHONE ENCOUNTER
DOCUMENTATION OF PRIOR AUTH STATUS    1. Medication name and dose: Tussionex    2. Name and Phone # of Prescription coverage company: [employer] [phone]    3. Date Prior Auth was submitted: 7/28/2020    4. What information was given to obtain insurance decision: Clinical notes    5. Prior Auth letter Approved or Denied: Denied    6. Pharmacy notified: Yes    7. Patient notified: Yes    Medication is an exclusion under plan.

## 2020-08-03 DIAGNOSIS — R74.8 ELEVATED ALKALINE PHOSPHATASE LEVEL: ICD-10-CM

## 2020-08-03 DIAGNOSIS — R79.89 ABNORMAL THYROID BLOOD TEST: ICD-10-CM

## 2020-08-03 DIAGNOSIS — K51.90 CHRONIC ULCERATIVE COLITIS WITHOUT COMPLICATION, UNSPECIFIED LOCATION (HCC): ICD-10-CM

## 2020-08-03 NOTE — PROGRESS NOTES
Reviewed lab results with Ana Paula; essentially normal except for mildly elevated ALP and TSH that has risen somewhat over past year. Will check CMP, GGTP, TSH, T3 and anti-TPO in 6 months as discussed.

## 2020-08-05 ENCOUNTER — APPOINTMENT (RX ONLY)
Dept: URBAN - METROPOLITAN AREA CLINIC 4 | Facility: CLINIC | Age: 75
Setting detail: DERMATOLOGY
End: 2020-08-05

## 2020-08-05 DIAGNOSIS — Z85.828 PERSONAL HISTORY OF OTHER MALIGNANT NEOPLASM OF SKIN: ICD-10-CM

## 2020-08-05 DIAGNOSIS — D22 MELANOCYTIC NEVI: ICD-10-CM

## 2020-08-05 DIAGNOSIS — L82.1 OTHER SEBORRHEIC KERATOSIS: ICD-10-CM

## 2020-08-05 DIAGNOSIS — L81.4 OTHER MELANIN HYPERPIGMENTATION: ICD-10-CM

## 2020-08-05 DIAGNOSIS — D18.0 HEMANGIOMA: ICD-10-CM

## 2020-08-05 PROBLEM — D18.01 HEMANGIOMA OF SKIN AND SUBCUTANEOUS TISSUE: Status: ACTIVE | Noted: 2020-08-05

## 2020-08-05 PROBLEM — D22.5 MELANOCYTIC NEVI OF TRUNK: Status: ACTIVE | Noted: 2020-08-05

## 2020-08-05 PROCEDURE — ? COUNSELING

## 2020-08-05 PROCEDURE — 99213 OFFICE O/P EST LOW 20 MIN: CPT

## 2020-08-05 ASSESSMENT — LOCATION ZONE DERM
LOCATION ZONE: TRUNK
LOCATION ZONE: LEG

## 2020-08-05 ASSESSMENT — LOCATION DETAILED DESCRIPTION DERM
LOCATION DETAILED: RIGHT INFERIOR MEDIAL UPPER BACK
LOCATION DETAILED: LEFT SUPERIOR UPPER BACK
LOCATION DETAILED: RIGHT SUPERIOR UPPER BACK
LOCATION DETAILED: RIGHT POSTERIOR ANKLE

## 2020-08-05 ASSESSMENT — LOCATION SIMPLE DESCRIPTION DERM
LOCATION SIMPLE: RIGHT ANKLE
LOCATION SIMPLE: LEFT UPPER BACK
LOCATION SIMPLE: RIGHT UPPER BACK

## 2020-08-17 DIAGNOSIS — G60.9 IDIOPATHIC PERIPHERAL NEUROPATHY: ICD-10-CM

## 2020-08-17 DIAGNOSIS — G11.9 CEREBELLAR ATAXIA (HCC): ICD-10-CM

## 2020-11-05 ENCOUNTER — APPOINTMENT (OUTPATIENT)
Dept: RADIOLOGY | Facility: MEDICAL CENTER | Age: 75
DRG: 314 | End: 2020-11-05
Attending: EMERGENCY MEDICINE
Payer: MEDICARE

## 2020-11-05 ENCOUNTER — HOSPITAL ENCOUNTER (INPATIENT)
Facility: MEDICAL CENTER | Age: 75
LOS: 1 days | DRG: 314 | End: 2020-11-06
Attending: EMERGENCY MEDICINE | Admitting: INTERNAL MEDICINE
Payer: MEDICARE

## 2020-11-05 ENCOUNTER — HOSPITAL ENCOUNTER (EMERGENCY)
Facility: MEDICAL CENTER | Age: 75
End: 2020-11-05
Payer: MEDICARE

## 2020-11-05 DIAGNOSIS — R55 SYNCOPE, UNSPECIFIED SYNCOPE TYPE: ICD-10-CM

## 2020-11-05 PROBLEM — R09.02 HYPOXIA: Status: ACTIVE | Noted: 2020-11-05
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
## 2020-11-05 LAB
ALBUMIN SERPL BCP-MCNC: 3.7 G/DL (ref 3.2–4.9)
ALBUMIN/GLOB SERPL: 1.4 G/DL
ALP SERPL-CCNC: 125 U/L (ref 30–99)
ALT SERPL-CCNC: 19 U/L (ref 2–50)
AMORPH CRY #/AREA URNS HPF: PRESENT /HPF
ANION GAP SERPL CALC-SCNC: 9 MMOL/L (ref 7–16)
APPEARANCE UR: ABNORMAL
AST SERPL-CCNC: 10 U/L (ref 12–45)
BACTERIA #/AREA URNS HPF: NEGATIVE /HPF
BASOPHILS # BLD AUTO: 0.4 % (ref 0–1.8)
BASOPHILS # BLD: 0.03 K/UL (ref 0–0.12)
BILIRUB SERPL-MCNC: 0.4 MG/DL (ref 0.1–1.5)
BILIRUB UR QL STRIP.AUTO: NEGATIVE
BUN SERPL-MCNC: 18 MG/DL (ref 8–22)
CALCIUM SERPL-MCNC: 8.7 MG/DL (ref 8.5–10.5)
CHLORIDE SERPL-SCNC: 105 MMOL/L (ref 96–112)
CO2 SERPL-SCNC: 27 MMOL/L (ref 20–33)
COLOR UR: YELLOW
COVID ORDER STATUS COVID19: NORMAL
CREAT SERPL-MCNC: 0.43 MG/DL (ref 0.5–1.4)
EKG IMPRESSION: NORMAL
EOSINOPHIL # BLD AUTO: 0.1 K/UL (ref 0–0.51)
EOSINOPHIL NFR BLD: 1.4 % (ref 0–6.9)
EPI CELLS #/AREA URNS HPF: NORMAL /HPF
ERYTHROCYTE [DISTWIDTH] IN BLOOD BY AUTOMATED COUNT: 43.7 FL (ref 35.9–50)
GLOBULIN SER CALC-MCNC: 2.6 G/DL (ref 1.9–3.5)
GLUCOSE SERPL-MCNC: 123 MG/DL (ref 65–99)
GLUCOSE UR STRIP.AUTO-MCNC: NEGATIVE MG/DL
HCT VFR BLD AUTO: 42.1 % (ref 37–47)
HGB BLD-MCNC: 13.7 G/DL (ref 12–16)
HYALINE CASTS #/AREA URNS LPF: NORMAL /LPF
IMM GRANULOCYTES # BLD AUTO: 0.03 K/UL (ref 0–0.11)
IMM GRANULOCYTES NFR BLD AUTO: 0.4 % (ref 0–0.9)
KETONES UR STRIP.AUTO-MCNC: NEGATIVE MG/DL
LEUKOCYTE ESTERASE UR QL STRIP.AUTO: NEGATIVE
LYMPHOCYTES # BLD AUTO: 1.53 K/UL (ref 1–4.8)
LYMPHOCYTES NFR BLD: 21.6 % (ref 22–41)
MCH RBC QN AUTO: 33 PG (ref 27–33)
MCHC RBC AUTO-ENTMCNC: 32.5 G/DL (ref 33.6–35)
MCV RBC AUTO: 101.4 FL (ref 81.4–97.8)
MICRO URNS: ABNORMAL
MONOCYTES # BLD AUTO: 0.5 K/UL (ref 0–0.85)
MONOCYTES NFR BLD AUTO: 7.1 % (ref 0–13.4)
NEUTROPHILS # BLD AUTO: 4.88 K/UL (ref 2–7.15)
NEUTROPHILS NFR BLD: 69.1 % (ref 44–72)
NITRITE UR QL STRIP.AUTO: NEGATIVE
NRBC # BLD AUTO: 0 K/UL
NRBC BLD-RTO: 0 /100 WBC
NT-PROBNP SERPL IA-MCNC: 108 PG/ML (ref 0–125)
PH UR STRIP.AUTO: 8.5 [PH] (ref 5–8)
PLATELET # BLD AUTO: 215 K/UL (ref 164–446)
PMV BLD AUTO: 9.1 FL (ref 9–12.9)
POTASSIUM SERPL-SCNC: 3.8 MMOL/L (ref 3.6–5.5)
PROT SERPL-MCNC: 6.3 G/DL (ref 6–8.2)
PROT UR QL STRIP: NEGATIVE MG/DL
RBC # BLD AUTO: 4.15 M/UL (ref 4.2–5.4)
RBC # URNS HPF: NORMAL /HPF
RBC UR QL AUTO: NEGATIVE
SARS-COV-2 RNA RESP QL NAA+PROBE: NOTDETECTED
SODIUM SERPL-SCNC: 141 MMOL/L (ref 135–145)
SP GR UR STRIP.AUTO: 1.01
SPECIMEN SOURCE: NORMAL
TROPONIN T SERPL-MCNC: 10 NG/L (ref 6–19)
UROBILINOGEN UR STRIP.AUTO-MCNC: 0.2 MG/DL
WBC # BLD AUTO: 7.1 K/UL (ref 4.8–10.8)
WBC #/AREA URNS HPF: NORMAL /HPF

## 2020-11-05 PROCEDURE — 700101 HCHG RX REV CODE 250: Performed by: EMERGENCY MEDICINE

## 2020-11-05 PROCEDURE — U0003 INFECTIOUS AGENT DETECTION BY NUCLEIC ACID (DNA OR RNA); SEVERE ACUTE RESPIRATORY SYNDROME CORONAVIRUS 2 (SARS-COV-2) (CORONAVIRUS DISEASE [COVID-19]), AMPLIFIED PROBE TECHNIQUE, MAKING USE OF HIGH THROUGHPUT TECHNOLOGIES AS DESCRIBED BY CMS-2020-01-R: HCPCS

## 2020-11-05 PROCEDURE — 700102 HCHG RX REV CODE 250 W/ 637 OVERRIDE(OP): Performed by: INTERNAL MEDICINE

## 2020-11-05 PROCEDURE — C9803 HOPD COVID-19 SPEC COLLECT: HCPCS | Performed by: EMERGENCY MEDICINE

## 2020-11-05 PROCEDURE — 83880 ASSAY OF NATRIURETIC PEPTIDE: CPT

## 2020-11-05 PROCEDURE — 99285 EMERGENCY DEPT VISIT HI MDM: CPT

## 2020-11-05 PROCEDURE — 770020 HCHG ROOM/CARE - TELE (206)

## 2020-11-05 PROCEDURE — 36415 COLL VENOUS BLD VENIPUNCTURE: CPT

## 2020-11-05 PROCEDURE — 304999 HCHG REPAIR-SIMPLE/INTERMED LEVEL 1

## 2020-11-05 PROCEDURE — 96374 THER/PROPH/DIAG INJ IV PUSH: CPT

## 2020-11-05 PROCEDURE — 94760 N-INVAS EAR/PLS OXIMETRY 1: CPT

## 2020-11-05 PROCEDURE — 70450 CT HEAD/BRAIN W/O DYE: CPT

## 2020-11-05 PROCEDURE — 85025 COMPLETE CBC W/AUTO DIFF WBC: CPT

## 2020-11-05 PROCEDURE — 3E0234Z INTRODUCTION OF SERUM, TOXOID AND VACCINE INTO MUSCLE, PERCUTANEOUS APPROACH: ICD-10-PCS | Performed by: EMERGENCY MEDICINE

## 2020-11-05 PROCEDURE — 0HQEXZZ REPAIR LEFT LOWER ARM SKIN, EXTERNAL APPROACH: ICD-10-PCS | Performed by: EMERGENCY MEDICINE

## 2020-11-05 PROCEDURE — 71045 X-RAY EXAM CHEST 1 VIEW: CPT

## 2020-11-05 PROCEDURE — 84484 ASSAY OF TROPONIN QUANT: CPT

## 2020-11-05 PROCEDURE — 73110 X-RAY EXAM OF WRIST: CPT | Mod: LT

## 2020-11-05 PROCEDURE — 90715 TDAP VACCINE 7 YRS/> IM: CPT | Performed by: EMERGENCY MEDICINE

## 2020-11-05 PROCEDURE — 303747 HCHG EXTRA SUTURE

## 2020-11-05 PROCEDURE — 700111 HCHG RX REV CODE 636 W/ 250 OVERRIDE (IP): Performed by: EMERGENCY MEDICINE

## 2020-11-05 PROCEDURE — 90471 IMMUNIZATION ADMIN: CPT

## 2020-11-05 PROCEDURE — 80053 COMPREHEN METABOLIC PANEL: CPT

## 2020-11-05 PROCEDURE — 93005 ELECTROCARDIOGRAM TRACING: CPT | Performed by: EMERGENCY MEDICINE

## 2020-11-05 PROCEDURE — A9270 NON-COVERED ITEM OR SERVICE: HCPCS | Performed by: INTERNAL MEDICINE

## 2020-11-05 PROCEDURE — 700105 HCHG RX REV CODE 258: Performed by: INTERNAL MEDICINE

## 2020-11-05 PROCEDURE — 81001 URINALYSIS AUTO W/SCOPE: CPT

## 2020-11-05 PROCEDURE — 99221 1ST HOSP IP/OBS SF/LOW 40: CPT | Mod: AI | Performed by: INTERNAL MEDICINE

## 2020-11-05 PROCEDURE — 304217 HCHG IRRIGATION SYSTEM

## 2020-11-05 RX ORDER — AZELASTINE 1 MG/ML
2 SPRAY, METERED NASAL 2 TIMES DAILY PRN
Status: DISCONTINUED | OUTPATIENT
Start: 2020-11-05 | End: 2020-11-05

## 2020-11-05 RX ORDER — SULFASALAZINE 500 MG/1
500 TABLET, DELAYED RELEASE ORAL 2 TIMES DAILY WITH MEALS
Status: DISCONTINUED | OUTPATIENT
Start: 2020-11-05 | End: 2020-11-06 | Stop reason: HOSPADM

## 2020-11-05 RX ORDER — METHYLPREDNISOLONE SODIUM SUCCINATE 125 MG/2ML
125 INJECTION, POWDER, LYOPHILIZED, FOR SOLUTION INTRAMUSCULAR; INTRAVENOUS ONCE
Status: COMPLETED | OUTPATIENT
Start: 2020-11-05 | End: 2020-11-05

## 2020-11-05 RX ORDER — ALBUTEROL SULFATE 90 UG/1
2 AEROSOL, METERED RESPIRATORY (INHALATION)
Status: DISCONTINUED | OUTPATIENT
Start: 2020-11-05 | End: 2020-11-06 | Stop reason: HOSPADM

## 2020-11-05 RX ORDER — LABETALOL HYDROCHLORIDE 5 MG/ML
10 INJECTION, SOLUTION INTRAVENOUS EVERY 4 HOURS PRN
Status: DISCONTINUED | OUTPATIENT
Start: 2020-11-05 | End: 2020-11-06 | Stop reason: HOSPADM

## 2020-11-05 RX ORDER — POLYETHYLENE GLYCOL 3350 17 G/17G
1 POWDER, FOR SOLUTION ORAL
Status: DISCONTINUED | OUTPATIENT
Start: 2020-11-05 | End: 2020-11-06 | Stop reason: HOSPADM

## 2020-11-05 RX ORDER — ONDANSETRON 2 MG/ML
4 INJECTION INTRAMUSCULAR; INTRAVENOUS EVERY 4 HOURS PRN
Status: DISCONTINUED | OUTPATIENT
Start: 2020-11-05 | End: 2020-11-06 | Stop reason: HOSPADM

## 2020-11-05 RX ORDER — IPRATROPIUM BROMIDE AND ALBUTEROL SULFATE 2.5; .5 MG/3ML; MG/3ML
3 SOLUTION RESPIRATORY (INHALATION)
Status: COMPLETED | OUTPATIENT
Start: 2020-11-05 | End: 2020-11-05

## 2020-11-05 RX ORDER — GABAPENTIN 100 MG/1
200 CAPSULE ORAL
Status: DISCONTINUED | OUTPATIENT
Start: 2020-11-05 | End: 2020-11-06

## 2020-11-05 RX ORDER — AMOXICILLIN 250 MG
2 CAPSULE ORAL 2 TIMES DAILY
Status: DISCONTINUED | OUTPATIENT
Start: 2020-11-05 | End: 2020-11-06 | Stop reason: HOSPADM

## 2020-11-05 RX ORDER — ONDANSETRON 4 MG/1
4 TABLET, ORALLY DISINTEGRATING ORAL EVERY 4 HOURS PRN
Status: DISCONTINUED | OUTPATIENT
Start: 2020-11-05 | End: 2020-11-06 | Stop reason: HOSPADM

## 2020-11-05 RX ORDER — MONTELUKAST SODIUM 10 MG/1
10 TABLET ORAL
Status: DISCONTINUED | OUTPATIENT
Start: 2020-11-05 | End: 2020-11-06 | Stop reason: HOSPADM

## 2020-11-05 RX ORDER — BISACODYL 10 MG
10 SUPPOSITORY, RECTAL RECTAL
Status: DISCONTINUED | OUTPATIENT
Start: 2020-11-05 | End: 2020-11-06 | Stop reason: HOSPADM

## 2020-11-05 RX ORDER — SODIUM CHLORIDE, SODIUM LACTATE, POTASSIUM CHLORIDE, CALCIUM CHLORIDE 600; 310; 30; 20 MG/100ML; MG/100ML; MG/100ML; MG/100ML
INJECTION, SOLUTION INTRAVENOUS CONTINUOUS
Status: DISCONTINUED | OUTPATIENT
Start: 2020-11-05 | End: 2020-11-06 | Stop reason: HOSPADM

## 2020-11-05 RX ORDER — CETIRIZINE HYDROCHLORIDE 10 MG/1
10 TABLET ORAL
Status: DISCONTINUED | OUTPATIENT
Start: 2020-11-05 | End: 2020-11-06 | Stop reason: HOSPADM

## 2020-11-05 RX ORDER — LIDOCAINE HYDROCHLORIDE AND EPINEPHRINE BITARTRATE 20; .01 MG/ML; MG/ML
10 INJECTION, SOLUTION SUBCUTANEOUS ONCE
Status: DISCONTINUED | OUTPATIENT
Start: 2020-11-05 | End: 2020-11-06

## 2020-11-05 RX ORDER — ACETAMINOPHEN 325 MG/1
650 TABLET ORAL EVERY 6 HOURS PRN
Status: DISCONTINUED | OUTPATIENT
Start: 2020-11-05 | End: 2020-11-06 | Stop reason: HOSPADM

## 2020-11-05 RX ORDER — DIAZEPAM 5 MG/1
5 TABLET ORAL
COMMUNITY
End: 2020-11-09 | Stop reason: SDUPTHER

## 2020-11-05 RX ADMIN — GABAPENTIN 200 MG: 100 CAPSULE ORAL at 20:19

## 2020-11-05 RX ADMIN — SODIUM CHLORIDE, POTASSIUM CHLORIDE, SODIUM LACTATE AND CALCIUM CHLORIDE: 600; 310; 30; 20 INJECTION, SOLUTION INTRAVENOUS at 12:43

## 2020-11-05 RX ADMIN — MONTELUKAST 10 MG: 10 TABLET, FILM COATED ORAL at 20:19

## 2020-11-05 RX ADMIN — METHYLPREDNISOLONE SODIUM SUCCINATE 125 MG: 125 INJECTION, POWDER, FOR SOLUTION INTRAMUSCULAR; INTRAVENOUS at 11:36

## 2020-11-05 RX ADMIN — SULFASALAZINE 500 MG: 500 TABLET, DELAYED RELEASE ORAL at 17:22

## 2020-11-05 RX ADMIN — CETIRIZINE HYDROCHLORIDE 10 MG: 10 TABLET, FILM COATED ORAL at 20:19

## 2020-11-05 RX ADMIN — IPRATROPIUM BROMIDE AND ALBUTEROL SULFATE 3 ML: .5; 3 SOLUTION RESPIRATORY (INHALATION) at 09:31

## 2020-11-05 RX ADMIN — CLOSTRIDIUM TETANI TOXOID ANTIGEN (FORMALDEHYDE INACTIVATED), CORYNEBACTERIUM DIPHTHERIAE TOXOID ANTIGEN (FORMALDEHYDE INACTIVATED), BORDETELLA PERTUSSIS TOXOID ANTIGEN (GLUTARALDEHYDE INACTIVATED), BORDETELLA PERTUSSIS FILAMENTOUS HEMAGGLUTININ ANTIGEN (FORMALDEHYDE INACTIVATED), BORDETELLA PERTUSSIS PERTACTIN ANTIGEN, AND BORDETELLA PERTUSSIS FIMBRIAE 2/3 ANTIGEN 0.5 ML: 5; 2; 2.5; 5; 3; 5 INJECTION, SUSPENSION INTRAMUSCULAR at 11:37

## 2020-11-05 SDOH — HEALTH STABILITY: MENTAL HEALTH: HOW OFTEN DO YOU HAVE A DRINK CONTAINING ALCOHOL?: NEVER

## 2020-11-05 ASSESSMENT — COGNITIVE AND FUNCTIONAL STATUS - GENERAL
DAILY ACTIVITIY SCORE: 18
WALKING IN HOSPITAL ROOM: A LITTLE
STANDING UP FROM CHAIR USING ARMS: A LITTLE
MOVING FROM LYING ON BACK TO SITTING ON SIDE OF FLAT BED: A LITTLE
HELP NEEDED FOR BATHING: A LITTLE
TOILETING: A LITTLE
SUGGESTED CMS G CODE MODIFIER DAILY ACTIVITY: CK
PERSONAL GROOMING: A LITTLE
DRESSING REGULAR UPPER BODY CLOTHING: A LITTLE
MOBILITY SCORE: 18
CLIMB 3 TO 5 STEPS WITH RAILING: A LITTLE
SUGGESTED CMS G CODE MODIFIER MOBILITY: CK
DRESSING REGULAR LOWER BODY CLOTHING: A LITTLE
MOVING TO AND FROM BED TO CHAIR: A LITTLE
EATING MEALS: A LITTLE
TURNING FROM BACK TO SIDE WHILE IN FLAT BAD: A LITTLE

## 2020-11-05 ASSESSMENT — COPD QUESTIONNAIRES
COPD SCREENING SCORE: 3
HAVE YOU SMOKED AT LEAST 100 CIGARETTES IN YOUR ENTIRE LIFE: NO/DON'T KNOW
DURING THE PAST 4 WEEKS HOW MUCH DID YOU FEEL SHORT OF BREATH: SOME OF THE TIME
DO YOU EVER COUGH UP ANY MUCUS OR PHLEGM?: NO/ONLY WITH OCCASIONAL COLDS OR INFECTIONS

## 2020-11-05 ASSESSMENT — PATIENT HEALTH QUESTIONNAIRE - PHQ9
SUM OF ALL RESPONSES TO PHQ9 QUESTIONS 1 AND 2: 0
2. FEELING DOWN, DEPRESSED, IRRITABLE, OR HOPELESS: NOT AT ALL
1. LITTLE INTEREST OR PLEASURE IN DOING THINGS: NOT AT ALL

## 2020-11-05 ASSESSMENT — LIFESTYLE VARIABLES
TOTAL SCORE: 0
EVER FELT BAD OR GUILTY ABOUT YOUR DRINKING: NO
HAVE PEOPLE ANNOYED YOU BY CRITICIZING YOUR DRINKING: NO
CONSUMPTION TOTAL: NEGATIVE
DOES PATIENT WANT TO STOP DRINKING: NO
TOTAL SCORE: 0
AVERAGE NUMBER OF DAYS PER WEEK YOU HAVE A DRINK CONTAINING ALCOHOL: 0
HOW MANY TIMES IN THE PAST YEAR HAVE YOU HAD 5 OR MORE DRINKS IN A DAY: 0
TOTAL SCORE: 0
EVER HAD A DRINK FIRST THING IN THE MORNING TO STEADY YOUR NERVES TO GET RID OF A HANGOVER: NO
HAVE YOU EVER FELT YOU SHOULD CUT DOWN ON YOUR DRINKING: NO
ALCOHOL_USE: NO
ON A TYPICAL DAY WHEN YOU DRINK ALCOHOL HOW MANY DRINKS DO YOU HAVE: 0

## 2020-11-05 ASSESSMENT — FIBROSIS 4 INDEX
FIB4 SCORE: 1.37
FIB4 SCORE: 1.37
FIB4 SCORE: 0.78

## 2020-11-05 NOTE — ED PROVIDER NOTES
ED Provider Note    CHIEF COMPLAINT  Chief Complaint   Patient presents with   • Fall     Patient fell when walker was caught on wall in home.       HPI  Ana Paula Ordonez is a 75 y.o. female who presents to the emergency department after a ground-level fall.  Patient reports she fell twice in the last 12 hours.  She got up last night.  She stood up to go to the restroom.  She fell backwards falling to the ground.  She did hit her arm, but does not think that she was injured.  She does not know she hit her head.  States that things just went out.  She got up again this morning and was walking down the [place].  Her walker got caught on the wall and she ended up falling down on the left side hitting her left forearm on something as she went down.  She did not lose consciousness she does not know she hit her head.  She denies any other injuries aside from the left arm.  Paramedics were called.  They found the patient to be hypotensive with a blood pressure 67/40.  She was hypoxic with oxygen saturations in the upper 80s.  She was transported to the ER.  She was placed on supplemental oxygen and her oxygen saturation improved.  Her blood pressure was normal on arrival.  Patient states that she thinks that she has a problem with gait because of the gabapentin that she takes.  She reports that it makes her mind not work right.  She takes 200 mg every night.  She notices that she is not steady on her feet and she has a hard time speaking after taking this medication.  She states that it does help with her nerve pain.    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) 6/26/2013   • Compression fracture of L2 ([facility]) 11/3/2019    Nov. 2019 from GLF   • Cough    • GERD (gastroesophageal reflux disease)    • Hay  fever 4/19/2019   • History of falling 5/2/2018   • HYPOTENSION    • Inner ear disease    • Leg cramps, sleep related 5/2/2018   • Near-syncope    • Neck injury     fracture, hand and leg numbness and tingling   • Osteoporosis    • Peripheral neuropathy    • Polyp of sigmoid colon 04/10/2018    Hyperplastic polyp   • Raynaud disease    • Tremor    • Vitamin D deficiency        SOCIAL HISTORY  Social History     Tobacco Use   • Smoking status: Never Smoker   • Smokeless tobacco: Never Used   Substance Use Topics   • Alcohol use: No     Alcohol/week: 0.0 oz     Frequency: Never   • Drug use: No       SURGICAL HISTORY  Past Surgical History:   Procedure Laterality Date   • HIP NAILING INTRAMEDULLARY  7/30/2011    Performed by KALEB RAGSDALE at SURGERY [facility] ORS   • NASAL FRACTURE REDUCTION CLOSED  10/8/08    Performed by DON BARRIOS at SURGERY [facility] DAY [facility] ORS   • CHOLECYSTECTOMY  2008   • NISSEN FUNDOPLICATION LAPAROSCOPIC  2005   • BLADDER SUSPENSION      X 2   • TONSILLECTOMY AND ADENOIDECTOMY         CURRENT MEDICATIONS  Home Medications     Reviewed by Wang Marquez R.N. (Registered Nurse) on 11/05/20 at 0757  Med List Status: Not Addressed   Medication Last Dose Status   albuterol (PROAIR HFA) 108 (90 Base) MCG/ACT Aero Soln inhalation aerosol  Active   azelastine (ASTELIN) 137 MCG/SPRAY nasal spray  Active   BIOTIN PO  Active   CALCIUM PO  Active   cetirizine (ZYRTEC) 10 MG Tab  Active   Cholecalciferol (VITAMIN D3 PO)  Active   Fexofenadine HCl (ALLEGRA ALLERGY PO)  Active   gabapentin (NEURONTIN) 100 MG Cap  Active   lidocaine (LIDODERM) 5 % Patch  Active   MAGNESIUM PO  Active   Melatonin 10 MG Cap  Active   methylPREDNISolone (MEDROL DOSEPAK) 4 MG Tablet Therapy Pack  Active   montelukast (SINGULAIR) 10 MG Tab  Active   Multiple Vitamins-Minerals (OCUVITE-LUTEIN) Tab  Active   Multiple Vitamins-Minerals (WOMENS 50+ MULTI VITAMIN/MIN PO)  Active   NITROFURANTOIN MACROCRYSTAL PO   "Active   NITROFURANTOIN PO  Active   Potassium (POTASSIMIN PO)  Active   sulfaSALAzine (AZULFIDINE EN-TAB) 500 MG EC tablet  Active                ALLERGIES  Allergies   Allergen Reactions   • Hydromorphone Anaphylaxis     \"went into code blue\"    • Azithromycin Diarrhea and Unspecified     Diarrhea     • Clindamycin    • Codeine Unspecified     ?   • Erythromycin Unspecified     ?   • Keflex      ?   • Levaquin Unspecified     \"i dont know\"    • Lyrica Unspecified     ?   • Mesalamine Hives   • Quinolones Unspecified     ?   • Sulfa Drugs Unspecified     \"i dont know\"    • Tetracyclines Unspecified     ?   • Other Food Unspecified     Dairy-mucus (butter is okay). spices-cough       PHYSICAL EXAM  VITAL SIGNS: /73   Pulse 98   Resp 16   Ht 1.549 m (5' 1\")   Wt 50 kg (110 lb 3.7 oz)   SpO2 96%   BMI 20.83 kg/m²    Constitutional: Awake and alert  HENT:  Atraumatic, Normocephalic.Oropharynx dry mucus membranes, Nose normal inspection.   Eyes: Normal inspection  Neck: Supple  Cardiovascular: Normal heart rate, Normal rhythm.  Symmetric peripheral pulses.   Thorax & Lungs: No respiratory distress, mild diffuse wheezing.  No crackles or rhonchi  Abdomen: Bowel sounds normal, soft, non-distended, nontender, no mass  Skin: Warm, Dry, No rash.   Back: No tenderness, No CVA tenderness.   Extremities: There is tenderness diffusely over the left forearm and wrist area.  There is a laceration across the left dorsal forearm.  No other focal bony tenderness.  Full range of motion of both hips knees and lower extremities.  Neurologic: Slow deliberate speech.  Mild dysarthria.  No aphasia.  Cranial nerves are symmetric.  Symmetric motor and sensory.  Psychiatric: Anxious appearing    RADIOLOGY/PROCEDURES  DX-WRIST-COMPLETE 3+ LEFT   Final Result      1.  There is no definite evidence of an acute displaced left wrist fracture.   2.  There is extensive deformity of the distal radius and ulna probably due to old trauma " and subsequent degenerative change.   3.  Ulnar positive variance is again seen.      DX-CHEST-PORTABLE (1 VIEW)   Final Result      1.  There is no acute cardiopulmonary process.      CT-HEAD W/O   Final Result      No acute intracranial abnormality.           Imaging is interpreted by radiologist    Laceration Repair Procedure Note    Indication: Laceration    Procedure: The patient was placed in the appropriate position and anesthesia around the laceration was obtained by infiltration using 2% Lidocaine with epinephrine. The area was then irrigated with normal saline. The laceration was closed with 4-0 Ethilon using interrupted sutures. There were no additional lacerations requiring repair. The wound area was then dressed with bacitracin.      Total repaired wound length: 6 cm.         Labs:  Results for orders placed or performed during the hospital encounter of 11/05/20   CBC WITH DIFFERENTIAL   Result Value Ref Range    WBC 7.1 4.8 - 10.8 K/uL    RBC 4.15 (L) 4.20 - 5.40 M/uL    Hemoglobin 13.7 12.0 - 16.0 g/dL    Hematocrit 42.1 37.0 - 47.0 %    .4 (H) 81.4 - 97.8 fL    MCH 33.0 27.0 - 33.0 pg    MCHC 32.5 (L) 33.6 - 35.0 g/dL    RDW 43.7 35.9 - 50.0 fL    Platelet Count 215 164 - 446 K/uL    MPV 9.1 9.0 - 12.9 fL    Neutrophils-Polys 69.10 44.00 - 72.00 %    Lymphocytes 21.60 (L) 22.00 - 41.00 %    Monocytes 7.10 0.00 - 13.40 %    Eosinophils 1.40 0.00 - 6.90 %    Basophils 0.40 0.00 - 1.80 %    Immature Granulocytes 0.40 0.00 - 0.90 %    Nucleated RBC 0.00 /100 WBC    Neutrophils (Absolute) 4.88 2.00 - 7.15 K/uL    Lymphs (Absolute) 1.53 1.00 - 4.80 K/uL    Monos (Absolute) 0.50 0.00 - 0.85 K/uL    Eos (Absolute) 0.10 0.00 - 0.51 K/uL    Baso (Absolute) 0.03 0.00 - 0.12 K/uL    Immature Granulocytes (abs) 0.03 0.00 - 0.11 K/uL    NRBC (Absolute) 0.00 K/uL   URINALYSIS (UA)    Specimen: Urine   Result Value Ref Range    Color Yellow     Character Turbid (A)     Specific Gravity 1.012 <1.035    Ph 8.5  (A) 5.0 - 8.0    Glucose Negative Negative mg/dL    Ketones Negative Negative mg/dL    Protein Negative Negative mg/dL    Bilirubin Negative Negative    Urobilinogen, Urine 0.2 Negative    Nitrite Negative Negative    Leukocyte Esterase Negative Negative    Occult Blood Negative Negative    Micro Urine Req Microscopic    COVID/SARS CoV-2 PCR    Specimen: Nasopharyngeal; Respirate   Result Value Ref Range    COVID Order Status Received    Comp Metabolic Panel   Result Value Ref Range    Sodium 141 135 - 145 mmol/L    Potassium 3.8 3.6 - 5.5 mmol/L    Chloride 105 96 - 112 mmol/L    Co2 27 20 - 33 mmol/L    Anion Gap 9.0 7.0 - 16.0    Glucose 123 (H) 65 - 99 mg/dL    Bun 18 8 - 22 mg/dL    Creatinine 0.43 (L) 0.50 - 1.40 mg/dL    Calcium 8.7 8.5 - 10.5 mg/dL    AST(SGOT) 10 (L) 12 - 45 U/L    ALT(SGPT) 19 2 - 50 U/L    Alkaline Phosphatase 125 (H) 30 - 99 U/L    Total Bilirubin 0.4 0.1 - 1.5 mg/dL    Albumin 3.7 3.2 - 4.9 g/dL    Total Protein 6.3 6.0 - 8.2 g/dL    Globulin 2.6 1.9 - 3.5 g/dL    A-G Ratio 1.4 g/dL   proBrain Natriuretic Peptide, NT   Result Value Ref Range    NT-proBNP 108 0 - 125 pg/mL   TROPONIN   Result Value Ref Range    Troponin T 10 6 - 19 ng/L   SARS-CoV-2, PCR (In-House)   Result Value Ref Range    SARS-CoV-2 Source NP Swab     SARS-CoV-2 by PCR NotDetected    URINE MICROSCOPIC (W/UA)   Result Value Ref Range    WBC 0-2 /hpf    RBC 0-2 /hpf    Bacteria Negative None /hpf    Epithelial Cells Rare /hpf    Amorphous Crystal Present /hpf    Hyaline Cast 0-2 /lpf   ESTIMATED GFR   Result Value Ref Range    GFR If African American >60 >60 mL/min/1.73 m 2    GFR If Non African American >60 >60 mL/min/1.73 m 2   EKG (Now)   Result Value Ref Range    Report       [facility] Emergency Dept.    Test Date:  2020-11-05  Pt Name:    TORY HERNANDEZ                Department: ER  MRN:        [id number]                      Room:       RD 07  Gender:     Female                       Technician:  [id number]  :        [id number]                   Requested By:NILDA LOBO  Order #:    [id number]                    Reading MD: NILDA LOBO MD    Measurements  Intervals                                Axis  Rate:       71                           P:          62  NE:         168                          QRS:        50  QRSD:       80                           T:          56  QT:         448  QTc:        487    Interpretive Statements  SINUS RHYTHM  PROBABLE LEFT ATRIAL ABNORMALITY  ABNORMAL T, CONSIDER ISCHEMIA, ANTERIOR LEADS  BORDERLINE PROLONGED QT INTERVAL  Compared to ECG 09/15/2019 08:42:09  Myocardial infarct finding no longer present  T-wave abnormality still present  Possible ischemia still present  Electronically Signed On 2020 10:11:36 PST by NILDA LOBO MD         COURSE & MEDICAL DECISION MAKING  She presents with 2 falls.  First fall sounds like it was syncope.  Second fall sounds mechanical walking into a wall.  Suspect this is likely related to her gabapentin.  Concerning features of his hypotension prior to arrival.  She was hypoxic here and work-up was initiated.  She was noted to have mild wheezing.  She was given albuterol treatment.  Later given Solu-Medrol intravenously.    Images returned negative.  Her wound was repaired as above.    Still hypoxic despite treatment.    Laboratory data returned as above.  She does not have Covid.    Patient will need to be admitted to the hospital for further evaluation and treatment.  She requires supplemental oxygen at this time although is stable.    Consulted Dr. Aguilar for admission.    FINAL IMPRESSION  1.  Syncope  2.  Hypotensive episode  3.  Laceration left forearm  4.  Asthma with acute exacerbation  5.  Hypoxic respiratory failure.      This dictation was created using voice recognition software. The accuracy of the dictation is limited to the abilities of the software.  The nursing notes were reviewed and certain aspects of  this information were incorporated into this note.      Electronically signed by: Robert Florez M.D., 11/5/2020 8:17 AM

## 2020-11-05 NOTE — ASSESSMENT & PLAN NOTE
-Unclear etiology.  Chest x-ray showed nothing acute.  Covid negative.  No signs of rib fractures.  Does not appear to be an asthma exacerbation.  She is not wheezy on exam.  -Start incentive spirometry.  Check procalcitonin and trend.  -Resume home inhalers, along with Singulair.  RT protocol.  -Continue oxygen supplement to keep saturations above 88%.  Wean as able.

## 2020-11-05 NOTE — ED NOTES
Upon removing bandage the patient had laceration to left forearm not skin tear.Patients wound was irrigated without issue.

## 2020-11-05 NOTE — ASSESSMENT & PLAN NOTE
-Sounds to be mechanical.  She has cerebellar ataxia history per records.  However, suspect that Neurontin is contributing to her falls as it was just recently started 3 weeks ago.  Initial hypotension which was rapidly corrected is also concerning for orthostasis.  -Check orthostatic vital signs.  Check TSH, and cortisol levels.  Start IV fluids with LR at 83 cc/h.  -Hold Neurontin for now.  -PT/OT evaluation.

## 2020-11-05 NOTE — H&P
[facility] Medicine History & Physical Note    Date of Service  11/5/2020    Primary Care Physician  Dar Fraire M.D.    Consultants  None    Code Status  Full Code    Chief Complaint  Chief Complaint   Patient presents with   • Fall     Patient fell when walker was caught on wall in home.       History of Presenting Illness  75 y.o. female with cerebellar ataxia, chronic ulcerative colitis, neuropathy, and asthma, who presented to the ED following fall x2, along with hypoxia, and brief hypotension.  Patient stated that she has recently started on gabapentin 3 weeks ago for her neuropathy, and since then she has noticed that her mind felt quality, and her balance is worse (not good to begin with).  Yesterday, as she was walking with the use of a walker, she somehow tripped while stepping back and fell backwards.  She denies any loss of consciousness.  She did not hit her head.  She was able to get back up quickly.  However this morning, she woke up in the middle of the night and could not get back to sleep so she decided to walk to get something to eat.  She states she still feels drowsy and tired, and while walking her walker hit the wall causing her to fall forward.  She scraped her left arm, but did not hit her head.  She again did not lose consciousness.  EMS was then called, who initially noted that she is hypotensive which quickly recovered.  She was also hypoxic in the upper 80s.  She was then brought to the ED    Additionally, she denies any other complaints.  She states she is feeling good.  She denied any chest pain, shortness of breath, cough, fevers or chills, bowel movement changes, or abdominal pain.  She had no bleeding.    ED course:  The patient was initially evaluated.  Her blood pressure was fairly normal on ED arrival.  She is requiring 2 L of oxygen.  Initial blood work-up were unimpressive.  She had no leukocytosis.  Electrolytes and renal function are normal.  Troponin was negative.   "Urinalysis was clean.  She tested negative for Covid.  Head CT did not show anything acute.  Chest x-ray (personally reviewed) did not show any focal consolidation infiltrate or effusion.  EKG showed sinus rhythm, with nonspecific T wave inversions in leads V1 through V4.  She is complaining of any chest pain.  ERP initially felt that she had some asthma exacerbation, and was given DuoNeb and Solu-Medrol.  She was subsequently admitted to the hospitalist service.    Review of Systems  ROS     Pertinent positives/negatives as mentioned above.     A complete review of systems was personally done by me. All other systems were negative.       Past Medical History   has a past medical history of Arthritis, ASTHMA, Cerebellar ataxia ([facility]) (3/30/2018), Chronic ulcerative colitis ([facility]) (6/26/2013), Compression fracture of L2 ([facility]) (11/3/2019), Cough, GERD (gastroesophageal reflux disease), Hay fever (4/19/2019), History of falling (5/2/2018), HYPOTENSION, Inner ear disease, Leg cramps, sleep related (5/2/2018), Near-syncope, Neck injury, Osteoporosis, Peripheral neuropathy, Polyp of sigmoid colon (04/10/2018), Raynaud disease, Tremor, and Vitamin D deficiency.    Surgical History   has a past surgical history that includes nasal fracture reduction closed (10/8/08); hip nailing intramedullary (7/30/2011); tonsillectomy and adenoidectomy; bladder suspension; nissen fundoplication laparoscopic (2005); and cholecystectomy (2008).     Family History  family history includes Non-contributory in her father and mother.     Social History   reports that she has never smoked. She has never used smokeless tobacco. She reports that she does not drink alcohol or use drugs.    Allergies  Allergies   Allergen Reactions   • Hydromorphone Anaphylaxis     \"went into code blue\"    • Azithromycin Diarrhea and Unspecified     Diarrhea     • Clindamycin    • Codeine Unspecified     ?   • Erythromycin Unspecified     ?   • Keflex      ?   • " "Levaquin Unspecified     \"i dont know\"    • Lyrica Unspecified     ?   • Mesalamine Hives   • Quinolones Unspecified     ?   • Sulfa Drugs Unspecified     \"i dont know\"    • Tetracyclines Unspecified     ?   • Other Food Unspecified     Dairy-mucus (butter is okay). spices-cough       Medications  Prior to Admission Medications   Prescriptions Last Dose Informant Patient Reported? Taking?   BIOTIN PO 11/4/2020 at am Patient Yes No   Sig: Take 1 Tab by mouth every day.   CALCIUM PO 11/4/2020 at am Patient Yes No   Sig: Take 2 Tabs by mouth 2 Times a Day.   Melatonin 10 MG Cap 11/4/2020 at 2100 Patient Yes No   Sig: Take 10 mg by mouth every bedtime.   NON SPECIFIED 11/4/2020 at 2100 Patient Yes Yes   Sig: Take 2 Tabs by mouth 2 Times a Day. AlgaeCal Vitamin   NON SPECIFIED 11/4/2020 at am Patient Yes Yes   Sig: Take 2 Tabs by mouth every morning. Nerve Shield Vitamin   NON SPECIFIED 11/4/2020 at am Patient Yes Yes   Sig: Take 2 Tabs by mouth every morning. Vitamin A-D-K Tablet   Potassium (POTASSIMIN PO) 11/4/2020 at am Patient Yes No   Sig: Take 2 Drops by mouth 2 Times a Day.   albuterol (PROAIR HFA) 108 (90 Base) MCG/ACT Aero Soln inhalation aerosol prn at prn Patient Yes No   Sig: Inhale 2 Puffs by mouth every 6 hours as needed for Shortness of Breath.   azelastine (ASTELIN) 137 MCG/SPRAY nasal spray prn at prn Patient Yes No   Sig: Spray 2 Sprays in nose 2 times a day as needed.   cetirizine (ZYRTEC) 10 MG Tab 11/4/2020 at 2100 Patient Yes No   Sig: Take 10 mg by mouth every day.   diazePAM (VALIUM) 5 MG Tab prn at prn Patient Yes Yes   Sig: Take 5 mg by mouth 1 time daily as needed for Anxiety.   gabapentin (NEURONTIN) 100 MG Cap 11/4/2020 at 2100 Patient No No   Sig: Take 1 Cap by mouth 3 times a day as needed.   Patient taking differently: Take 200 mg by mouth every bedtime.   lidocaine (LIDODERM) 5 % Patch Not Taking at Unknown time Patient No No   Sig: Apply 1 Patch to skin as directed every 24 hours. "   Patient not taking: Reported on 11/5/2020   methylPREDNISolone (MEDROL DOSEPAK) 4 MG Tablet Therapy Pack Not Taking at Unknown time Patient No No   Sig: As directed on the packaging label.   Patient not taking: Reported on 11/5/2020   montelukast (SINGULAIR) 10 MG Tab 11/4/2020 at 2100 Patient No No   Sig: TAKE 1 TABLET BY MOUTH EVERY DAY   sulfaSALAzine (AZULFIDINE EN-TAB) 500 MG EC tablet 11/4/2020 at 2100 Patient Yes No   Sig: Take 500 mg by mouth 2 times a day, with meals.      Facility-Administered Medications: None       Physical Exam  Pulse:  [83-98] 83  Resp:  [16-26] 26  BP: (146)/(73) 146/73  SpO2:  [96 %-97 %] 97 %    Physical Exam  Vitals signs reviewed.   Constitutional:       General: She is not in acute distress.     Appearance: Normal appearance. She is normal weight. She is not ill-appearing or diaphoretic.   HENT:      Head: Normocephalic and atraumatic.      Right Ear: External ear normal.      Left Ear: External ear normal.      Mouth/Throat:      Mouth: Mucous membranes are moist.      Pharynx: No oropharyngeal exudate or posterior oropharyngeal erythema.   Eyes:      General: No scleral icterus.     Extraocular Movements: Extraocular movements intact.      Conjunctiva/sclera: Conjunctivae normal.      Pupils: Pupils are equal, round, and reactive to light.   Neck:      Musculoskeletal: Normal range of motion and neck supple. No neck rigidity or muscular tenderness.   Cardiovascular:      Rate and Rhythm: Normal rate and regular rhythm.      Heart sounds: Normal heart sounds. No murmur.   Pulmonary:      Effort: Pulmonary effort is normal. No respiratory distress.      Breath sounds: Normal breath sounds. No stridor. No wheezing, rhonchi or rales.   Chest:      Chest wall: No tenderness.   Abdominal:      General: Bowel sounds are normal. There is no distension.      Palpations: Abdomen is soft. There is no mass.      Tenderness: There is no abdominal tenderness. There is no guarding or  rebound.   Musculoskeletal: Normal range of motion.         General: No swelling.      Right lower leg: No edema.      Left lower leg: No edema.   Lymphadenopathy:      Cervical: No cervical adenopathy.   Skin:     General: Skin is warm and dry.      Coloration: Skin is not jaundiced.      Findings: No rash.      Comments: Skin tear on the left forearm   Neurological:      General: No focal deficit present.      Mental Status: She is alert and oriented to person, place, and time. Mental status is at baseline.      Cranial Nerves: No cranial nerve deficit.   Psychiatric:         Mood and Affect: Mood normal.         Behavior: Behavior normal.         Thought Content: Thought content normal.         Judgment: Judgment normal.         Laboratory:  Recent Labs     11/05/20  0740   WBC 7.1   RBC 4.15*   HEMOGLOBIN 13.7   HEMATOCRIT 42.1   .4*   MCH 33.0   MCHC 32.5*   RDW 43.7   PLATELETCT 215   MPV 9.1     Recent Labs     11/05/20  0900   SODIUM 141   POTASSIUM 3.8   CHLORIDE 105   CO2 27   GLUCOSE 123*   BUN 18   CREATININE 0.43*   CALCIUM 8.7     Recent Labs     11/05/20  0900   ALTSGPT 19   ASTSGOT 10*   ALKPHOSPHAT 125*   TBILIRUBIN 0.4   GLUCOSE 123*         Recent Labs     11/05/20  0900   NTPROBNP 108         Recent Labs     11/05/20  0900   TROPONINT 10       Imaging:  DX-WRIST-COMPLETE 3+ LEFT   Final Result      1.  There is no definite evidence of an acute displaced left wrist fracture.   2.  There is extensive deformity of the distal radius and ulna probably due to old trauma and subsequent degenerative change.   3.  Ulnar positive variance is again seen.      DX-CHEST-PORTABLE (1 VIEW)   Final Result      1.  There is no acute cardiopulmonary process.      CT-HEAD W/O   Final Result      No acute intracranial abnormality.            Assessment/Plan:  I anticipate this patient will require at least two midnights for appropriate medical management, necessitating inpatient admission.    Fall from ground  level- (present on admission)  Assessment & Plan  -Sounds to be mechanical.  She has cerebellar ataxia history per records.  However, suspect that Neurontin is contributing to her falls as it was just recently started 3 weeks ago.  Initial hypotension which was rapidly corrected is also concerning for orthostasis.  -Check orthostatic vital signs.  Check TSH, and cortisol levels.  Start IV fluids with LR at 83 cc/h.  -Hold Neurontin for now.  -PT/OT evaluation.    Hypoxia- (present on admission)  Assessment & Plan  -Unclear etiology.  Chest x-ray showed nothing acute.  Covid negative.  No signs of rib fractures.  Does not appear to be an asthma exacerbation.  She is not wheezy on exam.  -Start incentive spirometry.  Check procalcitonin and trend.  -Resume home inhalers, along with Singulair.  RT protocol.  -Continue oxygen supplement to keep saturations above 88%.  Wean as able.    Mild intermittent asthma without complication- (present on admission)  Assessment & Plan  -ERP initially felt that she is an asthma exacerbation, but per my exam she is completely clear without wheezing.  -Hold off on further steroids.  Resume albuterol MDI, along with Singulair.  Continue RT protocol.  Continue oxygen supplements as needed to keep saturations above 80%, wean as able.  -Incentive spirometry.    Chronic ulcerative colitis (HCC)- (present on admission)  Assessment & Plan  -Not in acute flare.  Resume home sulfasalazine.      DVT prophylaxis: Lovenox SQ

## 2020-11-05 NOTE — ED TRIAGE NOTES
[facility] TRIAGE OFFICER ER REPORT  Consult/Admission requested by: Dr Florez  Chief complaint: GLF, hypoxia  Pertinent history/ER Course: GLF appears mechanical.  This am fell again ?syncope.  On scene SBP 60s and hypoxic.  Asthma flare in ED.  COVID neg  Code Status: full per ERP, I personally verified with the ERP patient's code status and the ERP has confirmed this with the patient.   Patient meets admission criterion: Yes..  Recommendations given or work up & consultations requested per triage officer:   Consultants involved and pertinent input from consultants:   Admission status: Inpatient.   Admission order placed: Yes.   Floor requested: tele  Assigned hospitalist: Dinora

## 2020-11-05 NOTE — ASSESSMENT & PLAN NOTE
-ERP initially felt that she is an asthma exacerbation, but per my exam she is completely clear without wheezing.  -Hold off on further steroids.  Resume albuterol MDI, along with Singulair.  Continue RT protocol.  Continue oxygen supplements as needed to keep saturations above 80%, wean as able.  -Incentive spirometry.

## 2020-11-05 NOTE — ED NOTES
Pharmacy Medication Reconciliation      Medication reconciliation updated and complete per pt at bedside with medication list.   No oral ABX within the last 14 days  Patient home pharmacy:[facility]

## 2020-11-06 ENCOUNTER — PATIENT OUTREACH (OUTPATIENT)
Dept: HEALTH INFORMATION MANAGEMENT | Facility: OTHER | Age: 75
End: 2020-11-06

## 2020-11-06 VITALS
OXYGEN SATURATION: 90 % | SYSTOLIC BLOOD PRESSURE: 109 MMHG | WEIGHT: 111.33 LBS | DIASTOLIC BLOOD PRESSURE: 72 MMHG | HEART RATE: 67 BPM | HEIGHT: 61 IN | TEMPERATURE: 97.3 F | BODY MASS INDEX: 21.02 KG/M2 | RESPIRATION RATE: 18 BRPM
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
## 2020-11-06 PROBLEM — R09.02 HYPOXIA: Status: RESOLVED | Noted: 2020-11-05 | Resolved: 2020-11-06

## 2020-11-06 LAB
ANION GAP SERPL CALC-SCNC: 8 MMOL/L (ref 7–16)
BASOPHILS # BLD AUTO: 0.2 % (ref 0–1.8)
BASOPHILS # BLD: 0.02 K/UL (ref 0–0.12)
BUN SERPL-MCNC: 15 MG/DL (ref 8–22)
CALCIUM SERPL-MCNC: 9 MG/DL (ref 8.5–10.5)
CHLORIDE SERPL-SCNC: 106 MMOL/L (ref 96–112)
CO2 SERPL-SCNC: 27 MMOL/L (ref 20–33)
CREAT SERPL-MCNC: 0.37 MG/DL (ref 0.5–1.4)
EOSINOPHIL # BLD AUTO: 0.05 K/UL (ref 0–0.51)
EOSINOPHIL NFR BLD: 0.5 % (ref 0–6.9)
ERYTHROCYTE [DISTWIDTH] IN BLOOD BY AUTOMATED COUNT: 42.5 FL (ref 35.9–50)
GLUCOSE SERPL-MCNC: 99 MG/DL (ref 65–99)
HCT VFR BLD AUTO: 36.3 % (ref 37–47)
HGB BLD-MCNC: 12 G/DL (ref 12–16)
IMM GRANULOCYTES # BLD AUTO: 0.04 K/UL (ref 0–0.11)
IMM GRANULOCYTES NFR BLD AUTO: 0.4 % (ref 0–0.9)
LYMPHOCYTES # BLD AUTO: 2.17 K/UL (ref 1–4.8)
LYMPHOCYTES NFR BLD: 21.6 % (ref 22–41)
MCH RBC QN AUTO: 33.1 PG (ref 27–33)
MCHC RBC AUTO-ENTMCNC: 33.1 G/DL (ref 33.6–35)
MCV RBC AUTO: 100.3 FL (ref 81.4–97.8)
MONOCYTES # BLD AUTO: 0.68 K/UL (ref 0–0.85)
MONOCYTES NFR BLD AUTO: 6.8 % (ref 0–13.4)
NEUTROPHILS # BLD AUTO: 7.07 K/UL (ref 2–7.15)
NEUTROPHILS NFR BLD: 70.5 % (ref 44–72)
NRBC # BLD AUTO: 0 K/UL
NRBC BLD-RTO: 0 /100 WBC
PLATELET # BLD AUTO: 206 K/UL (ref 164–446)
PMV BLD AUTO: 8.7 FL (ref 9–12.9)
POTASSIUM SERPL-SCNC: 3.6 MMOL/L (ref 3.6–5.5)
PROCALCITONIN SERPL-MCNC: <0.05 NG/ML
RBC # BLD AUTO: 3.62 M/UL (ref 4.2–5.4)
SODIUM SERPL-SCNC: 141 MMOL/L (ref 135–145)
WBC # BLD AUTO: 10 K/UL (ref 4.8–10.8)

## 2020-11-06 PROCEDURE — 85025 COMPLETE CBC W/AUTO DIFF WBC: CPT

## 2020-11-06 PROCEDURE — 36415 COLL VENOUS BLD VENIPUNCTURE: CPT

## 2020-11-06 PROCEDURE — A9270 NON-COVERED ITEM OR SERVICE: HCPCS | Performed by: INTERNAL MEDICINE

## 2020-11-06 PROCEDURE — 97166 OT EVAL MOD COMPLEX 45 MIN: CPT

## 2020-11-06 PROCEDURE — 84145 PROCALCITONIN (PCT): CPT

## 2020-11-06 PROCEDURE — 99239 HOSP IP/OBS DSCHRG MGMT >30: CPT | Performed by: INTERNAL MEDICINE

## 2020-11-06 PROCEDURE — 700102 HCHG RX REV CODE 250 W/ 637 OVERRIDE(OP): Performed by: INTERNAL MEDICINE

## 2020-11-06 PROCEDURE — 97161 PT EVAL LOW COMPLEX 20 MIN: CPT

## 2020-11-06 PROCEDURE — 80048 BASIC METABOLIC PNL TOTAL CA: CPT

## 2020-11-06 PROCEDURE — 700105 HCHG RX REV CODE 258: Performed by: INTERNAL MEDICINE

## 2020-11-06 RX ADMIN — SODIUM CHLORIDE, POTASSIUM CHLORIDE, SODIUM LACTATE AND CALCIUM CHLORIDE: 600; 310; 30; 20 INJECTION, SOLUTION INTRAVENOUS at 00:32

## 2020-11-06 RX ADMIN — SULFASALAZINE 500 MG: 500 TABLET, DELAYED RELEASE ORAL at 05:49

## 2020-11-06 SDOH — ECONOMIC STABILITY: TRANSPORTATION INSECURITY
IN THE PAST 12 MONTHS, HAS LACK OF TRANSPORTATION KEPT YOU FROM MEETINGS, WORK, OR FROM GETTING THINGS NEEDED FOR DAILY LIVING?: NO

## 2020-11-06 SDOH — ECONOMIC STABILITY: FOOD INSECURITY: WITHIN THE PAST 12 MONTHS, THE FOOD YOU BOUGHT JUST DIDN'T LAST AND YOU DIDN'T HAVE MONEY TO GET MORE.: NEVER TRUE

## 2020-11-06 SDOH — ECONOMIC STABILITY: FOOD INSECURITY: WITHIN THE PAST 12 MONTHS, YOU WORRIED THAT YOUR FOOD WOULD RUN OUT BEFORE YOU GOT MONEY TO BUY MORE.: NEVER TRUE

## 2020-11-06 SDOH — ECONOMIC STABILITY: TRANSPORTATION INSECURITY
IN THE PAST 12 MONTHS, HAS THE LACK OF TRANSPORTATION KEPT YOU FROM MEDICAL APPOINTMENTS OR FROM GETTING MEDICATIONS?: NO

## 2020-11-06 SDOH — ECONOMIC STABILITY: INCOME INSECURITY: HOW HARD IS IT FOR YOU TO PAY FOR THE VERY BASICS LIKE FOOD, HOUSING, MEDICAL CARE, AND HEATING?: NOT HARD AT ALL

## 2020-11-06 ASSESSMENT — COGNITIVE AND FUNCTIONAL STATUS - GENERAL
SUGGESTED CMS G CODE MODIFIER MOBILITY: CJ
DAILY ACTIVITIY SCORE: 22
CLIMB 3 TO 5 STEPS WITH RAILING: A LITTLE
HELP NEEDED FOR BATHING: A LITTLE
WALKING IN HOSPITAL ROOM: A LITTLE
TOILETING: A LITTLE
SUGGESTED CMS G CODE MODIFIER DAILY ACTIVITY: CJ
MOBILITY SCORE: 22

## 2020-11-06 ASSESSMENT — GAIT ASSESSMENTS
DISTANCE (FEET): 200
ASSISTIVE DEVICE: 4 WHEEL WALKER
GAIT LEVEL OF ASSIST: SUPERVISED

## 2020-11-06 ASSESSMENT — PATIENT HEALTH QUESTIONNAIRE - PHQ9
1. LITTLE INTEREST OR PLEASURE IN DOING THINGS: NOT AT ALL
SUM OF ALL RESPONSES TO PHQ9 QUESTIONS 1 AND 2: 0
2. FEELING DOWN, DEPRESSED, IRRITABLE, OR HOPELESS: NOT AT ALL

## 2020-11-06 ASSESSMENT — ACTIVITIES OF DAILY LIVING (ADL): TOILETING: INDEPENDENT

## 2020-11-06 NOTE — PROGRESS NOTES
2 RN skin check complete w/ LUKAS Hicks   Devices in place nasal cannula.  Skin assessed under devices red and blanching.  Confirmed pressure ulcers found on N/A.  New potential pressure ulcers noted on N/A. Wound consult placed N/A.  The following interventions in place pillows and blankets, moisturizer. Patient is up with walker. Heels are dry.

## 2020-11-06 NOTE — THERAPY
"Occupational Therapy   Initial Evaluation     Patient Name: Ana Paula Ordonez  Age:  75 y.o., Sex:  female  Medical Record #: [id number]  Today's Date: 11/6/2020     Precautions: (P) Fall Risk    Assessment  Patient is 75 y.o. female admitted for hypoxia after a ground level fall. Per chart and pt report she has a history of many falls. Her PMHx includes cerebellar ataxia, chronic ulcerative colitis, and neuropathy.   Pt presented with impaired balance and safety awareness today impacting her ability to safely complete ADLs and functional transfers. Pt reported her balance continuing to feel off today. She reports having necessary equipment at home and assistance from her daughter as needed. Will continue to follow in this setting.     Placed BSC w/ arms over toilet in bathroom to simulate home environment and increase safety while patient uses toilet with nursing. RN notified.    Plan  Recommend Occupational Therapy 2 times per week until therapy goals are met for the following treatments:  Adaptive Equipment, Manual Therapy Techniques, Neuro Re-Education / Balance, Self Care/Activities of Daily Living, Therapeutic Activities and Therapeutic Exercises.    DC Equipment Recommendations: None(pt owns 4WW)  Discharge Recommendations: Other - Recommend home w/ home health for continued occupational therapy services, however pt refuses. Pt reports she has a  that comes 3x/week. Also recommend 24/7 supv initially until pt feels back to her baseline for safety.    Subjective  \"you can trust me, I don't want to fall\"     Objective   11/06/20 0936   Prior Living Situation   Prior Services Intermittent Physical Support for ADL Per Family   Housing / Facility 1 Story House   Bathroom Set up Bathtub / Shower Combination;Grab Bars   Equipment Owned 4-Wheel Walker   Lives with - Patient's Self Care Capacity Adult Children   Comments lives with daughter; reports she works from home and is able to assist as needed "   Prior Level of ADL Function   Self Feeding Independent   Grooming / Hygiene Independent   Bathing Independent   Dressing Independent   Toileting Independent   Prior Level of IADL Function   Medication Management Independent   Laundry Independent   Kitchen Mobility Independent   Finances Independent   Home Management Dependent   Shopping Dependent   Prior Level Of Mobility Independent With Device in Community  (w/4WW)   Driving / Transportation Relatives / Others Provide Transportation   Occupation (Pre-Hospital Vocational) Retired Due To Age   Comments retired ; reports daughter assists with almost all IADLs   History of Falls   History of Falls Yes   Date of Last Fall   (reason for admit; reports having many fals previously)   Precautions   Precautions Fall Risk   Pain 0 - 10 Group   Therapist Pain Assessment   (no c/o pain; pt reports controlled with medication)   Cognition    Cognition / Consciousness X   Speech/ Communication Delayed Responses   Level of Consciousness Alert   Safety Awareness Impaired;Impulsive   Comments Pt presents with slightly delayed and slow speech; stephanie to follow directions, however was impulsive; poor safety awareness-demanded to be left alone in bathroom- not receptive to stand by assist for safety   Active ROM Upper Body   Active ROM Upper Body  WDL   Strength Upper Body   Upper Body Strength  WDL   Sensation Upper Body   Comments reports neuropathy   Upper Body Muscle Tone   Upper Body Muscle Tone  WDL   Coordination Upper Body   Coordination X   Gross Motor Coordination presents with ataxia primarily in legs and trunk impacting sitting and standing balance   Balance Assessment   Sitting Balance (Static) Fair   Sitting Balance (Dynamic) Fair -   Standing Balance (Static) Fair -   Standing Balance (Dynamic) Fair -   Weight Shift Sitting Fair   Weight Shift Standing Poor   Bed Mobility    Comments pt up in chair   ADL Assessment   Grooming Minimal Assist;Standing  (for safety)    Lower Body Dressing Supervision  (seated)   Toileting Supervision  (self cath on toilet)  Pt demanded to be left alone on toilet and refused stand by/minimal assist from therapist   Functional Mobility   Sit to Stand Supervised   Bed, Chair, Wheelchair Transfer Minimal Assist  (for safety)   Toilet Transfers Minimal Assist  (for safety)   Mobility walked to bathroom and back to chair with 4WW   Comments w/ 4WW; unstable due to cerebellar ataxia; required min A for safety   Activity Tolerance   Sitting in Chair 20 min   Standing 5 min   Comments no c/o pain or fatigue with activity   Patient / Family Goals   Patient / Family Goal #1 to go home   Short Term Goals   Short Term Goal # 1 pt will complete grooming in standing with spv   Short Term Goal # 2 pt will complete toilet txf and konrad care with spv   Education Group   Education Provided Home Safety;Role of Occupational Therapist;Activities of Daily Living   Role of Occupational Therapist Patient Response Patient;Acceptance;Explanation   Home Safety Patient Response Patient;Acceptance;Explanation;Verbal Demonstration;Reinforcement Needed   ADL Patient Response Patient;Acceptance;Explanation;Verbal Demonstration;Action Demonstration;Reinforcement Needed   Additional Comments Focused on safety with ADLs and discussed strategies pt uses at home   Problem List   Problem List Decreased Active Daily Living Skills;Decreased Functional Mobility;Impaired Postural Control / Balance   Interdisciplinary Plan of Care Collaboration   IDT Collaboration with  Nursing   Patient Position at End of Therapy Seated;Chair Alarm On;Call Light within Reach;Phone within Reach;Tray Table within Reach   Collaboration Comments RN updated   Session Information   Date / Session Number  11/6 1(1/2, 11/12)   Priority 2

## 2020-11-06 NOTE — PROGRESS NOTES
Community Health Worker Intake  • Social determinates of health intake complete.   • Identified no barriers.  • Contact information provided to Ana Paula NICHOL Ordonez.  • Accepted Meds-To-Beds.   • Inpatient assessment completed.    CHW Yakelin met with pt bedside to introduce CCM services. Pt confirmed PCP is Dar Fraire MD. No f/u appt yet. Pt declined for this CHW to schedule f/u. Pt prefers to do it herself. Pt reports no transportation issues getting to appts. Pt reports daughter will provide. Pt reports no trouble paying for resources such as care, housing, and food. Pt reports she feels confident in managing her health after d/c. Pt reports no other needs from this CHW at this time.     Plan: Follow up call post discharge.

## 2020-11-06 NOTE — DISCHARGE SUMMARY
Discharge Summary    CHIEF COMPLAINT ON ADMISSION  Chief Complaint   Patient presents with   • Fall     Patient fell when walker was caught on wall in home.       Reason for Admission  Fall; Dizziness     Admission Date  11/5/2020    CODE STATUS  Full Code    HPI & HOSPITAL COURSE  This is a 75 y.o. female here with above medical issues. Patient suffered a fall with no clear loss of consciousness. She was admitted to the telemetry floor where no clear cardiac issues were found. PT and OT cleared patient for home, but recommended home health, which patient refused since she has a FWW at home and already has a  thrice weekly. Her labs normalized and her outpatient gabapentin was stopped as this was the most likely culprit and her concurrent known cerebellar ataxia.  No clear evidence of infection. I told patient that lyrica or cymbalta might be viable alternatives for neuropathic pain.     Her hypoxia was transient and resolved with no clear etiology.    No notes on file    Therefore, she is discharged in fair and stable condition to home with close outpatient follow-up.    The patient recovered much more quickly than anticipated on admission.    Discharge Date  11/6/2020    FOLLOW UP ITEMS POST DISCHARGE  F/U with PCP in 1-2 weeks and neurology in 3-4 weeks    DISCHARGE DIAGNOSES  Active Problems:    Chronic ulcerative colitis (HCC) POA: Yes      Overview: Cecum, ascending colon and sigmoid on colonoscopy done June 2018    Mild intermittent asthma without complication POA: Yes  Resolved Problems:    Fall from ground level POA: Yes    Hypoxia POA: Yes      FOLLOW UP  Future Appointments   Date Time Provider Department Center   11/9/2020  3:00 PM KAY Haynes None   2/3/2021  9:00 AM Oksana Berkowitz R.N. [facility] None     No follow-up provider specified.    MEDICATIONS ON DISCHARGE     Medication List      CONTINUE taking these medications      Instructions   azelastine 137 MCG/SPRAY nasal  "spray  Commonly known as: ASTELIN   Spray 2 Sprays in nose 2 times a day as needed.  Dose: 2 Spray     BIOTIN PO   Take 1 Tab by mouth every day.  Dose: 1 Tab     CALCIUM PO   Take 2 Tabs by mouth 2 Times a Day.  Dose: 2 Tab     cetirizine 10 MG Tabs  Commonly known as: ZYRTEC   Take 10 mg by mouth every day.  Dose: 10 mg     diazePAM 5 MG Tabs  Commonly known as: VALIUM   Take 5 mg by mouth 1 time daily as needed for Anxiety.  Dose: 5 mg     lidocaine 5 % Ptch  Commonly known as: LIDODERM   Apply 1 Patch to skin as directed every 24 hours.  Dose: 1 Patch     Melatonin 10 MG Caps   Take 10 mg by mouth every bedtime.  Dose: 10 mg     montelukast 10 MG Tabs  Commonly known as: SINGULAIR   TAKE 1 TABLET BY MOUTH EVERY DAY     NON SPECIFIED   Take 2 Tabs by mouth 2 Times a Day. AlgaeCal Vitamin  Dose: 2 Tab     NON SPECIFIED   Take 2 Tabs by mouth every morning. Nerve Shield Vitamin  Dose: 2 Tab     NON SPECIFIED   Take 2 Tabs by mouth every morning. Vitamin A-D-K Tablet  Dose: 2 Tab     POTASSIMIN PO   Take 2 Drops by mouth 2 Times a Day.  Dose: 2 Drop     ProAir  (90 Base) MCG/ACT Aers inhalation aerosol  Generic drug: albuterol   Inhale 2 Puffs by mouth every 6 hours as needed for Shortness of Breath.  Dose: 2 Puff     sulfaSALAzine 500 MG EC tablet  Commonly known as: AZULFIDINE EN-tab   Take 500 mg by mouth 2 times a day, with meals.  Dose: 500 mg        STOP taking these medications    gabapentin 100 MG Caps  Commonly known as: NEURONTIN     methylPREDNISolone 4 MG Tbpk  Commonly known as: MEDROL DOSEPAK            Allergies  Allergies   Allergen Reactions   • Hydromorphone Anaphylaxis     \"went into code blue\"    • Azithromycin Diarrhea and Unspecified     Diarrhea     • Clindamycin    • Codeine Unspecified     ?   • Erythromycin Unspecified     ?   • Keflex      ?   • Levaquin Unspecified     \"i dont know\"    • Lyrica Unspecified     ?   • Mesalamine Hives   • Quinolones Unspecified     ?   • Sulfa Drugs " "Unspecified     \"i dont know\"    • Tetracyclines Unspecified     ?   • Other Food Unspecified     Dairy-mucus (butter is okay). spices-cough       DIET  Orders Placed This Encounter   Procedures   • Diet Order Diet: Regular     Standing Status:   Standing     Number of Occurrences:   1     Order Specific Question:   Diet:     Answer:   Regular [1]       ACTIVITY  As tolerated.  Weight bearing as tolerated    CONSULTATIONS  NONE    PROCEDURES  DX-WRIST-COMPLETE 3+ LEFT   Final Result      1.  There is no definite evidence of an acute displaced left wrist fracture.   2.  There is extensive deformity of the distal radius and ulna probably due to old trauma and subsequent degenerative change.   3.  Ulnar positive variance is again seen.      DX-CHEST-PORTABLE (1 VIEW)   Final Result      1.  There is no acute cardiopulmonary process.      CT-HEAD W/O   Final Result      No acute intracranial abnormality.            LABORATORY  Lab Results   Component Value Date    SODIUM 141 11/06/2020    POTASSIUM 3.6 11/06/2020    CHLORIDE 106 11/06/2020    CO2 27 11/06/2020    GLUCOSE 99 11/06/2020    BUN 15 11/06/2020    CREATININE 0.37 (L) 11/06/2020    CREATININE 0.7 09/28/2008        Lab Results   Component Value Date    WBC 10.0 11/06/2020    HEMOGLOBIN 12.0 11/06/2020    HEMATOCRIT 36.3 (L) 11/06/2020    PLATELETCT 206 11/06/2020        Total time of the discharge process exceeds 32 minutes.  "

## 2020-11-06 NOTE — PROGRESS NOTES
Assumed care at 1700, bedside report received from LUAKS Khoury. Pt is SR on the telemetry monitor. Patient is AO x 4 and is resting in bed. Initial assessment completed and orders reviewed. POC discussed with patient. Call light within reach and hourly rounding in place. No further questions at this time. Fall precautions in place.

## 2020-11-06 NOTE — DISCHARGE INSTRUCTIONS
PATIENT CAN CONSIDER ASKING HER NEUROLOGIST ABOUT LYRICA OR CYMBALTA AS THERAPEUTIC ALTERNATIVES TO GABAPENTIN/NEURONTIN.    Discharge Instructions    Discharged to home by car with relative. Discharged via wheelchair, hospital escort: Refused.  Special equipment needed: Not Applicable    Be sure to schedule a follow-up appointment with your primary care doctor or any specialists as instructed.     Discharge Plan:   Diet Plan: Discussed  Activity Level: Discussed  Confirmed Follow up Appointment: Appointment Scheduled  Confirmed Symptoms Management: Discussed  Medication Reconciliation Updated: Yes  Influenza Vaccine Indication: Patient Refuses    I understand that a diet low in cholesterol, fat, and sodium is recommended for good health. Unless I have been given specific instructions below for another diet, I accept this instruction as my diet prescription.   Other diet: regular    Special Instructions: None    · Is patient discharged on Warfarin / Coumadin?   No         Fall Prevention in the Home, Adult  Falls can cause injuries. They can happen to people of all ages. There are many things you can do to make your home safe and to help prevent falls. Ask for help when making these changes, if needed.  What actions can I take to prevent falls?  General Instructions  · Use good lighting in all rooms. Replace any light bulbs that burn out.  · Turn on the lights when you go into a dark area. Use night-lights.  · Keep items that you use often in easy-to-reach places. Lower the shelves around your home if necessary.  · Set up your furniture so you have a clear path. Avoid moving your furniture around.  · Do not have throw rugs and other things on the floor that can make you trip.  · Avoid walking on wet floors.  · If any of your floors are uneven, fix them.  · Add color or contrast paint or tape to clearly lady and help you see:  ? Any grab bars or handrails.  ? First and last steps of stairways.  ? Where the edge of  each step is.  · If you use a stepladder:  ? Make sure that it is fully opened. Do not climb a closed stepladder.  ? Make sure that both sides of the stepladder are locked into place.  ? Ask someone to hold the stepladder for you while you use it.  · If there are any pets around you, be aware of where they are.  What can I do in the bathroom?         · Keep the floor dry. Clean up any water that spills onto the floor as soon as it happens.  · Remove soap buildup in the tub or shower regularly.  · Use non-skid mats or decals on the floor of the tub or shower.  · Attach bath mats securely with double-sided, non-slip rug tape.  · If you need to sit down in the shower, use a plastic, non-slip stool.  · Install grab bars by the toilet and in the tub and shower. Do not use towel bars as grab bars.  What can I do in the bedroom?  · Make sure that you have a light by your bed that is easy to reach.  · Do not use any sheets or blankets that are too big for your bed. They should not hang down onto the floor.  · Have a firm chair that has side arms. You can use this for support while you get dressed.  What can I do in the kitchen?  · Clean up any spills right away.  · If you need to reach something above you, use a strong step stool that has a grab bar.  · Keep electrical cords out of the way.  · Do not use floor polish or wax that makes floors slippery. If you must use wax, use non-skid floor wax.  What can I do with my stairs?  · Do not leave any items on the stairs.  · Make sure that you have a light switch at the top of the stairs and the bottom of the stairs. If you do not have them, ask someone to add them for you.  · Make sure that there are handrails on both sides of the stairs, and use them. Fix handrails that are broken or loose. Make sure that handrails are as long as the stairways.  · Install non-slip stair treads on all stairs in your home.  · Avoid having throw rugs at the top or bottom of the stairs. If you do  have throw rugs, attach them to the floor with carpet tape.  · Choose a carpet that does not hide the edge of the steps on the stairway.  · Check any carpeting to make sure that it is firmly attached to the stairs. Fix any carpet that is loose or worn.  What can I do on the outside of my home?  · Use bright outdoor lighting.  · Regularly fix the edges of walkways and driveways and fix any cracks.  · Remove anything that might make you trip as you walk through a door, such as a raised step or threshold.  · Trim any bushes or trees on the path to your home.  · Regularly check to see if handrails are loose or broken. Make sure that both sides of any steps have handrails.  · Install guardrails along the edges of any raised decks and porches.  · Clear walking paths of anything that might make someone trip, such as tools or rocks.  · Have any leaves, snow, or ice cleared regularly.  · Use sand or salt on walking paths during winter.  · Clean up any spills in your garage right away. This includes grease or oil spills.  What other actions can I take?  · Wear shoes that:  ? Have a low heel. Do not wear high heels.  ? Have rubber bottoms.  ? Are comfortable and fit you well.  ? Are closed at the toe. Do not wear open-toe sandals.  · Use tools that help you move around (mobility aids) if they are needed. These include:  ? Canes.  ? Walkers.  ? Scooters.  ? Crutches.  · Review your medicines with your doctor. Some medicines can make you feel dizzy. This can increase your chance of falling.  Ask your doctor what other things you can do to help prevent falls.  Where to find more information  · Centers for Disease Control and Prevention, [employer]: https://cdc.gov  · National Hundred on Aging: https://sf3wddx.diego.nih.gov  Contact a doctor if:  · You are afraid of falling at home.  · You feel weak, drowsy, or dizzy at home.  · You fall at home.  Summary  · There are many simple things that you can do to make your home safe and to  help prevent falls.  · Ways to make your home safe include removing tripping hazards and installing grab bars in the bathroom.  · Ask for help when making these changes in your home.  This information is not intended to replace advice given to you by your health care provider. Make sure you discuss any questions you have with your health care provider.  Document Released: 10/14/2010 Document Revised: 04/09/2020 Document Reviewed: 08/02/2018  Elsevier Patient Education © 2020 [employer]      Hypoxia  Hypoxia is a condition that happens when there is a lack of oxygen in the body's tissues and organs. When there is not enough oxygen, organs cannot work as they should. This causes serious problems throughout the body and in the brain.  What are the causes?  This condition may be caused by:  · Exposure to high altitude.  · A collapsed lung (pneumothorax).  · Lung infection (pneumonia).  · Lung injury.  · Long-term (chronic) lung disease, such as COPD (chronic obstructive pulmonary disease).  · Blood collecting in the chest cavity (hemothorax).  · Food, saliva, or vomit getting into the airway (aspiration).  · Reduced blood flow (ischemia).  · Severe blood loss.  · Slow or shallow breathing (hypoventilation).  · Blood disorders, such as anemia.  · Carbon monoxide poisoning.  · The heart suddenly stopping (cardiac arrest).  · Anesthetic medicines.  · Drowning.  · Choking.  What are the signs or symptoms?  Symptoms of this condition include:  · Headache.  · Fatigue.  · Drowsiness.  · Forgetfulness.  · Nausea.  · Confusion.  · Shortness of breath.  · Dizziness.  · Bluish color of the skin, lips, or nail beds (cyanosis).  · Change in consciousness or awareness.  If hypoxia is not treated, it can lead to convulsions, loss of consciousness (coma), or brain damage.  How is this diagnosed?  This condition may be diagnosed based on:  · A physical exam.  · Blood tests.  · A test that measures how much oxygen is in your blood  (pulse oximetry). This is done with a sensor that is placed on your finger, toe, or earlobe.  · Chest X-ray.  · Tests to check your lung function (pulmonary function tests).  · A test to check the electrical activity of your heart (electrocardiogram, ECG).  You may have other tests to determine the cause of your hypoxia.  How is this treated?    Treatment for this condition depends on what is causing the hypoxia. You will likely be treated with oxygen therapy. This may be done by giving you oxygen through a face mask or through tubes in your nose.  Your health care provider may also recommend other therapies to treat the underlying cause of your hypoxia.  Follow these instructions at home:  · Take over-the-counter and prescription medicines only as told by your health care provider.  · Do not use any products that contain nicotine or tobacco, such as cigarettes and e-cigarettes. If you need help quitting, ask your health care provider.  · Avoid secondhand smoke.  · Work with your health care provider to manage any chronic conditions you have that may be causing hypoxia, such as COPD.  · Keep all follow-up visits as told by your health care provider. This is important.  Contact a health care provider if:  · You have a fever.  · You have trouble breathing, even after treatment.  · You become extremely short of breath when you exercise.  Get help right away if:  · Your shortness of breath gets worse, especially with normal or very little activity.  · Your skin, lips, or nail beds have a bluish color.  · You become confused or you cannot think properly.  · You have chest pain.  Summary  · Hypoxia is a condition that happens when there is a lack of oxygen in the body's tissues and organs.  · If hypoxia is not treated, it can lead to convulsions, loss of consciousness (coma), or brain damage.  · Symptoms of hypoxia can include a headache, shortness of breath, confusion, nausea, and a bluish skin color.  · Hypoxia has many  possible causes, including exposure to high altitude, carbon monoxide poisoning, or other health issues, such as blood disorders or cardiac arrest.  · Hypoxia is usually treated with oxygen therapy.  This information is not intended to replace advice given to you by your health care provider. Make sure you discuss any questions you have with your health care provider.  Document Released: 02/05/2018 Document Revised: 11/30/2018 Document Reviewed: 02/05/2018  [employer] Patient Education © 2020 [employer]        Depression / Suicide Risk    As you are discharged from this [facility] facility, it is important to learn how to keep safe from harming yourself.    Recognize the warning signs:  · Abrupt changes in personality, positive or negative- including increase in energy   · Giving away possessions  · Change in eating patterns- significant weight changes-  positive or negative  · Change in sleeping patterns- unable to sleep or sleeping all the time   · Unwillingness or inability to communicate  · Depression  · Unusual sadness, discouragement and loneliness  · Talk of wanting to die  · Neglect of personal appearance   · Rebelliousness- reckless behavior  · Withdrawal from people/activities they love  · Confusion- inability to concentrate     If you or a loved one observes any of these behaviors or has concerns about self-harm, here's what you can do:  · Talk about it- your feelings and reasons for harming yourself  · Remove any means that you might use to hurt yourself (examples: pills, rope, extension cords, firearm)  · Get professional help from the community (Mental Health, Substance Abuse, psychological counseling)  · Do not be alone:Call your Safe Contact- someone whom you trust who will be there for you.  · Call your local CRISIS HOTLINE 994-6052 or 571-869-6153  · Call your local Children's Mobile Crisis Response Team [place] (682) 962-9711 or www.[employer]  · Call the toll free National Suicide  Prevention Hotlines   · National Suicide Prevention Lifeline 503-939-EMEB (0573)  · National Hope Line Network 800-SUICIDE (309-3686)

## 2020-11-06 NOTE — CARE PLAN
Problem: Respiratory:  Goal: Respiratory status will improve  Outcome: PROGRESSING AS EXPECTED     Problem: Communication  Goal: The ability to communicate needs accurately and effectively will improve  Outcome: PROGRESSING AS EXPECTED

## 2020-11-06 NOTE — THERAPY
Physical Therapy   Initial Evaluation     Patient Name: Ana Paula Ordonez  Age:  75 y.o., Sex:  female  Medical Record #: [id number]  Today's Date: 11/6/2020     Precautions: Fall Risk    Assessment  Patient is 75 y.o. female admitted due ot hypoxia after posterior GLF.  Pt was seen for PT assessment, noted to have 4WW 3 inches to tall with multiple posterior LOB noted  . Walker adjusted and pt educated on need for COG to be anterior of CHRISTOPHER. Pt educated on 4WW brake use during single UE tasks. Pt given ankle sway exercise to be done at home with . Pt has all required DME, no further skilled inpatient or post acute therapy indicated.   Additional factors influencing patient status / progress history of cerebellar ataxia since 2006 and recent dx of neuropathy.     Plan    Recommend Physical Therapy for Evaluation only.    DC Equipment Recommendations: None  Discharge Recommendations: Anticipate that the patient will have no further physical therapy needs after discharge from the hospital          Objective       11/06/20 1044   Prior Living Situation   Prior Services Intermittent Physical Support for ADL Per Family   Housing / Facility 1 Story House   Steps Into Home 0   Steps In Home 0   Equipment Owned 4-Wheel Walker   Lives with - Patient's Self Care Capacity Adult Children   Comments pt's daughter works from home, pt has a  3 x/wk.   Prior Level of Functional Mobility   Bed Mobility Independent   Transfer Status Independent   Ambulation Independent   Assistive Devices Used Front-Wheel Walker   Comments pt has a  3 x/wk   Cognition    Comments pt aware of balance deficits, impulsive at times.    Strength Lower Body   Lower Body Strength  WDL   Comments no isolated weakness   Neurological Concerns   Equilibrium Reaction Impaired   Comments delayed ankle strategy. LOB with minimal backward perturbation .   Balance Assessment   Sitting Balance (Static) Good   Sitting Balance (Dynamic)  Fair   Standing Balance (Static) Fair   Standing Balance (Dynamic) Fair   Weight Shift Sitting Fair   Weight Shift Standing Fair   Gait Analysis   Gait Level Of Assist Supervised   Assistive Device 4 Wheel Walker   Distance (Feet) 200   # of Times Distance was Traveled 1   Deviation Ataxic;Increased Base Of Support;Decreased Toe Off;Decreased Heel Strike   # of Stairs Climbed 0   Weight Bearing Status no restrictions   Comments Walker adjusted down 3 inches to  allow for proper elbow angle and move CG anterior vs post   Functional Mobility   Sit to Stand Supervised   Bed, Chair, Wheelchair Transfer Supervised   Transfer Method Stand Step   Comments cues for pacing , attention to brakes.    Education Group   Education Provided Use of Assistive Device;Gait Training;Role of Physical Therapist   Role of Physical Therapist Patient Response Patient;Eager;Explanation;Verbal Demonstration   Gait Training Patient Response Patient;Acceptance;Explanation;Demonstration;Action Demonstration;Verbal Demonstration   Use of Assistive Device Patient Response Patient;Acceptance;Demonstration;Explanation;Action Demonstration;Verbal Demonstration   Additional Comments educated on proper walker height and given exercise for balance.  VU

## 2020-11-09 ENCOUNTER — PATIENT OUTREACH (OUTPATIENT)
Dept: HEALTH INFORMATION MANAGEMENT | Facility: OTHER | Age: 75
End: 2020-11-09

## 2020-11-09 ENCOUNTER — OFFICE VISIT (OUTPATIENT)
Dept: INTERNAL MEDICINE | Facility: IMAGING CENTER | Age: 75
End: 2020-11-09
Payer: MEDICARE

## 2020-11-09 VITALS
RESPIRATION RATE: 14 BRPM | HEIGHT: 63 IN | TEMPERATURE: 98.7 F | SYSTOLIC BLOOD PRESSURE: 120 MMHG | DIASTOLIC BLOOD PRESSURE: 72 MMHG | BODY MASS INDEX: 19.31 KG/M2 | WEIGHT: 109 LBS | HEART RATE: 88 BPM | OXYGEN SATURATION: 91 %

## 2020-11-09 DIAGNOSIS — F41.9 ANXIETY: ICD-10-CM

## 2020-11-09 DIAGNOSIS — Z91.81 HISTORY OF RECENT FALL: ICD-10-CM

## 2020-11-09 DIAGNOSIS — G11.9 CEREBELLAR ATAXIA (HCC): ICD-10-CM

## 2020-11-09 DIAGNOSIS — G60.9 IDIOPATHIC PERIPHERAL NEUROPATHY: ICD-10-CM

## 2020-11-09 PROCEDURE — 99214 OFFICE O/P EST MOD 30 MIN: CPT | Performed by: INTERNAL MEDICINE

## 2020-11-09 RX ORDER — DIAZEPAM 5 MG/1
5 TABLET ORAL
Qty: 30 TAB | Refills: 0 | Status: SHIPPED
Start: 2020-11-09 | End: 2022-03-07 | Stop reason: SDUPTHER

## 2020-11-09 RX ORDER — PREGABALIN 25 MG/1
25 CAPSULE ORAL NIGHTLY PRN
Qty: 30 CAP | Refills: 5 | Status: SHIPPED | OUTPATIENT
Start: 2020-11-09 | End: 2021-05-08

## 2020-11-09 ASSESSMENT — FIBROSIS 4 INDEX: FIB4 SCORE: 0.81

## 2020-11-09 NOTE — PROGRESS NOTES
Established Patient Note   HPI:        Ana Paula comes in today to follow up recent hospital visit. Hospitalist has suggested she stop neurontin due to her most recent fall. Her oxygen level was low initially in ER. She will also get occasional leg cramps at night; she states she has a formulation that includes quinine that will help. She would like to have an Rx for something similar to neurontin for pain to use prn.    Past Medical History:   Diagnosis Date   • Arthritis    • ASTHMA    • Cerebellar ataxia ([facility]) 3/30/2018    Due to fall 2004   • Chronic ulcerative colitis ([facility]) 6/26/2013   • Compression fracture of L2 ([facility]) 11/3/2019    Nov. 2019 from [facility]   • Cough    • GERD (gastroesophageal reflux disease)    • Hay fever 4/19/2019   • History of falling 5/2/2018   • HYPOTENSION    • Inner ear disease    • Leg cramps, sleep related 5/2/2018   • Near-syncope    • Neck injury     fracture, hand and leg numbness and tingling   • Osteoporosis    • Peripheral neuropathy    • Polyp of sigmoid colon 04/10/2018    Hyperplastic polyp   • Raynaud disease    • Tremor    • Vitamin D deficiency        Current Outpatient Medications   Medication Sig Dispense Refill   • pregabalin (LYRICA) 25 MG Cap Take 1 Cap by mouth at bedtime as needed for up to 180 days. 30 Cap 5   • NON SPECIFIED Take 2 Tabs by mouth 2 Times a Day. AlgaeCal Vitamin     • NON SPECIFIED Take 2 Tabs by mouth every morning. Nerve Shield Vitamin     • NON SPECIFIED Take 2 Tabs by mouth every morning. Vitamin A-D-K Tablet     • diazePAM (VALIUM) 5 MG Tab Take 5 mg by mouth 1 time daily as needed for Anxiety.     • sulfaSALAzine (AZULFIDINE EN-TAB) 500 MG EC tablet Take 500 mg by mouth 2 times a day, with meals.     • montelukast (SINGULAIR) 10 MG Tab TAKE 1 TABLET BY MOUTH EVERY DAY 90 Tab 3   • cetirizine (ZYRTEC) 10 MG Tab Take 10 mg by mouth every day.     • BIOTIN PO Take 1 Tab by mouth every day.     • Potassium (POTASSIMIN PO) Take 2 Drops by mouth 2 Times a  "Day.     • Melatonin 10 MG Cap Take 10 mg by mouth every bedtime.     • CALCIUM PO Take 2 Tabs by mouth 2 Times a Day.     • albuterol (PROAIR HFA) 108 (90 Base) MCG/ACT Aero Soln inhalation aerosol Inhale 2 Puffs by mouth every 6 hours as needed for Shortness of Breath.     • azelastine (ASTELIN) 137 MCG/SPRAY nasal spray Yeaddiss 2 Sprays in nose 2 times a day as needed.  11     No current facility-administered medications for this visit.          Allergies   Allergen Reactions   • Hydromorphone Anaphylaxis     \"went into code blue\"    • Azithromycin Diarrhea and Unspecified     Diarrhea     • Clindamycin    • Codeine Unspecified     ?   • Erythromycin Unspecified     ?   • Keflex      ?   • Levaquin Unspecified     \"i dont know\"    • Lyrica Unspecified     ?   • Mesalamine Hives   • Quinolones Unspecified     ?   • Sulfa Drugs Unspecified     \"i dont know\"    • Tetracyclines Unspecified     ?   • Other Food Unspecified     Dairy-mucus (butter is okay). spices-cough         Social History     Tobacco Use   • Smoking status: Never Smoker   • Smokeless tobacco: Never Used   Substance Use Topics   • Alcohol use: No     Alcohol/week: 0.0 oz     Frequency: Never   • Drug use: No       Past Surgical History:   Procedure Laterality Date   • HIP NAILING INTRAMEDULLARY  7/30/2011    Performed by KALEB RAGSDALE at SURGERY [facility] ORS   • NASAL FRACTURE REDUCTION CLOSED  10/8/08    Performed by DON BARRIOS at SURGERY SAME DAY [facility] ORS   • CHOLECYSTECTOMY  2008   • NISSEN FUNDOPLICATION LAPAROSCOPIC  2005   • BLADDER SUSPENSION      X 2   • TONSILLECTOMY AND ADENOIDECTOMY          ROS    Ambulatory Vitals  /72 (BP Location: Left arm, Patient Position: Sitting, BP Cuff Size: Adult)   Pulse 88   Temp 37.1 °C (98.7 °F) (Temporal)   Resp 14   Ht 1.6 m (5' 3\")   Wt 49.4 kg (109 lb)   BMI 19.31 kg/m²     Physical Exam   Constitutional: She is well-developed, well-nourished, and in no distress. No " distress.   Cardiovascular: Normal rate, regular rhythm and normal heart sounds.   Pulmonary/Chest: Effort normal and breath sounds normal. She has no wheezes. She has no rales.   Musculoskeletal:         General: No edema.   Neurological: She is alert.   Uses walker to help with balance.   Skin: She is not diaphoretic.         Assessment and Plan:     1. History of recent fall     2. Cerebellar ataxia (HCC)     3. Idiopathic peripheral neuropathy  pregabalin (LYRICA) 25 MG Cap   4. Anxiety       I have recommended she stop neurontin since she has had this most recent fall; will Rx lyrica 25 mg hs to use prn. Refill valium 5 mg; she will typically take one have or one quarter tablet prn.    Dar Fraire M.D.

## 2020-11-10 NOTE — PROGRESS NOTES
Community Health Worker Intake  • Contact information provided to Ana Paula Ordonez.  • Outpatient assessment completed.    CHW Yakelin spoke with Ana Paula via TC to follow up post discharge. Ana Paula reports doing well. Reviewed and discussed AVS with Ana Paula. She reports she went to her PCP appt yesterday with Dar Fraire MD. Ana Paula declined to speak with CCM RN for health education, questions, and concerns. Ana Paula reports no other needs from this CHW and CCM. This CHW encouraged Ana Paula to reach out to me when needed. Ana Paula met all goals, and will be d/c from CCM services.

## 2020-11-16 ENCOUNTER — NON-PROVIDER VISIT (OUTPATIENT)
Dept: INTERNAL MEDICINE | Facility: IMAGING CENTER | Age: 75
End: 2020-11-16
Payer: MEDICARE

## 2020-11-17 NOTE — NON-PROVIDER
"Day # 11 after 7 sutures placed on left forearm.  7 sutures removed without difficulty.  Wound is clean and dry.  No signs of infection.  Distal 50% of wound poorly approximated (1/8\" deep), steri strips applied.  Proximal 50% of wound well approximated.  Patient instructed to leave steri strips in place until they fall off spontaneously.  "

## 2020-12-11 DIAGNOSIS — S76.319A HAMSTRING STRAIN, UNSPECIFIED LATERALITY, INITIAL ENCOUNTER: ICD-10-CM

## 2020-12-11 DIAGNOSIS — G60.9 IDIOPATHIC PERIPHERAL NEUROPATHY: ICD-10-CM

## 2020-12-11 DIAGNOSIS — G11.9 CEREBELLAR ATAXIA (HCC): ICD-10-CM

## 2020-12-17 DIAGNOSIS — G60.9 IDIOPATHIC PERIPHERAL NEUROPATHY: ICD-10-CM

## 2020-12-17 DIAGNOSIS — M79.2 NERVE PAIN: ICD-10-CM

## 2020-12-17 RX ORDER — LIDOCAINE 5% 5 G/100G
1 CREAM TOPICAL 3 TIMES DAILY PRN
Qty: 113 G | Refills: 2 | Status: SHIPPED | OUTPATIENT
Start: 2020-12-17 | End: 2022-11-07

## 2021-01-15 DIAGNOSIS — Z23 NEED FOR VACCINATION: ICD-10-CM

## 2021-01-29 ENCOUNTER — TELEPHONE (OUTPATIENT)
Dept: INTERNAL MEDICINE | Facility: IMAGING CENTER | Age: 76
End: 2021-01-29

## 2021-01-29 RX ORDER — TIZANIDINE 2 MG/1
2 TABLET ORAL 3 TIMES DAILY PRN
Qty: 20 TAB | Refills: 0 | Status: SHIPPED | OUTPATIENT
Start: 2021-01-29 | End: 2022-01-14

## 2021-02-03 ENCOUNTER — APPOINTMENT (RX ONLY)
Dept: URBAN - METROPOLITAN AREA CLINIC 4 | Facility: CLINIC | Age: 76
Setting detail: DERMATOLOGY
End: 2021-02-03

## 2021-02-03 DIAGNOSIS — L81.4 OTHER MELANIN HYPERPIGMENTATION: ICD-10-CM

## 2021-02-03 DIAGNOSIS — Z85.828 PERSONAL HISTORY OF OTHER MALIGNANT NEOPLASM OF SKIN: ICD-10-CM

## 2021-02-03 DIAGNOSIS — L82.1 OTHER SEBORRHEIC KERATOSIS: ICD-10-CM

## 2021-02-03 DIAGNOSIS — D22 MELANOCYTIC NEVI: ICD-10-CM

## 2021-02-03 DIAGNOSIS — D18.0 HEMANGIOMA: ICD-10-CM

## 2021-02-03 PROBLEM — D22.5 MELANOCYTIC NEVI OF TRUNK: Status: ACTIVE | Noted: 2021-02-03

## 2021-02-03 PROBLEM — D18.01 HEMANGIOMA OF SKIN AND SUBCUTANEOUS TISSUE: Status: ACTIVE | Noted: 2021-02-03

## 2021-02-03 PROCEDURE — 99213 OFFICE O/P EST LOW 20 MIN: CPT

## 2021-02-03 PROCEDURE — ? COUNSELING

## 2021-02-03 ASSESSMENT — LOCATION DETAILED DESCRIPTION DERM
LOCATION DETAILED: RIGHT INFERIOR MEDIAL UPPER BACK
LOCATION DETAILED: LEFT SUPERIOR UPPER BACK
LOCATION DETAILED: RIGHT POSTERIOR ANKLE
LOCATION DETAILED: RIGHT SUPERIOR UPPER BACK

## 2021-02-03 ASSESSMENT — LOCATION SIMPLE DESCRIPTION DERM
LOCATION SIMPLE: LEFT UPPER BACK
LOCATION SIMPLE: RIGHT UPPER BACK
LOCATION SIMPLE: RIGHT ANKLE

## 2021-02-03 ASSESSMENT — LOCATION ZONE DERM
LOCATION ZONE: TRUNK
LOCATION ZONE: LEG

## 2021-02-11 ENCOUNTER — NON-PROVIDER VISIT (OUTPATIENT)
Dept: INTERNAL MEDICINE | Facility: IMAGING CENTER | Age: 76
End: 2021-02-11
Payer: MEDICARE

## 2021-02-11 ENCOUNTER — HOSPITAL ENCOUNTER (OUTPATIENT)
Facility: MEDICAL CENTER | Age: 76
End: 2021-02-11
Attending: INTERNAL MEDICINE
Payer: MEDICARE

## 2021-02-11 DIAGNOSIS — R74.8 ELEVATED ALKALINE PHOSPHATASE LEVEL: ICD-10-CM

## 2021-02-11 DIAGNOSIS — R79.89 ABNORMAL THYROID BLOOD TEST: ICD-10-CM

## 2021-02-11 DIAGNOSIS — E78.00 HYPERCHOLESTEROLEMIA: ICD-10-CM

## 2021-02-11 DIAGNOSIS — Z01.89 ENCOUNTER FOR ROUTINE LABORATORY TESTING: ICD-10-CM

## 2021-02-11 DIAGNOSIS — K51.90 CHRONIC ULCERATIVE COLITIS WITHOUT COMPLICATION, UNSPECIFIED LOCATION (HCC): ICD-10-CM

## 2021-02-11 LAB
ALBUMIN SERPL BCP-MCNC: 4.5 G/DL (ref 3.2–4.9)
ALBUMIN/GLOB SERPL: 1.4 G/DL
ALP SERPL-CCNC: 120 U/L (ref 30–99)
ALT SERPL-CCNC: 17 U/L (ref 2–50)
ANION GAP SERPL CALC-SCNC: 8 MMOL/L (ref 7–16)
AST SERPL-CCNC: 14 U/L (ref 12–45)
BILIRUB SERPL-MCNC: 0.7 MG/DL (ref 0.1–1.5)
BUN SERPL-MCNC: 11 MG/DL (ref 8–22)
CALCIUM SERPL-MCNC: 10 MG/DL (ref 8.5–10.5)
CHLORIDE SERPL-SCNC: 98 MMOL/L (ref 96–112)
CHOLEST SERPL-MCNC: 253 MG/DL (ref 100–199)
CO2 SERPL-SCNC: 30 MMOL/L (ref 20–33)
CREAT SERPL-MCNC: 0.46 MG/DL (ref 0.5–1.4)
GGT SERPL-CCNC: 8 U/L (ref 7–34)
GLOBULIN SER CALC-MCNC: 3.2 G/DL (ref 1.9–3.5)
GLUCOSE SERPL-MCNC: 95 MG/DL (ref 65–99)
HDLC SERPL-MCNC: 75 MG/DL
LDLC SERPL CALC-MCNC: 160 MG/DL
POTASSIUM SERPL-SCNC: 3.9 MMOL/L (ref 3.6–5.5)
PROT SERPL-MCNC: 7.7 G/DL (ref 6–8.2)
SODIUM SERPL-SCNC: 136 MMOL/L (ref 135–145)
T3 SERPL-MCNC: 112 NG/DL (ref 60–181)
THYROPEROXIDASE AB SERPL-ACNC: 12 IU/ML (ref 0–9)
TRIGL SERPL-MCNC: 89 MG/DL (ref 0–149)
TSH SERPL DL<=0.005 MIU/L-ACNC: 3.35 UIU/ML (ref 0.38–5.33)

## 2021-02-11 PROCEDURE — 80053 COMPREHEN METABOLIC PANEL: CPT

## 2021-02-11 PROCEDURE — 84480 ASSAY TRIIODOTHYRONINE (T3): CPT

## 2021-02-11 PROCEDURE — 84443 ASSAY THYROID STIM HORMONE: CPT

## 2021-02-11 PROCEDURE — 86376 MICROSOMAL ANTIBODY EACH: CPT

## 2021-02-11 PROCEDURE — 82977 ASSAY OF GGT: CPT

## 2021-02-11 PROCEDURE — 80061 LIPID PANEL: CPT

## 2021-02-17 ENCOUNTER — NON-PROVIDER VISIT (OUTPATIENT)
Dept: INTERNAL MEDICINE | Facility: IMAGING CENTER | Age: 76
End: 2021-02-17
Payer: MEDICARE

## 2021-02-17 ENCOUNTER — HOSPITAL ENCOUNTER (OUTPATIENT)
Facility: MEDICAL CENTER | Age: 76
End: 2021-02-17
Attending: INTERNAL MEDICINE
Payer: MEDICARE

## 2021-02-17 DIAGNOSIS — R31.9 HEMATURIA, UNSPECIFIED TYPE: ICD-10-CM

## 2021-02-17 DIAGNOSIS — R30.0 DYSURIA: ICD-10-CM

## 2021-02-17 LAB
APPEARANCE UR: CLEAR
BILIRUB UR STRIP-MCNC: NEGATIVE MG/DL
COLOR UR AUTO: NORMAL
GLUCOSE UR STRIP.AUTO-MCNC: NEGATIVE MG/DL
KETONES UR STRIP.AUTO-MCNC: 15 MG/DL
LEUKOCYTE ESTERASE UR QL STRIP.AUTO: NORMAL
NITRITE UR QL STRIP.AUTO: POSITIVE
PH UR STRIP.AUTO: 5 [PH] (ref 5–8)
PROT UR QL STRIP: NEGATIVE MG/DL
RBC UR QL AUTO: NORMAL
SP GR UR STRIP.AUTO: <1.005
UROBILINOGEN UR STRIP-MCNC: 0.2 MG/DL

## 2021-02-17 PROCEDURE — 81002 URINALYSIS NONAUTO W/O SCOPE: CPT | Performed by: INTERNAL MEDICINE

## 2021-02-17 PROCEDURE — 87086 URINE CULTURE/COLONY COUNT: CPT

## 2021-02-17 PROCEDURE — 81001 URINALYSIS AUTO W/SCOPE: CPT

## 2021-02-17 RX ORDER — SULFAMETHOXAZOLE AND TRIMETHOPRIM 800; 160 MG/1; MG/1
1 TABLET ORAL 2 TIMES DAILY
Qty: 14 TABLET | Refills: 0 | Status: SHIPPED | OUTPATIENT
Start: 2021-02-17 | End: 2021-02-24

## 2021-02-18 DIAGNOSIS — J30.1 HAY FEVER: ICD-10-CM

## 2021-02-18 LAB
APPEARANCE UR: ABNORMAL
BACTERIA #/AREA URNS HPF: NEGATIVE /HPF
BILIRUB UR QL STRIP.AUTO: NEGATIVE
COLOR UR: ABNORMAL
EPI CELLS #/AREA URNS HPF: NEGATIVE /HPF
GLUCOSE UR STRIP.AUTO-MCNC: NEGATIVE MG/DL
HYALINE CASTS #/AREA URNS LPF: NORMAL /LPF
KETONES UR STRIP.AUTO-MCNC: ABNORMAL MG/DL
LEUKOCYTE ESTERASE UR QL STRIP.AUTO: ABNORMAL
MICRO URNS: ABNORMAL
NITRITE UR QL STRIP.AUTO: POSITIVE
PH UR STRIP.AUTO: 6 [PH] (ref 5–8)
PROT UR QL STRIP: NEGATIVE MG/DL
RBC # URNS HPF: NORMAL /HPF
RBC UR QL AUTO: NEGATIVE
SP GR UR STRIP.AUTO: 1.02
UROBILINOGEN UR STRIP.AUTO-MCNC: 0.2 MG/DL
WBC #/AREA URNS HPF: NORMAL /HPF

## 2021-02-18 RX ORDER — AZELASTINE 1 MG/ML
2 SPRAY, METERED NASAL 2 TIMES DAILY PRN
Qty: 30 ML | Refills: 11 | Status: SHIPPED | OUTPATIENT
Start: 2021-02-18 | End: 2022-01-14 | Stop reason: SDUPTHER

## 2021-02-19 DIAGNOSIS — G60.9 IDIOPATHIC PERIPHERAL NEUROPATHY: ICD-10-CM

## 2021-02-19 DIAGNOSIS — R74.8 ELEVATED ALKALINE PHOSPHATASE LEVEL: ICD-10-CM

## 2021-02-19 DIAGNOSIS — G47.62 LEG CRAMPS, SLEEP RELATED: ICD-10-CM

## 2021-02-19 DIAGNOSIS — Z87.440 HISTORY OF RECURRENT UTI (URINARY TRACT INFECTION): ICD-10-CM

## 2021-02-19 DIAGNOSIS — K51.90 CHRONIC ULCERATIVE COLITIS WITHOUT COMPLICATION, UNSPECIFIED LOCATION (HCC): ICD-10-CM

## 2021-02-19 DIAGNOSIS — Z86.39 HISTORY OF VITAMIN D DEFICIENCY: ICD-10-CM

## 2021-02-20 LAB
BACTERIA UR CULT: NORMAL
SIGNIFICANT IND 70042: NORMAL
SITE SITE: NORMAL
SOURCE SOURCE: NORMAL

## 2021-02-25 ENCOUNTER — TELEPHONE (OUTPATIENT)
Dept: INTERNAL MEDICINE | Facility: IMAGING CENTER | Age: 76
End: 2021-02-25

## 2021-02-25 RX ORDER — ESTRADIOL 0.1 MG/G
CREAM VAGINAL
Qty: 42.5 G | Refills: 1 | Status: SHIPPED | OUTPATIENT
Start: 2021-02-25 | End: 2021-02-25

## 2021-02-25 NOTE — TELEPHONE ENCOUNTER
Patient c/o persistent dysuria despite negative UA, negative urine culture, and recent 7 day course of Bactrim DS.  Per Dr Sosa - recommend daily dab of estrogen cream around urethra.

## 2021-03-04 ENCOUNTER — TELEMEDICINE (OUTPATIENT)
Dept: SLEEP MEDICINE | Facility: MEDICAL CENTER | Age: 76
End: 2021-03-04
Payer: MEDICARE

## 2021-03-04 VITALS — HEIGHT: 63 IN | WEIGHT: 109 LBS | BODY MASS INDEX: 19.31 KG/M2

## 2021-03-04 DIAGNOSIS — J30.1 HAY FEVER: ICD-10-CM

## 2021-03-04 DIAGNOSIS — G11.9 CEREBELLAR ATAXIA (HCC): ICD-10-CM

## 2021-03-04 DIAGNOSIS — R05.9 COUGH: ICD-10-CM

## 2021-03-04 DIAGNOSIS — J45.40 MODERATE PERSISTENT ASTHMA WITHOUT COMPLICATION: ICD-10-CM

## 2021-03-04 PROCEDURE — 99213 OFFICE O/P EST LOW 20 MIN: CPT | Mod: 95,CR | Performed by: INTERNAL MEDICINE

## 2021-03-04 RX ORDER — FLUTICASONE PROPIONATE 50 MCG
1 SPRAY, SUSPENSION (ML) NASAL DAILY
Qty: 16 G | Refills: 11 | Status: SHIPPED | OUTPATIENT
Start: 2021-03-04 | End: 2022-01-14

## 2021-03-04 ASSESSMENT — FIBROSIS 4 INDEX: FIB4 SCORE: 1.22

## 2021-03-04 NOTE — PROGRESS NOTES
"Telemedicine: Established Patient   This evaluation was conducted via Zoom using secure and encrypted videoconferencing technology. The patient was in a private location in the state of [place].    The patient's identity was confirmed and verbal consent was obtained for this virtual visit.    Subjective:   CC:   Chief Complaint   Patient presents with   • Follow-Up     Asthma       Ana Paula Ordonez is a 76 y.o. female presenting for evaluation and management of:cough and asthma.  She uses pro-air on an as-needed basis and has an antihistamine nasal spray.    The patient fell several years ago and suffered a cerebellar injury.  She has cerebellar ataxia.  She does not like to use inhaled medications.  Other problems include ulcerative colitis, reflux, and tremors.     Pulmonary function testing reveals an FEV1 of 1.74 L which is 82% of predicted.  FEV1/FVC ratio is 66%.  She has a 9% improvement after bronchodilator.     When we saw her last May we recommended Breo to see if that helped with her asthma.  Apparently she only recently started when it was prescribed by Dr. Sosa.  She says it helps a little but that her cough persists.  Today she tells me that she has no history of reflux.  She tells me that she has seen ENT in the past.  I do not have any documentation in her chart.  Although she has seen OT and PT for neurologic problems I do not think that she seen speech therapy.  She denies choking on food or aspiration.  She denies heartburn.  She does have hayfever and has used an antihistamine nasal spray in the past.  She has multiple medication allergies as listed.    ROS  She has poor balance and tremor.  She also has the cough as listed above.  Remainder of the 12 point review of systems is negative.    Allergies   Allergen Reactions   • Hydromorphone Anaphylaxis     \"went into code blue\"    • Azithromycin Diarrhea and Unspecified     Diarrhea     • Clindamycin    • Codeine Unspecified     ?   • " "Erythromycin Unspecified     ?   • Keflex      ?   • Levaquin Unspecified     \"i dont know\"    • Lyrica Unspecified     ?   • Mesalamine Hives   • Quinolones Unspecified     ?   • Sulfa Drugs Unspecified     \"i dont know\"    • Tetracyclines Unspecified     ?   • Other Food Unspecified     Dairy-mucus (butter is okay). spices-cough       Current medicines (including changes today)  Current Outpatient Medications   Medication Sig Dispense Refill   • fluticasone (FLONASE) 50 MCG/ACT nasal spray Administer 1 Spray into affected nostril(S) every day. 16 g 11   • azelastine (ASTELIN) 137 MCG/SPRAY nasal spray Administer 2 Sprays into affected nostril(S) 2 times a day as needed. 30 mL 11   • BREO ELLIPTA 100-25 MCG/INH AEROSOL POWDER, BREATH ACTIVATED INHALE 1 PUFF BY MOUTH EVERY DAY 60 Each 2   • montelukast (SINGULAIR) 10 MG Tab TAKE 1 TABLET BY MOUTH EVERY DAY 90 Tab 3   • albuterol (PROAIR HFA) 108 (90 Base) MCG/ACT Aero Soln inhalation aerosol Inhale 2 Puffs by mouth every 6 hours as needed for Shortness of Breath.     • estrogens, conjugated (PREMARIN) 0.625 MG/GM Cream Apply a dab around urethral opening daily as needed 30 g 1   • tizanidine (ZANAFLEX) 2 MG tablet Take 1 Tab by mouth 3 times a day as needed (muscle spasm or pain). 20 Tab 0   • Lidocaine 5 % Cream Apply 1 Application topically 3 times a day as needed. 113 g 2   • pregabalin (LYRICA) 25 MG Cap Take 1 Cap by mouth at bedtime as needed for up to 180 days. 30 Cap 5   • NON SPECIFIED Take 2 Tabs by mouth 2 Times a Day. AlgaeCal Vitamin     • NON SPECIFIED Take 2 Tabs by mouth every morning. Nerve Shield Vitamin     • NON SPECIFIED Take 2 Tabs by mouth every morning. Vitamin A-D-K Tablet     • sulfaSALAzine (AZULFIDINE EN-TAB) 500 MG EC tablet Take 500 mg by mouth 2 times a day, with meals.     • cetirizine (ZYRTEC) 10 MG Tab Take 10 mg by mouth every day.     • BIOTIN PO Take 1 Tab by mouth every day.     • Potassium (POTASSIMIN PO) Take 2 Drops by mouth " 2 Times a Day.     • Melatonin 10 MG Cap Take 10 mg by mouth every bedtime.     • CALCIUM PO Take 2 Tabs by mouth 2 Times a Day.       No current facility-administered medications for this visit.       Patient Active Problem List    Diagnosis Date Noted   • CN (constipation) with early obstructive pattern 09/28/2019     Priority: High   • Raynaud disease 08/20/2019     Priority: Medium   • Left ear pain, likely residual pressure post-ear infection 05/07/2019     Priority: Medium   • Peripheral neuropathy      Priority: Medium   • Prolonged QT interval 05/07/2019     Priority: Low   • Urinary retention 05/06/2019     Priority: Low   • Cerebellar ataxia (HCC) 03/30/2018     Priority: Low   • Vitamin D deficiency 03/30/2018     Priority: Low   • Mild intermittent asthma without complication 02/03/2017     Priority: Low   • Chronic ulcerative colitis (HCC) 06/26/2013     Priority: Low   • Compression fracture of L2 ([facility]) 11/03/2019   • Hay fever 04/19/2019   • History of recurrent UTI (urinary tract infection) 05/02/2018   • History of falling 05/02/2018   • Leg cramps, sleep related 05/02/2018   • Polyp of sigmoid colon 04/10/2018   • Age related osteoporosis 03/30/2018       Family History   Problem Relation Age of Onset   • Non-contributory Father         Parkinson's disease   • Non-contributory Mother         old age with mild dementia   • Heart Disease Neg Hx    • Hypertension Neg Hx    • Hyperlipidemia Neg Hx    • Diabetes Neg Hx        She  has a past medical history of Arthritis, ASTHMA, Cerebellar ataxia (HCC) (3/30/2018), Chronic ulcerative colitis ([facility]) (6/26/2013), Compression fracture of L2 ([facility]) (11/3/2019), Cough, GERD (gastroesophageal reflux disease), Hay fever (4/19/2019), History of falling (5/2/2018), HYPOTENSION, Inner ear disease, Leg cramps, sleep related (5/2/2018), Near-syncope, Neck injury, Osteoporosis, Peripheral neuropathy, Polyp of sigmoid colon (04/10/2018), Raynaud disease, Tremor, and  "Vitamin D deficiency.  She  has a past surgical history that includes nasal fracture reduction closed (10/8/08); hip nailing intramedullary (7/30/2011); tonsillectomy and adenoidectomy; bladder suspension; nissen fundoplication laparoscopic (2005); and cholecystectomy (2008).       Objective:   Ht 1.6 m (5' 3\")   Wt 49.4 kg (109 lb)   Breastfeeding No   BMI 19.31 kg/m²     Physical Exam   Uneven voice quality.  No respiratory distress.  No audible wheezing.  Regular rhythm.    Assessment and Plan:   The following treatment plan was discussed:     1. Cough    2. Moderate persistent asthma without complication    3. Hay fever    4. Cerebellar ataxia (HCC)    Other orders  - fluticasone (FLONASE) 50 MCG/ACT nasal spray; Administer 1 Spray into affected nostril(S) every day.  Dispense: 16 g; Refill: 11    She does have mild asthma but does not like taking medications.  We have tried to get her to use Breo on a regular basis.  I have also suggested that she use Flonase on a daily basis.  In the future she may benefit from a speech therapy consult to make sure that her vocal cords are not the cause of her cough and that she is not having problems with aspiration.  We will see her back in 6 months to reassess her response to Breo/Flonase.             "

## 2021-03-09 ENCOUNTER — TELEPHONE (OUTPATIENT)
Dept: SLEEP MEDICINE | Facility: MEDICAL CENTER | Age: 76
End: 2021-03-09

## 2021-03-09 NOTE — TELEPHONE ENCOUNTER
Patient called she has started her Flonase she was prescribed, but has been feeling dizzy and would like to stop the medication she just needs to know if she could just stop it or does she gradually stop it.    Patient would like Dr. Mckenzie to call her and review medication did inform her that Dr. Mckenzie was out this week.   Please advice

## 2021-04-12 DIAGNOSIS — J45.20 MILD INTERMITTENT ASTHMA WITHOUT COMPLICATION: ICD-10-CM

## 2021-04-14 ENCOUNTER — TELEPHONE (OUTPATIENT)
Dept: INTERNAL MEDICINE | Facility: IMAGING CENTER | Age: 76
End: 2021-04-14

## 2021-04-14 DIAGNOSIS — K51.90 CHRONIC ULCERATIVE COLITIS WITHOUT COMPLICATION, UNSPECIFIED LOCATION (HCC): ICD-10-CM

## 2021-04-14 DIAGNOSIS — R19.7 DIARRHEA, UNSPECIFIED TYPE: ICD-10-CM

## 2021-04-14 RX ORDER — DIPHENOXYLATE HYDROCHLORIDE AND ATROPINE SULFATE 2.5; .025 MG/1; MG/1
1 TABLET ORAL 4 TIMES DAILY PRN
Qty: 30 TABLET | Refills: 1 | Status: SHIPPED | OUTPATIENT
Start: 2021-04-14 | End: 2021-04-21

## 2021-05-20 ENCOUNTER — NON-PROVIDER VISIT (OUTPATIENT)
Dept: INTERNAL MEDICINE | Facility: IMAGING CENTER | Age: 76
End: 2021-05-20
Payer: MEDICARE

## 2021-05-20 ENCOUNTER — HOSPITAL ENCOUNTER (OUTPATIENT)
Facility: MEDICAL CENTER | Age: 76
End: 2021-05-20
Attending: INTERNAL MEDICINE
Payer: MEDICARE

## 2021-05-20 DIAGNOSIS — Z01.89 ENCOUNTER FOR ROUTINE LABORATORY TESTING: ICD-10-CM

## 2021-05-20 PROCEDURE — 83993 ASSAY FOR CALPROTECTIN FECAL: CPT

## 2021-05-21 ENCOUNTER — TELEPHONE (OUTPATIENT)
Dept: SLEEP MEDICINE | Facility: MEDICAL CENTER | Age: 76
End: 2021-05-21

## 2021-05-21 DIAGNOSIS — R06.00 DYSPNEA, UNSPECIFIED TYPE: ICD-10-CM

## 2021-05-21 DIAGNOSIS — R05.9 COUGH: ICD-10-CM

## 2021-05-21 DIAGNOSIS — J45.40 MODERATE PERSISTENT ASTHMA WITHOUT COMPLICATION: ICD-10-CM

## 2021-05-22 LAB — CALPROTECTIN STL-MCNT: 64 UG/G

## 2021-05-24 NOTE — TELEPHONE ENCOUNTER
I called patient and added PFT on follow up with Dr Mckenzie scheduled 9/10/21 at 1:30 just prior to appointment.    Order pended for approval.

## 2021-06-02 DIAGNOSIS — J30.1 HAY FEVER: ICD-10-CM

## 2021-06-02 RX ORDER — MONTELUKAST SODIUM 10 MG/1
TABLET ORAL
Qty: 90 TABLET | Refills: 3 | Status: SHIPPED | OUTPATIENT
Start: 2021-06-02 | End: 2022-03-07 | Stop reason: SDUPTHER

## 2021-06-07 ENCOUNTER — TELEPHONE (OUTPATIENT)
Dept: INTERNAL MEDICINE | Facility: IMAGING CENTER | Age: 76
End: 2021-06-07

## 2021-06-08 NOTE — TELEPHONE ENCOUNTER
Patient has a previous h/o of colitis.  Recently she has been battling alternating constipation and diarrhea.  She is currently using miralax and metamucil.  She is very sensitive to medications.  She calls today with loose stool & oozing stool overnight.  Per Dr Baker diet & continue 1 TBSP Metamucil /day for bulk.  She will follow up in a few days with her progress.

## 2021-06-22 ENCOUNTER — NON-PROVIDER VISIT (OUTPATIENT)
Dept: INTERNAL MEDICINE | Facility: IMAGING CENTER | Age: 76
End: 2021-06-22
Payer: MEDICARE

## 2021-06-23 NOTE — NON-PROVIDER
Bilateral ear canals successfully lavaged with warm water.  Both ear canals & TM's are WNL.  Patient tolerated the procedure.

## 2021-07-08 DIAGNOSIS — N30.00 ACUTE CYSTITIS WITHOUT HEMATURIA: ICD-10-CM

## 2021-07-08 RX ORDER — NITROFURANTOIN 25; 75 MG/1; MG/1
100 CAPSULE ORAL 2 TIMES DAILY
Qty: 14 CAPSULE | Refills: 0 | Status: SHIPPED | OUTPATIENT
Start: 2021-07-08 | End: 2022-05-17 | Stop reason: SDUPTHER

## 2021-07-09 NOTE — PROGRESS NOTES
Ana Paula states she has been having urinary urgency past couple days. Denies burning as yet but is sure this is early UTI. She is currently in [place], [place]. Will Rx macrobid.

## 2021-07-21 RX ORDER — TRIAMCINOLONE ACETONIDE 1 MG/G
1 CREAM TOPICAL QD
Qty: 1 | Refills: 3 | Status: ERX | COMMUNITY
Start: 2021-07-21

## 2021-07-21 RX ADMIN — TRIAMCINOLONE ACETONIDE 1: 1 CREAM TOPICAL at 00:00

## 2021-08-03 ENCOUNTER — HOSPITAL ENCOUNTER (OUTPATIENT)
Facility: MEDICAL CENTER | Age: 76
End: 2021-08-03
Attending: INTERNAL MEDICINE
Payer: MEDICARE

## 2021-08-03 ENCOUNTER — NON-PROVIDER VISIT (OUTPATIENT)
Dept: INTERNAL MEDICINE | Facility: IMAGING CENTER | Age: 76
End: 2021-08-03
Payer: MEDICARE

## 2021-08-03 DIAGNOSIS — R74.8 ELEVATED ALKALINE PHOSPHATASE LEVEL: ICD-10-CM

## 2021-08-03 DIAGNOSIS — K51.90 CHRONIC ULCERATIVE COLITIS WITHOUT COMPLICATION, UNSPECIFIED LOCATION (HCC): ICD-10-CM

## 2021-08-03 DIAGNOSIS — G47.62 LEG CRAMPS, SLEEP RELATED: ICD-10-CM

## 2021-08-03 DIAGNOSIS — Z86.39 HISTORY OF VITAMIN D DEFICIENCY: ICD-10-CM

## 2021-08-03 DIAGNOSIS — G60.9 IDIOPATHIC PERIPHERAL NEUROPATHY: ICD-10-CM

## 2021-08-03 DIAGNOSIS — Z87.440 HISTORY OF RECURRENT UTI (URINARY TRACT INFECTION): ICD-10-CM

## 2021-08-03 DIAGNOSIS — Z01.89 ENCOUNTER FOR ROUTINE LABORATORY TESTING: ICD-10-CM

## 2021-08-03 DIAGNOSIS — E78.00 HYPERCHOLESTEROLEMIA: ICD-10-CM

## 2021-08-03 LAB
25(OH)D3 SERPL-MCNC: 57 NG/ML (ref 30–100)
ALBUMIN SERPL BCP-MCNC: 4.2 G/DL (ref 3.2–4.9)
ALBUMIN/GLOB SERPL: 1.3 G/DL
ALP SERPL-CCNC: 135 U/L (ref 30–99)
ALT SERPL-CCNC: 21 U/L (ref 2–50)
ANION GAP SERPL CALC-SCNC: 11 MMOL/L (ref 7–16)
APPEARANCE UR: CLEAR
AST SERPL-CCNC: 20 U/L (ref 12–45)
BASOPHILS # BLD AUTO: 0.6 % (ref 0–1.8)
BASOPHILS # BLD: 0.04 K/UL (ref 0–0.12)
BILIRUB SERPL-MCNC: 0.8 MG/DL (ref 0.1–1.5)
BILIRUB UR QL STRIP.AUTO: NEGATIVE
BUN SERPL-MCNC: 12 MG/DL (ref 8–22)
CALCIUM SERPL-MCNC: 9.5 MG/DL (ref 8.5–10.5)
CHLORIDE SERPL-SCNC: 97 MMOL/L (ref 96–112)
CHOLEST SERPL-MCNC: 226 MG/DL (ref 100–199)
CO2 SERPL-SCNC: 26 MMOL/L (ref 20–33)
COLOR UR: YELLOW
CREAT SERPL-MCNC: 0.47 MG/DL (ref 0.5–1.4)
EOSINOPHIL # BLD AUTO: 0.08 K/UL (ref 0–0.51)
EOSINOPHIL NFR BLD: 1.2 % (ref 0–6.9)
ERYTHROCYTE [DISTWIDTH] IN BLOOD BY AUTOMATED COUNT: 46.5 FL (ref 35.9–50)
GLOBULIN SER CALC-MCNC: 3.2 G/DL (ref 1.9–3.5)
GLUCOSE SERPL-MCNC: 84 MG/DL (ref 65–99)
GLUCOSE UR STRIP.AUTO-MCNC: NEGATIVE MG/DL
HCT VFR BLD AUTO: 45 % (ref 37–47)
HDLC SERPL-MCNC: 64 MG/DL
HGB BLD-MCNC: 14.4 G/DL (ref 12–16)
IMM GRANULOCYTES # BLD AUTO: 0.03 K/UL (ref 0–0.11)
IMM GRANULOCYTES NFR BLD AUTO: 0.5 % (ref 0–0.9)
KETONES UR STRIP.AUTO-MCNC: NEGATIVE MG/DL
LDLC SERPL CALC-MCNC: 146 MG/DL
LEUKOCYTE ESTERASE UR QL STRIP.AUTO: NEGATIVE
LYMPHOCYTES # BLD AUTO: 1.84 K/UL (ref 1–4.8)
LYMPHOCYTES NFR BLD: 28.4 % (ref 22–41)
MCH RBC QN AUTO: 32.9 PG (ref 27–33)
MCHC RBC AUTO-ENTMCNC: 32 G/DL (ref 33.6–35)
MCV RBC AUTO: 102.7 FL (ref 81.4–97.8)
MICRO URNS: NORMAL
MONOCYTES # BLD AUTO: 0.48 K/UL (ref 0–0.85)
MONOCYTES NFR BLD AUTO: 7.4 % (ref 0–13.4)
NEUTROPHILS # BLD AUTO: 4.01 K/UL (ref 2–7.15)
NEUTROPHILS NFR BLD: 61.9 % (ref 44–72)
NITRITE UR QL STRIP.AUTO: NEGATIVE
NRBC # BLD AUTO: 0 K/UL
NRBC BLD-RTO: 0 /100 WBC
PH UR STRIP.AUTO: 7 [PH] (ref 5–8)
PLATELET # BLD AUTO: 274 K/UL (ref 164–446)
PMV BLD AUTO: 9.3 FL (ref 9–12.9)
POTASSIUM SERPL-SCNC: 3.9 MMOL/L (ref 3.6–5.5)
PROT SERPL-MCNC: 7.4 G/DL (ref 6–8.2)
PROT UR QL STRIP: NEGATIVE MG/DL
RBC # BLD AUTO: 4.38 M/UL (ref 4.2–5.4)
RBC UR QL AUTO: NEGATIVE
SODIUM SERPL-SCNC: 134 MMOL/L (ref 135–145)
SP GR UR STRIP.AUTO: 1.01
TRIGL SERPL-MCNC: 80 MG/DL (ref 0–149)
TSH SERPL DL<=0.005 MIU/L-ACNC: 3.61 UIU/ML (ref 0.38–5.33)
UROBILINOGEN UR STRIP.AUTO-MCNC: 0.2 MG/DL
VIT B12 SERPL-MCNC: 708 PG/ML (ref 211–911)
WBC # BLD AUTO: 6.5 K/UL (ref 4.8–10.8)

## 2021-08-03 PROCEDURE — 82306 VITAMIN D 25 HYDROXY: CPT

## 2021-08-03 PROCEDURE — 85025 COMPLETE CBC W/AUTO DIFF WBC: CPT

## 2021-08-03 PROCEDURE — 80061 LIPID PANEL: CPT

## 2021-08-03 PROCEDURE — 84443 ASSAY THYROID STIM HORMONE: CPT

## 2021-08-03 PROCEDURE — 80053 COMPREHEN METABOLIC PANEL: CPT

## 2021-08-03 PROCEDURE — 81003 URINALYSIS AUTO W/O SCOPE: CPT

## 2021-08-03 PROCEDURE — 82607 VITAMIN B-12: CPT

## 2021-08-04 ENCOUNTER — APPOINTMENT (RX ONLY)
Dept: URBAN - METROPOLITAN AREA CLINIC 4 | Facility: CLINIC | Age: 76
Setting detail: DERMATOLOGY
End: 2021-08-04

## 2021-08-04 DIAGNOSIS — L81.4 OTHER MELANIN HYPERPIGMENTATION: ICD-10-CM

## 2021-08-04 DIAGNOSIS — D18.0 HEMANGIOMA: ICD-10-CM

## 2021-08-04 DIAGNOSIS — D22 MELANOCYTIC NEVI: ICD-10-CM

## 2021-08-04 DIAGNOSIS — Z85.828 PERSONAL HISTORY OF OTHER MALIGNANT NEOPLASM OF SKIN: ICD-10-CM

## 2021-08-04 DIAGNOSIS — L82.1 OTHER SEBORRHEIC KERATOSIS: ICD-10-CM

## 2021-08-04 DIAGNOSIS — L82.0 INFLAMED SEBORRHEIC KERATOSIS: ICD-10-CM

## 2021-08-04 PROBLEM — D18.01 HEMANGIOMA OF SKIN AND SUBCUTANEOUS TISSUE: Status: ACTIVE | Noted: 2021-08-04

## 2021-08-04 PROBLEM — D22.5 MELANOCYTIC NEVI OF TRUNK: Status: ACTIVE | Noted: 2021-08-04

## 2021-08-04 PROCEDURE — 17110 DESTRUCTION B9 LES UP TO 14: CPT

## 2021-08-04 PROCEDURE — 99213 OFFICE O/P EST LOW 20 MIN: CPT | Mod: 25

## 2021-08-04 PROCEDURE — ? COUNSELING

## 2021-08-04 PROCEDURE — ? LIQUID NITROGEN

## 2021-08-04 ASSESSMENT — LOCATION DETAILED DESCRIPTION DERM
LOCATION DETAILED: RIGHT SUPERIOR UPPER BACK
LOCATION DETAILED: RIGHT PROXIMAL RADIAL DORSAL FOREARM
LOCATION DETAILED: LEFT PROXIMAL DORSAL FOREARM
LOCATION DETAILED: LEFT DISTAL DORSAL FOREARM
LOCATION DETAILED: RIGHT PROXIMAL DORSAL FOREARM
LOCATION DETAILED: RIGHT POSTERIOR ANKLE
LOCATION DETAILED: LEFT SUPERIOR UPPER BACK
LOCATION DETAILED: RIGHT INFERIOR MEDIAL UPPER BACK

## 2021-08-04 ASSESSMENT — LOCATION SIMPLE DESCRIPTION DERM
LOCATION SIMPLE: RIGHT FOREARM
LOCATION SIMPLE: LEFT FOREARM
LOCATION SIMPLE: RIGHT UPPER BACK
LOCATION SIMPLE: LEFT UPPER BACK
LOCATION SIMPLE: RIGHT ANKLE

## 2021-08-04 ASSESSMENT — LOCATION ZONE DERM
LOCATION ZONE: ARM
LOCATION ZONE: LEG
LOCATION ZONE: TRUNK

## 2021-08-04 NOTE — PROCEDURE: LIQUID NITROGEN
Include Z78.9 (Other Specified Conditions Influencing Health Status) As An Associated Diagnosis?: No
Duration Of Freeze Thaw-Cycle (Seconds): 3
Show Topical Anesthesia Variable?: Yes
Medical Necessity Clause: This procedure was medically necessary because the lesions that were treated were:
Post-Care Instructions: I reviewed with the patient in detail post-care instructions. Patient is to wear sunprotection, and avoid picking at any of the treated lesions. Pt may apply Vaseline to crusted or scabbing areas.
Consent: The patient's consent was obtained including but not limited to risks of crusting, scabbing, blistering, scarring, darker or lighter pigmentary change, recurrence, incomplete removal and infection.
Aperture Size (Optional): C
Detail Level: Detailed
Number Of Freeze-Thaw Cycles: 1 freeze-thaw cycle
Medical Necessity Information: It is in your best interest to select a reason for this procedure from the list below. All of these items fulfill various CMS LCD requirements except the new and changing color options.

## 2021-08-11 ENCOUNTER — OFFICE VISIT (OUTPATIENT)
Dept: INTERNAL MEDICINE | Facility: IMAGING CENTER | Age: 76
End: 2021-08-11
Payer: MEDICARE

## 2021-08-11 VITALS
SYSTOLIC BLOOD PRESSURE: 112 MMHG | HEIGHT: 63 IN | TEMPERATURE: 98.6 F | WEIGHT: 108 LBS | OXYGEN SATURATION: 90 % | HEART RATE: 87 BPM | BODY MASS INDEX: 19.14 KG/M2 | DIASTOLIC BLOOD PRESSURE: 64 MMHG | RESPIRATION RATE: 14 BRPM

## 2021-08-11 DIAGNOSIS — M81.0 OSTEOPOROSIS, UNSPECIFIED OSTEOPOROSIS TYPE, UNSPECIFIED PATHOLOGICAL FRACTURE PRESENCE: ICD-10-CM

## 2021-08-11 DIAGNOSIS — E87.1 HYPONATREMIA: ICD-10-CM

## 2021-08-11 DIAGNOSIS — J45.40 MODERATE PERSISTENT ASTHMA WITHOUT COMPLICATION: ICD-10-CM

## 2021-08-11 DIAGNOSIS — R74.8 ELEVATED ALKALINE PHOSPHATASE LEVEL: ICD-10-CM

## 2021-08-11 PROBLEM — J45.909 ASTHMA: Status: ACTIVE | Noted: 2017-02-03

## 2021-08-11 PROCEDURE — 99214 OFFICE O/P EST MOD 30 MIN: CPT | Performed by: INTERNAL MEDICINE

## 2021-08-11 RX ORDER — TRIAMCINOLONE ACETONIDE 1 MG/G
CREAM TOPICAL
COMMUNITY
Start: 2021-07-21

## 2021-08-11 ASSESSMENT — ENCOUNTER SYMPTOMS: WHEEZING: 0

## 2021-08-11 ASSESSMENT — FIBROSIS 4 INDEX: FIB4 SCORE: 1.18

## 2021-08-11 NOTE — PROGRESS NOTES
Established Patient Note   HPI:        Ana Paula come in today to discuss her asthma; she states over past 2-3 months her cough is the same but the phlegm/mucus has become thicker and more obvious. She is using breo daily and his now having to use albuterol daily which she didn't require before. She has appointment with pulmonologist in September. She has noticed more shortness of breath with exertion than in past.    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 2019 from [facility]   • Cough    • GERD (gastroesophageal reflux disease)    • Hay fever 4/19/2019   • History of falling 5/2/2018   • HYPOTENSION    • Inner ear disease    • Leg cramps, sleep related 5/2/2018   • Near-syncope    • Neck injury     fracture, hand and leg numbness and tingling   • Osteoporosis    • Peripheral neuropathy    • Polyp of sigmoid colon 04/10/2018    Hyperplastic polyp   • Raynaud disease    • Tremor    • Vitamin D deficiency        Current Outpatient Medications   Medication Sig Dispense Refill   • triamcinolone acetonide (KENALOG) 0.1 % Cream APPLY TOPICALLY TO THE AFFECTED AREA EVERY DAY FOR 2 TO 3 WEEKS AS NEEDED     • montelukast (SINGULAIR) 10 MG Tab TAKE 1 TABLET BY MOUTH EVERY DAY 90 tablet 3   • Fluticasone Furoate-Vilanterol (BREO ELLIPTA) 100-25 MCG/INH AEROSOL POWDER, BREATH ACTIVATED INHALE 1 PUFF BY MOUTH EVERY DAY 60 Each 3   • estrogens, conjugated (PREMARIN) 0.625 MG/GM Cream Apply a dab around urethral opening daily as needed; 30 gram = 30 day supply 30 g 5   • azelastine (ASTELIN) 137 MCG/SPRAY nasal spray Administer 2 Sprays into affected nostril(S) 2 times a day as needed. 30 mL 11   • tizanidine (ZANAFLEX) 2 MG tablet Take 1 Tab by mouth 3 times a day as needed (muscle spasm or pain). 20 Tab 0   • Lidocaine 5 % Cream Apply 1 Application  "topically 3 times a day as needed. 113 g 2   • NON SPECIFIED Take 2 Tabs by mouth every morning. Vitamin A-D-K Tablet     • sulfaSALAzine (AZULFIDINE EN-TAB) 500 MG EC tablet Take 1,000 mg by mouth 2 times a day with meals.     • cetirizine (ZYRTEC) 10 MG Tab Take 10 mg by mouth every day.     • albuterol (PROAIR HFA) 108 (90 Base) MCG/ACT Aero Soln inhalation aerosol Inhale 2 Puffs by mouth every 6 hours as needed for Shortness of Breath.     • fluticasone (FLONASE) 50 MCG/ACT nasal spray Administer 1 Spray into affected nostril(S) every day. (Patient not taking: Reported on 8/11/2021) 16 g 11   • NON SPECIFIED Take 2 Tabs by mouth 2 Times a Day. AlgaeCal Vitamin (Patient not taking: Reported on 8/11/2021)     • NON SPECIFIED Take 2 Tabs by mouth every morning. Nerve Shield Vitamin (Patient not taking: Reported on 8/11/2021)     • BIOTIN PO Take 1 Tab by mouth every day. (Patient not taking: Reported on 8/11/2021)     • Potassium (POTASSIMIN PO) Take 2 Drops by mouth 2 Times a Day. (Patient not taking: Reported on 8/11/2021)     • CALCIUM PO Take 2 Tabs by mouth 2 Times a Day. (Patient not taking: Reported on 8/11/2021)       No current facility-administered medications for this visit.         Allergies   Allergen Reactions   • Hydromorphone Anaphylaxis     \"went into code blue\"    • Azithromycin Diarrhea and Unspecified     Diarrhea     • Clindamycin    • Codeine Unspecified     ?   • Erythromycin Unspecified     ?   • Keflex      ?   • Levaquin Unspecified     \"i dont know\"    • Lyrica Unspecified     ?   • Mesalamine Hives   • Quinolones Unspecified     ?   • Sulfa Drugs Unspecified     \"i dont know\"    • Tetracyclines Unspecified     ?   • Other Food Unspecified     Dairy-mucus (butter is okay). spices-cough         Social History     Tobacco Use   • Smoking status: Never Smoker   • Smokeless tobacco: Never Used   Vaping Use   • Vaping Use: Never used   Substance Use Topics   • Alcohol use: No     Alcohol/week: " "0.0 oz   • Drug use: No       Past Surgical History:   Procedure Laterality Date   • HIP NAILING INTRAMEDULLARY  7/30/2011    Performed by KALEB RAGSDALE at SURGERY [facility] ORS   • NASAL FRACTURE REDUCTION CLOSED  10/8/08    Performed by DON BARRIOS at SURGERY SAME DAY [facility] ORS   • CHOLECYSTECTOMY  2008   • NISSEN FUNDOPLICATION LAPAROSCOPIC  2005   • BLADDER SUSPENSION      X 2   • TONSILLECTOMY AND ADENOIDECTOMY          Review of Systems   Respiratory: Negative for wheezing.        Ambulatory Vitals  /64 (BP Location: Left arm, Patient Position: Sitting, BP Cuff Size: Adult)   Pulse 87   Temp 37 °C (98.6 °F) (Temporal)   Resp 14   Ht 1.6 m (5' 3\")   Wt 49 kg (108 lb)   SpO2 90%   BMI 19.13 kg/m²     Physical Exam  Constitutional:       Appearance: Normal appearance.   Cardiovascular:      Rate and Rhythm: Normal rate and regular rhythm.      Heart sounds: No murmur heard.   No friction rub. No gallop.    Pulmonary:      Effort: Pulmonary effort is normal.      Breath sounds: Normal breath sounds. No wheezing or rales.   Abdominal:      Palpations: Abdomen is soft. There is no mass.      Tenderness: There is no abdominal tenderness. There is no guarding.   Musculoskeletal:      Right lower leg: No edema.      Left lower leg: No edema.         Component      Latest Ref Rng & Units 8/3/2021   WBC      4.8 - 10.8 K/uL 6.5   RBC      4.20 - 5.40 M/uL 4.38   Hemoglobin      12.0 - 16.0 g/dL 14.4   Hematocrit      37.0 - 47.0 % 45.0   MCV      81.4 - 97.8 fL 102.7 (H)   MCH      27.0 - 33.0 pg 32.9   MCHC      33.6 - 35.0 g/dL 32.0 (L)   RDW      35.9 - 50.0 fL 46.5   Platelet Count      164 - 446 K/uL 274   MPV      9.0 - 12.9 fL 9.3   Neutrophils-Polys      44.00 - 72.00 % 61.90   Lymphocytes      22.00 - 41.00 % 28.40   Monocytes      0.00 - 13.40 % 7.40   Eosinophils      0.00 - 6.90 % 1.20   Basophils      0.00 - 1.80 % 0.60   Immature Granulocytes      0.00 - 0.90 % 0.50 "   Nucleated RBC      /100 WBC 0.00   Neutrophils (Absolute)      2.00 - 7.15 K/uL 4.01   Lymphs (Absolute)      1.00 - 4.80 K/uL 1.84   Monos (Absolute)      0.00 - 0.85 K/uL 0.48   Eos (Absolute)      0.00 - 0.51 K/uL 0.08   Baso (Absolute)      0.00 - 0.12 K/uL 0.04   Immature Granulocytes (abs)      0.00 - 0.11 K/uL 0.03   NRBC (Absolute)      K/uL 0.00   Sodium      135 - 145 mmol/L 134 (L)   Potassium      3.6 - 5.5 mmol/L 3.9   Chloride      96 - 112 mmol/L 97   Co2      20 - 33 mmol/L 26   Anion Gap      7.0 - 16.0 11.0   Glucose      65 - 99 mg/dL 84   Bun      8 - 22 mg/dL 12   Creatinine      0.50 - 1.40 mg/dL 0.47 (L)   Calcium      8.5 - 10.5 mg/dL 9.5   AST(SGOT)      12 - 45 U/L 20   ALT(SGPT)      2 - 50 U/L 21   Alkaline Phosphatase      30 - 99 U/L 135 (H)   Total Bilirubin      0.1 - 1.5 mg/dL 0.8   Albumin      3.2 - 4.9 g/dL 4.2   Total Protein      6.0 - 8.2 g/dL 7.4   Globulin      1.9 - 3.5 g/dL 3.2   A-G Ratio      g/dL 1.3   Color       Yellow   Character       Clear   Specific Gravity      <1.035 1.012   Ph      5.0 - 8.0 7.0   Glucose      Negative mg/dL Negative   Ketones      Negative mg/dL Negative   Protein      Negative mg/dL Negative   Bilirubin      Negative Negative   Urobilinogen, Urine      Negative 0.2   Nitrite      Negative Negative   Leukocyte Esterase      Negative Negative   Occult Blood      Negative Negative   Micro Urine Req       see below   POC Color      Negative    POC Appearance      Negative    POC Leukocyte Esterase      Negative    POC Nitrites      Negative    POC Urobiligen      Negative (0.2) mg/dL    POC Protein      Negative mg/dL    POC Urine PH      5.0 - 8.0    POC Blood      Negative    POC Specific Gravity      <1.005 - >1.030    POC Ketones      Negative mg/dL    POC Bilirubin      Negative mg/dL    POC Glucose      Negative mg/dL    WBC      /hpf    RBC      /hpf    Bacteria      None /hpf    Epithelial Cells      /hpf    Hyaline Cast      /lpf     Cholesterol,Tot      100 - 199 mg/dL 226 (H)   Triglycerides      0 - 149 mg/dL 80   HDL      >=40 mg/dL 64   LDL      <100 mg/dL 146 (H)   Significant Indicator          Source          Site          Culture Result          GFR If African American      >60 mL/min/1.73 m 2 >60   GFR If Non African American      >60 mL/min/1.73 m 2 >60   Gamma Gt      7 - 34 U/L    Microsomal -Tpo- Abs      0.0 - 9.0 IU/mL    T3      60.0 - 181.0 ng/dL    TSH      0.380 - 5.330 uIU/mL 3.610   Calprotectin, Fecal      <=49 ug/g    Vitamin B12 -True Cobalamin      211 - 911 pg/mL 708   25-Hydroxy   Vitamin D 25      30 - 100 ng/mL 57     Assessment and Plan:     1. Moderate persistent asthma without complication  CBC WITH DIFFERENTIAL   2. Osteoporosis, unspecified osteoporosis type, unspecified pathological fracture presence  CBC WITH DIFFERENTIAL    Comp Metabolic Panel    DS-BONE DENSITY STUDY (DEXA)   3. Hyponatremia  Comp Metabolic Panel    very mild   4. Elevated alkaline phosphatase level  Comp Metabolic Panel    GAMMA GT (GGT)     Will add spiriva respimat to use for her cough and asthma. She will try this with a sample given before she sees her pulmonolgist. Advised she try mucinex bid for the thicker phlegm. If she receives benefit from inhaled anticholinergic she will call to change her breo to trelegy inhaler. She is willing to update a DEXA but at this time does not want treatment for her osteoporosis. Check CBC, CMP, GGT in 6 months.    Dar Fraire M.D.

## 2021-08-14 ENCOUNTER — APPOINTMENT (OUTPATIENT)
Dept: RADIOLOGY | Facility: MEDICAL CENTER | Age: 76
End: 2021-08-14
Attending: EMERGENCY MEDICINE
Payer: MEDICARE

## 2021-08-14 ENCOUNTER — HOSPITAL ENCOUNTER (EMERGENCY)
Facility: MEDICAL CENTER | Age: 76
End: 2021-08-14
Attending: EMERGENCY MEDICINE
Payer: MEDICARE

## 2021-08-14 VITALS
BODY MASS INDEX: 19.14 KG/M2 | OXYGEN SATURATION: 95 % | DIASTOLIC BLOOD PRESSURE: 91 MMHG | SYSTOLIC BLOOD PRESSURE: 141 MMHG | TEMPERATURE: 97.3 F | HEIGHT: 63 IN | WEIGHT: 108.03 LBS | RESPIRATION RATE: 16 BRPM | HEART RATE: 70 BPM

## 2021-08-14 DIAGNOSIS — M79.605 LEFT LEG PAIN: ICD-10-CM

## 2021-08-14 DIAGNOSIS — I73.00 RAYNAUD'S DISEASE WITHOUT GANGRENE: ICD-10-CM

## 2021-08-14 PROCEDURE — 93971 EXTREMITY STUDY: CPT | Mod: LT

## 2021-08-14 PROCEDURE — 93922 UPR/L XTREMITY ART 2 LEVELS: CPT | Mod: 26 | Performed by: INTERNAL MEDICINE

## 2021-08-14 PROCEDURE — 93922 UPR/L XTREMITY ART 2 LEVELS: CPT

## 2021-08-14 PROCEDURE — 73650 X-RAY EXAM OF HEEL: CPT | Mod: LT

## 2021-08-14 PROCEDURE — 99284 EMERGENCY DEPT VISIT MOD MDM: CPT

## 2021-08-14 ASSESSMENT — PAIN DESCRIPTION - DESCRIPTORS: DESCRIPTORS: ACHING

## 2021-08-14 ASSESSMENT — FIBROSIS 4 INDEX: FIB4 SCORE: 1.18

## 2021-08-14 NOTE — ED TRIAGE NOTES
"Presents complaining of left foot, and calf pain recurring for the past 2 days.  She denies any obvious trauma.  Chief Complaint   Patient presents with   • Foot Pain   • Leg Pain     /85   Pulse 80   Temp 36.3 °C (97.3 °F) (Temporal)   Resp 20   Ht 1.6 m (5' 3\")   Wt 49 kg (108 lb 0.4 oz)   SpO2 92%   BMI 19.14 kg/m²           "

## 2021-08-14 NOTE — DISCHARGE INSTRUCTIONS
Use Tylenol and ibuprofen if needed for pain, follow-up with your primary care doctor during the week for recheck

## 2021-08-14 NOTE — ED PROVIDER NOTES
ED Provider Note    Scribed for Min Bunch M.D. by Pretty Dodge. 8/14/2021, 11:25 AM.    Primary care provider: Dar Fraire M.D.  Means of arrival: Walk in  History obtained from: Patient  History limited by: None    CHIEF COMPLAINT  Chief Complaint   Patient presents with   • Foot Pain   • Leg Pain       HPI  Ana Paula Ordonez is a 76 y.o. female, with a history of Raynaud disease, who presents to the Emergency Department accompanied by family for ongoing left foot pain with an onset of 2 days ago. Her pain is localized to the bottom of her left foot. She denies any proceeding injuries, falls, or trauma. She called her PCP today, who instructed her to come to the ED due to her calf pain when squeezing her calf. Exacerbating factors include walking. No alleviating factors were identified. She reports associated discoloration of left toes, and left calf pain. She denies any associated left foot numbness, tingling, weakness, fever, or chills.     REVIEW OF SYSTEMS  Pertinent positives include left foot pain, discoloration of left toes, and left calf pain.   Pertinent negatives include no left foot numbness, tingling, weakness, fever, or chills.    See HPI for further details.     PAST MEDICAL HISTORY   has a past medical history of Arthritis, ASTHMA, Cerebellar ataxia ([facility]) (3/30/2018), Chronic ulcerative colitis ([facility]) (6/26/2013), Compression fracture of L2 ([facility]) (11/3/2019), Cough, GERD (gastroesophageal reflux disease), Hay fever (4/19/2019), History of falling (5/2/2018), HYPOTENSION, Inner ear disease, Leg cramps, sleep related (5/2/2018), Near-syncope, Neck injury, Osteoporosis, Peripheral neuropathy, Polyp of sigmoid colon (04/10/2018), Raynaud disease, Tremor, and Vitamin D deficiency.    SURGICAL HISTORY   has a past surgical history that includes nasal fracture reduction closed (10/8/08); hip nailing intramedullary (7/30/2011); tonsillectomy and adenoidectomy; bladder suspension; nissen  "fundoplication laparoscopic (2005); and cholecystectomy (2008).    SOCIAL HISTORY  Social History     Tobacco Use   • Smoking status: Never Smoker   • Smokeless tobacco: Never Used   Vaping Use   • Vaping Use: Never used   Substance Use Topics   • Alcohol use: No     Alcohol/week: 0.0 oz   • Drug use: No      Social History     Substance and Sexual Activity   Drug Use No       FAMILY HISTORY  Family History   Problem Relation Age of Onset   • Non-contributory Father         Parkinson's disease   • Non-contributory Mother         old age with mild dementia   • Heart Disease Neg Hx    • Hypertension Neg Hx    • Hyperlipidemia Neg Hx    • Diabetes Neg Hx        CURRENT MEDICATIONS  Home Medications    **Home medications have not yet been reviewed for this encounter**         ALLERGIES  Allergies   Allergen Reactions   • Hydromorphone Anaphylaxis     \"went into code blue\"    • Azithromycin Diarrhea and Unspecified     Diarrhea     • Clindamycin    • Codeine Unspecified     ?   • Erythromycin Unspecified     ?   • Keflex      ?   • Levaquin Unspecified     \"i dont know\"    • Lyrica Unspecified     ?   • Mesalamine Hives   • Quinolones Unspecified     ?   • Sulfa Drugs Unspecified     \"i dont know\"    • Tetracyclines Unspecified     ?   • Other Food Unspecified     Dairy-mucus (butter is okay). spices-cough       PHYSICAL EXAM  VITAL SIGNS: /85   Pulse 80   Temp 36.3 °C (97.3 °F) (Temporal)   Resp 20   Ht 1.6 m (5' 3\")   Wt 49 kg (108 lb 0.4 oz)   SpO2 92%   BMI 19.14 kg/m²     Nursing note and vitals reviewed.  Constitutional: No distress.   HENT: Head is atraumatic. Oropharynx is moist.   Eyes: Conjunctivae are normal. Pupils are equal, round, and reactive to light.   Cardiovascular: Normal peripheral perfusion  Respiratory: No respiratory distress.   Musculoskeletal: Left toes are cyanotic, thready dorsalis pedis pulse, tenderness to left calf but no swelling. Normal range of motion.   Neurological: Alert. " No focal deficits noted.    Skin: No rash.   Psych: Appropriate for clinical situation     DIAGNOSTIC STUDIES / PROCEDURES    RADIOLOGY  US-EXTREMITY VENOUS LOWER UNILAT LEFT   Final Result      No evidence of left  lower extremity deep venous thrombosis.      US-ROMAINE SINGLE LEVEL BILAT    (Results Pending)     Spoke with the vascular tech.  No evidence of arterial insufficiency.    The radiologist's interpretation of all radiological studies have been reviewed by me.    COURSE & MEDICAL DECISION MAKING  Nursing notes, VS, PMSFHx reviewed in chart.    11:25 AM - Patient seen and examined at bedside. Discussed plan of care with the patient. I informed them that we will evaluate for DVT and arterial insufficiency with ultrasound studies. She is understanding and agreeable. Ordered for US Left lower extremity, and US extremity left artery to evaluate her symptoms.     Patient was reevaluated at bedside. Discussed radiology results with the patient and informed them that there is no evidence of arterial insufficiency or deep venous thrombosis.      2:08 PM went to discussed these findings with the patient.  She any now is complaining of pain around the left calcaneus.  She says that sometimes she runs into her walker.  She is requesting an x-ray of her heel bone.  On exam she has mild diffuse tenderness over the calcaneus.  No tenderness over the forefoot or metatarsals.  X-ray of the os calcis will be obtained.    The patient was discharged home with an information sheet on skill skeletal pain and Raynaud's phenomenon and told to return immediately for any signs or symptoms listed.  The patient agreed to the discharge precautions and follow-up plan which is documented in EPIC.    DISPOSITION:  Patient will be discharged home in stable condition.    FOLLOW UP:  Dar Fraire M.D.  [address] [facility]  [place] [id number] [phone]    Schedule an appointment as soon as possible for a visit       [facility]  [facility], Emergency Dept  [address]  [place] [address]  [phone]    If symptoms worsen      OUTPATIENT MEDICATIONS:  New Prescriptions    No medications on file       FINAL IMPRESSION  1. Left leg pain    2. Raynaud's disease without gangrene          Pretty GUADARRAMA (Scribe), am scribing for, and in the presence of, Min Bunch M.D..    Electronically signed by: Pretty Dodge (Scribe), 8/14/2021    IMin M.D. personally performed the services described in this documentation, as scribed by Pretty Dodge in my presence, and it is both accurate and complete.    E    The note accurately reflects work and decisions made by me.  Min Bunch M.D.  8/14/2021  2:04 PM

## 2021-08-19 DIAGNOSIS — J45.40 MODERATE PERSISTENT ASTHMA WITHOUT COMPLICATION: ICD-10-CM

## 2021-08-19 NOTE — PROGRESS NOTES
Ana Paula states the sample of stialto inhaler given to her last week has been helpful with her asthma and chronic cough. She has been using breo daily for her asthma. Will change her Rx to trelegy.

## 2021-08-23 DIAGNOSIS — J45.20 MILD INTERMITTENT ASTHMA WITHOUT COMPLICATION: ICD-10-CM

## 2021-09-10 ENCOUNTER — NON-PROVIDER VISIT (OUTPATIENT)
Dept: SLEEP MEDICINE | Facility: MEDICAL CENTER | Age: 76
End: 2021-09-10
Attending: NURSE PRACTITIONER
Payer: MEDICARE

## 2021-09-10 ENCOUNTER — SLEEP CENTER VISIT (OUTPATIENT)
Dept: SLEEP MEDICINE | Facility: MEDICAL CENTER | Age: 76
End: 2021-09-10
Payer: MEDICARE

## 2021-09-10 VITALS
HEART RATE: 78 BPM | RESPIRATION RATE: 16 BRPM | OXYGEN SATURATION: 92 % | TEMPERATURE: 97.6 F | SYSTOLIC BLOOD PRESSURE: 114 MMHG | BODY MASS INDEX: 18.61 KG/M2 | HEIGHT: 63 IN | WEIGHT: 105 LBS | DIASTOLIC BLOOD PRESSURE: 66 MMHG

## 2021-09-10 VITALS — BODY MASS INDEX: 18.61 KG/M2 | WEIGHT: 105 LBS | HEIGHT: 63 IN

## 2021-09-10 DIAGNOSIS — J45.40 MODERATE PERSISTENT ASTHMA WITHOUT COMPLICATION: ICD-10-CM

## 2021-09-10 DIAGNOSIS — R05.9 COUGH: ICD-10-CM

## 2021-09-10 DIAGNOSIS — G11.9 CEREBELLAR ATAXIA (HCC): ICD-10-CM

## 2021-09-10 DIAGNOSIS — R06.00 DYSPNEA, UNSPECIFIED TYPE: ICD-10-CM

## 2021-09-10 PROCEDURE — 94729 DIFFUSING CAPACITY: CPT | Performed by: INTERNAL MEDICINE

## 2021-09-10 PROCEDURE — 99214 OFFICE O/P EST MOD 30 MIN: CPT | Mod: 25 | Performed by: INTERNAL MEDICINE

## 2021-09-10 PROCEDURE — 94010 BREATHING CAPACITY TEST: CPT | Performed by: INTERNAL MEDICINE

## 2021-09-10 ASSESSMENT — PULMONARY FUNCTION TESTS
FEV1/FVC_PERCENT_PREDICTED: 92
FEV1/FVC_PERCENT_LLN: 65
FEV1: 1.81
FVC: 2.53
FVC_LLN: 2.19
FEV1_PREDICTED: 2.03
FEV1/FVC_PREDICTED: 78
FVC_PREDICTED: 2.63
FEV1/FVC: 72
FEV1/FVC: 72
FEV1/FVC_PERCENT_PREDICTED: 77
FVC_PERCENT_PREDICTED: 96
FEV1/FVC_PERCENT_PREDICTED: 93
FEV1_PERCENT_PREDICTED: 89
FEV1_LLN: 1.69

## 2021-09-10 ASSESSMENT — FIBROSIS 4 INDEX
FIB4 SCORE: 1.18
FIB4 SCORE: 1.18

## 2021-09-10 ASSESSMENT — PAIN SCALES - GENERAL: PAINLEVEL: NO PAIN

## 2021-09-10 NOTE — PROCEDURES
Technician: Kasie Tapia [facility]  Tech notes:  Good pt effort and cooperation. ATS standards met for acceptability; best efforts reported. Pt has very sensitive cough/gag reflex and could not tolerate tidal breathing for more than a couple breaths.    1. Baseline spirometry demonstrates a normal FEV1 and FVC. FEV1/FVC ratio is 72%.  FEV1 is 1.81 L which is 89% of predicted.    2.  An inhaled bronchodilator was not administered.    3.  Total lung capacity was not measured    4. Gas exchange as estimated by DLCO is 89% of predicted    Impression:    No definite evidence of significant obstructive or restrictive pulmonary disease is noted on the study.  The study is compatible with the presence of asthma in good control

## 2021-09-11 NOTE — PROGRESS NOTES
Chief Complaint   Patient presents with   • Follow-Up     Last seen 3/4/21   • Asthma     Labs 8/3/21   • Other     Review PFT       HPI:  Ana Paula Ordonez is a 76 y.o. female presenting for evaluation and management of:cough and asthma.  She uses Breo, pro-air on an as-needed basis and has an antihistamine nasal spray.    The patient fell several years ago and suffered a cerebellar injury.  She has cerebellar ataxia.  She does not like to use inhaled medications.  Other problems include ulcerative colitis, reflux, and tremors.     Pulmonary function testing reveals an FEV1 of 1.74 L which is 82% of predicted.  FEV1/FVC ratio is 66%.  She has a 9% improvement after bronchodilator.  Repeat pulmonary function testing demonstrates an FEV1 of 1.81 L which is 89% of predicted.  The FEV1 FVC ratio is 72%.  DLCO is 89% of predicted.       She says Breo helps a little but that her cough persists.  Today she tells me that she has no history of reflux.  She tells me that she has seen ENT in the past.  I do not have any documentation in her chart.  Although she has seen OT and PT for neurologic problems I do not think that she seen speech therapy.  She denies choking on food or aspiration.  She denies heartburn.  She does have hayfever and has used an antihistamine nasal spray in the past.  She has multiple medication allergies as listed.    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    Due to fall 2004   • Chronic ulcerative colitis (HCC) 6/26/2013   • Compression fracture of L2 (HCC) 11/3/2019    Nov. 2019 from [facility]   • Cough    • GERD (gastroesophageal reflux disease)    • Hay fever 4/19/2019   • History of falling 5/2/2018   • HYPOTENSION    • Inner ear disease    • Leg cramps, sleep related 5/2/2018   • Near-syncope    • Neck injury     fracture, hand and leg numbness and tingling   • Osteoporosis    • Peripheral neuropathy    • Polyp of sigmoid colon 04/10/2018     Hyperplastic polyp   • Raynaud disease    • Tremor    • Vitamin D deficiency        ROS:   Constitutional: Denies fevers, chills, night sweats, fatigue or weight loss  Eyes: Denies vision loss, pain, drainage, double vision  Ears, Nose, Throat: Denies earache, tinnitus, hoarseness  Cardiovascular: Denies chest pain, tightness, palpitations  Respiratory: See HPI  Sleep: Denies, snoring, apnea  GI: Denies abdominal pain, nausea, vomiting, diarrhea  : Denies frequent urination, hematuria, painful urination  Musculoskeletal: Denies back pain, painful joints, sore muscles  Neurological: Denies headaches, seizures.  Cerebellar ataxia.  Skin: Denies rashes, color changes  Psychiatric: Denies depression or thoughts of suicide  Hematologic: Denies bleeding tendency or clotting tendency  Allergic/Immunologic: Denies rhinitis, skin sensitivity    Social History     Socioeconomic History   • Marital status: Single     Spouse name: Not on file   • Number of children: Not on file   • Years of education: Not on file   • Highest education level: Not on file   Occupational History   • Not on file   Tobacco Use   • Smoking status: Never Smoker   • Smokeless tobacco: Never Used   Vaping Use   • Vaping Use: Never used   Substance and Sexual Activity   • Alcohol use: No     Alcohol/week: 0.0 oz   • Drug use: No   • Sexual activity: Not on file   Other Topics Concern   • Not on file   Social History Narrative   • Not on file     Social Determinants of Health     Financial Resource Strain: Low Risk    • Difficulty of Paying Living Expenses: Not hard at all   Food Insecurity: No Food Insecurity   • Worried About Running Out of Food in the Last Year: Never true   • Ran Out of Food in the Last Year: Never true   Transportation Needs: No Transportation Needs   • Lack of Transportation (Medical): No   • Lack of Transportation (Non-Medical): No   Physical Activity:    • Days of Exercise per Week:    • Minutes of Exercise per Session:     Stress:    • Feeling of Stress :    Social Connections:    • Frequency of Communication with Friends and Family:    • Frequency of Social Gatherings with Friends and Family:    • Attends [employer] Services:    • Active Member of Clubs or Organizations:    • Attends Club or Organization Meetings:    • Marital Status:    Intimate Partner Violence:    • Fear of Current or Ex-Partner:    • Emotionally Abused:    • Physically Abused:    • Sexually Abused:      Hydromorphone, Azithromycin, Clindamycin, Codeine, Erythromycin, Keflex, Levaquin, Lyrica, Mesalamine, Quinolones, Sulfa drugs, Tetracyclines, and Other food  Current Outpatient Medications on File Prior to Visit   Medication Sig Dispense Refill   • Tiotropium Bromide Monohydrate 1.25 MCG/ACT Aero Soln Inhale 2 Inhalation every day. 4 g 3   • Fluticasone Furoate-Vilanterol (BREO ELLIPTA) 100-25 MCG/INH AEROSOL POWDER, BREATH ACTIVATED INHALE 1 PUFF BY MOUTH EVERY DAY 60 Each 3   • montelukast (SINGULAIR) 10 MG Tab TAKE 1 TABLET BY MOUTH EVERY DAY 90 tablet 3   • estrogens, conjugated (PREMARIN) 0.625 MG/GM Cream Apply a dab around urethral opening daily as needed; 30 gram = 30 day supply 30 g 5   • azelastine (ASTELIN) 137 MCG/SPRAY nasal spray Administer 2 Sprays into affected nostril(S) 2 times a day as needed. 30 mL 11   • sulfaSALAzine (AZULFIDINE EN-TAB) 500 MG EC tablet Take 1,000 mg by mouth 2 times a day with meals.     • cetirizine (ZYRTEC) 10 MG Tab Take 10 mg by mouth every day.     • albuterol (PROAIR HFA) 108 (90 Base) MCG/ACT Aero Soln inhalation aerosol Inhale 2 Puffs by mouth every 6 hours as needed for Shortness of Breath.     • Fluticasone-Umeclidin-Vilant (TRELEGY ELLIPTA) 100-62.5-25 MCG/INH AEROSOL POWDER, BREATH ACTIVATED Inhale 1 Inhalation every day. After inhalation rinse mouth with water without swallowing. Use inhaler only once per day. (Patient not taking: Reported on 9/10/2021) 28 Each 0   • triamcinolone acetonide (KENALOG) 0.1 %  "Cream APPLY TOPICALLY TO THE AFFECTED AREA EVERY DAY FOR 2 TO 3 WEEKS AS NEEDED     • fluticasone (FLONASE) 50 MCG/ACT nasal spray Administer 1 Spray into affected nostril(S) every day. (Patient not taking: Reported on 8/11/2021) 16 g 11   • tizanidine (ZANAFLEX) 2 MG tablet Take 1 Tab by mouth 3 times a day as needed (muscle spasm or pain). 20 Tab 0   • Lidocaine 5 % Cream Apply 1 Application topically 3 times a day as needed. 113 g 2   • NON SPECIFIED Take 2 Tabs by mouth 2 Times a Day. AlgaeCal Vitamin (Patient not taking: Reported on 8/11/2021)     • NON SPECIFIED Take 2 Tabs by mouth every morning. Nerve Shield Vitamin (Patient not taking: Reported on 8/11/2021)     • NON SPECIFIED Take 2 Tabs by mouth every morning. Vitamin A-D-K Tablet     • BIOTIN PO Take 1 Tab by mouth every day. (Patient not taking: Reported on 8/11/2021)     • Potassium (POTASSIMIN PO) Take 2 Drops by mouth 2 Times a Day. (Patient not taking: Reported on 8/11/2021)     • CALCIUM PO Take 2 Tabs by mouth 2 Times a Day. (Patient not taking: Reported on 8/11/2021)       No current facility-administered medications on file prior to visit.     /66 (BP Location: Right arm, Patient Position: Sitting, BP Cuff Size: Adult)   Pulse 78   Temp 36.4 °C (97.6 °F) (Temporal)   Resp 16   Ht 1.6 m (5' 3\")   Wt 47.6 kg (105 lb)   SpO2 92%   Family History   Problem Relation Age of Onset   • Non-contributory Father         Parkinson's disease   • Non-contributory Mother         old age with mild dementia   • Heart Disease Neg Hx    • Hypertension Neg Hx    • Hyperlipidemia Neg Hx    • Diabetes Neg Hx        Physical Exam:  No distress at rest.  Uses a walker  HEENT: PERRLA, EOMI, no scleral icterus, no nasal or oral lesions  Neck: No thyromegaly, no adenopathy, no bruits  Mallampatti: Grade II  Lungs: Equal breath sounds, no wheezes or crackles  Heart: Regular rate and rhythm, no gallops or murmurs  Abdomen: Soft, benign, no " organomegaly  Extremities: No clubbing, cyanosis, or edema  Neurologic: Unsteady gait    1. Cough    2. Moderate persistent asthma without complication    3. Dyspnea, unspecified type    4. Cerebellar ataxia (HCC)        I suspect she may have some subtle chronic aspiration due to her cerebellar issues.  I have asked her to consult with speech therapy for swallow evaluation.  She will continue Breo Ellipta and albuterol.  We will see her in follow-up in 3 months.

## 2021-09-13 ENCOUNTER — HOSPITAL ENCOUNTER (OUTPATIENT)
Dept: RADIOLOGY | Facility: MEDICAL CENTER | Age: 76
End: 2021-09-13
Attending: INTERNAL MEDICINE
Payer: MEDICARE

## 2021-09-13 DIAGNOSIS — M81.0 OSTEOPOROSIS, UNSPECIFIED OSTEOPOROSIS TYPE, UNSPECIFIED PATHOLOGICAL FRACTURE PRESENCE: ICD-10-CM

## 2021-09-13 PROCEDURE — 77080 DXA BONE DENSITY AXIAL: CPT

## 2021-09-22 ENCOUNTER — TELEPHONE (OUTPATIENT)
Dept: INTERNAL MEDICINE | Facility: IMAGING CENTER | Age: 76
End: 2021-09-22

## 2021-09-22 DIAGNOSIS — J45.40 MODERATE PERSISTENT ASTHMA WITHOUT COMPLICATION: ICD-10-CM

## 2021-09-23 ENCOUNTER — NON-PROVIDER VISIT (OUTPATIENT)
Dept: INTERNAL MEDICINE | Facility: IMAGING CENTER | Age: 76
End: 2021-09-23
Payer: MEDICARE

## 2021-09-23 NOTE — NON-PROVIDER
V-shaped (3 mm/side) knife laceration to left thumb 1 week ago.  Patient is concerned that wound edges are not approximating.  Tincture of benzoin & steri strips applied to laceration bringing edges together nicely.  Patient instructed to leave steri strips in place until they fall off naturally.  Wound is clean and dry.  No swelling, redness, or drainage.

## 2021-11-17 ENCOUNTER — OFFICE VISIT (OUTPATIENT)
Dept: INTERNAL MEDICINE | Facility: IMAGING CENTER | Age: 76
End: 2021-11-17
Payer: MEDICARE

## 2021-11-17 VITALS
HEART RATE: 77 BPM | OXYGEN SATURATION: 91 % | BODY MASS INDEX: 19.67 KG/M2 | HEIGHT: 63 IN | TEMPERATURE: 98.5 F | DIASTOLIC BLOOD PRESSURE: 60 MMHG | WEIGHT: 111 LBS | SYSTOLIC BLOOD PRESSURE: 104 MMHG | RESPIRATION RATE: 14 BRPM

## 2021-11-17 DIAGNOSIS — Z78.0 MENOPAUSE: ICD-10-CM

## 2021-11-17 DIAGNOSIS — J06.9 UPPER RESPIRATORY TRACT INFECTION, UNSPECIFIED TYPE: ICD-10-CM

## 2021-11-17 DIAGNOSIS — R05.9 COUGH: ICD-10-CM

## 2021-11-17 DIAGNOSIS — H61.23 CERUMEN DEBRIS ON TYMPANIC MEMBRANE OF BOTH EARS: ICD-10-CM

## 2021-11-17 PROCEDURE — 99214 OFFICE O/P EST MOD 30 MIN: CPT | Performed by: INTERNAL MEDICINE

## 2021-11-17 RX ORDER — AZITHROMYCIN 250 MG/1
TABLET, FILM COATED ORAL
Qty: 6 TABLET | Refills: 0 | Status: SHIPPED | OUTPATIENT
Start: 2021-11-17 | End: 2022-01-14

## 2021-11-17 ASSESSMENT — ACTIVITIES OF DAILY LIVING (ADL): BATHING_REQUIRES_ASSISTANCE: 0

## 2021-11-17 ASSESSMENT — PATIENT HEALTH QUESTIONNAIRE - PHQ9: CLINICAL INTERPRETATION OF PHQ2 SCORE: 0

## 2021-11-17 ASSESSMENT — ENCOUNTER SYMPTOMS: GENERAL WELL-BEING: GOOD

## 2021-11-17 ASSESSMENT — FIBROSIS 4 INDEX: FIB4 SCORE: 1.18

## 2021-11-17 NOTE — PROGRESS NOTES
"Established Patient Note   HPI:        Ana Paula comes in today for annual medicare wellness; she admits she has had multiple falls over past year. She complains of cold symptoms over past 3 weeks. She denies fever at any time but her daughter feels she has noticed \"rattling cough\". She states phlegm is clear in color. Ana Paula states she needs refill of her estrogen.    Past Medical History:   Diagnosis Date   • Arthritis    • ASTHMA     Moderate persistent   • Cerebellar ataxia (HCC) 3/30/2018    Due to fall 2004   • Chronic ulcerative colitis ([facility]) 6/26/2013   • Compression fracture of L2 ([facility]) 11/3/2019    Nov. 2019 from [facility]   • Cough    • GERD (gastroesophageal reflux disease)    • Hay fever 4/19/2019   • History of falling 5/2/2018   • HYPOTENSION    • Inner ear disease    • Leg cramps, sleep related 5/2/2018   • Near-syncope    • Neck injury     fracture, hand and leg numbness and tingling   • Osteoporosis     DEXA Sept. 2021: T score lumbar -3.3 and right hip -4.1   • Peripheral neuropathy    • Polyp of sigmoid colon 04/10/2018    Hyperplastic polyp   • Raynaud disease    • Tremor    • Vitamin D deficiency        Current Outpatient Medications   Medication Sig Dispense Refill   • azithromycin (ZITHROMAX) 250 MG Tab Take 2 tablets on first day orally then take one tablet orally daily for next 4 days 6 Tablet 0   • estrogens, conjugated (PREMARIN) 0.625 MG/GM Cream Apply a dab around urethral opening daily as needed; 30 gram = 30 day supply 30 g 5   • Fluticasone-Umeclidin-Vilant (TRELEGY ELLIPTA) 100-62.5-25 MCG/INH AEROSOL POWDER, BREATH ACTIVATED Inhale 1 Inhalation every day. After inhalation rinse mouth with water without swallowing. Use inhaler only once per day. 28 Each 5   • triamcinolone acetonide (KENALOG) 0.1 % Cream APPLY TOPICALLY TO THE AFFECTED AREA EVERY DAY FOR 2 TO 3 WEEKS AS NEEDED     • montelukast (SINGULAIR) 10 MG Tab TAKE 1 TABLET BY MOUTH EVERY DAY 90 tablet 3   • tizanidine (ZANAFLEX) 2 MG " "tablet Take 1 Tab by mouth 3 times a day as needed (muscle spasm or pain). 20 Tab 0   • Lidocaine 5 % Cream Apply 1 Application topically 3 times a day as needed. 113 g 2   • NON SPECIFIED Take 2 Tablets by mouth every morning. Nerve Shield Vitamin      • NON SPECIFIED Take 2 Tabs by mouth every morning. Vitamin A-D-K Tablet     • sulfaSALAzine (AZULFIDINE EN-TAB) 500 MG EC tablet Take 1,000 mg by mouth 2 times a day with meals.     • cetirizine (ZYRTEC) 10 MG Tab Take 10 mg by mouth every day.     • fluticasone (FLONASE) 50 MCG/ACT nasal spray Administer 1 Spray into affected nostril(S) every day. (Patient not taking: Reported on 8/11/2021) 16 g 11   • azelastine (ASTELIN) 137 MCG/SPRAY nasal spray Administer 2 Sprays into affected nostril(S) 2 times a day as needed. (Patient not taking: Reported on 11/17/2021) 30 mL 11   • NON SPECIFIED Take 2 Tabs by mouth 2 Times a Day. AlgaeCal Vitamin (Patient not taking: Reported on 8/11/2021)     • BIOTIN PO Take 1 Tab by mouth every day. (Patient not taking: Reported on 8/11/2021)     • Potassium (POTASSIMIN PO) Take 2 Drops by mouth 2 Times a Day. (Patient not taking: Reported on 8/11/2021)     • CALCIUM PO Take 2 Tabs by mouth 2 Times a Day. (Patient not taking: Reported on 8/11/2021)     • albuterol (PROAIR HFA) 108 (90 Base) MCG/ACT Aero Soln inhalation aerosol Inhale 2 Puffs by mouth every 6 hours as needed for Shortness of Breath. (Patient not taking: Reported on 11/17/2021)       No current facility-administered medications for this visit.         Allergies   Allergen Reactions   • Hydromorphone Anaphylaxis     \"went into code blue\"    • Azithromycin Diarrhea and Unspecified     Diarrhea     • Clindamycin    • Codeine Unspecified     ?   • Erythromycin Unspecified     ?   • Keflex      ?   • Levaquin Unspecified     \"i dont know\"    • Lyrica Unspecified     ?   • Mesalamine Hives   • Quinolones Unspecified     ?   • Sulfa Drugs Unspecified     \"i dont know\"    • " Tetracyclines Unspecified     ?   • Other Food Unspecified     Dairy-mucus (butter is okay). spices-cough         Social History     Tobacco Use   • Smoking status: Never Smoker   • Smokeless tobacco: Never Used   Vaping Use   • Vaping Use: Never used   Substance Use Topics   • Alcohol use: No     Alcohol/week: 0.0 oz   • Drug use: No       Past Surgical History:   Procedure Laterality Date   • HIP NAILING INTRAMEDULLARY  7/30/2011    Performed by KALEB RAGSDALE at SURGERY [facility] ORS   • NASAL FRACTURE REDUCTION CLOSED  10/8/08    Performed by DON BARRIOS at SURGERY SAME DAY [facility] ORS   • CHOLECYSTECTOMY  2008   • NISSEN FUNDOPLICATION LAPAROSCOPIC  2005   • BLADDER SUSPENSION      X 2   • TONSILLECTOMY AND ADENOIDECTOMY          ROS  Depression Screening    Little interest or pleasure in doing things?  0 - not at all  Feeling down, depressed , or hopeless? 0 - not at all  Patient Health Questionnaire Score: 0     If depressive symptoms identified deferred to follow up visit unless specifically addressed in assessment and plan.    Interpretation of PHQ-9 Total Score   Score Severity   1-4 No Depression   5-9 Mild Depression   10-14 Moderate Depression   15-19 Moderately Severe Depression   20-27 Severe Depression    Screening for Cognitive Impairment    Three Minute Recall (captain, garden, picture) 3/3    Brad clock face with all 12 numbers and set the hands to show 5 past 8.  Yes    Cognitive concerns identified deferred for follow up unless specifically addressed in assessment and plan.    Fall Risk Assessment    Has the patient had two or more falls in the last year or any fall with injury in the last year?  Yes    Safety Assessment    Throw rugs on floor.  No  Handrails on all stairs.  Yes  Good lighting in all hallways.  Yes  Difficulty hearing.  No  Patient counseled about all safety risks that were identified.    Functional Assessment ADLs    Are there any barriers preventing you from  cooking for yourself or meeting nutritional needs?  No.    Are there any barriers preventing you from driving safely or obtaining transportation?  Yes.    Are there any barriers preventing you from using a telephone or calling for help?  No.    Are there any barriers preventing you from shopping?  Yes.    Are there any barriers preventing you from taking care of your own finances?  No.    Are there any barriers preventing you from managing your medications?  No.    Are there any barriers preventing you from showering, bathing or dressing yourself?  No.    Are you currently engaging in any exercise or physical activity?  No.     What is your perception of your health?  Good.      Health Maintenance Summary          Overdue - Annual Wellness Visit (Every 366 Days) Overdue since 3/13/2020    03/13/2019  Visit Dx: Medicare annual wellness visit, subsequent          Overdue - PAP SMEAR (Every 3 Years) Overdue since 12/28/2020 12/28/2017  PAP IG (IMAGE GUIDED)          Postponed - MAMMOGRAM (Yearly) Postponed until 8/11/2032 03/30/2016  [id number]    03/30/2016  [id number]    07/27/2012  MA-SCREENING MAMMOGRAM W/ CAD    04/01/2010  MA-DIAGNOSTIC DIGITAL MAMMO-BILAT    04/01/2010  MA-CAD DIAGNOSTIC-MAMMO    Only the first 5 history entries have been loaded, but more history exists.          Postponed - IMM PNEUMOCOCCAL VACCINE: 65+ Years (1 of 2 - PPSV23) Postponed until 8/11/2032    No completion history exists for this topic.          Postponed - IMM INFLUENZA (1) Postponed until 1/1/2099 12/14/2012  Imm Admin: Influenza Seasonal Injectable - Historical Data          Postponed - IMM ZOSTER VACCINES (2 of 3) Postponed until 1/1/2099 03/01/2013  Imm Admin: Zoster Vaccine Live (ZVL) (Zostavax) - HISTORICAL DATA          COVID-19 Vaccine (3 - Booster for Pfizer series) Next due on 2/10/2022    08/10/2021  Imm Admin: Pfizer SARS-CoV-2 Vaccine    07/20/2021  Imm Admin: Pfizer SARS-CoV-2  "Vaccine          BONE DENSITY (Every 5 Years) Next due on 9/13/2026 09/13/2021  DS-BONE DENSITY STUDY (DEXA)    05/03/2017  DS-BONE DENSITY STUDY (DEXA)    04/01/2010  DS-BONE DENSITY STUDY (DEXA)    03/15/2007  DS-BONE DENSITY STUDY (DEXA)          COLORECTAL CANCER SCREENING (COLONOSCOPY - Every 10 Years) Tentatively due on 6/29/2028 06/29/2018  REFERRAL TO GI FOR COLONOSCOPY    10/20/2015  REFERRAL TO GI FOR COLONOSCOPY    10/20/2015  REFERRAL TO GI FOR COLONOSCOPY    07/10/2013  REFERRAL TO GI FOR COLONOSCOPY          IMM DTaP/Tdap/Td Vaccine (3 - Td or Tdap) Next due on 11/5/2030 11/05/2020  Imm Admin: Tdap Vaccine    04/27/2015  Imm Admin: Tdap Vaccine          HEPATITIS C SCREENING  Completed    05/06/2011  HEPATITIS PANEL ACUTE(4 COMPONENTS)          IMM HEP B VACCINE (Series Information) Aged Out    No completion history exists for this topic.          IMM MENINGOCOCCAL VACCINE (MCV4) (Series Information) Aged Out    No completion history exists for this topic.                Patient Care Team:  Dar Farire M.D. as PCP - General (Internal Medicine)  Catie Barrientos M.D. (Otolaryngology)  Ila Celaya M.D. (Cardiovascular Disease (Cardiology))  Ambulatory Vitals  /60 (BP Location: Left arm, Patient Position: Sitting, BP Cuff Size: Adult)   Pulse 77   Temp 36.9 °C (98.5 °F) (Temporal)   Resp 14   Ht 1.6 m (5' 3\")   Wt 50.3 kg (111 lb)   SpO2 91%   BMI 19.66 kg/m²     Physical Exam  Constitutional:       Appearance: Normal appearance.   HENT:      Head:      Comments: Ear canals with wax bilaterally.  Cardiovascular:      Rate and Rhythm: Normal rate and regular rhythm.      Heart sounds: Normal heart sounds. No murmur heard.  No friction rub. No gallop.    Pulmonary:      Effort: Pulmonary effort is normal.      Breath sounds: Normal breath sounds. No wheezing or rales.   Musculoskeletal:      Right lower leg: No edema.      Left lower leg: No edema.           Assessment and " Plan:     1. Cough  azithromycin (ZITHROMAX) 250 MG Tab   2. Menopause  estrogens, conjugated (PREMARIN) 0.625 MG/GM Cream   3. Upper respiratory tract infection, unspecified type  azithromycin (ZITHROMAX) 250 MG Tab   4. Cerumen debris on tympanic membrane of both ears       Continue trelegy inhaler. Discussed if phlegm changes color from clear to tan or green she will start zithromax. Ear canals are lavaged to help remove wax.    Dar Fraire M.D.

## 2021-12-13 ENCOUNTER — TELEPHONE (OUTPATIENT)
Dept: INTERNAL MEDICINE | Facility: IMAGING CENTER | Age: 76
End: 2021-12-13

## 2021-12-13 DIAGNOSIS — J45.40 MODERATE PERSISTENT ASTHMA WITHOUT COMPLICATION: ICD-10-CM

## 2021-12-13 DIAGNOSIS — Z78.0 MENOPAUSE: ICD-10-CM

## 2021-12-13 NOTE — TELEPHONE ENCOUNTER
Ana Paula states she days where she aches in multiple joints such as knees, shoulders, hands (especially MCP and PIP) that she describes on phone. She has been using a cream called michael otc that does help. She will have stiffness in mornings but cannot identify a time frame. She denies joint swelling. She does take advil at night with some benefit. Discussed could refer her to rheumatologist since she could be at risk for autoimmune arthritis such as spondyloarthritis but she wishes to wait. Discussed with her that should she have SpA that treatment at this point is NSAID as long as she receives benefit.

## 2022-01-14 ENCOUNTER — OFFICE VISIT (OUTPATIENT)
Dept: SLEEP MEDICINE | Facility: MEDICAL CENTER | Age: 77
End: 2022-01-14
Payer: MEDICARE

## 2022-01-14 VITALS
SYSTOLIC BLOOD PRESSURE: 110 MMHG | BODY MASS INDEX: 20.04 KG/M2 | DIASTOLIC BLOOD PRESSURE: 72 MMHG | OXYGEN SATURATION: 94 % | WEIGHT: 113.13 LBS | HEIGHT: 63 IN | HEART RATE: 84 BPM

## 2022-01-14 DIAGNOSIS — G11.9 CEREBELLAR ATAXIA (HCC): ICD-10-CM

## 2022-01-14 DIAGNOSIS — J30.1 HAY FEVER: ICD-10-CM

## 2022-01-14 DIAGNOSIS — R05.9 COUGH: ICD-10-CM

## 2022-01-14 DIAGNOSIS — J45.40 MODERATE PERSISTENT ASTHMA WITHOUT COMPLICATION: ICD-10-CM

## 2022-01-14 PROCEDURE — 99214 OFFICE O/P EST MOD 30 MIN: CPT | Performed by: INTERNAL MEDICINE

## 2022-01-14 RX ORDER — AZELASTINE 1 MG/ML
2 SPRAY, METERED NASAL 2 TIMES DAILY PRN
Qty: 30 ML | Refills: 11 | Status: SHIPPED | OUTPATIENT
Start: 2022-01-14 | End: 2022-06-01 | Stop reason: SDUPTHER

## 2022-01-14 ASSESSMENT — FIBROSIS 4 INDEX: FIB4 SCORE: 1.18

## 2022-01-15 NOTE — PROGRESS NOTES
Chief Complaint   Patient presents with   • Asthma     Last seen 09/10/21       HPI:  Ana Paula Ordonez is a 76 y.o. female presenting for evaluation and management of:cough and asthma.  She uses  Trelegy, pro-air on an as-needed basis, montelukast and has an antihistamine nasal spray.  The patient fell several years ago and suffered a cerebellar injury.  She has cerebellar ataxia.  She does not like to use inhaled medications.  Other problems include ulcerative colitis, reflux, and tremors.     Pulmonary function testing reveals an FEV1 of 1.74 L which is 82% of predicted.  FEV1/FVC ratio is 66%.  She has a 9% improvement after bronchodilator.  Repeat pulmonary function testing demonstrates an FEV1 of 1.81 L which is 89% of predicted.  The FEV1 FVC ratio is 72%.  DLCO is 89% of predicted.     She says Trelegy helps a little but that her cough persists.  She has been seeing speech therapy as recommended by her neurologist and they have been giving her exercises to help with swallowing.  She thinks this is helping.    Past Medical History:   Diagnosis Date   • Arthritis    • ASTHMA     Moderate persistent   • Cerebellar ataxia (HCC) 3/30/2018    Due to fall 2004   • Chronic ulcerative colitis (HCC) 6/26/2013   • Compression fracture of L2 ([facility]) 11/3/2019    Nov. 2019 from [facility]   • Cough    • GERD (gastroesophageal reflux disease)    • Hay fever 4/19/2019   • History of falling 5/2/2018   • HYPOTENSION    • Inner ear disease    • Leg cramps, sleep related 5/2/2018   • Near-syncope    • Neck injury     fracture, hand and leg numbness and tingling   • Osteoporosis     DEXA Sept. 2021: T score lumbar -3.3 and right hip -4.1   • Peripheral neuropathy    • Polyp of sigmoid colon 04/10/2018    Hyperplastic polyp   • Raynaud disease    • Tremor    • Vitamin D deficiency        ROS:   Constitutional: Denies fevers, chills, night sweats, fatigue or weight loss  Eyes: Denies vision loss, pain, drainage, double vision  Ears, Nose,  Throat: Denies earache, tinnitus, hoarseness  Cardiovascular: Denies chest pain, tightness, palpitations  Respiratory: See HPI  Sleep: Denies, snoring, apnea  GI: Denies abdominal pain, nausea, vomiting, diarrhea  : Denies frequent urination, hematuria, painful urination  Musculoskeletal: Denies back pain, painful joints, sore muscles  Neurological: See HPI  Skin: Denies rashes, color changes  Psychiatric: Denies depression or thoughts of suicide  Hematologic: Denies bleeding tendency or clotting tendency  Allergic/Immunologic: Denies rhinitis, skin sensitivity    Social History     Socioeconomic History   • Marital status: Single     Spouse name: Not on file   • Number of children: Not on file   • Years of education: Not on file   • Highest education level: Not on file   Occupational History   • Not on file   Tobacco Use   • Smoking status: Never Smoker   • Smokeless tobacco: Never Used   Vaping Use   • Vaping Use: Never used   Substance and Sexual Activity   • Alcohol use: No     Alcohol/week: 0.0 oz   • Drug use: No   • Sexual activity: Not on file   Other Topics Concern   • Not on file   Social History Narrative   • Not on file     Social Determinants of Health     Financial Resource Strain:    • Difficulty of Paying Living Expenses: Not on file   Food Insecurity:    • Worried About Running Out of Food in the Last Year: Not on file   • Ran Out of Food in the Last Year: Not on file   Transportation Needs:    • Lack of Transportation (Medical): Not on file   • Lack of Transportation (Non-Medical): Not on file   Physical Activity:    • Days of Exercise per Week: Not on file   • Minutes of Exercise per Session: Not on file   Stress:    • Feeling of Stress : Not on file   Social Connections:    • Frequency of Communication with Friends and Family: Not on file   • Frequency of Social Gatherings with Friends and Family: Not on file   • Attends Anabaptist Services: Not on file   • Active Member of Clubs or  Organizations: Not on file   • Attends Club or Organization Meetings: Not on file   • Marital Status: Not on file   Intimate Partner Violence:    • Fear of Current or Ex-Partner: Not on file   • Emotionally Abused: Not on file   • Physically Abused: Not on file   • Sexually Abused: Not on file   Housing Stability:    • Unable to Pay for Housing in the Last Year: Not on file   • Number of Places Lived in the Last Year: Not on file   • Unstable Housing in the Last Year: Not on file     Hydromorphone, Azithromycin, Clindamycin, Codeine, Erythromycin, Keflex, Levaquin, Lyrica, Mesalamine, Quinolones, Sulfa drugs, Tetracyclines, and Other food  Current Outpatient Medications on File Prior to Visit   Medication Sig Dispense Refill   • Fluticasone-Umeclidin-Vilant (TRELEGY ELLIPTA) 100-62.5-25 MCG/INH AEROSOL POWDER, BREATH ACTIVATED inhalation Inhale 1 Inhalation every day. After inhalation rinse mouth with water without swallowing. Use inhaler only once per day. 28 Each 5   • estrogens, conjugated (PREMARIN) 0.625 MG/GM Cream Apply a dab around urethral opening daily as needed; 30 gram = 30 day supply 30 g 5   • triamcinolone acetonide (KENALOG) 0.1 % Cream APPLY TOPICALLY TO THE AFFECTED AREA EVERY DAY FOR 2 TO 3 WEEKS AS NEEDED     • montelukast (SINGULAIR) 10 MG Tab TAKE 1 TABLET BY MOUTH EVERY DAY 90 tablet 3   • Lidocaine 5 % Cream Apply 1 Application topically 3 times a day as needed. 113 g 2   • NON SPECIFIED Take 2 Tabs by mouth every morning. Vitamin A-D-K Tablet     • sulfaSALAzine (AZULFIDINE EN-TAB) 500 MG EC tablet Take 1,000 mg by mouth 2 times a day with meals.     • cetirizine (ZYRTEC) 10 MG Tab Take 10 mg by mouth every day.     • azithromycin (ZITHROMAX) 250 MG Tab Take 2 tablets on first day orally then take one tablet orally daily for next 4 days (Patient not taking: Reported on 1/14/2022) 6 Tablet 0   • fluticasone (FLONASE) 50 MCG/ACT nasal spray Administer 1 Spray into affected nostril(S) every  "day. (Patient not taking: Reported on 8/11/2021) 16 g 11   • tizanidine (ZANAFLEX) 2 MG tablet Take 1 Tab by mouth 3 times a day as needed (muscle spasm or pain). (Patient not taking: Reported on 1/14/2022) 20 Tab 0   • NON SPECIFIED Take 2 Tabs by mouth 2 Times a Day. AlgaeCal Vitamin (Patient not taking: Reported on 8/11/2021)     • NON SPECIFIED Take 2 Tablets by mouth every morning. Nerve Shield Vitamin  (Patient not taking: Reported on 1/14/2022)     • BIOTIN PO Take 1 Tab by mouth every day. (Patient not taking: Reported on 8/11/2021)     • Potassium (POTASSIMIN PO) Take 2 Drops by mouth 2 Times a Day. (Patient not taking: Reported on 8/11/2021)     • CALCIUM PO Take 2 Tabs by mouth 2 Times a Day. (Patient not taking: Reported on 8/11/2021)     • albuterol (PROAIR HFA) 108 (90 Base) MCG/ACT Aero Soln inhalation aerosol Inhale 2 Puffs by mouth every 6 hours as needed for Shortness of Breath. (Patient not taking: Reported on 11/17/2021)       No current facility-administered medications on file prior to visit.     /72 (BP Location: Left arm, Patient Position: Sitting, BP Cuff Size: Adult)   Pulse 84   Ht 1.6 m (5' 3\")   Wt 51.3 kg (113 lb 2 oz)   SpO2 94%   Family History   Problem Relation Age of Onset   • Non-contributory Father         Parkinson's disease   • Non-contributory Mother         old age with mild dementia   • Heart Disease Neg Hx    • Hypertension Neg Hx    • Hyperlipidemia Neg Hx    • Diabetes Neg Hx        Physical Exam:  No distress at rest.  Uses a walker  HEENT: PERRLA, EOMI, no scleral icterus, no nasal or oral lesions  Neck: No thyromegaly, no adenopathy, no bruits  Mallampatti: Grade II  Lungs: Equal breath sounds, no wheezes or crackles  Heart: Regular rate and rhythm, no gallops or murmurs  Abdomen: Soft, benign, no organomegaly  Extremities: No clubbing, cyanosis, or edema  Neurologic: Unsteady gait    1. Cough    2. Moderate persistent asthma without complication    3. Hay " fever    4. Cerebellar ataxia (HCC)        Her cough persists but is somewhat improved with Trelegy and speech therapy evaluation.  We will continue her current medications.  We will see her in follow-up in 6 months or sooner if she has issues.

## 2022-01-21 DIAGNOSIS — G60.9 IDIOPATHIC PERIPHERAL NEUROPATHY: ICD-10-CM

## 2022-01-21 DIAGNOSIS — G11.9 CEREBELLAR ATAXIA (HCC): ICD-10-CM

## 2022-01-21 DIAGNOSIS — R29.898 BILATERAL ARM WEAKNESS: ICD-10-CM

## 2022-02-08 ENCOUNTER — APPOINTMENT (OUTPATIENT)
Dept: RADIOLOGY | Facility: MEDICAL CENTER | Age: 77
End: 2022-02-08
Attending: EMERGENCY MEDICINE
Payer: MEDICARE

## 2022-02-08 ENCOUNTER — HOSPITAL ENCOUNTER (EMERGENCY)
Facility: MEDICAL CENTER | Age: 77
End: 2022-02-08
Attending: EMERGENCY MEDICINE
Payer: MEDICARE

## 2022-02-08 VITALS
HEIGHT: 62 IN | DIASTOLIC BLOOD PRESSURE: 72 MMHG | OXYGEN SATURATION: 96 % | SYSTOLIC BLOOD PRESSURE: 126 MMHG | HEART RATE: 66 BPM | RESPIRATION RATE: 16 BRPM | TEMPERATURE: 97.9 F | BODY MASS INDEX: 20.69 KG/M2

## 2022-02-08 DIAGNOSIS — S01.01XA LACERATION OF SCALP, INITIAL ENCOUNTER: ICD-10-CM

## 2022-02-08 DIAGNOSIS — S69.91XA INJURY OF RIGHT WRIST, INITIAL ENCOUNTER: ICD-10-CM

## 2022-02-08 DIAGNOSIS — S09.90XA CLOSED HEAD INJURY, INITIAL ENCOUNTER: ICD-10-CM
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
## 2022-02-08 PROCEDURE — 73110 X-RAY EXAM OF WRIST: CPT | Mod: RT

## 2022-02-08 PROCEDURE — 70450 CT HEAD/BRAIN W/O DYE: CPT | Mod: ME

## 2022-02-08 PROCEDURE — 99284 EMERGENCY DEPT VISIT MOD MDM: CPT | Mod: 25

## 2022-02-08 NOTE — DISCHARGE INSTRUCTIONS
Please call your primary care physician at the opening of business hours tomorrow morning, or this afternoon, to review your emergency department visit and schedule follow-up appointment for complete recheck.  Please follow-up with your primary care physician within 24 to 48 hours.  Return to the emergency department if you develop any new or worsening symptoms including development of a headache, if you have any nausea or vomiting, any confusion, or if you have any further concerns.  Please have your wrist rechecked by her primary care physician, if the pain does not significantly improve, you may require follow-up with an orthopedic specialist within 1 week.

## 2022-02-08 NOTE — ED TRIAGE NOTES
GLF today Biba for head laceration and R wrist pain. Patient states that she doesn't take blood thinners.

## 2022-02-08 NOTE — ED PROVIDER NOTES
ED Physician Note    Chief Complaint:   Closed head injury, right wrist injury, scalp laceration    HPI:  Ana Paula Ordonez is a very pleasant 76-year-old woman who presents to the emergency department for evaluation of a right wrist injury, closed head injury after a fall from standing.  She has a history of cerebellar ataxia noted in the medical record, she lost her balance around 7:00 this morning and fell sideways landing on her right hand, striking the right side of her head.  She is not currently on any anticoagulation antiplatelet agents, she has had no nausea, no vomiting, did not lose consciousness.  She has no headache at this time.  She did sustain a laceration to the right parietal scalp, that is hemostatic on arrival.  With regard to her wrist injury, she states she has some pain localized to the radial aspect of the right wrist, she states that she has broken the wrist previously, does have some chronic bony abnormalities, she reports no loss of sensation, but does have some pain with range of motion.  She is unable to identify any reliably alleviating factors, wrist pain is exacerbated by movement.    Review of Systems:  See HPI for pertinent positives and negatives.     Past Medical History:   has a past medical history of Arthritis, ASTHMA, Cerebellar ataxia (HCC) (3/30/2018), Chronic ulcerative colitis (HCC) (6/26/2013), Compression fracture of L2 (HCC) (11/3/2019), Cough, GERD (gastroesophageal reflux disease), Hay fever (4/19/2019), History of falling (5/2/2018), HYPOTENSION, Inner ear disease, Leg cramps, sleep related (5/2/2018), Near-syncope, Neck injury, Osteoporosis, Peripheral neuropathy, Polyp of sigmoid colon (04/10/2018), Raynaud disease, Tremor, and Vitamin D deficiency.    Social History:  Social History     Tobacco Use   • Smoking status: Never Smoker   • Smokeless tobacco: Never Used   Vaping Use   • Vaping Use: Never used   Substance and Sexual Activity   • Alcohol use: No      "Alcohol/week: 0.0 oz   • Drug use: No   • Sexual activity: Not on file       Surgical History:   has a past surgical history that includes nasal fracture reduction closed (10/8/08); hip nailing intramedullary (7/30/2011); tonsillectomy and adenoidectomy; bladder suspension; nissen fundoplication laparoscopic (2005); and cholecystectomy (2008).    Current Medications:  Home Medications    **Home medications have not yet been reviewed for this encounter**         Allergies:  Allergies   Allergen Reactions   • Hydromorphone Anaphylaxis     \"went into code blue\"    • Azithromycin Diarrhea and Unspecified     Diarrhea     • Clindamycin    • Codeine Unspecified     ?   • Erythromycin Unspecified     ?   • Keflex      ?   • Levaquin Unspecified     \"i dont know\"    • Lyrica Unspecified     ?   • Mesalamine Hives   • Quinolones Unspecified     ?   • Sulfa Drugs Unspecified     \"i dont know\"    • Tetracyclines Unspecified     ?   • Other Food Unspecified     Dairy-mucus (butter is okay). spices-cough       Physical Exam:  Vital Signs: /72   Pulse 66   Temp 36.6 °C (97.9 °F) (Temporal)   Resp 16   Ht 1.575 m (5' 2\")   SpO2 96%   BMI 20.69 kg/m²   Constitutional: Alert, no acute distress  HENT: Normocephalic, small superficial right parietal scalp laceration, irregular, less than 1 cm involving epidermal and dermal layers only, hemostatic, no gaping defect  Eyes: Pupils equal and reactive, normal conjunctiva  Neck: Supple, normal range of motion, no stridor  Cardiovascular: Extremities are warm and well perfused, no murmur appreciated, normal cardiac auscultation  Pulmonary: No respiratory distress, normal work of breathing, no accessory muscule usage, breath sounds clear and equal bilaterally, no wheezing, no coarse breath sounds  Abdomen: Soft, non-distended, non-tender to palpation, no peritoneal signs  Skin: Warm, dry, no rashes or lesions, excepting abnormalities as documented in HEENT exam  Musculoskeletal: " Normal range of motion in all extremities, mild tenderness palpation of the soft tissue in the wrist without localizable point bony tenderness to palpation, 2+ radial pulse  Neurologic: Alert, oriented, normal speech, normal motor function, no facial droop, no slurred speech  Psychiatric: Normal and appropriate mood and affect    Medical records reviewed for continuity of care.  No recent visits to this facility for similar symptoms.  She was seen by her pulmonologist for asthma follow-up on 1/14/2022.  Noted to have a history of cerebellar ataxia.    Labs:  Labs Reviewed - No data to display    Radiology:  DX-WRIST-COMPLETE 3+ RIGHT   Final Result      No evidence of acute fracture or dislocation.         CT-HEAD W/O   Final Result      No acute intracranial abnormality.              ED Medications Administered:  Medications - No data to display    Differential diagnosis:  Soft tissue injury including scalp hematoma, minor laceration, intracranial bleed, right wrist sprain, contusion, fracture    MDM:  Ms. Ordonez presents to the emergency department today for evaluation of a head injury and wrist injury as documented above.  She had no loss of consciousness, no nausea, no vomiting, no anticoagulation or antiplatelet agent use.  She has no clinical evidence of concussion or intracranial bleed.  Given her age, and the severity of the fall, CT of the head was ordered for further evaluation.  She does have some right wrist discomfort, though no obvious deformity.  Right upper extremity is without evidence of neurovascular compromise.    With regard to her right parietal scalp laceration, this is very superficial, hemostatic, involving only epidermal and dermal layers and less than 1 cm.  Wound is not gaping, and well approximated, do not believe there is any indication for additional closure at this time.  No staples or sutures needed.    Plain film of the right wrist is negative for acute fracture dislocation.  She  has no tenderness in the anatomic snuffbox, doubt occult scaphoid fracture.    CT head is negative for acute intracranial abnormality, no evidence of bleed.    At this time, do not believe she requires any further emergent diagnostics nor treatment.  Plan is for discharge home with primary care recheck within 24 hours.  She is counseled to call her primary care physician and discuss her emergency department visit, as well as any new or ongoing symptoms that may require follow-up. Return precautions were discussed with the patient, and provided in written form with the patient's discharge instructions.     Personal protective equipment including N95 surgical respirator, goggles, and gloves were used during this encounter.       Disposition:  Discharge home in stable condition    Final Impression:  1. Fall, initial encounter    2. Closed head injury, initial encounter    3. Laceration of scalp, initial encounter    4. Injury of right wrist, initial encounter        Electronically signed by: Tasha Tucker MD, 2/10/2022 6:35 AM

## 2022-02-09 ENCOUNTER — TELEPHONE (OUTPATIENT)
Dept: INTERNAL MEDICINE | Facility: IMAGING CENTER | Age: 77
End: 2022-02-09

## 2022-02-10 NOTE — TELEPHONE ENCOUNTER
----- Message from Dar Fraire M.D. sent at 2/9/2022  4:37 PM PST -----  Regarding: FW: ER Follow Up  Aysha:       Since she is at her baseline and does not have symptoms of dizziness or palpitations she can avoid coming in to be seen unless she has an issue.  Dr. MCLEAN  ----- Message -----  From: Oksana Berkowitz R.N.  Sent: 2/9/2022   2:40 PM PST  To: Dar Fraire M.D.  Subject: ER Follow Up                                     Dr Sosa,    Ana Paula was told to see her PCP following her ER visit for ground level fall yesterday.  I spoke with both Ana Paula and Juhi this afternoon.  They both agree that Ana Paula is at her baseline today.  Ana Paula feels that she fell because she lost her balance.  She denies dizziness or syncope.  She denies headache.  Her CT scan was negative.  She has a small laceration above her right ear that stopped bleeding spontaneously, no sutures.  Her right wrist x-ray was negative for fracture - soft tissue swelling only.  Do you feel Ana Paula needs to be examined this week?  Thank you,  Aysha

## 2022-02-15 ENCOUNTER — APPOINTMENT (OUTPATIENT)
Dept: INTERNAL MEDICINE | Facility: IMAGING CENTER | Age: 77
End: 2022-02-15
Payer: MEDICARE

## 2022-02-24 ENCOUNTER — APPOINTMENT (OUTPATIENT)
Dept: INTERNAL MEDICINE | Facility: IMAGING CENTER | Age: 77
End: 2022-02-24
Payer: MEDICARE

## 2022-03-03 ENCOUNTER — HOSPITAL ENCOUNTER (OUTPATIENT)
Facility: MEDICAL CENTER | Age: 77
End: 2022-03-03
Attending: INTERNAL MEDICINE
Payer: MEDICARE

## 2022-03-03 ENCOUNTER — NON-PROVIDER VISIT (OUTPATIENT)
Dept: INTERNAL MEDICINE | Facility: IMAGING CENTER | Age: 77
End: 2022-03-03
Payer: MEDICARE

## 2022-03-03 DIAGNOSIS — J45.40 MODERATE PERSISTENT ASTHMA WITHOUT COMPLICATION: ICD-10-CM

## 2022-03-03 DIAGNOSIS — E78.00 HYPERCHOLESTEROLEMIA: ICD-10-CM

## 2022-03-03 DIAGNOSIS — R74.8 ELEVATED ALKALINE PHOSPHATASE LEVEL: ICD-10-CM

## 2022-03-03 DIAGNOSIS — E87.1 HYPONATREMIA: ICD-10-CM

## 2022-03-03 DIAGNOSIS — M81.0 OSTEOPOROSIS, UNSPECIFIED OSTEOPOROSIS TYPE, UNSPECIFIED PATHOLOGICAL FRACTURE PRESENCE: ICD-10-CM

## 2022-03-03 DIAGNOSIS — Z01.89 ENCOUNTER FOR ROUTINE LABORATORY TESTING: ICD-10-CM

## 2022-03-03 PROCEDURE — 80061 LIPID PANEL: CPT

## 2022-03-03 PROCEDURE — 85025 COMPLETE CBC W/AUTO DIFF WBC: CPT

## 2022-03-03 PROCEDURE — 80053 COMPREHEN METABOLIC PANEL: CPT

## 2022-03-03 PROCEDURE — 82977 ASSAY OF GGT: CPT

## 2022-03-03 NOTE — ED NOTES
Patient ins in bed resting comfortably. The patient complained of left wrist pain and has skin tear under bandage. Other than that the patient had no obvious complaint.   Negative

## 2022-03-04 LAB
ALBUMIN SERPL BCP-MCNC: 4.3 G/DL (ref 3.2–4.9)
ALBUMIN/GLOB SERPL: 1.3 G/DL
ALP SERPL-CCNC: 101 U/L (ref 30–99)
ALT SERPL-CCNC: 50 U/L (ref 2–50)
ANION GAP SERPL CALC-SCNC: 11 MMOL/L (ref 7–16)
AST SERPL-CCNC: 25 U/L (ref 12–45)
BASOPHILS # BLD AUTO: 0.4 % (ref 0–1.8)
BASOPHILS # BLD: 0.02 K/UL (ref 0–0.12)
BILIRUB SERPL-MCNC: 0.5 MG/DL (ref 0.1–1.5)
BUN SERPL-MCNC: 13 MG/DL (ref 8–22)
CALCIUM SERPL-MCNC: 9.3 MG/DL (ref 8.5–10.5)
CHLORIDE SERPL-SCNC: 103 MMOL/L (ref 96–112)
CHOLEST SERPL-MCNC: 236 MG/DL (ref 100–199)
CO2 SERPL-SCNC: 26 MMOL/L (ref 20–33)
CREAT SERPL-MCNC: 0.44 MG/DL (ref 0.5–1.4)
EOSINOPHIL # BLD AUTO: 0.12 K/UL (ref 0–0.51)
EOSINOPHIL NFR BLD: 2.2 % (ref 0–6.9)
ERYTHROCYTE [DISTWIDTH] IN BLOOD BY AUTOMATED COUNT: 45.3 FL (ref 35.9–50)
GGT SERPL-CCNC: 11 U/L (ref 7–34)
GLOBULIN SER CALC-MCNC: 3.2 G/DL (ref 1.9–3.5)
GLUCOSE SERPL-MCNC: 85 MG/DL (ref 65–99)
HCT VFR BLD AUTO: 44 % (ref 37–47)
HDLC SERPL-MCNC: 67 MG/DL
HGB BLD-MCNC: 14.2 G/DL (ref 12–16)
IMM GRANULOCYTES # BLD AUTO: 0.01 K/UL (ref 0–0.11)
IMM GRANULOCYTES NFR BLD AUTO: 0.2 % (ref 0–0.9)
LDLC SERPL CALC-MCNC: 154 MG/DL
LYMPHOCYTES # BLD AUTO: 1.69 K/UL (ref 1–4.8)
LYMPHOCYTES NFR BLD: 30.6 % (ref 22–41)
MCH RBC QN AUTO: 33.6 PG (ref 27–33)
MCHC RBC AUTO-ENTMCNC: 32.3 G/DL (ref 33.6–35)
MCV RBC AUTO: 104 FL (ref 81.4–97.8)
MONOCYTES # BLD AUTO: 0.46 K/UL (ref 0–0.85)
MONOCYTES NFR BLD AUTO: 8.3 % (ref 0–13.4)
NEUTROPHILS # BLD AUTO: 3.23 K/UL (ref 2–7.15)
NEUTROPHILS NFR BLD: 58.3 % (ref 44–72)
NRBC # BLD AUTO: 0 K/UL
NRBC BLD-RTO: 0 /100 WBC
PLATELET # BLD AUTO: 225 K/UL (ref 164–446)
PMV BLD AUTO: 9.1 FL (ref 9–12.9)
POTASSIUM SERPL-SCNC: 4.3 MMOL/L (ref 3.6–5.5)
PROT SERPL-MCNC: 7.5 G/DL (ref 6–8.2)
RBC # BLD AUTO: 4.23 M/UL (ref 4.2–5.4)
SODIUM SERPL-SCNC: 140 MMOL/L (ref 135–145)
TRIGL SERPL-MCNC: 76 MG/DL (ref 0–149)
WBC # BLD AUTO: 5.5 K/UL (ref 4.8–10.8)

## 2022-03-07 ENCOUNTER — HOSPITAL ENCOUNTER (OUTPATIENT)
Facility: MEDICAL CENTER | Age: 77
End: 2022-03-07
Attending: INTERNAL MEDICINE
Payer: MEDICARE

## 2022-03-07 ENCOUNTER — OFFICE VISIT (OUTPATIENT)
Dept: INTERNAL MEDICINE | Facility: IMAGING CENTER | Age: 77
End: 2022-03-07
Payer: MEDICARE

## 2022-03-07 VITALS
HEART RATE: 85 BPM | SYSTOLIC BLOOD PRESSURE: 136 MMHG | TEMPERATURE: 98.9 F | OXYGEN SATURATION: 93 % | DIASTOLIC BLOOD PRESSURE: 84 MMHG | WEIGHT: 114 LBS | BODY MASS INDEX: 20.2 KG/M2 | RESPIRATION RATE: 14 BRPM | HEIGHT: 63 IN

## 2022-03-07 DIAGNOSIS — I73.00 RAYNAUD'S DISEASE WITHOUT GANGRENE: ICD-10-CM

## 2022-03-07 DIAGNOSIS — J30.1 HAY FEVER: ICD-10-CM

## 2022-03-07 DIAGNOSIS — Z87.440 HISTORY OF RECURRENT UTI (URINARY TRACT INFECTION): ICD-10-CM

## 2022-03-07 DIAGNOSIS — M19.042 ARTHRITIS OF BOTH HANDS: ICD-10-CM

## 2022-03-07 DIAGNOSIS — M19.041 ARTHRITIS OF BOTH HANDS: ICD-10-CM

## 2022-03-07 DIAGNOSIS — H61.23 BILATERAL IMPACTED CERUMEN: ICD-10-CM

## 2022-03-07 DIAGNOSIS — J45.40 MODERATE PERSISTENT ASTHMA WITHOUT COMPLICATION: ICD-10-CM

## 2022-03-07 DIAGNOSIS — Z00.00 MEDICARE ANNUAL WELLNESS VISIT, SUBSEQUENT: ICD-10-CM

## 2022-03-07 DIAGNOSIS — Z86.39 HISTORY OF VITAMIN D DEFICIENCY: ICD-10-CM

## 2022-03-07 DIAGNOSIS — M81.0 OSTEOPOROSIS, UNSPECIFIED OSTEOPOROSIS TYPE, UNSPECIFIED PATHOLOGICAL FRACTURE PRESENCE: ICD-10-CM

## 2022-03-07 DIAGNOSIS — F41.9 ANXIETY: ICD-10-CM

## 2022-03-07 DIAGNOSIS — G60.9 IDIOPATHIC PERIPHERAL NEUROPATHY: ICD-10-CM

## 2022-03-07 DIAGNOSIS — R33.9 URINARY RETENTION: ICD-10-CM

## 2022-03-07 DIAGNOSIS — E78.00 HYPERCHOLESTEROLEMIA: ICD-10-CM

## 2022-03-07 DIAGNOSIS — G47.62 LEG CRAMPS, SLEEP RELATED: ICD-10-CM

## 2022-03-07 DIAGNOSIS — K51.90 CHRONIC ULCERATIVE COLITIS WITHOUT COMPLICATION, UNSPECIFIED LOCATION (HCC): ICD-10-CM

## 2022-03-07 PROCEDURE — 81001 URINALYSIS AUTO W/SCOPE: CPT

## 2022-03-07 PROCEDURE — G0439 PPPS, SUBSEQ VISIT: HCPCS | Performed by: INTERNAL MEDICINE

## 2022-03-07 RX ORDER — DIAZEPAM 5 MG/1
5 TABLET ORAL
Qty: 30 TABLET | Refills: 0 | Status: SHIPPED | OUTPATIENT
Start: 2022-03-07 | End: 2023-01-18 | Stop reason: SDUPTHER

## 2022-03-07 RX ORDER — MONTELUKAST SODIUM 10 MG/1
10 TABLET ORAL
Qty: 90 TABLET | Refills: 3 | Status: SHIPPED | OUTPATIENT
Start: 2022-03-07 | End: 2022-06-21 | Stop reason: SDUPTHER

## 2022-03-07 RX ORDER — SULFASALAZINE 500 MG/1
1000 TABLET, DELAYED RELEASE ORAL 2 TIMES DAILY WITH MEALS
Qty: 120 TABLET | Refills: 2 | Status: SHIPPED | OUTPATIENT
Start: 2022-03-07 | End: 2022-10-21 | Stop reason: SDUPTHER

## 2022-03-07 ASSESSMENT — FIBROSIS 4 INDEX: FIB4 SCORE: 1.178511301977579207

## 2022-03-07 ASSESSMENT — PATIENT HEALTH QUESTIONNAIRE - PHQ9: CLINICAL INTERPRETATION OF PHQ2 SCORE: 0

## 2022-03-07 ASSESSMENT — ACTIVITIES OF DAILY LIVING (ADL): BATHING_REQUIRES_ASSISTANCE: 0

## 2022-03-07 ASSESSMENT — ENCOUNTER SYMPTOMS: GENERAL WELL-BEING: GOOD

## 2022-03-07 NOTE — PROGRESS NOTES
Established Patient Note   HPI:        Ana Paula comes in today for annual medicare wellness. She has been having joint pain in hands from her arthritis and is interested in using voltaren gel.    Past Medical History:   Diagnosis Date   • Arthritis    • ASTHMA     Moderate persistent   • Cerebellar ataxia (HCC) 3/30/2018    Due to fall 2004   • Chronic ulcerative colitis (HCC) 6/26/2013   • Compression fracture of L2 (HCC) 11/3/2019    Nov. 2019 from GLF   • Cough    • GERD (gastroesophageal reflux disease)    • Hay fever 4/19/2019   • History of falling 5/2/2018   • HYPOTENSION    • Inner ear disease    • Leg cramps, sleep related 5/2/2018   • Near-syncope    • Neck injury     fracture, hand and leg numbness and tingling   • Osteoporosis     DEXA Sept. 2021: T score lumbar -3.3 and right hip -4.1   • Peripheral neuropathy    • Polyp of sigmoid colon 04/10/2018    Hyperplastic polyp   • Raynaud disease    • Tremor    • Vitamin D deficiency        Current Outpatient Medications   Medication Sig Dispense Refill   • diclofenac sodium (VOLTAREN) 1 % Gel Apply 2 g topically 3 times a day as needed (arthritis). 100 g 3   • montelukast (SINGULAIR) 10 MG Tab Take 1 Tablet by mouth every day. 90 Tablet 3   • sulfaSALAzine (AZULFIDINE EN-TAB) 500 MG EC tablet Take 2 Tablets by mouth 2 times a day with meals. 120 Tablet 2   • diazePAM (VALIUM) 5 MG Tab Take 1 Tablet by mouth 1 time a day as needed for Anxiety for up to 30 days. 30 Tablet 0   • Fluticasone-Umeclidin-Vilant (TRELEGY ELLIPTA) 100-62.5-25 MCG/INH AEROSOL POWDER, BREATH ACTIVATED inhalation Inhale 1 Inhalation every day. After inhalation rinse mouth with water without swallowing. Use inhaler only once per day. 28 Each 5   • estrogens, conjugated (PREMARIN) 0.625 MG/GM Cream Apply a dab around urethral opening daily as needed; 30 gram = 30 day supply 30 g 5   • triamcinolone acetonide (KENALOG) 0.1 % Cream APPLY TOPICALLY TO THE AFFECTED AREA EVERY DAY FOR 2 TO 3  "WEEKS AS NEEDED     • Lidocaine 5 % Cream Apply 1 Application topically 3 times a day as needed. 113 g 2   • NON SPECIFIED Take 2 Tablets by mouth every morning. Vitamin A-D-K Tablet     • cetirizine (ZYRTEC) 10 MG Tab Take 10 mg by mouth every day.     • azelastine (ASTELIN) 137 MCG/SPRAY nasal spray Administer 2 Sprays into affected nostril(S) 2 times a day as needed. (Patient not taking: Reported on 3/7/2022) 30 mL 11     No current facility-administered medications for this visit.         Allergies   Allergen Reactions   • Hydromorphone Anaphylaxis     \"went into code blue\"    • Azithromycin Diarrhea and Unspecified     Diarrhea     • Clindamycin    • Codeine Unspecified     ?   • Erythromycin Unspecified     ?   • Keflex      ?   • Levaquin Unspecified     \"i dont know\"    • Lyrica Unspecified     ?   • Mesalamine Hives   • Quinolones Unspecified     ?   • Sulfa Drugs Unspecified     \"i dont know\"    • Tetracyclines Unspecified     ?   • Other Food Unspecified     Dairy-mucus (butter is okay). spices-cough         Social History     Tobacco Use   • Smoking status: Never Smoker   • Smokeless tobacco: Never Used   Vaping Use   • Vaping Use: Never used   Substance Use Topics   • Alcohol use: No     Alcohol/week: 0.0 oz   • Drug use: No       Past Surgical History:   Procedure Laterality Date   • HIP NAILING INTRAMEDULLARY  7/30/2011    Performed by KALEB RAGSDALE at SURGERY [facility] ORS   • NASAL FRACTURE REDUCTION CLOSED  10/8/08    Performed by DON BARRIOS at SURGERY SAME DAY [facility] ORS   • CHOLECYSTECTOMY  2008   • NISSEN FUNDOPLICATION LAPAROSCOPIC  2005   • BLADDER SUSPENSION      X 2   • TONSILLECTOMY AND ADENOIDECTOMY          ROS  Depression Screening  Little interest or pleasure in doing things?  0 - not at all  Feeling down, depressed , or hopeless? 0 - not at all  Patient Health Questionnaire Score: 0     If depressive symptoms identified deferred to follow up visit unless " specifically addressed in assessment and plan.    Interpretation of PHQ-9 Total Score   Score Severity   1-4 No Depression   5-9 Mild Depression   10-14 Moderate Depression   15-19 Moderately Severe Depression   20-27 Severe Depression    Screening for Cognitive Impairment  Three Minute Recall (daughter, heaven, mountain) 3/3    Brad clock face with all 12 numbers and set the hands to show 10 past 11.  Yes    Cognitive concerns identified deferred for follow up unless specifically addressed in assessment and plan.    Fall Risk Assessment  Has the patient had two or more falls in the last year or any fall with injury in the last year?  Yes    Safety Assessment  Throw rugs on floor.  No  Handrails on all stairs.  Yes  Good lighting in all hallways.  Yes  Difficulty hearing.  No  Patient counseled about all safety risks that were identified.    Functional Assessment ADLs  Are there any barriers preventing you from cooking for yourself or meeting nutritional needs?  Yes.    Are there any barriers preventing you from driving safely or obtaining transportation?  Yes.    Are there any barriers preventing you from using a telephone or calling for help?  No.    Are there any barriers preventing you from shopping?  Yes.    Are there any barriers preventing you from taking care of your own finances?  No.    Are there any barriers preventing you from managing your medications?  No.    Are there any barriers preventing you from showering, bathing or dressing yourself?  No.    Are you currently engaging in any exercise or physical activity?  Yes.     What is your perception of your health?  Good.      Health Maintenance Summary          Overdue - PAP SMEAR (Every 3 Years) Overdue since 12/28/2020 12/28/2017  PAP IG (IMAGE GUIDED)          Overdue - COVID-19 Vaccine (3 - Booster for Pfizer series) Overdue since 1/10/2022    08/10/2021  Imm Admin: Pfizer SARS-CoV-2 Vaccine    07/20/2021  Imm Admin: Pfizer SARS-CoV-2 Vaccine           Postponed - MAMMOGRAM (Yearly) Postponed until 8/11/2032 03/30/2016  [id number]    03/30/2016  [id number]    07/27/2012  MA-SCREENING MAMMOGRAM W/ CAD    04/01/2010  MA-DIAGNOSTIC DIGITAL MAMMO-BILAT    04/01/2010  MA-CAD DIAGNOSTIC-MAMMO    Only the first 5 history entries have been loaded, but more history exists.          Postponed - IMM PNEUMOCOCCAL VACCINE: 65+ Years (1 of 1 - PPSV23) Postponed until 8/11/2032    No completion history exists for this topic.          Postponed - IMM INFLUENZA (1) Postponed until 1/1/2099 12/14/2012  Imm Admin: Influenza Seasonal Injectable - Historical Data          Postponed - IMM ZOSTER VACCINES (2 of 3) Postponed until 1/1/2099 03/01/2013  Imm Admin: Zoster Vaccine Live (ZVL) (Zostavax) - HISTORICAL DATA          Annual Wellness Visit (Every 366 Days) Next due on 3/8/2023    03/07/2022  Visit Dx: Medicare annual wellness visit, subsequent    03/13/2019  Visit Dx: Medicare annual wellness visit, subsequent          BONE DENSITY (Every 5 Years) Next due on 9/13/2026 09/13/2021  DS-BONE DENSITY STUDY (DEXA)    05/03/2017  DS-BONE DENSITY STUDY (DEXA)    04/01/2010  DS-BONE DENSITY STUDY (DEXA)    03/15/2007  DS-BONE DENSITY STUDY (DEXA)          COLORECTAL CANCER SCREENING (COLONOSCOPY - Every 10 Years) Tentatively due on 6/29/2028 06/29/2018  REFERRAL TO GI FOR COLONOSCOPY    10/20/2015  REFERRAL TO GI FOR COLONOSCOPY    10/20/2015  REFERRAL TO GI FOR COLONOSCOPY    07/10/2013  REFERRAL TO GI FOR COLONOSCOPY          IMM DTaP/Tdap/Td Vaccine (3 - Td or Tdap) Next due on 11/5/2030 11/05/2020  Imm Admin: Tdap Vaccine    04/27/2015  Imm Admin: Tdap Vaccine          HEPATITIS C SCREENING  Completed    05/06/2011  HEPATITIS PANEL ACUTE(4 COMPONENTS)          IMM HEP B VACCINE (Series Information) Aged Out    No completion history exists for this topic.          IMM MENINGOCOCCAL VACCINE (MCV4) (Series Information) Aged Out    No  "completion history exists for this topic.                Patient Care Team:  Dar Fraire M.D. as PCP - General (Internal Medicine)  Catie Barrientos M.D. (Otolaryngology)  Ila Celaya M.D. (Cardiovascular Disease (Cardiology))     Ambulatory Vitals  /84 (BP Location: Left arm, Patient Position: Sitting, BP Cuff Size: Adult)   Pulse 85   Temp 37.2 °C (98.9 °F) (Temporal)   Resp 14   Ht 1.6 m (5' 3\")   Wt 51.7 kg (114 lb)   SpO2 93%   BMI 20.19 kg/m²     Physical Exam  Constitutional:       Appearance: Normal appearance.   HENT:      Right Ear: There is impacted cerumen.      Left Ear: There is impacted cerumen.   Cardiovascular:      Rate and Rhythm: Normal rate and regular rhythm.      Heart sounds: No murmur heard.    No friction rub. No gallop.   Pulmonary:      Effort: Pulmonary effort is normal.      Breath sounds: Normal breath sounds. No wheezing or rales.   Abdominal:      General: There is no distension.      Palpations: There is no mass.      Tenderness: There is no abdominal tenderness. There is no right CVA tenderness or left CVA tenderness.   Musculoskeletal:      Right lower leg: No edema.      Left lower leg: No edema.   Skin:     Capillary Refill: Capillary refill takes less than 2 seconds.      Comments: Feet with cyanotic appearance in toes with history of Raynauds.   Neurological:      General: No focal deficit present.      Coordination: Coordination abnormal.      Comments: Uses walker to help with balance.         Component      Latest Ref Rng & Units 8/3/2021 3/3/2022   WBC      4.8 - 10.8 K/uL 6.5 5.5   RBC      4.20 - 5.40 M/uL 4.38 4.23   Hemoglobin      12.0 - 16.0 g/dL 14.4 14.2   Hematocrit      37.0 - 47.0 % 45.0 44.0   MCV      81.4 - 97.8 fL 102.7 (H) 104.0 (H)   MCH      27.0 - 33.0 pg 32.9 33.6 (H)   MCHC      33.6 - 35.0 g/dL 32.0 (L) 32.3 (L)   RDW      35.9 - 50.0 fL 46.5 45.3   Platelet Count      164 - 446 K/uL 274 225   MPV      9.0 - 12.9 fL 9.3 9.1 "   Neutrophils-Polys      44.00 - 72.00 % 61.90 58.30   Lymphocytes      22.00 - 41.00 % 28.40 30.60   Monocytes      0.00 - 13.40 % 7.40 8.30   Eosinophils      0.00 - 6.90 % 1.20 2.20   Basophils      0.00 - 1.80 % 0.60 0.40   Immature Granulocytes      0.00 - 0.90 % 0.50 0.20   Nucleated RBC      /100 WBC 0.00 0.00   Neutrophils (Absolute)      2.00 - 7.15 K/uL 4.01 3.23   Lymphs (Absolute)      1.00 - 4.80 K/uL 1.84 1.69   Monos (Absolute)      0.00 - 0.85 K/uL 0.48 0.46   Eos (Absolute)      0.00 - 0.51 K/uL 0.08 0.12   Baso (Absolute)      0.00 - 0.12 K/uL 0.04 0.02   Immature Granulocytes (abs)      0.00 - 0.11 K/uL 0.03 0.01   NRBC (Absolute)      K/uL 0.00 0.00   Sodium      135 - 145 mmol/L 134 (L) 140   Potassium      3.6 - 5.5 mmol/L 3.9 4.3   Chloride      96 - 112 mmol/L 97 103   Co2      20 - 33 mmol/L 26 26   Anion Gap      7.0 - 16.0 11.0 11.0   Glucose      65 - 99 mg/dL 84 85   Bun      8 - 22 mg/dL 12 13   Creatinine      0.50 - 1.40 mg/dL 0.47 (L) 0.44 (L)   Calcium      8.5 - 10.5 mg/dL 9.5 9.3   AST(SGOT)      12 - 45 U/L 20 25   ALT(SGPT)      2 - 50 U/L 21 50   Alkaline Phosphatase      30 - 99 U/L 135 (H) 101 (H)   Total Bilirubin      0.1 - 1.5 mg/dL 0.8 0.5   Albumin      3.2 - 4.9 g/dL 4.2 4.3   Total Protein      6.0 - 8.2 g/dL 7.4 7.5   Globulin      1.9 - 3.5 g/dL 3.2 3.2   A-G Ratio      g/dL 1.3 1.3   Color       Yellow    Character       Clear    Specific Gravity      <1.035 1.012    Ph      5.0 - 8.0 7.0    Glucose      Negative mg/dL Negative    Ketones      Negative mg/dL Negative    Protein      Negative mg/dL Negative    Bilirubin      Negative Negative    Urobilinogen, Urine      Negative 0.2    Nitrite      Negative Negative    Leukocyte Esterase      Negative Negative    Occult Blood      Negative Negative    Micro Urine Req       see below    Cholesterol,Tot      100 - 199 mg/dL 226 (H) 236 (H)   Triglycerides      0 - 149 mg/dL 80 76   HDL      >=40 mg/dL 64 67   LDL       <100 mg/dL 146 (H) 154 (H)   GFR If African American      >60 mL/min/1.73 m 2 >60 >60   GFR If Non African American      >60 mL/min/1.73 m 2 >60 >60   Vitamin B12 -True Cobalamin      211 - 911 pg/mL 708    25-Hydroxy   Vitamin D 25      30 - 100 ng/mL 57    TSH      0.380 - 5.330 uIU/mL 3.610    Gamma Gt      7 - 34 U/L  11     Assessment and Plan:     1. Medicare annual wellness visit, subsequent     2. Osteoporosis, unspecified osteoporosis type, unspecified pathological fracture presence  CBC WITH DIFFERENTIAL    VITAMIN D,25 HYDROXY    Comp Metabolic Panel    TSH    History of L2 compression fracture 2019   3. History of vitamin D deficiency  VITAMIN D,25 HYDROXY    Comp Metabolic Panel   4. History of recurrent UTI (urinary tract infection)  URINALYSIS,CULTURE IF INDICATED    CBC WITH DIFFERENTIAL    URINALYSIS    Comp Metabolic Panel   5. Idiopathic peripheral neuropathy  VITAMIN B12   6. Leg cramps, sleep related  Comp Metabolic Panel    TSH   7. Hypercholesterolemia  Lipid Profile    TSH   8. Urinary retention  URINALYSIS,CULTURE IF INDICATED   9. Arthritis of both hands  CBC WITH DIFFERENTIAL    RHEUMATOID ARTHRITIS PANEL    Sed Rate    CRP QUANTITIVE (NON-CARDIAC)   10. Raynaud's disease without gangrene     11. Bilateral impacted cerumen     12. Chronic ulcerative colitis without complication, unspecified location (HCC)  sulfaSALAzine (AZULFIDINE EN-TAB) 500 MG EC tablet   13. Moderate persistent asthma without complication  montelukast (SINGULAIR) 10 MG Tab   14. Hay fever  montelukast (SINGULAIR) 10 MG Tab   15. Anxiety  diazePAM (VALIUM) 5 MG Tab     Discussed consider vascular screening including CAC but she does not wish to do at this time. Also discussed consider treating osteoporosis but she does not wish to take any Rx at this time. She would like to try voltaren gel for arthritis in hands. Check CBC, CMP, lipid panel, B12, vitamin D, TSH, urinalysis in one year.    Dar Fraire M.D.

## 2022-03-08 LAB
APPEARANCE UR: CLEAR
BACTERIA #/AREA URNS HPF: NEGATIVE /HPF
BILIRUB UR QL STRIP.AUTO: NEGATIVE
COLOR UR: YELLOW
EPI CELLS #/AREA URNS HPF: NORMAL /HPF
GLUCOSE UR STRIP.AUTO-MCNC: NEGATIVE MG/DL
HYALINE CASTS #/AREA URNS LPF: NORMAL /LPF
KETONES UR STRIP.AUTO-MCNC: NEGATIVE MG/DL
LEUKOCYTE ESTERASE UR QL STRIP.AUTO: ABNORMAL
MICRO URNS: ABNORMAL
NITRITE UR QL STRIP.AUTO: NEGATIVE
PH UR STRIP.AUTO: 5 [PH] (ref 5–8)
PROT UR QL STRIP: NEGATIVE MG/DL
RBC # URNS HPF: NORMAL /HPF
RBC UR QL AUTO: NEGATIVE
SP GR UR STRIP.AUTO: 1.01
UROBILINOGEN UR STRIP.AUTO-MCNC: 0.2 MG/DL
WBC #/AREA URNS HPF: NORMAL /HPF

## 2022-03-11 ENCOUNTER — APPOINTMENT (RX ONLY)
Dept: URBAN - METROPOLITAN AREA CLINIC 4 | Facility: CLINIC | Age: 77
Setting detail: DERMATOLOGY
End: 2022-03-11

## 2022-03-11 DIAGNOSIS — L57.8 OTHER SKIN CHANGES DUE TO CHRONIC EXPOSURE TO NONIONIZING RADIATION: ICD-10-CM

## 2022-03-11 DIAGNOSIS — L30.9 DERMATITIS, UNSPECIFIED: ICD-10-CM

## 2022-03-11 DIAGNOSIS — L81.4 OTHER MELANIN HYPERPIGMENTATION: ICD-10-CM

## 2022-03-11 DIAGNOSIS — Z85.828 PERSONAL HISTORY OF OTHER MALIGNANT NEOPLASM OF SKIN: ICD-10-CM

## 2022-03-11 DIAGNOSIS — D22 MELANOCYTIC NEVI: ICD-10-CM

## 2022-03-11 DIAGNOSIS — L82.1 OTHER SEBORRHEIC KERATOSIS: ICD-10-CM

## 2022-03-11 DIAGNOSIS — L57.0 ACTINIC KERATOSIS: ICD-10-CM

## 2022-03-11 DIAGNOSIS — D18.0 HEMANGIOMA: ICD-10-CM

## 2022-03-11 PROBLEM — D48.5 NEOPLASM OF UNCERTAIN BEHAVIOR OF SKIN: Status: ACTIVE | Noted: 2022-03-11

## 2022-03-11 PROBLEM — D18.01 HEMANGIOMA OF SKIN AND SUBCUTANEOUS TISSUE: Status: ACTIVE | Noted: 2022-03-11

## 2022-03-11 PROBLEM — D22.5 MELANOCYTIC NEVI OF TRUNK: Status: ACTIVE | Noted: 2022-03-11

## 2022-03-11 PROCEDURE — 11102 TANGNTL BX SKIN SINGLE LES: CPT

## 2022-03-11 PROCEDURE — ? LIQUID NITROGEN

## 2022-03-11 PROCEDURE — ? COUNSELING

## 2022-03-11 PROCEDURE — 17000 DESTRUCT PREMALG LESION: CPT | Mod: 59

## 2022-03-11 PROCEDURE — 99213 OFFICE O/P EST LOW 20 MIN: CPT | Mod: 25

## 2022-03-11 PROCEDURE — ? BIOPSY BY SHAVE METHOD

## 2022-03-11 PROCEDURE — ? PRESCRIPTION

## 2022-03-11 RX ORDER — MUPIROCIN 20 MG/G
1 OINTMENT TOPICAL TID
Qty: 22 | Refills: 5 | Status: ERX | COMMUNITY
Start: 2022-03-11

## 2022-03-11 RX ADMIN — MUPIROCIN 1: 20 OINTMENT TOPICAL at 00:00

## 2022-03-11 ASSESSMENT — LOCATION SIMPLE DESCRIPTION DERM
LOCATION SIMPLE: RIGHT UPPER BACK
LOCATION SIMPLE: RIGHT ZYGOMA
LOCATION SIMPLE: LEFT HAND
LOCATION SIMPLE: LEFT NOSE
LOCATION SIMPLE: LEFT ANTERIOR NECK
LOCATION SIMPLE: RIGHT HAND
LOCATION SIMPLE: RIGHT NOSE
LOCATION SIMPLE: RIGHT CHEEK

## 2022-03-11 ASSESSMENT — LOCATION DETAILED DESCRIPTION DERM
LOCATION DETAILED: LEFT SUPERIOR ANTERIOR NECK
LOCATION DETAILED: RIGHT SUPERIOR UPPER BACK
LOCATION DETAILED: RIGHT NARIS
LOCATION DETAILED: LEFT ULNAR DORSAL HAND
LOCATION DETAILED: RIGHT ULNAR DORSAL HAND
LOCATION DETAILED: LEFT NARIS
LOCATION DETAILED: RIGHT INFERIOR CENTRAL MALAR CHEEK
LOCATION DETAILED: RIGHT CENTRAL ZYGOMA
LOCATION DETAILED: RIGHT SUPERIOR MEDIAL UPPER BACK

## 2022-03-11 ASSESSMENT — LOCATION ZONE DERM
LOCATION ZONE: NECK
LOCATION ZONE: FACE
LOCATION ZONE: TRUNK
LOCATION ZONE: HAND
LOCATION ZONE: NOSE

## 2022-03-11 NOTE — PROCEDURE: LIQUID NITROGEN
Detail Level: Simple
Number Of Freeze-Thaw Cycles: 2 freeze-thaw cycles
Render Post-Care Instructions In Note?: no
Consent: The patient's consent was obtained including but not limited to risks of crusting, scabbing, blistering, scarring, darker or lighter pigmentary change, recurrence, incomplete removal and infection.
Show Aperture Variable?: Yes
Duration Of Freeze Thaw-Cycle (Seconds): 3
Post-Care Instructions: I reviewed with the patient in detail post-care instructions. Patient is to wear sunprotection, and avoid picking at any of the treated lesions. Pt may apply Vaseline to crusted or scabbing areas.
Aperture Size (Optional): C

## 2022-03-14 RX ORDER — MUPIROCIN 20 MG/G
1 OINTMENT TOPICAL TID
Qty: 22 | Refills: 5 | Status: ERX

## 2022-03-17 ENCOUNTER — NON-PROVIDER VISIT (OUTPATIENT)
Dept: INTERNAL MEDICINE | Facility: IMAGING CENTER | Age: 77
End: 2022-03-17
Payer: MEDICARE

## 2022-03-17 PROCEDURE — 99999 PR NO CHARGE: CPT | Performed by: INTERNAL MEDICINE

## 2022-03-17 NOTE — NON-PROVIDER
Both ear canals successfully lavaged with warm water with moderate difficulty.  Patient tolerated the procedure.  Both canals and TM's WNL

## 2022-04-19 ENCOUNTER — APPOINTMENT (RX ONLY)
Dept: URBAN - METROPOLITAN AREA CLINIC 36 | Facility: CLINIC | Age: 77
Setting detail: DERMATOLOGY
End: 2022-04-19

## 2022-04-19 PROBLEM — C44.329 SQUAMOUS CELL CARCINOMA OF SKIN OF OTHER PARTS OF FACE: Status: ACTIVE | Noted: 2022-04-19

## 2022-04-19 PROCEDURE — 17311 MOHS 1 STAGE H/N/HF/G: CPT

## 2022-04-19 PROCEDURE — 13132 CMPLX RPR F/C/C/M/N/AX/G/H/F: CPT

## 2022-04-19 PROCEDURE — ? MOHS SURGERY

## 2022-04-19 NOTE — PROCEDURE: MOHS SURGERY
Mohs Case Number: [id number]
Previous Accession (Optional): [id number]
Biopsy Photograph Reviewed: Yes
Referring Physician (Optional): emmanuel
Consent Type: Consent 1 (Standard)
Eye Shield Used: No
Surgeon Performing Repair (Optional): Loco
Initial Size Of Lesion: 0.6
X Size Of Lesion In Cm (Optional): 0.4
Number Of Stages: 1
Primary Defect Length In Cm (Final Defect Size - Required For Flaps/Grafts): 1.1
Primary Defect Width In Cm (Final Defect Size - Required For Flaps/Grafts): 0.7
Repair Type: Complex Repair
Oculoplastic Surgeon (A): Rodney
Oculoplastic Surgeon Procedure Text (A): After obtaining clear surgical margins the patient was sent to oculoplastics for surgical repair.  The patient understands they will receive post-surgical care and follow-up from the referring physician's office.
Otolaryngologist Procedure Text (A): After obtaining clear surgical margins the patient was sent to otolaryngology for surgical repair.  The patient understands they will receive post-surgical care and follow-up from the referring physician's office.
Plastic Surgeon Procedure Text (A): After obtaining clear surgical margins the patient was sent to plastics for surgical repair.  The patient understands they will receive post-surgical care and follow-up from the referring physician's office.
Mid-Level Procedure Text (A): After obtaining clear surgical margins the patient was sent to a mid-level provider for surgical repair.  The patient understands they will receive post-surgical care and follow-up from the mid-level provider.
Provider Procedure Text (A): After obtaining clear surgical margins the defect was repaired by another provider.
Asc Procedure Text (A): After obtaining clear surgical margins the patient was sent to an ASC for surgical repair.  The patient understands they will receive post-surgical care and follow-up from the ASC physician.
Simple / Intermediate / Complex Repair - Final Wound Length In Cm: 3.1
Suturegard Retention Suture: 2-0 Nylon
Retention Suture Bite Size: 3 mm
Length To Time In Minutes Device Was In Place: 10
Complex/Intermediate Repair Variations: Crescentic
Width Of Defect Perpendicular To Closure In Cm (Required): 0.8
Undermining Type: Entire Wound
Debridement Text: The wound edges were debrided prior to proceeding with the closure to facilitate wound healing.
Helical Rim Text: The closure involved the helical rim.
Vermilion Border Text: The closure involved the vermilion border.
Nostril Rim Text: The closure involved the nostril rim.
Retention Suture Text: Retention sutures were placed to support the closure and prevent dehiscence.
Secondary Defect Length In Cm (Required For Flaps): 0
Area H Indication Text: Tumors in this location are included in Area H (eyelids, eyebrows, nose, lips, chin, ear, pre-auricular, post-auricular, temple, genitalia, hands, feet, ankles and areola).  Tissue conservation is critical in these anatomic locations.
Area M Indication Text: Tumors in this location are included in Area M (cheek, forehead, scalp, neck, jawline and pretibial skin).  Mohs surgery is indicated for tumors in these anatomic locations.
Area L Indication Text: Tumors in this location are included in Area L (trunk and extremities).  Mohs surgery is indicated for larger tumors, or tumors with aggressive histologic features, in these anatomic locations.
Special Stains Stage 1 - Results: Base On Clearance Noted Above
Stage 2: Additional Anesthesia Type: 1% lidocaine with 1:100,000 epinephrine and 408mcg clindamycin/ml and a 1:10 solution of 8.4% sodium bicarbonate
Stage 4: Additional Anesthesia Type: 1% lidocaine with epinephrine
Include Tumor Staging In Mohs Note?: Please Select the Appropriate Response
Staging Info: By selecting yes to the question above you will include information on AJCC 8 tumor staging in your Mohs note. Information on tumor staging will be automatically added for SCCs on the head and neck. AJCC 8 includes tumor size, tumor depth, perineural involvement and bone invasion.
Tumor Depth: Less than 6mm from granular layer and no invasion beyond the subcutaneous fat
Medical Necessity Statement: Based on my medical judgement, Mohs surgery is the most appropriate treatment for this cancer compared to other treatments.
Alternatives Discussed Intro (Do Not Add Period): I discussed alternative treatments to Mohs surgery and specifically discussed the risks and benefits of
Consent 1/Introductory Paragraph: The rationale for Mohs was explained to the patient and consent was obtained. The risks, benefits and alternatives to therapy were discussed in detail. Specifically, the risks of infection, scarring, bleeding, prolonged wound healing, incomplete removal, allergy to anesthesia, nerve injury and recurrence were addressed. Prior to the procedure, the treatment site was clearly identified and confirmed by the patient. All components of Universal Protocol/PAUSE Rule completed.
Consent 2/Introductory Paragraph: Mohs surgery was explained to the patient and consent was obtained. The risks, benefits and alternatives to therapy were discussed in detail. Specifically, the risks of infection, scarring, bleeding, prolonged wound healing, incomplete removal, allergy to anesthesia, nerve injury and recurrence were addressed. Prior to the procedure, the treatment site was clearly identified and confirmed by the patient. All components of Universal Protocol/PAUSE Rule completed.
Consent 3/Introductory Paragraph: I gave the patient a chance to ask questions they had about the procedure.  Following this I explained the Mohs procedure and consent was obtained. The risks, benefits and alternatives to therapy were discussed in detail. Specifically, the risks of infection, scarring, bleeding, prolonged wound healing, incomplete removal, allergy to anesthesia, nerve injury and recurrence were addressed. Prior to the procedure, the treatment site was clearly identified and confirmed by the patient. All components of Universal Protocol/PAUSE Rule completed.
Consent (Temporal Branch)/Introductory Paragraph: The rationale for Mohs was explained to the patient and consent was obtained. The risks, benefits and alternatives to therapy were discussed in detail. Specifically, the risks of damage to the temporal branch of the facial nerve, infection, scarring, bleeding, prolonged wound healing, incomplete removal, allergy to anesthesia, and recurrence were addressed. Prior to the procedure, the treatment site was clearly identified and confirmed by the patient. All components of Universal Protocol/PAUSE Rule completed.
Consent (Marginal Mandibular)/Introductory Paragraph: The rationale for Mohs was explained to the patient and consent was obtained. The risks, benefits and alternatives to therapy were discussed in detail. Specifically, the risks of damage to the marginal mandibular branch of the facial nerve, infection, scarring, bleeding, prolonged wound healing, incomplete removal, allergy to anesthesia, and recurrence were addressed. Prior to the procedure, the treatment site was clearly identified and confirmed by the patient. All components of Universal Protocol/PAUSE Rule completed.
Consent (Spinal Accessory)/Introductory Paragraph: The rationale for Mohs was explained to the patient and consent was obtained. The risks, benefits and alternatives to therapy were discussed in detail. Specifically, the risks of damage to the spinal accessory nerve, infection, scarring, bleeding, prolonged wound healing, incomplete removal, allergy to anesthesia, and recurrence were addressed. Prior to the procedure, the treatment site was clearly identified and confirmed by the patient. All components of Universal Protocol/PAUSE Rule completed.
Consent (Near Eyelid Margin)/Introductory Paragraph: The rationale for Mohs was explained to the patient and consent was obtained. The risks, benefits and alternatives to therapy were discussed in detail. Specifically, the risks of ectropion or eyelid deformity, infection, scarring, bleeding, prolonged wound healing, incomplete removal, allergy to anesthesia, nerve injury and recurrence were addressed. Prior to the procedure, the treatment site was clearly identified and confirmed by the patient. All components of Universal Protocol/PAUSE Rule completed.
Consent (Ear)/Introductory Paragraph: The rationale for Mohs was explained to the patient and consent was obtained. The risks, benefits and alternatives to therapy were discussed in detail. Specifically, the risks of ear deformity, infection, scarring, bleeding, prolonged wound healing, incomplete removal, allergy to anesthesia, nerve injury and recurrence were addressed. Prior to the procedure, the treatment site was clearly identified and confirmed by the patient. All components of Universal Protocol/PAUSE Rule completed.
Consent (Nose)/Introductory Paragraph: The rationale for Mohs was explained to the patient and consent was obtained. The risks, benefits and alternatives to therapy were discussed in detail. Specifically, the risks of nasal deformity, changes in the flow of air through the nose, infection, scarring, bleeding, prolonged wound healing, incomplete removal, allergy to anesthesia, nerve injury and recurrence were addressed. Prior to the procedure, the treatment site was clearly identified and confirmed by the patient. All components of Universal Protocol/PAUSE Rule completed.
Consent (Lip)/Introductory Paragraph: The rationale for Mohs was explained to the patient and consent was obtained. The risks, benefits and alternatives to therapy were discussed in detail. Specifically, the risks of lip deformity, changes in the oral aperture, infection, scarring, bleeding, prolonged wound healing, incomplete removal, allergy to anesthesia, nerve injury and recurrence were addressed. Prior to the procedure, the treatment site was clearly identified and confirmed by the patient. All components of Universal Protocol/PAUSE Rule completed.
Consent (Scalp)/Introductory Paragraph: The rationale for Mohs was explained to the patient and consent was obtained. The risks, benefits and alternatives to therapy were discussed in detail. Specifically, the risks of changes in hair growth pattern secondary to repair, infection, scarring, bleeding, prolonged wound healing, incomplete removal, allergy to anesthesia, nerve injury and recurrence were addressed. Prior to the procedure, the treatment site was clearly identified and confirmed by the patient. All components of Universal Protocol/PAUSE Rule completed.
Detail Level: Detailed
Postop Diagnosis: same
Anesthesia Type: 1% lidocaine with 1:100,000 epinephrine and a 1:10 solution of 8.4% sodium bicarbonate
Anesthesia Volume In Cc: 6
Hemostasis: Electrocautery
Estimated Blood Loss (Cc): less than 5 cc
Repair Anesthesia Method: local infiltration
Brow Lift Text: A midfrontal incision was made medially to the defect to allow access to the tissues just superior to the left eyebrow. Following careful dissection inferiorly in a supraperiosteal plane to the level of the left eyebrow, several 3-0 monocryl sutures were used to resuspend the eyebrow orbicularis oculi muscular unit to the superior frontal bone periosteum. This resulted in an appropriate reapproximation of static eyebrow symmetry and correction of the left brow ptosis.
Deep Sutures: 5-0 Polysorb
Epidermal Sutures: 5-0 Prolene
Epidermal Closure: running cuticular
Suturegard Intro: Intraoperative tissue expansion was performed, utilizing the SUTUREGARD device, in order to reduce wound tension.
Suturegard Body: The suture ends were repeatedly re-tightened and re-clamped to achieve the desired tissue expansion.
Hemigard Intro: Due to skin fragility and wound tension, it was decided to use HEMIGARD adhesive retention suture devices to permit a linear closure. The skin was cleaned and dried for a 6cm distance away from the wound. Excessive hair, if present, was removed to allow for adhesion.
Hemigard Postcare Instructions: The HEMIGARD strips are to remain completely dry for at least 5-7 days.
Donor Site Anesthesia Type: same as repair anesthesia
Graft Basting Suture (Optional): 5-0 Fast Absorbing Gut
Graft Donor Site Epidermal Sutures (Optional): 5-0 Ethibond
Epidermal Closure Graft Donor Site (Optional): simple interrupted
Graft Donor Site Bandage (Optional-Leave Blank If You Don't Want In Note): Aquaphor and telefa placed on wound. Pressure dressing applied to donor site
Closure 2 Information: This tab is for additional flaps and grafts, including complex repair and grafts and complex repair and flaps. You can also specify a different location for the additional defect, if the location is the same you do not need to select a new one. We will insert the automated text for the repair you select below just as we do for solitary flaps and grafts. Please note that at this time if you select a location with a different insurance zone you will need to override the ICD10 and CPT if appropriate.
Closure 3 Information: This tab is for additional flaps and grafts above and beyond our usual structured repairs.  Please note if you enter information here it will not currently bill and you will need to add the billing information manually.
Wound Care: Aquaphor
Dressing: dry sterile dressing
Wound Care (No Sutures): Petrolatum
Suture Removal: 7 days
Unna Boot Text: An Unna boot was placed to help immobilize the limb and facilitate more rapid healing.
Home Suture Removal Text: Patient was provided instructions on removing sutures and will remove their sutures at home.  If they have any questions or difficulties they will call the office.
Post-Care Instructions: I reviewed with the patient in detail post-care instructions. Patient is not to engage in any heavy lifting, exercise, or swimming for the next 14 days. Should the patient develop any fevers, chills, bleeding, severe pain patient will contact the office immediately.
Pain Refusal Text: I offered to prescribe pain medication but the patient refused to take this medication.
Mauc Instructions: By selecting yes to the question below the MAUC number will be added into the note.  This will be calculated automatically based on the diagnosis chosen, the size entered, the body zone selected (H,M,L) and the specific indications you chose. You will also have the option to override the Mohs AUC if you disagree with the automatically calculated number and this option is found in the Case Summary tab.
Where Do You Want The Question To Include Opioid Counseling Located?: Case Summary Tab
Eye Protection Verbiage: Before proceeding with the stage, a plastic scleral shield was inserted. The globe was anesthetized with a few drops of 1% lidocaine with 1:100,000 epinephrine. Then, an appropriate sized scleral shield was chosen and coated with lacrilube ointment. The shield was gently inserted and left in place for the duration of each stage. After the stage was completed, the shield was gently removed.
Mohs Method Verbiage: An incision at a 45 degree angle following the standard Mohs approach was done and the specimen was harvested as a microscopic controlled layer.
Surgeon/Pathologist Verbiage (Will Incorporate Name Of Surgeon From Intro If Not Blank): operated in two distinct and integrated capacities as the surgeon and pathologist.
Mohs Histo Method Verbiage: Each section was then chromacoded and processed in the Mohs lab using the Mohs protocol and submitted for frozen section.
Subsequent Stages Histo Method Verbiage: Using a similar technique to that described above, a thin layer of tissue was removed from all areas where tumor was visible on the previous stage.  The tissue was again oriented, mapped, dyed, and processed as above.
Mohs Rapid Report Verbiage: The area of clinically evident tumor was marked with skin marking ink and appropriately hatched.  The initial incision was made following the Mohs approach through the skin.  The specimen was taken to the lab, divided into the necessary number of pieces, chromacoded and processed according to the Mohs protocol.  This was repeated in successive stages until a tumor free defect was achieved.
Complex Repair Preamble Text (Leave Blank If You Do Not Want): Extensive wide undermining was performed at least 2 cm in all directions.
Intermediate Repair Preamble Text (Leave Blank If You Do Not Want): Undermining was performed with blunt dissection.
M-Plasty Complex Repair Preamble Text (Leave Blank If You Do Not Want): Extensive wide undermining was performed.
Non-Graft Cartilage Fenestration Text: The cartilage was fenestrated with a 2mm punch biopsy to help facilitate healing.
Graft Cartilage Fenestration Text: The cartilage was fenestrated with a 2mm punch biopsy to help facilitate graft survival and healing.
Secondary Intention Text (Leave Blank If You Do Not Want): The defect will heal with secondary intention.
No Repair - Repaired With Adjacent Surgical Defect Text (Leave Blank If You Do Not Want): After obtaining clear surgical margins the defect was repaired concurrently with another surgical defect which was in close approximation.
Adjacent Tissue Transfer Text: The defect edges were debeveled with a #15 scalpel blade.  Given the location of the defect and the proximity to free margins an adjacent tissue transfer was deemed most appropriate.  Using a sterile surgical marker, an appropriate flap was drawn incorporating the defect and placing the expected incisions within the relaxed skin tension lines where possible.    The area thus outlined was incised deep to adipose tissue with a #15 scalpel blade.  The skin margins were undermined to an appropriate distance in all directions utilizing iris scissors.
Advancement Flap (Single) Text: The defect edges were debeveled with a #15 scalpel blade.  Given the location of the defect and the proximity to free margins a single advancement flap was deemed most appropriate.  Using a sterile surgical marker, an appropriate advancement flap was drawn incorporating the defect and placing the expected incisions within the relaxed skin tension lines where possible.    The area thus outlined was incised deep to adipose tissue with a #15 scalpel blade.  The skin margins were undermined to an appropriate distance in all directions utilizing iris scissors.
Advancement Flap (Double) Text: The defect edges were debeveled with a #15 scalpel blade.  Given the location of the defect and the proximity to free margins a double advancement flap was deemed most appropriate.  Using a sterile surgical marker, the appropriate advancement flaps were drawn incorporating the defect and placing the expected incisions within the relaxed skin tension lines where possible.    The area thus outlined was incised deep to adipose tissue with a #15 scalpel blade.  The skin margins were undermined to an appropriate distance in all directions utilizing iris scissors.
Burow's Advancement Flap Text: The defect edges were debeveled with a #15 scalpel blade.  Given the location of the defect and the proximity to free margins a Burow's advancement flap was deemed most appropriate.  Using a sterile surgical marker, the appropriate advancement flap was drawn incorporating the defect and placing the expected incisions within the relaxed skin tension lines where possible.    The area thus outlined was incised deep to adipose tissue with a #15 scalpel blade.  The skin margins were undermined to an appropriate distance in all directions utilizing iris scissors.
Chonodrocutaneous Helical Advancement Flap Text: The defect edges were debeveled with a #15 scalpel blade.  Given the location of the defect and the proximity to free margins a chondrocutaneous helical advancement flap was deemed most appropriate.  Using a sterile surgical marker, the appropriate advancement flap was drawn incorporating the defect and placing the expected incisions within the relaxed skin tension lines where possible.    The area thus outlined was incised deep to adipose tissue with a #15 scalpel blade.  The skin margins were undermined to an appropriate distance in all directions utilizing iris scissors.
Crescentic Advancement Flap Text: The defect edges were debeveled with a #15 scalpel blade.  Given the location of the defect and the proximity to free margins a crescentic advancement flap was deemed most appropriate.  Using a sterile surgical marker, the appropriate advancement flap was drawn incorporating the defect and placing the expected incisions within the relaxed skin tension lines where possible.    The area thus outlined was incised deep to adipose tissue with a #15 scalpel blade.  The skin margins were undermined to an appropriate distance in all directions utilizing iris scissors.
A-T Advancement Flap Text: The defect edges were debeveled with a #15 scalpel blade.  Given the location of the defect, shape of the defect and the proximity to free margins an A-T advancement flap was deemed most appropriate.  Using a sterile surgical marker, an appropriate advancement flap was drawn incorporating the defect and placing the expected incisions within the relaxed skin tension lines where possible.    The area thus outlined was incised deep to adipose tissue with a #15 scalpel blade.  The skin margins were undermined to an appropriate distance in all directions utilizing iris scissors.
O-T Advancement Flap Text: The defect edges were debeveled with a #15 scalpel blade.  Given the location of the defect, shape of the defect and the proximity to free margins an O-T advancement flap was deemed most appropriate.  Using a sterile surgical marker, an appropriate advancement flap was drawn incorporating the defect and placing the expected incisions within the relaxed skin tension lines where possible.    The area thus outlined was incised deep to adipose tissue with a #15 scalpel blade.  The skin margins were undermined to an appropriate distance in all directions utilizing iris scissors.
O-L Flap Text: The defect edges were debeveled with a #15 scalpel blade.  Given the location of the defect, shape of the defect and the proximity to free margins an O-L flap was deemed most appropriate.  Using a sterile surgical marker, an appropriate advancement flap was drawn incorporating the defect and placing the expected incisions within the relaxed skin tension lines where possible.    The area thus outlined was incised deep to adipose tissue with a #15 scalpel blade.  The skin margins were undermined to an appropriate distance in all directions utilizing iris scissors.
O-Z Flap Text: The defect edges were debeveled with a #15 scalpel blade.  Given the location of the defect, shape of the defect and the proximity to free margins an O-Z flap was deemed most appropriate.  Using a sterile surgical marker, an appropriate transposition flap was drawn incorporating the defect and placing the expected incisions within the relaxed skin tension lines where possible. The area thus outlined was incised deep to adipose tissue with a #15 scalpel blade.  The skin margins were undermined to an appropriate distance in all directions utilizing iris scissors.
Double O-Z Flap Text: The defect edges were debeveled with a #15 scalpel blade.  Given the location of the defect, shape of the defect and the proximity to free margins a Double O-Z flap was deemed most appropriate.  Using a sterile surgical marker, an appropriate transposition flap was drawn incorporating the defect and placing the expected incisions within the relaxed skin tension lines where possible. The area thus outlined was incised deep to adipose tissue with a #15 scalpel blade.  The skin margins were undermined to an appropriate distance in all directions utilizing iris scissors.
V-Y Flap Text: The defect edges were debeveled with a #15 scalpel blade.  Given the location of the defect, shape of the defect and the proximity to free margins a V-Y flap was deemed most appropriate.  Using a sterile surgical marker, an appropriate advancement flap was drawn incorporating the defect and placing the expected incisions within the relaxed skin tension lines where possible.    The area thus outlined was incised deep to adipose tissue with a #15 scalpel blade.  The skin margins were undermined to an appropriate distance in all directions utilizing iris scissors.
Advancement-Rotation Flap Text: The defect edges were debeveled with a #15 scalpel blade.  Given the location of the defect, shape of the defect and the proximity to free margins an advancement-rotation flap was deemed most appropriate.  Using a sterile surgical marker, an appropriate flap was drawn incorporating the defect and placing the expected incisions within the relaxed skin tension lines where possible. The area thus outlined was incised deep to adipose tissue with a #15 scalpel blade.  The skin margins were undermined to an appropriate distance in all directions utilizing iris scissors.
Mercedes Flap Text: The defect edges were debeveled with a #15 scalpel blade.  Given the location of the defect, shape of the defect and the proximity to free margins a Mercedes flap was deemed most appropriate.  Using a sterile surgical marker, an appropriate advancement flap was drawn incorporating the defect and placing the expected incisions within the relaxed skin tension lines where possible. The area thus outlined was incised deep to adipose tissue with a #15 scalpel blade.  The skin margins were undermined to an appropriate distance in all directions utilizing iris scissors.
Modified Advancement Flap Text: The defect edges were debeveled with a #15 scalpel blade.  Given the location of the defect, shape of the defect and the proximity to free margins a modified advancement flap was deemed most appropriate.  Using a sterile surgical marker, an appropriate advancement flap was drawn incorporating the defect and placing the expected incisions within the relaxed skin tension lines where possible.    The area thus outlined was incised deep to adipose tissue with a #15 scalpel blade.  The skin margins were undermined to an appropriate distance in all directions utilizing iris scissors.
Mucosal Advancement Flap Text: Given the location of the defect, shape of the defect and the proximity to free margins a mucosal advancement flap was deemed most appropriate. Incisions were made with a 15 blade scalpel in the appropriate fashion along the cutaneous vermilion border and the mucosal lip. The remaining actinically damaged mucosal tissue was excised.  The mucosal advancement flap was then elevated to the gingival sulcus with care taken to preserve the neurovascular structures and advanced into the primary defect. Care was taken to ensure that precise realignment of the vermilion border was achieved.
Peng Advancement Flap Text: The defect edges were debeveled with a #15 scalpel blade.  Given the location of the defect, shape of the defect and the proximity to free margins a Peng advancement flap was deemed most appropriate.  Using a sterile surgical marker, an appropriate advancement flap was drawn incorporating the defect and placing the expected incisions within the relaxed skin tension lines where possible. The area thus outlined was incised deep to adipose tissue with a #15 scalpel blade.  The skin margins were undermined to an appropriate distance in all directions utilizing iris scissors.
Hatchet Flap Text: The defect edges were debeveled with a #15 scalpel blade.  Given the location of the defect, shape of the defect and the proximity to free margins a hatchet flap based from the glabella was deemed most appropriate.  Using a sterile surgical marker, an appropriate glabellar hatchet flap was drawn incorporating the defect and placing the expected incisions within the relaxed skin tension lines where possible.    The area thus outlined was incised deep to adipose tissue with a #15 scalpel blade.  The skin margins were undermined to an appropriate distance in all directions utilizing iris scissors.
Rotation Flap Text: The defect edges were debeveled with a #15 scalpel blade.  Given the location of the defect, shape of the defect and the proximity to free margins a rotation flap was deemed most appropriate.  Using a sterile surgical marker, an appropriate rotation flap was drawn incorporating the defect and placing the expected incisions within the relaxed skin tension lines where possible.    The area thus outlined was incised deep to adipose tissue with a #15 scalpel blade.  The skin margins were undermined to an appropriate distance in all directions utilizing iris scissors.
Spiral Flap Text: The defect edges were debeveled with a #15 scalpel blade.  Given the location of the defect, shape of the defect and the proximity to free margins a spiral flap was deemed most appropriate.  Using a sterile surgical marker, an appropriate rotation flap was drawn incorporating the defect and placing the expected incisions within the relaxed skin tension lines where possible. The area thus outlined was incised deep to adipose tissue with a #15 scalpel blade.  The skin margins were undermined to an appropriate distance in all directions utilizing iris scissors.
Staged Advancement Flap Text: The defect edges were debeveled with a #15 scalpel blade.  Given the location of the defect, shape of the defect and the proximity to free margins a staged advancement flap was deemed most appropriate.  Using a sterile surgical marker, an appropriate advancement flap was drawn incorporating the defect and placing the expected incisions within the relaxed skin tension lines where possible. The area thus outlined was incised deep to adipose tissue with a #15 scalpel blade.  The skin margins were undermined to an appropriate distance in all directions utilizing iris scissors.
Star Wedge Flap Text: The defect edges were debeveled with a #15 scalpel blade.  Given the location of the defect, shape of the defect and the proximity to free margins a star wedge flap was deemed most appropriate.  Using a sterile surgical marker, an appropriate rotation flap was drawn incorporating the defect and placing the expected incisions within the relaxed skin tension lines where possible. The area thus outlined was incised deep to adipose tissue with a #15 scalpel blade.  The skin margins were undermined to an appropriate distance in all directions utilizing iris scissors.
Transposition Flap Text: The defect edges were debeveled with a #15 scalpel blade.  Given the location of the defect and the proximity to free margins a transposition flap was deemed most appropriate.  Using a sterile surgical marker, an appropriate transposition flap was drawn incorporating the defect.    The area thus outlined was incised deep to adipose tissue with a #15 scalpel blade.  The skin margins were undermined to an appropriate distance in all directions utilizing iris scissors.
Muscle Hinge Flap Text: The defect edges were debeveled with a #15 scalpel blade.  Given the size, depth and location of the defect and the proximity to free margins a muscle hinge flap was deemed most appropriate.  Using a sterile surgical marker, an appropriate hinge flap was drawn incorporating the defect. The area thus outlined was incised with a #15 scalpel blade.  The skin margins were undermined to an appropriate distance in all directions utilizing iris scissors.
Mustarde Flap Text: The defect edges were debeveled with a #15 scalpel blade.  Given the size, depth and location of the defect and the proximity to free margins a Mustarde flap was deemed most appropriate.  Using a sterile surgical marker, an appropriate flap was drawn incorporating the defect. The area thus outlined was incised with a #15 scalpel blade.  The skin margins were undermined to an appropriate distance in all directions utilizing iris scissors.
Nasal Turnover Hinge Flap Text: The defect edges were debeveled with a #15 scalpel blade.  Given the size, depth, location of the defect and the defect being full thickness a nasal turnover hinge flap was deemed most appropriate.  Using a sterile surgical marker, an appropriate hinge flap was drawn incorporating the defect. The area thus outlined was incised with a #15 scalpel blade. The flap was designed to recreate the nasal mucosal lining and the alar rim. The skin margins were undermined to an appropriate distance in all directions utilizing iris scissors.
Nasalis-Muscle-Based Myocutaneous Island Pedicle Flap Text: Using a #15 blade, an incision was made around the donor flap to the level of the nasalis muscle. Wide lateral undermining was then performed in both the subcutaneous plane above the nasalis muscle, and in a submuscular plane just above periosteum. This allowed the formation of a free nasalis muscle axial pedicle (based on the angular artery) which was still attached to the actual cutaneous flap, increasing its mobility and vascular viability. Hemostasis was obtained with pinpoint electrocoagulation. The flap was mobilized into position and the pivotal anchor points positioned and stabilized with buried interrupted sutures. Subcutaneous and dermal tissues were closed in a multilayered fashion with sutures. Tissue redundancies were excised, and the epidermal edges were apposed without significant tension and sutured with sutures.
Orbicularis Oris Muscle Flap Text: The defect edges were debeveled with a #15 scalpel blade.  Given that the defect affected the competency of the oral sphincter an orbicularis oris muscle flap was deemed most appropriate to restore this competency and normal muscle function.  Using a sterile surgical marker, an appropriate flap was drawn incorporating the defect. The area thus outlined was incised with a #15 scalpel blade.
Melolabial Transposition Flap Text: The defect edges were debeveled with a #15 scalpel blade.  Given the location of the defect and the proximity to free margins a melolabial flap was deemed most appropriate.  Using a sterile surgical marker, an appropriate melolabial transposition flap was drawn incorporating the defect.    The area thus outlined was incised deep to adipose tissue with a #15 scalpel blade.  The skin margins were undermined to an appropriate distance in all directions utilizing iris scissors.
Rhombic Flap Text: The defect edges were debeveled with a #15 scalpel blade.  Given the location of the defect and the proximity to free margins a rhombic flap was deemed most appropriate.  Using a sterile surgical marker, an appropriate rhombic flap was drawn incorporating the defect.    The area thus outlined was incised deep to adipose tissue with a #15 scalpel blade.  The skin margins were undermined to an appropriate distance in all directions utilizing iris scissors.
Rhomboid Transposition Flap Text: The defect edges were debeveled with a #15 scalpel blade.  Given the location of the defect and the proximity to free margins a rhomboid transposition flap was deemed most appropriate.  Using a sterile surgical marker, an appropriate rhomboid flap was drawn incorporating the defect.    The area thus outlined was incised deep to adipose tissue with a #15 scalpel blade.  The skin margins were undermined to an appropriate distance in all directions utilizing iris scissors.
Bi-Rhombic Flap Text: The defect edges were debeveled with a #15 scalpel blade.  Given the location of the defect and the proximity to free margins a bi-rhombic flap was deemed most appropriate.  Using a sterile surgical marker, an appropriate rhombic flap was drawn incorporating the defect. The area thus outlined was incised deep to adipose tissue with a #15 scalpel blade.  The skin margins were undermined to an appropriate distance in all directions utilizing iris scissors.
Helical Rim Advancement Flap Text: The defect edges were debeveled with a #15 blade scalpel.  Given the location of the defect and the proximity to free margins (helical rim) a double helical rim advancement flap was deemed most appropriate.  Using a sterile surgical marker, the appropriate advancement flaps were drawn incorporating the defect and placing the expected incisions between the helical rim and antihelix where possible.  The area thus outlined was incised through and through with a #15 scalpel blade.  With a skin hook and iris scissors, the flaps were gently and sharply undermined and freed up.
Bilateral Helical Rim Advancement Flap Text: The defect edges were debeveled with a #15 blade scalpel.  Given the location of the defect and the proximity to free margins (helical rim) a bilateral helical rim advancement flap was deemed most appropriate.  Using a sterile surgical marker, the appropriate advancement flaps were drawn incorporating the defect and placing the expected incisions between the helical rim and antihelix where possible.  The area thus outlined was incised through and through with a #15 scalpel blade.  With a skin hook and iris scissors, the flaps were gently and sharply undermined and freed up.
Ear Star Wedge Flap Text: The defect edges were debeveled with a #15 blade scalpel.  Given the location of the defect and the proximity to free margins (helical rim) an ear star wedge flap was deemed most appropriate.  Using a sterile surgical marker, the appropriate flap was drawn incorporating the defect and placing the expected incisions between the helical rim and antihelix where possible.  The area thus outlined was incised through and through with a #15 scalpel blade.
Banner Transposition Flap Text: The defect edges were debeveled with a #15 scalpel blade.  Given the location of the defect and the proximity to free margins a Banner transposition flap was deemed most appropriate.  Using a sterile surgical marker, an appropriate flap drawn around the defect. The area thus outlined was incised deep to adipose tissue with a #15 scalpel blade.  The skin margins were undermined to an appropriate distance in all directions utilizing iris scissors.
Bilobed Flap Text: The defect edges were debeveled with a #15 scalpel blade.  Given the location of the defect and the proximity to free margins a bilobe flap was deemed most appropriate.  Using a sterile surgical marker, an appropriate bilobe flap drawn around the defect.    The area thus outlined was incised deep to adipose tissue with a #15 scalpel blade.  The skin margins were undermined to an appropriate distance in all directions utilizing iris scissors.
Bilobed Transposition Flap Text: The defect edges were debeveled with a #15 scalpel blade.  Given the location of the defect and the proximity to free margins a bilobed transposition flap was deemed most appropriate.  Using a sterile surgical marker, an appropriate bilobe flap drawn around the defect.    The area thus outlined was incised deep to adipose tissue with a #15 scalpel blade.  The skin margins were undermined to an appropriate distance in all directions utilizing iris scissors.
Trilobed Flap Text: The defect edges were debeveled with a #15 scalpel blade.  Given the location of the defect and the proximity to free margins a trilobed flap was deemed most appropriate.  Using a sterile surgical marker, an appropriate trilobed flap drawn around the defect.    The area thus outlined was incised deep to adipose tissue with a #15 scalpel blade.  The skin margins were undermined to an appropriate distance in all directions utilizing iris scissors.
Dorsal Nasal Flap Text: The defect edges were debeveled with a #15 scalpel blade.  Given the location of the defect and the proximity to free margins a dorsal nasal flap,based upon the glabellar folds, was deemed most appropriate.  Using a sterile surgical marker, an appropriate dorsal nasal flap was drawn around the defect.    The area thus outlined was incised deep to adipose tissue with a #15 scalpel blade.  The skin margins were undermined to an appropriate distance in all directions utilizing iris scissors.
Island Pedicle Flap Text: The defect edges were debeveled with a #15 scalpel blade.  Given the location of the defect, shape of the defect and the proximity to free margins an island pedicle advancement flap was deemed most appropriate.  Using a sterile surgical marker, an appropriate advancement flap was drawn incorporating the defect, outlining the appropriate donor tissue and placing the expected incisions within the relaxed skin tension lines where possible.    The area thus outlined was incised deep to adipose tissue with a #15 scalpel blade.  The skin margins were undermined to an appropriate distance in all directions around the primary defect and laterally outward around the island pedicle utilizing iris scissors.  There was minimal undermining beneath the pedicle flap.
Island Pedicle Flap With Canthal Suspension Text: The defect edges were debeveled with a #15 scalpel blade.  Given the location of the defect, shape of the defect and the proximity to free margins an island pedicle advancement flap was deemed most appropriate.  Using a sterile surgical marker, an appropriate advancement flap was drawn incorporating the defect, outlining the appropriate donor tissue and placing the expected incisions within the relaxed skin tension lines where possible. The area thus outlined was incised deep to adipose tissue with a #15 scalpel blade.  The skin margins were undermined to an appropriate distance in all directions around the primary defect and laterally outward around the island pedicle utilizing iris scissors.  There was minimal undermining beneath the pedicle flap. A suspension suture was placed in the canthal tendon to prevent tension and prevent ectropion.
Alar Island Pedicle Flap Text: The defect edges were debeveled with a #15 scalpel blade.  Given the location of the defect, shape of the defect and the proximity to the alar rim an island pedicle advancement flap was deemed most appropriate.  Using a sterile surgical marker, an appropriate advancement flap was drawn incorporating the defect, outlining the appropriate donor tissue and placing the expected incisions within the nasal ala running parallel to the alar rim. The area thus outlined was incised with a #15 scalpel blade.  The skin margins were undermined minimally to an appropriate distance in all directions around the primary defect and laterally outward around the island pedicle utilizing iris scissors.  There was minimal undermining beneath the pedicle flap.
Double Island Pedicle Flap Text: The defect edges were debeveled with a #15 scalpel blade.  Given the location of the defect, shape of the defect and the proximity to free margins a double island pedicle advancement flap was deemed most appropriate.  Using a sterile surgical marker, an appropriate advancement flap was drawn incorporating the defect, outlining the appropriate donor tissue and placing the expected incisions within the relaxed skin tension lines where possible.    The area thus outlined was incised deep to adipose tissue with a #15 scalpel blade.  The skin margins were undermined to an appropriate distance in all directions around the primary defect and laterally outward around the island pedicle utilizing iris scissors.  There was minimal undermining beneath the pedicle flap.
Island Pedicle Flap-Requiring Vessel Identification Text: The defect edges were debeveled with a #15 scalpel blade.  Given the location of the defect, shape of the defect and the proximity to free margins an island pedicle advancement flap was deemed most appropriate.  Using a sterile surgical marker, an appropriate advancement flap was drawn, based on the axial vessel mentioned above, incorporating the defect, outlining the appropriate donor tissue and placing the expected incisions within the relaxed skin tension lines where possible.    The area thus outlined was incised deep to adipose tissue with a #15 scalpel blade.  The skin margins were undermined to an appropriate distance in all directions around the primary defect and laterally outward around the island pedicle utilizing iris scissors.  There was minimal undermining beneath the pedicle flap.
Keystone Flap Text: The defect edges were debeveled with a #15 scalpel blade.  Given the location of the defect, shape of the defect a keystone flap was deemed most appropriate.  Using a sterile surgical marker, an appropriate keystone flap was drawn incorporating the defect, outlining the appropriate donor tissue and placing the expected incisions within the relaxed skin tension lines where possible. The area thus outlined was incised deep to adipose tissue with a #15 scalpel blade.  The skin margins were undermined to an appropriate distance in all directions around the primary defect and laterally outward around the flap utilizing iris scissors.
O-T Plasty Text: The defect edges were debeveled with a #15 scalpel blade.  Given the location of the defect, shape of the defect and the proximity to free margins an O-T plasty was deemed most appropriate.  Using a sterile surgical marker, an appropriate O-T plasty was drawn incorporating the defect and placing the expected incisions within the relaxed skin tension lines where possible.    The area thus outlined was incised deep to adipose tissue with a #15 scalpel blade.  The skin margins were undermined to an appropriate distance in all directions utilizing iris scissors.
O-Z Plasty Text: The defect edges were debeveled with a #15 scalpel blade.  Given the location of the defect, shape of the defect and the proximity to free margins an O-Z plasty (double transposition flap) was deemed most appropriate.  Using a sterile surgical marker, the appropriate transposition flaps were drawn incorporating the defect and placing the expected incisions within the relaxed skin tension lines where possible.    The area thus outlined was incised deep to adipose tissue with a #15 scalpel blade.  The skin margins were undermined to an appropriate distance in all directions utilizing iris scissors.  Hemostasis was achieved with electrocautery.  The flaps were then transposed into place, one clockwise and the other counterclockwise, and anchored with interrupted buried subcutaneous sutures.
Double O-Z Plasty Text: The defect edges were debeveled with a #15 scalpel blade.  Given the location of the defect, shape of the defect and the proximity to free margins a Double O-Z plasty (double transposition flap) was deemed most appropriate.  Using a sterile surgical marker, the appropriate transposition flaps were drawn incorporating the defect and placing the expected incisions within the relaxed skin tension lines where possible. The area thus outlined was incised deep to adipose tissue with a #15 scalpel blade.  The skin margins were undermined to an appropriate distance in all directions utilizing iris scissors.  Hemostasis was achieved with electrocautery.  The flaps were then transposed into place, one clockwise and the other counterclockwise, and anchored with interrupted buried subcutaneous sutures.
V-Y Plasty Text: The defect edges were debeveled with a #15 scalpel blade.  Given the location of the defect, shape of the defect and the proximity to free margins an V-Y advancement flap was deemed most appropriate.  Using a sterile surgical marker, an appropriate advancement flap was drawn incorporating the defect and placing the expected incisions within the relaxed skin tension lines where possible.    The area thus outlined was incised deep to adipose tissue with a #15 scalpel blade.  The skin margins were undermined to an appropriate distance in all directions utilizing iris scissors.
H Plasty Text: Given the location of the defect, shape of the defect and the proximity to free margins a H-plasty was deemed most appropriate for repair.  Using a sterile surgical marker, the appropriate advancement arms of the H-plasty were drawn incorporating the defect and placing the expected incisions within the relaxed skin tension lines where possible. The area thus outlined was incised deep to adipose tissue with a #15 scalpel blade. The skin margins were undermined to an appropriate distance in all directions utilizing iris scissors.  The opposing advancement arms were then advanced into place in opposite direction and anchored with interrupted buried subcutaneous sutures.
W Plasty Text: The lesion was extirpated to the level of the fat with a #15 scalpel blade.  Given the location of the defect, shape of the defect and the proximity to free margins a W-plasty was deemed most appropriate for repair.  Using a sterile surgical marker, the appropriate transposition arms of the W-plasty were drawn incorporating the defect and placing the expected incisions within the relaxed skin tension lines where possible.    The area thus outlined was incised deep to adipose tissue with a #15 scalpel blade.  The skin margins were undermined to an appropriate distance in all directions utilizing iris scissors.  The opposing transposition arms were then transposed into place in opposite direction and anchored with interrupted buried subcutaneous sutures.
Z Plasty Text: The lesion was extirpated to the level of the fat with a #15 scalpel blade.  Given the location of the defect, shape of the defect and the proximity to free margins a Z-plasty was deemed most appropriate for repair.  Using a sterile surgical marker, the appropriate transposition arms of the Z-plasty were drawn incorporating the defect and placing the expected incisions within the relaxed skin tension lines where possible.    The area thus outlined was incised deep to adipose tissue with a #15 scalpel blade.  The skin margins were undermined to an appropriate distance in all directions utilizing iris scissors.  The opposing transposition arms were then transposed into place in opposite direction and anchored with interrupted buried subcutaneous sutures.
Zygomaticofacial Flap Text: Given the location of the defect, shape of the defect and the proximity to free margins a zygomaticofacial flap was deemed most appropriate for repair.  Using a sterile surgical marker, the appropriate flap was drawn incorporating the defect and placing the expected incisions within the relaxed skin tension lines where possible. The area thus outlined was incised deep to adipose tissue with a #15 scalpel blade with preservation of a vascular pedicle.  The skin margins were undermined to an appropriate distance in all directions utilizing iris scissors.  The flap was then placed into the defect and anchored with interrupted buried subcutaneous sutures.
Cheek Interpolation Flap Text: A decision was made to reconstruct the defect utilizing an interpolation axial flap and a staged reconstruction.  A telfa template was made of the defect.  This telfa template was then used to outline the Cheek Interpolation flap.  The donor area for the pedicle flap was then injected with anesthesia.  The flap was excised through the skin and subcutaneous tissue down to the layer of the underlying musculature.  The interpolation flap was carefully excised within this deep plane to maintain its blood supply.  The edges of the donor site were undermined.   The donor site was closed in a primary fashion.  The pedicle was then rotated into position and sutured.  Once the tube was sutured into place, adequate blood supply was confirmed with blanching and refill.  The pedicle was then wrapped with xeroform gauze and dressed appropriately with a telfa and gauze bandage to ensure continued blood supply and protect the attached pedicle.
Cheek-To-Nose Interpolation Flap Text: A decision was made to reconstruct the defect utilizing an interpolation axial flap and a staged reconstruction.  A telfa template was made of the defect.  This telfa template was then used to outline the Cheek-To-Nose Interpolation flap.  The donor area for the pedicle flap was then injected with anesthesia.  The flap was excised through the skin and subcutaneous tissue down to the layer of the underlying musculature.  The interpolation flap was carefully excised within this deep plane to maintain its blood supply.  The edges of the donor site were undermined.   The donor site was closed in a primary fashion.  The pedicle was then rotated into position and sutured.  Once the tube was sutured into place, adequate blood supply was confirmed with blanching and refill.  The pedicle was then wrapped with xeroform gauze and dressed appropriately with a telfa and gauze bandage to ensure continued blood supply and protect the attached pedicle.
Interpolation Flap Text: A decision was made to reconstruct the defect utilizing an interpolation axial flap and a staged reconstruction.  A telfa template was made of the defect.  This telfa template was then used to outline the interpolation flap.  The donor area for the pedicle flap was then injected with anesthesia.  The flap was excised through the skin and subcutaneous tissue down to the layer of the underlying musculature.  The interpolation flap was carefully excised within this deep plane to maintain its blood supply.  The edges of the donor site were undermined.   The donor site was closed in a primary fashion.  The pedicle was then rotated into position and sutured.  Once the tube was sutured into place, adequate blood supply was confirmed with blanching and refill.  The pedicle was then wrapped with xeroform gauze and dressed appropriately with a telfa and gauze bandage to ensure continued blood supply and protect the attached pedicle.
Melolabial Interpolation Flap Text: A decision was made to reconstruct the defect utilizing an interpolation axial flap and a staged reconstruction.  A telfa template was made of the defect.  This telfa template was then used to outline the melolabial interpolation flap.  The donor area for the pedicle flap was then injected with anesthesia.  The flap was excised through the skin and subcutaneous tissue down to the layer of the underlying musculature.  The pedicle flap was carefully excised within this deep plane to maintain its blood supply.  The edges of the donor site were undermined.   The donor site was closed in a primary fashion.  The pedicle was then rotated into position and sutured.  Once the tube was sutured into place, adequate blood supply was confirmed with blanching and refill.  The pedicle was then wrapped with xeroform gauze and dressed appropriately with a telfa and gauze bandage to ensure continued blood supply and protect the attached pedicle.
Mastoid Interpolation Flap Text: A decision was made to reconstruct the defect utilizing an interpolation axial flap and a staged reconstruction.  A telfa template was made of the defect.  This telfa template was then used to outline the mastoid interpolation flap.  The donor area for the pedicle flap was then injected with anesthesia.  The flap was excised through the skin and subcutaneous tissue down to the layer of the underlying musculature.  The pedicle flap was carefully excised within this deep plane to maintain its blood supply.  The edges of the donor site were undermined.   The donor site was closed in a primary fashion.  The pedicle was then rotated into position and sutured.  Once the tube was sutured into place, adequate blood supply was confirmed with blanching and refill.  The pedicle was then wrapped with xeroform gauze and dressed appropriately with a telfa and gauze bandage to ensure continued blood supply and protect the attached pedicle.
Posterior Auricular Interpolation Flap Text: A decision was made to reconstruct the defect utilizing an interpolation axial flap and a staged reconstruction.  A telfa template was made of the defect.  This telfa template was then used to outline the posterior auricular interpolation flap.  The donor area for the pedicle flap was then injected with anesthesia.  The flap was excised through the skin and subcutaneous tissue down to the layer of the underlying musculature.  The pedicle flap was carefully excised within this deep plane to maintain its blood supply.  The edges of the donor site were undermined.   The donor site was closed in a primary fashion.  The pedicle was then rotated into position and sutured.  Once the tube was sutured into place, adequate blood supply was confirmed with blanching and refill.  The pedicle was then wrapped with xeroform gauze and dressed appropriately with a telfa and gauze bandage to ensure continued blood supply and protect the attached pedicle.
Paramedian Forehead Flap Text: A decision was made to reconstruct the defect utilizing an interpolation axial flap and a staged reconstruction.  A telfa template was made of the defect.  This telfa template was then used to outline the paramedian forehead pedicle flap.  The donor area for the pedicle flap was then injected with anesthesia.  The flap was excised through the skin and subcutaneous tissue down to the layer of the underlying musculature.  The pedicle flap was carefully excised within this deep plane to maintain its blood supply.  The edges of the donor site were undermined.   The donor site was closed in a primary fashion.  The pedicle was then rotated into position and sutured.  Once the tube was sutured into place, adequate blood supply was confirmed with blanching and refill.  The pedicle was then wrapped with xeroform gauze and dressed appropriately with a telfa and gauze bandage to ensure continued blood supply and protect the attached pedicle.
Cheiloplasty (Less Than 50%) Text: A decision was made to reconstruct the defect with a  cheiloplasty.  The defect was undermined extensively.  Additional obicularis oris muscle was excised with a 15 blade scalpel.  The defect was converted into a full thickness wedge, of less than 50% of the vertical height of the lip, to facilite a better cosmetic result.  Small vessels were then tied off with 5-0 monocyrl. The obicularis oris, superficial fascia, adipose and dermis were then reapproximated.  After the deeper layers were approximated the epidermis was reapproximated with particular care given to realign the vermilion border.
Cheiloplasty (Complex) Text: A decision was made to reconstruct the defect with a  cheiloplasty.  The defect was undermined extensively.  Additional obicularis oris muscle was excised with a 15 blade scalpel.  The defect was converted into a full thickness wedge to facilite a better cosmetic result.  Small vessels were then tied off with 5-0 monocyrl. The obicularis oris, superficial fascia, adipose and dermis were then reapproximated.  After the deeper layers were approximated the epidermis was reapproximated with particular care given to realign the vermilion border.
Ear Wedge Repair Text: A wedge excision was completed by carrying down an excision through the full thickness of the ear and cartilage with an inward facing Burow's triangle. The wound was then closed in a layered fashion.
Full Thickness Lip Wedge Repair (Flap) Text: Given the location of the defect and the proximity to free margins a full thickness wedge repair was deemed most appropriate.  Using a sterile surgical marker, the appropriate repair was drawn incorporating the defect and placing the expected incisions perpendicular to the vermilion border.  The vermilion border was also meticulously outlined to ensure appropriate reapproximation during the repair.  The area thus outlined was incised through and through with a #15 scalpel blade.  The muscularis and dermis were reaproximated with deep sutures following hemostasis. Care was taken to realign the vermilion border before proceeding with the superficial closure.  Once the vermilion was realigned the superfical and mucosal closure was finished.
Ftsg Text: The defect edges were debeveled with a #15 scalpel blade.  Given the location of the defect, shape of the defect and the proximity to free margins a full thickness skin graft was deemed most appropriate.  Using a sterile surgical marker, the primary defect shape was transferred to the donor site. The area thus outlined was incised deep to adipose tissue with a #15 scalpel blade.  The harvested graft was then trimmed of adipose tissue until only dermis and epidermis was left.  The skin margins of the secondary defect were undermined to an appropriate distance in all directions utilizing iris scissors.  The secondary defect was closed with interrupted buried subcutaneous sutures.  The skin edges were then re-apposed with running  sutures.  The skin graft was then placed in the primary defect and oriented appropriately.
Split-Thickness Skin Graft Text: The defect edges were debeveled with a #15 scalpel blade.  Given the location of the defect, shape of the defect and the proximity to free margins a split thickness skin graft was deemed most appropriate.  Using a sterile surgical marker, the primary defect shape was transferred to the donor site. The split thickness graft was then harvested.  The skin graft was then placed in the primary defect and oriented appropriately.
Burow's Graft Text: The defect edges were debeveled with a #15 scalpel blade.  Given the location of the defect, shape of the defect, the proximity to free margins and the presence of a standing cone deformity a Burow's skin graft was deemed most appropriate. The standing cone was removed and this tissue was then trimmed to the shape of the primary defect. The adipose tissue was also removed until only dermis and epidermis were left.  The skin margins of the secondary defect were undermined to an appropriate distance in all directions utilizing iris scissors.  The secondary defect was closed with interrupted buried subcutaneous sutures.  The skin edges were then re-apposed with running  sutures.  The skin graft was then placed in the primary defect and oriented appropriately.
Cartilage Graft Text: The defect edges were debeveled with a #15 scalpel blade.  Given the location of the defect, shape of the defect, the fact the defect involved a full thickness cartilage defect a cartilage graft was deemed most appropriate.  An appropriate donor site was identified, cleansed, and anesthetized. The cartilage graft was then harvested and transferred to the recipient site, oriented appropriately and then sutured into place.  The secondary defect was then repaired using a primary closure.
Composite Graft Text: The defect edges were debeveled with a #15 scalpel blade.  Given the location of the defect, shape of the defect, the proximity to free margins and the fact the defect was full thickness a composite graft was deemed most appropriate.  The defect was outline and then transferred to the donor site.  A full thickness graft was then excised from the donor site. The graft was then placed in the primary defect, oriented appropriately and then sutured into place.  The secondary defect was then repaired using a primary closure.
Epidermal Autograft Text: The defect edges were debeveled with a #15 scalpel blade.  Given the location of the defect, shape of the defect and the proximity to free margins an epidermal autograft was deemed most appropriate.  Using a sterile surgical marker, the primary defect shape was transferred to the donor site. The epidermal graft was then harvested.  The skin graft was then placed in the primary defect and oriented appropriately.
Dermal Autograft Text: The defect edges were debeveled with a #15 scalpel blade.  Given the location of the defect, shape of the defect and the proximity to free margins a dermal autograft was deemed most appropriate.  Using a sterile surgical marker, the primary defect shape was transferred to the donor site. The area thus outlined was incised deep to adipose tissue with a #15 scalpel blade.  The harvested graft was then trimmed of adipose and epidermal tissue until only dermis was left.  The skin graft was then placed in the primary defect and oriented appropriately.
Skin Substitute Text: The defect edges were debeveled with a #15 scalpel blade.  Given the location of the defect, shape of the defect and the proximity to free margins a skin substitute graft was deemed most appropriate.  The graft material was trimmed to fit the size of the defect. The graft was then placed in the primary defect and oriented appropriately.
Tissue Cultured Epidermal Autograft Text: The defect edges were debeveled with a #15 scalpel blade.  Given the location of the defect, shape of the defect and the proximity to free margins a tissue cultured epidermal autograft was deemed most appropriate.  The graft was then trimmed to fit the size of the defect.  The graft was then placed in the primary defect and oriented appropriately.
Xenograft Text: The defect edges were debeveled with a #15 scalpel blade.  Given the location of the defect, shape of the defect and the proximity to free margins a xenograft was deemed most appropriate.  The graft was then trimmed to fit the size of the defect.  The graft was then placed in the primary defect and oriented appropriately.
Purse String (Simple) Text: Given the location of the defect and the characteristics of the surrounding skin a purse string closure was deemed most appropriate.  Undermining was performed circumfirentially around the surgical defect.  A purse string suture was then placed and tightened.
Purse String (Intermediate) Text: Given the location of the defect and the characteristics of the surrounding skin a purse string intermediate closure was deemed most appropriate.  Undermining was performed circumfirentially around the surgical defect.  A purse string suture was then placed and tightened.
Partial Purse String (Simple) Text: Given the location of the defect and the characteristics of the surrounding skin a simple purse string closure was deemed most appropriate.  Undermining was performed circumfirentially around the surgical defect.  A purse string suture was then placed and tightened. Wound tension only allowed a partial closure of the circular defect.
Partial Purse String (Intermediate) Text: Given the location of the defect and the characteristics of the surrounding skin an intermediate purse string closure was deemed most appropriate.  Undermining was performed circumfirentially around the surgical defect.  A purse string suture was then placed and tightened. Wound tension only allowed a partial closure of the circular defect.
Localized Dermabrasion With Wire Brush Text: The patient was draped in routine manner.  Localized dermabrasion using 3 x 17 mm wire brush was performed in routine manner to papillary dermis. This spot dermabrasion is being performed to complete skin cancer reconstruction. It also will eliminate the other sun damaged precancerous cells that are known to be part of the regional effect of a lifetime's worth of sun exposure. This localized dermabrasion is therapeutic and should not be considered cosmetic in any regard.
Tarsorrhaphy Text: A tarsorrhaphy was performed using Frost sutures.
Complex Repair And Flap Additional Text (Will Appearing After The Standard Complex Repair Text): The complex repair was not sufficient to completely close the primary defect. The remaining additional defect was repaired with the flap mentioned below.
Complex Repair And Graft Additional Text (Will Appearing After The Standard Complex Repair Text): The complex repair was not sufficient to completely close the primary defect. The remaining additional defect was repaired with the graft mentioned below.
Unique Flap 1 Name: Myocutaneous Island pedicle Flap
Unique Flap 2 Name: Peng Flap
Unique Flap 3 Name: Mercedes Flap
Unique Flap 4 Name: Banner Flap
Unique Flap 5 Name: tunneled myocutaneous flap
Unique Flap 6 Name: Yara-B?ch flap
Unique Flap 7 Name: Mustarde flap
Unique Flap 8 Name: East to West Flap
Unique Flap 1 Text: A decision was made to reconstruct the defect utilizing a myocutaneous Island pedicle Flap based on the levator labii superioris muscle.  A telfa template was made of the defect.  This telfa template was then used to outline the myocutaneous flap, based along the meilolabial fold.  The donor area for the pedicle flap was then injected with anesthesia.  The flap was excised through the skin and subcutaneous tissue down to the layer of the underlying musculature.  The myocutaneous flap was carefully excised within this deep plane to maintain its blood supply. Based on the muscle. The edges of the donor site were undermined.   The donor site was closed in a primary fashion to the point of transposition.  The pedicle was then transposed into position and sutured.  Once the flap was sutured into place, adequate blood supply was confirmed with blanching and refill.
Unique Flap 2 Text: A decision was made to reconstruct the defect utilizing a Peng Flap (Bilateral Advancement Rotation Flap). Given the location of the defect and the proximity to free margins, this flap was deemed most appropriate.  Using a sterile surgical marker, the appropriate rotation flaps were drawn incorporating the defect and placing the expected incisions within the relaxed skin tension lines where possible.    The area thus outlined was incised deep to adipose tissue with a #15 scalpel blade.  The skin margins were undermined to an appropriate distance in all directions utilizing iris scissors.
Unique Flap 3 Text: The defect edges were debeveled with a #15 scalpel blade.  Given the location of the defect, shape of the defect and the proximity to free margins a Mercedes (double advancement flap) was deemed most appropriate.  Using a sterile surgical marker, the appropriate transposition flaps were drawn incorporating the defect and placing the expected incisions within the relaxed skin tension lines where possible.    The area thus outlined was incised deep to adipose tissue with a #15 scalpel blade.  The skin margins were undermined to an appropriate distance in all directions utilizing iris scissors.  Hemostasis was achieved with electrocautery.  The flaps were then advanced into the defect and anchored with interrupted buried subcutaneous sutures.
Unique Flap 4 Text: The defect edges were debeveled with a #15 scalpel blade.  Given the location of the defect and the proximity to free margins a Banner transposition flap was deemed most appropriate.  Using a sterile surgical marker, an appropriate Banner transposition flap was drawn incorporating the defect.    The area thus outlined was incised deep to adipose tissue with a #15 scalpel blade.  The skin margins were undermined to an appropriate distance in all directions utilizing iris scissors.
Unique Flap 5 Text: A decision was made to reconstruct the defect utilizing a tunneled myocutaneous Island pedicle Flap based on the anterior auricularis muscle.  A telfa template was made of the defect.  This telfa template was then used to outline the myocutaneous flap, based along the preauricular fold.  The donor area for the pedicle flap was then injected with anesthesia.  The flap was excised through the skin and subcutaneous tissue down to the layer of the underlying musculature.  The myocutaneous flap was carefully excised within this deep plane to maintain its blood supply based on the muscle. The edges of the donor site were undermined.   The donor site was closed in a primary fashion to the point of transposition.  The pedicle was then transposed through a tunnel into position and sutured.  Once the flap was sutured into place, adequate blood supply was confirmed with blanching and refill.
Unique Flap 6 Text: A decision was made to reconstruct the defect utilizing an Anti-aging-B?ch Flap (Bilateral helical Advancement Rotation Flap). Given the location of the defect and the proximity to free margins, this flap was deemed most appropriate.  Using a sterile surgical marker, the appropriate flaps were drawn incorporating the defect and placing the expected incisions within the relaxed skin tension lines where possible.  The area thus outlined was incised deep to adipose tissue with a #15 scalpel blade.  The skin margins were undermined to an appropriate distance in all directions utilizing iris scissors. Cartilage was incorporated into the flap arms to maintain helical anatomy.
Unique Flap 7 Text: A decision was made to reconstruct the defect utilizing a Mustarde Flap (Advancement Rotation Flap). Given the location of the defect and the proximity to free margins, this flap was deemed most appropriate.  Using a sterile surgical marker, the appropriate rotation flap was drawn incorporating the defect and placing the expected incisions within the relaxed skin tension lines where possible.    The area thus outlined was incised deep to adipose tissue with a #15 scalpel blade.  The skin margins were undermined to an appropriate distance in all directions utilizing iris scissors. The flap was advanced and rotated under the eyelid with a sling created laterally to keep ectropion minimal.
Unique Flap 8 Text: A decision was made to reconstruct the defect utilizing an East to West Flap (Modified Burows Advancement Flap). Given the location of the defect and the proximity to free margins, this flap was deemed most appropriate.  Using a sterile surgical marker, the appropriate advancement flaps were drawn incorporating the defect and placing the expected incisions within the relaxed skin tension lines where possible.    The area thus outlined was incised deep to adipose tissue with a #15 scalpel blade.  The skin margins were undermined to an appropriate distance in all directions utilizing iris scissors. Minimal alar distortion was created with flap approximation.
Manual Repair Warning Statement: We plan on removing the manually selected variable below in favor of our much easier automatic structured text blocks found in the previous tab. We decided to do this to help make the flow better and give you the full power of structured data. Manual selection is never going to be ideal in our platform and I would encourage you to avoid using manual selection from this point on, especially since I will be sunsetting this feature. It is important that you do one of two things with the customized text below. First, you can save all of the text in a word file so you can have it for future reference. Second, transfer the text to the appropriate area in the Library tab. Lastly, if there is a flap or graft type which we do not have you need to let us know right away so I can add it in before the variable is hidden. No need to panic, we plan to give you roughly 6 months to make the change.
Same Histology In Subsequent Stages Text: The pattern and morphology of the tumor is as described in the first stage.
No Residual Tumor Seen Histology Text: There were no malignant cells seen in the sections examined.
Inflammation Suggestive Of Cancer Camouflage Histology Text: There was a dense lymphocytic infiltrate which prevented adequate histologic evaluation of adjacent structures.
Bcc Histology Text: There were numerous aggregates of basaloid cells.
Bcc Infiltrative Histology Text: There were numerous aggregates of basaloid cells demonstrating an infiltrative pattern.
Mart-1 - Positive Histology Text: MART-1 staining demonstrates areas of higher density and clustering of melanocytes with Pagetoid spread upwards within the epidermis. The surgical margins are positive for tumor cells.
Mart-1 - Negative Histology Text: MART-1 staining demonstrates a normal density and pattern of melanocytes along the dermal-epidermal junction. The surgical margins are negative for tumor cells.
Information: Selecting Yes will display possible errors in your note based on the variables you have selected. This validation is only offered as a suggestion for you. PLEASE NOTE THAT THE VALIDATION TEXT WILL BE REMOVED WHEN YOU FINALIZE YOUR NOTE. IF YOU WANT TO FAX A PRELIMINARY NOTE YOU WILL NEED TO TOGGLE THIS TO 'NO' IF YOU DO NOT WANT IT IN YOUR FAXED NOTE.

## 2022-04-26 ENCOUNTER — APPOINTMENT (RX ONLY)
Dept: URBAN - METROPOLITAN AREA CLINIC 36 | Facility: CLINIC | Age: 77
Setting detail: DERMATOLOGY
End: 2022-04-26

## 2022-04-26 DIAGNOSIS — Z48.02 ENCOUNTER FOR REMOVAL OF SUTURES: ICD-10-CM

## 2022-04-26 PROCEDURE — ? SUTURE REMOVAL (GLOBAL PERIOD)

## 2022-04-26 ASSESSMENT — LOCATION SIMPLE DESCRIPTION DERM: LOCATION SIMPLE: RIGHT CHEEK

## 2022-04-26 ASSESSMENT — LOCATION DETAILED DESCRIPTION DERM: LOCATION DETAILED: RIGHT CENTRAL MALAR CHEEK

## 2022-04-26 ASSESSMENT — LOCATION ZONE DERM: LOCATION ZONE: FACE

## 2022-04-26 NOTE — PROCEDURE: SUTURE REMOVAL (GLOBAL PERIOD)
Detail Level: Detailed
Add 16731 Cpt? (Important Note: In 2017 The Use Of 46080 Is Being Tracked By Cms To Determine Future Global Period Reimbursement For Global Periods): no

## 2022-05-17 DIAGNOSIS — N30.00 ACUTE CYSTITIS WITHOUT HEMATURIA: ICD-10-CM

## 2022-05-17 RX ORDER — NITROFURANTOIN 25; 75 MG/1; MG/1
100 CAPSULE ORAL 2 TIMES DAILY
Qty: 14 CAPSULE | Refills: 0 | Status: SHIPPED | OUTPATIENT
Start: 2022-05-17 | End: 2022-05-24

## 2022-05-18 DIAGNOSIS — Z78.0 MENOPAUSE: ICD-10-CM

## 2022-05-23 ENCOUNTER — TELEPHONE (OUTPATIENT)
Dept: SLEEP MEDICINE | Facility: MEDICAL CENTER | Age: 77
End: 2022-05-23
Payer: MEDICARE

## 2022-05-31 DIAGNOSIS — J45.40 MODERATE PERSISTENT ASTHMA WITHOUT COMPLICATION: ICD-10-CM

## 2022-06-01 ENCOUNTER — TELEPHONE (OUTPATIENT)
Dept: SLEEP MEDICINE | Facility: MEDICAL CENTER | Age: 77
End: 2022-06-01
Payer: MEDICARE

## 2022-06-01 DIAGNOSIS — J30.1 HAY FEVER: ICD-10-CM

## 2022-06-01 RX ORDER — AZELASTINE 1 MG/ML
2 SPRAY, METERED NASAL 2 TIMES DAILY PRN
Qty: 30 ML | Refills: 5 | Status: SHIPPED | OUTPATIENT
Start: 2022-06-01

## 2022-06-01 NOTE — TELEPHONE ENCOUNTER
VOICEMAIL  1. Caller Name: Ana Paula                      Call Back Number: [phone] (home)       2. Message: Pt called and lm, wanting Dr Mckenzie to call her.    3. Patient approves office to leave a detailed voicemail/MyChart message: N\A        06/01/22:  Called pt and lm, I was returning her call and wanting to get more information regarding her message.

## 2022-06-01 NOTE — TELEPHONE ENCOUNTER
Pt lvm requesting a call back to discuss a personal matter w/ Dr Mckenzie.  I called pt back no answer, left vm for pt to call back.

## 2022-06-07 ENCOUNTER — TELEPHONE (OUTPATIENT)
Dept: INTERNAL MEDICINE | Facility: IMAGING CENTER | Age: 77
End: 2022-06-07
Payer: MEDICARE

## 2022-06-07 DIAGNOSIS — J20.9 ACUTE BRONCHITIS, UNSPECIFIED ORGANISM: ICD-10-CM

## 2022-06-07 RX ORDER — AZITHROMYCIN 250 MG/1
TABLET, FILM COATED ORAL
Qty: 6 TABLET | Refills: 0
Start: 2022-06-07 | End: 2022-09-20 | Stop reason: ALTCHOICE

## 2022-06-08 NOTE — TELEPHONE ENCOUNTER
Ana Paula states she started having upper respiratory symtoms one week ago. She states she is expectorating green phlegm now. No shortness of breath or fever. She has an Rx at home for Z pack. Advised she start the zithromycin. If she worsens such as shortness of breath she will need to be seen for blood work and chest X-ray as discussed.

## 2022-06-14 ENCOUNTER — TELEPHONE (OUTPATIENT)
Dept: INTERNAL MEDICINE | Facility: IMAGING CENTER | Age: 77
End: 2022-06-14
Payer: MEDICARE

## 2022-06-14 NOTE — TELEPHONE ENCOUNTER
Ana Paula states she still has yellow phlegm but no shortness of breath or fever. She complains of some pain behind right eye and nasal congestion. She does have hay fever allergies. She is currently in [place], [place]. Discussed this may be more allergy than infection. Discussed she will start antihistamine/decongestant. If symptoms worsen she will need to be seen; discussed she may need to go to an urgent care in [place].

## 2022-06-16 ENCOUNTER — HOSPITAL ENCOUNTER (OUTPATIENT)
Dept: RADIOLOGY | Facility: MEDICAL CENTER | Age: 77
End: 2022-06-16
Attending: INTERNAL MEDICINE
Payer: MEDICARE

## 2022-06-16 ENCOUNTER — TELEPHONE (OUTPATIENT)
Dept: INTERNAL MEDICINE | Facility: IMAGING CENTER | Age: 77
End: 2022-06-16
Payer: MEDICARE

## 2022-06-16 DIAGNOSIS — R05.3 COUGH, PERSISTENT: ICD-10-CM

## 2022-06-16 PROCEDURE — 71046 X-RAY EXAM CHEST 2 VIEWS: CPT

## 2022-06-16 RX ORDER — AMOXICILLIN 250 MG/1
250 CAPSULE ORAL 3 TIMES DAILY
Qty: 21 CAPSULE | Refills: 0 | Status: SHIPPED | OUTPATIENT
Start: 2022-06-16 | End: 2022-06-23

## 2022-06-16 NOTE — TELEPHONE ENCOUNTER
Patient treated with Z-Gab 6/7/2022 for prolonged URI with cough/yellow green sputum.  Most of her symptoms have improved but she still is coughing with yellow green sputum.  She denies SOB or wheeze.  She is using her Trelegy Inhaler.  Per Dr Sosa -   1.  CXR  2.  Amoxicillin 250 mg TID x 7 days

## 2022-06-17 RX ORDER — MUPIROCIN 20 MG/G
1 OINTMENT TOPICAL TID
Qty: 22 | Refills: 5 | Status: ERX

## 2022-06-20 ENCOUNTER — TELEPHONE (OUTPATIENT)
Dept: INTERNAL MEDICINE | Facility: IMAGING CENTER | Age: 77
End: 2022-06-20
Payer: MEDICARE

## 2022-06-20 NOTE — TELEPHONE ENCOUNTER
Ana Paula called stating she uses catheterization equipment 7-12 times a day to self catheterize due to failed bladder sling (twice) in past. She had supplies ordered by her GYN but her GYN has now retired.

## 2022-06-21 DIAGNOSIS — J45.40 MODERATE PERSISTENT ASTHMA WITHOUT COMPLICATION: ICD-10-CM

## 2022-06-21 DIAGNOSIS — J30.1 HAY FEVER: ICD-10-CM

## 2022-06-21 RX ORDER — MONTELUKAST SODIUM 10 MG/1
10 TABLET ORAL
Qty: 90 TABLET | Refills: 3 | Status: SHIPPED | OUTPATIENT
Start: 2022-06-21

## 2022-07-12 ENCOUNTER — TELEPHONE (OUTPATIENT)
Dept: INTERNAL MEDICINE | Facility: IMAGING CENTER | Age: 77
End: 2022-07-12
Payer: MEDICARE

## 2022-07-12 RX ORDER — AZITHROMYCIN 500 MG/1
500 TABLET, FILM COATED ORAL DAILY
Qty: 6 TABLET | Refills: 0 | Status: SHIPPED | OUTPATIENT
Start: 2022-07-12 | End: 2022-07-18

## 2022-07-21 ENCOUNTER — NON-PROVIDER VISIT (OUTPATIENT)
Dept: INTERNAL MEDICINE | Facility: IMAGING CENTER | Age: 77
End: 2022-07-21
Payer: MEDICARE

## 2022-07-28 ENCOUNTER — TELEPHONE (OUTPATIENT)
Dept: INTERNAL MEDICINE | Facility: IMAGING CENTER | Age: 77
End: 2022-07-28
Payer: MEDICARE

## 2022-07-28 RX ORDER — AMOXICILLIN 500 MG/1
500 CAPSULE ORAL 3 TIMES DAILY
Qty: 21 CAPSULE | Refills: 0 | Status: SHIPPED | OUTPATIENT
Start: 2022-07-28 | End: 2022-08-04

## 2022-07-28 NOTE — TELEPHONE ENCOUNTER
Patient recently recovered from COVID, completing Azithromycin 500 mg/day x 6 days on July 18 from a secondary bronchitis.She reports that she recovered completely from her cough until this week when she developed a cough again that is now productive of creamy yellow sputum.  She denies fever, SOB, or wheeze.  She is certain that a whey protein milk shake that she is having daily has caused a significant increase in mucus production.  She ahs stopped the milk shakes.  She is using her Singulair, Trelegy, & Allegra daily as ordered.  Per Dr Sosa - Amoxicillin rx to preferred pharmacy.  If she does not improve, she will need CXR & office visit,

## 2022-08-08 DIAGNOSIS — R06.00 DYSPNEA, UNSPECIFIED TYPE: ICD-10-CM

## 2022-08-08 DIAGNOSIS — G11.9 CEREBELLAR ATAXIA (HCC): ICD-10-CM

## 2022-08-08 DIAGNOSIS — R05.9 COUGH: ICD-10-CM

## 2022-08-08 DIAGNOSIS — J45.40 MODERATE PERSISTENT ASTHMA WITHOUT COMPLICATION: ICD-10-CM

## 2022-08-10 ENCOUNTER — TELEPHONE (OUTPATIENT)
Dept: INTERNAL MEDICINE | Facility: IMAGING CENTER | Age: 77
End: 2022-08-10
Payer: MEDICARE

## 2022-09-20 DIAGNOSIS — J20.9 ACUTE BRONCHITIS, UNSPECIFIED ORGANISM: ICD-10-CM

## 2022-09-20 RX ORDER — AZITHROMYCIN 500 MG/1
500 TABLET, FILM COATED ORAL DAILY
Qty: 6 TABLET | Refills: 0 | Status: SHIPPED | OUTPATIENT
Start: 2022-09-20 | End: 2022-09-26

## 2022-09-20 NOTE — PROGRESS NOTES
Ana Paula states she has had productive cough with green phlegm over past week. No fevers or chills. Cough sounds bronchitic. No shortness of breath.

## 2022-09-28 ENCOUNTER — TELEPHONE (OUTPATIENT)
Dept: INTERNAL MEDICINE | Facility: IMAGING CENTER | Age: 77
End: 2022-09-28
Payer: MEDICARE

## 2022-09-28 RX ORDER — METHYLPREDNISOLONE 4 MG/1
TABLET ORAL
Qty: 21 TABLET | Refills: 0 | Status: SHIPPED | OUTPATIENT
Start: 2022-09-28 | End: 2022-11-07

## 2022-10-10 ENCOUNTER — TELEPHONE (OUTPATIENT)
Dept: INTERNAL MEDICINE | Facility: IMAGING CENTER | Age: 77
End: 2022-10-10
Payer: MEDICARE

## 2022-10-10 RX ORDER — ALBUTEROL SULFATE 90 UG/1
2 AEROSOL, METERED RESPIRATORY (INHALATION) EVERY 6 HOURS PRN
Qty: 8.5 G | Refills: 2 | Status: SHIPPED | OUTPATIENT
Start: 2022-10-10

## 2022-10-10 NOTE — TELEPHONE ENCOUNTER
Persistent cough since 9/20/2022.  Cough improved with antibiotics, but then had copious sputum.  Sputum improved with steroids, but not completely.  She is again getting small amount of green sputum. Patient is using her Trelegy every day.  She is not using her albuterol inhaler.  She denies wheeze.  She is short of breath after a cough spell, but not with activity.  Per Dr Sosa -   Increase Albuterol to 2 puffs 3-4 times/day.  Check in with phone call in 48 hours.  Appointments offered this week prn.

## 2022-10-21 ENCOUNTER — TELEPHONE (OUTPATIENT)
Dept: INTERNAL MEDICINE | Facility: IMAGING CENTER | Age: 77
End: 2022-10-21
Payer: MEDICARE

## 2022-10-21 DIAGNOSIS — K51.90 CHRONIC ULCERATIVE COLITIS WITHOUT COMPLICATION, UNSPECIFIED LOCATION (HCC): ICD-10-CM

## 2022-10-21 DIAGNOSIS — R05.3 CHRONIC COUGH: ICD-10-CM

## 2022-10-21 RX ORDER — SULFASALAZINE 500 MG/1
1000 TABLET, DELAYED RELEASE ORAL 2 TIMES DAILY WITH MEALS
Qty: 120 TABLET | Refills: 2 | Status: SHIPPED | OUTPATIENT
Start: 2022-10-21 | End: 2022-11-07 | Stop reason: SDUPTHER

## 2022-11-07 ENCOUNTER — OFFICE VISIT (OUTPATIENT)
Dept: INTERNAL MEDICINE | Facility: IMAGING CENTER | Age: 77
End: 2022-11-07
Payer: MEDICARE

## 2022-11-07 VITALS
BODY MASS INDEX: 19.29 KG/M2 | RESPIRATION RATE: 14 BRPM | DIASTOLIC BLOOD PRESSURE: 62 MMHG | HEIGHT: 64 IN | TEMPERATURE: 98 F | OXYGEN SATURATION: 93 % | WEIGHT: 113 LBS | HEART RATE: 85 BPM | SYSTOLIC BLOOD PRESSURE: 112 MMHG

## 2022-11-07 DIAGNOSIS — D64.9 ANEMIA, UNSPECIFIED TYPE: ICD-10-CM

## 2022-11-07 DIAGNOSIS — R74.8 ELEVATED LIVER ENZYMES: ICD-10-CM

## 2022-11-07 DIAGNOSIS — J45.40 MODERATE PERSISTENT ASTHMA WITHOUT COMPLICATION: ICD-10-CM

## 2022-11-07 DIAGNOSIS — K21.9 GASTROESOPHAGEAL REFLUX DISEASE, UNSPECIFIED WHETHER ESOPHAGITIS PRESENT: ICD-10-CM

## 2022-11-07 DIAGNOSIS — K51.90 CHRONIC ULCERATIVE COLITIS WITHOUT COMPLICATION, UNSPECIFIED LOCATION (HCC): ICD-10-CM

## 2022-11-07 PROCEDURE — 99214 OFFICE O/P EST MOD 30 MIN: CPT | Performed by: INTERNAL MEDICINE

## 2022-11-07 RX ORDER — SULFASALAZINE 500 MG/1
1000 TABLET, DELAYED RELEASE ORAL 2 TIMES DAILY WITH MEALS
Qty: 240 TABLET | Refills: 1 | Status: SHIPPED | OUTPATIENT
Start: 2022-11-07

## 2022-11-07 RX ORDER — DIAZEPAM 5 MG/1
5 TABLET ORAL
COMMUNITY

## 2022-11-07 RX ORDER — FAMOTIDINE 20 MG/1
20 TABLET, FILM COATED ORAL EVERY EVENING
Qty: 100 TABLET | Refills: 3 | COMMUNITY
Start: 2022-11-07

## 2022-11-07 ASSESSMENT — FIBROSIS 4 INDEX: FIB4 SCORE: 1.6

## 2022-11-07 NOTE — PROGRESS NOTES
Established Patient Note   HPI:        Ana Paula comes in today to follow up recent cough and bronchitis that has been treated with zithromax followed by amoxicillin and medrol dose pack since cough persisted. She states cough has improved. She had elevated LFTs while in hospital in [place] in August but she is not sure she had any follow up tests while in rehab for her fractured patella. She states she has history of GERD.    Past Medical History:   Diagnosis Date    Arthritis     ASTHMA     Moderate persistent    Cerebellar ataxia (HCC) 3/30/2018    Due to fall 2004    Chronic ulcerative colitis ([facility]) 6/26/2013    Compression fracture of L2 ([facility]) 11/3/2019    Nov. 2019 from GLF    Cough     GERD (gastroesophageal reflux disease)     Hay fever 4/19/2019    History of falling 5/2/2018    HYPOTENSION     Inner ear disease     Leg cramps, sleep related 5/2/2018    Near-syncope     Neck injury     fracture, hand and leg numbness and tingling    Osteoporosis     DEXA Sept. 2021: T score lumbar -3.3 and right hip -4.1    Peripheral neuropathy     Polyp of sigmoid colon 04/10/2018    Hyperplastic polyp    Raynaud disease     Tremor     Vitamin D deficiency        Current Outpatient Medications   Medication Sig Dispense Refill    Misc Natural Products (LUTEIN 20 PO) Take  by mouth.      diazePAM (VALIUM) 5 MG Tab Take 1 Tablet by mouth.      sulfaSALAzine (AZULFIDINE EN-TAB) 500 MG EC tablet Take 2 Tablets by mouth 2 times a day with meals. 240 Tablet 1    Fluticasone-Umeclidin-Vilant 200-62.5-25 MCG/ACT AEROSOL POWDER, BREATH ACTIVATED Inhale 1 Inhalation every day. Rinse mouth with water & spit after each inhalation 28 Each 5    albuterol (PROAIR HFA) 108 (90 Base) MCG/ACT Aero Soln inhalation aerosol Inhale 2 Puffs every 6 hours as needed for Shortness of Breath. 8.5 g 2    Non Formulary Request Take 2 Capsules by mouth every day. Kristine WHYTE Immune Support      diclofenac sodium (VOLTAREN) 1 % Gel Apply 2 g topically 3  "times a day as needed (arthritis). 100 g 3    montelukast (SINGULAIR) 10 MG Tab Take 1 Tablet by mouth every day. 90 Tablet 3    azelastine (ASTELIN) 137 MCG/SPRAY nasal spray Administer 2 Sprays into affected nostril(S) 2 times a day as needed for Rhinitis. 30 mL 5    estrogens, conjugated (PREMARIN) 0.625 MG/GM Cream Apply a dab around urethral opening daily as needed; 30 gram = 30 day supply 30 g 5    triamcinolone acetonide (KENALOG) 0.1 % Cream APPLY TOPICALLY TO THE AFFECTED AREA EVERY DAY FOR 2 TO 3 WEEKS AS NEEDED      NON SPECIFIED Take 2 Tablets by mouth every morning. Vitamin A-D-K Tablet      cetirizine (ZYRTEC) 10 MG Tab Take 1 Tablet by mouth every day.       No current facility-administered medications for this visit.         Allergies   Allergen Reactions    Hydromorphone Anaphylaxis     \"went into code blue\"     Azithromycin Diarrhea and Unspecified     Diarrhea      Clindamycin     Codeine Unspecified     ?    Erythromycin Unspecified     ?    Keflex      ?    Levaquin Unspecified     \"i dont know\"     Lyrica Unspecified     ?    Mesalamine Hives    Quinolones Unspecified     ?    Sulfa Drugs Unspecified     \"i dont know\"     Tetracyclines Unspecified     ?    Other Food Unspecified     Dairy-mucus (butter is okay). spices-cough         Social History     Tobacco Use    Smoking status: Never    Smokeless tobacco: Never   Vaping Use    Vaping Use: Never used   Substance Use Topics    Alcohol use: No     Alcohol/week: 0.0 oz    Drug use: No       Past Surgical History:   Procedure Laterality Date    HIP NAILING INTRAMEDULLARY  7/30/2011    Performed by KALEB RAGSDALE at SURGERY [facility] ORS    NASAL FRACTURE REDUCTION CLOSED  10/8/08    Performed by DON BARRIOS at SURGERY SAME DAY [facility] ORS    CHOLECYSTECTOMY  2008    NISSEN FUNDOPLICATION LAPAROSCOPIC  2005    BLADDER SUSPENSION      X 2    TONSILLECTOMY AND ADENOIDECTOMY          ROS    Ambulatory Vitals  /62 (BP Location: " "Left arm, Patient Position: Sitting, BP Cuff Size: Adult)   Pulse 85   Temp 36.7 °C (98 °F) (Temporal)   Resp 14   Ht 1.613 m (5' 3.5\")   Wt 51.3 kg (113 lb)   SpO2 93%   BMI 19.70 kg/m²     Physical Exam  Constitutional:       Appearance: Normal appearance.   Cardiovascular:      Rate and Rhythm: Normal rate and regular rhythm.      Heart sounds: Normal heart sounds. No murmur heard.    No friction rub. No gallop.   Pulmonary:      Effort: Pulmonary effort is normal.      Breath sounds: Normal breath sounds. No wheezing or rales.   Musculoskeletal:      Right lower leg: No edema.      Left lower leg: No edema.         Assessment and Plan:     1. Moderate persistent asthma without complication      Continue current higher dose trelegy      2. Chronic ulcerative colitis without complication, unspecified location (HCC)  sulfaSALAzine (AZULFIDINE EN-TAB) 500 MG EC tablet      3. Elevated liver enzymes  Comp Metabolic Panel      4. Anemia, unspecified type  VITAMIN B12    CBC WITH DIFFERENTIAL    IRON/TOTAL IRON BIND      5. Gastroesophageal reflux disease, unspecified whether esophagitis present          Due to history of GERD which may be aggravating her cough will have her start taking pepcid 20 mg after dinner. Advised she take mucinex bid during times when her sputum is thick and tenacious.    Dar Fraire M.D.  "

## 2022-11-09 ENCOUNTER — NON-PROVIDER VISIT (OUTPATIENT)
Dept: INTERNAL MEDICINE | Facility: IMAGING CENTER | Age: 77
End: 2022-11-09
Payer: MEDICARE

## 2022-11-09 ENCOUNTER — HOSPITAL ENCOUNTER (OUTPATIENT)
Facility: MEDICAL CENTER | Age: 77
End: 2022-11-09
Attending: INTERNAL MEDICINE
Payer: MEDICARE

## 2022-11-09 ENCOUNTER — APPOINTMENT (RX ONLY)
Dept: URBAN - METROPOLITAN AREA CLINIC 4 | Facility: CLINIC | Age: 77
Setting detail: DERMATOLOGY
End: 2022-11-09

## 2022-11-09 DIAGNOSIS — D22 MELANOCYTIC NEVI: ICD-10-CM

## 2022-11-09 DIAGNOSIS — R74.8 ELEVATED LIVER ENZYMES: ICD-10-CM

## 2022-11-09 DIAGNOSIS — D18.0 HEMANGIOMA: ICD-10-CM

## 2022-11-09 DIAGNOSIS — L82.1 OTHER SEBORRHEIC KERATOSIS: ICD-10-CM

## 2022-11-09 DIAGNOSIS — D64.9 ANEMIA, UNSPECIFIED TYPE: ICD-10-CM

## 2022-11-09 DIAGNOSIS — L57.8 OTHER SKIN CHANGES DUE TO CHRONIC EXPOSURE TO NONIONIZING RADIATION: ICD-10-CM

## 2022-11-09 DIAGNOSIS — Z85.828 PERSONAL HISTORY OF OTHER MALIGNANT NEOPLASM OF SKIN: ICD-10-CM

## 2022-11-09 DIAGNOSIS — Z01.89 ENCOUNTER FOR ROUTINE LABORATORY TESTING: ICD-10-CM

## 2022-11-09 DIAGNOSIS — L81.4 OTHER MELANIN HYPERPIGMENTATION: ICD-10-CM

## 2022-11-09 PROBLEM — D18.01 HEMANGIOMA OF SKIN AND SUBCUTANEOUS TISSUE: Status: ACTIVE | Noted: 2022-11-09

## 2022-11-09 PROBLEM — D22.5 MELANOCYTIC NEVI OF TRUNK: Status: ACTIVE | Noted: 2022-11-09

## 2022-11-09 PROCEDURE — 80053 COMPREHEN METABOLIC PANEL: CPT

## 2022-11-09 PROCEDURE — 99213 OFFICE O/P EST LOW 20 MIN: CPT

## 2022-11-09 PROCEDURE — 83540 ASSAY OF IRON: CPT

## 2022-11-09 PROCEDURE — ? COUNSELING

## 2022-11-09 PROCEDURE — 85025 COMPLETE CBC W/AUTO DIFF WBC: CPT

## 2022-11-09 PROCEDURE — 82607 VITAMIN B-12: CPT

## 2022-11-09 PROCEDURE — 83550 IRON BINDING TEST: CPT

## 2022-11-09 ASSESSMENT — LOCATION ZONE DERM
LOCATION ZONE: TRUNK
LOCATION ZONE: NECK
LOCATION ZONE: FACE
LOCATION ZONE: HAND

## 2022-11-09 ASSESSMENT — LOCATION SIMPLE DESCRIPTION DERM
LOCATION SIMPLE: LEFT ANTERIOR NECK
LOCATION SIMPLE: RIGHT UPPER BACK
LOCATION SIMPLE: RIGHT HAND
LOCATION SIMPLE: LEFT HAND
LOCATION SIMPLE: RIGHT CHEEK

## 2022-11-09 ASSESSMENT — LOCATION DETAILED DESCRIPTION DERM
LOCATION DETAILED: RIGHT SUPERIOR MEDIAL UPPER BACK
LOCATION DETAILED: RIGHT INFERIOR CENTRAL MALAR CHEEK
LOCATION DETAILED: LEFT SUPERIOR ANTERIOR NECK
LOCATION DETAILED: RIGHT SUPERIOR UPPER BACK
LOCATION DETAILED: RIGHT ULNAR DORSAL HAND
LOCATION DETAILED: LEFT ULNAR DORSAL HAND

## 2022-11-10 LAB
ALBUMIN SERPL BCP-MCNC: 4.2 G/DL (ref 3.2–4.9)
ALBUMIN/GLOB SERPL: 1.5 G/DL
ALP SERPL-CCNC: 108 U/L (ref 30–99)
ALT SERPL-CCNC: 20 U/L (ref 2–50)
ANION GAP SERPL CALC-SCNC: 10 MMOL/L (ref 7–16)
AST SERPL-CCNC: 20 U/L (ref 12–45)
BASOPHILS # BLD AUTO: 0.5 % (ref 0–1.8)
BASOPHILS # BLD: 0.04 K/UL (ref 0–0.12)
BILIRUB SERPL-MCNC: 0.6 MG/DL (ref 0.1–1.5)
BUN SERPL-MCNC: 15 MG/DL (ref 8–22)
CALCIUM SERPL-MCNC: 9.9 MG/DL (ref 8.5–10.5)
CHLORIDE SERPL-SCNC: 98 MMOL/L (ref 96–112)
CO2 SERPL-SCNC: 28 MMOL/L (ref 20–33)
CREAT SERPL-MCNC: 0.52 MG/DL (ref 0.5–1.4)
EOSINOPHIL # BLD AUTO: 0.09 K/UL (ref 0–0.51)
EOSINOPHIL NFR BLD: 1.2 % (ref 0–6.9)
ERYTHROCYTE [DISTWIDTH] IN BLOOD BY AUTOMATED COUNT: 46.8 FL (ref 35.9–50)
GFR SERPLBLD CREATININE-BSD FMLA CKD-EPI: 96 ML/MIN/1.73 M 2
GLOBULIN SER CALC-MCNC: 2.8 G/DL (ref 1.9–3.5)
GLUCOSE SERPL-MCNC: 106 MG/DL (ref 65–99)
HCT VFR BLD AUTO: 39.8 % (ref 37–47)
HGB BLD-MCNC: 12.7 G/DL (ref 12–16)
IMM GRANULOCYTES # BLD AUTO: 0.03 K/UL (ref 0–0.11)
IMM GRANULOCYTES NFR BLD AUTO: 0.4 % (ref 0–0.9)
IRON SATN MFR SERPL: 28 % (ref 15–55)
IRON SERPL-MCNC: 84 UG/DL (ref 40–170)
LYMPHOCYTES # BLD AUTO: 2.03 K/UL (ref 1–4.8)
LYMPHOCYTES NFR BLD: 27.9 % (ref 22–41)
MCH RBC QN AUTO: 33.5 PG (ref 27–33)
MCHC RBC AUTO-ENTMCNC: 31.9 G/DL (ref 33.6–35)
MCV RBC AUTO: 105 FL (ref 81.4–97.8)
MONOCYTES # BLD AUTO: 0.64 K/UL (ref 0–0.85)
MONOCYTES NFR BLD AUTO: 8.8 % (ref 0–13.4)
NEUTROPHILS # BLD AUTO: 4.45 K/UL (ref 2–7.15)
NEUTROPHILS NFR BLD: 61.2 % (ref 44–72)
NRBC # BLD AUTO: 0 K/UL
NRBC BLD-RTO: 0 /100 WBC
PLATELET # BLD AUTO: 351 K/UL (ref 164–446)
PMV BLD AUTO: 9.3 FL (ref 9–12.9)
POTASSIUM SERPL-SCNC: 4.1 MMOL/L (ref 3.6–5.5)
PROT SERPL-MCNC: 7 G/DL (ref 6–8.2)
RBC # BLD AUTO: 3.79 M/UL (ref 4.2–5.4)
SODIUM SERPL-SCNC: 136 MMOL/L (ref 135–145)
TIBC SERPL-MCNC: 304 UG/DL (ref 250–450)
UIBC SERPL-MCNC: 220 UG/DL (ref 110–370)
VIT B12 SERPL-MCNC: 643 PG/ML (ref 211–911)
WBC # BLD AUTO: 7.3 K/UL (ref 4.8–10.8)

## 2022-11-10 NOTE — NON-PROVIDER
Both ear canals lavaged with warm water and H2O2 (25%).  Sticky wax removed with difficulty from both ear canals.  Patient screamed in intense pain during right ear lavage.  Small amount of blood noted in right ear canal.  Right ear lavage was stopped.  Otherwise both ear canals & TM's appeared WNL.  Recommend future cerumen removal by ENT

## 2022-12-12 ENCOUNTER — TELEPHONE (OUTPATIENT)
Dept: INTERNAL MEDICINE | Facility: IMAGING CENTER | Age: 77
End: 2022-12-12
Payer: MEDICARE

## 2022-12-12 DIAGNOSIS — R05.9 COUGH, UNSPECIFIED TYPE: ICD-10-CM

## 2022-12-12 RX ORDER — CODEINE PHOSPHATE/GUAIFENESIN 10-100MG/5
5 LIQUID (ML) ORAL 2 TIMES DAILY PRN
Qty: 100 ML | Refills: 0 | Status: SHIPPED | OUTPATIENT
Start: 2022-12-12 | End: 2022-12-22

## 2022-12-22 ENCOUNTER — TELEPHONE (OUTPATIENT)
Dept: INTERNAL MEDICINE | Facility: IMAGING CENTER | Age: 77
End: 2022-12-22
Payer: MEDICARE

## 2022-12-22 DIAGNOSIS — R29.898 BILATERAL ARM WEAKNESS: ICD-10-CM

## 2022-12-22 DIAGNOSIS — Z87.81 HISTORY OF PATELLAR FRACTURE: ICD-10-CM

## 2022-12-22 DIAGNOSIS — G60.9 IDIOPATHIC PERIPHERAL NEUROPATHY: ICD-10-CM

## 2022-12-22 DIAGNOSIS — G11.9 CEREBELLAR ATAXIA (HCC): ICD-10-CM

## 2023-01-04 ENCOUNTER — TELEPHONE (OUTPATIENT)
Dept: INTERNAL MEDICINE | Facility: IMAGING CENTER | Age: 78
End: 2023-01-04
Payer: MEDICARE

## 2023-01-04 DIAGNOSIS — G60.9 IDIOPATHIC PERIPHERAL NEUROPATHY: ICD-10-CM

## 2023-01-04 DIAGNOSIS — G11.9 CEREBELLAR ATAXIA (HCC): ICD-10-CM
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
## 2023-01-04 DIAGNOSIS — S89.92XS INJURY OF LEFT KNEE, SEQUELA: ICD-10-CM

## 2023-01-05 NOTE — TELEPHONE ENCOUNTER
Sprained left knee 12/31/2022.  She is using ice, heat, and alternating tylenol 500 mg & ibuprofen 200 mg every 3 hours with good relief.  She is currently unable to get into & out of the bath tub and requests home health for bathing assistance.

## 2023-01-13 RX ORDER — CONJUGATED ESTROGENS 0.62 MG/G
CREAM VAGINAL
Qty: 30 G | Refills: 3 | Status: SHIPPED | OUTPATIENT
Start: 2023-01-13

## 2023-01-18 DIAGNOSIS — F41.9 ANXIETY: ICD-10-CM

## 2023-01-18 RX ORDER — DIAZEPAM 5 MG/1
5 TABLET ORAL
Qty: 30 TABLET | Refills: 0 | Status: SHIPPED | OUTPATIENT
Start: 2023-01-18 | End: 2023-02-17

## 2023-01-25 DIAGNOSIS — R39.15 URINARY URGENCY: ICD-10-CM

## 2023-01-30 LAB
APPEARANCE UR: CLEAR
BACTERIA UR CULT: NO GROWTH
BACTERIA UR CULT: NORMAL
BILIRUB UR QL STRIP: NEGATIVE
COLOR UR: YELLOW
GLUCOSE UR QL STRIP: NEGATIVE
HGB UR QL STRIP: NEGATIVE
KETONES UR QL STRIP: NEGATIVE
LEUKOCYTE ESTERASE UR QL STRIP: NEGATIVE
MICRO URNS: NORMAL
NITRITE UR QL STRIP: NEGATIVE
PH UR STRIP: 5.5 [PH] (ref 5–7.5)
PROT UR QL STRIP: NEGATIVE
SP GR UR STRIP: 1.03 (ref 1–1.03)
UROBILINOGEN UR STRIP-MCNC: 0.2 MG/DL (ref 0.2–1)

## 2023-02-14 ENCOUNTER — TELEPHONE (OUTPATIENT)
Dept: INTERNAL MEDICINE | Facility: IMAGING CENTER | Age: 78
End: 2023-02-14
Payer: MEDICARE

## 2023-02-14 ENCOUNTER — HOSPITAL ENCOUNTER (OUTPATIENT)
Dept: RADIOLOGY | Facility: MEDICAL CENTER | Age: 78
End: 2023-02-14
Attending: FAMILY MEDICINE
Payer: MEDICARE

## 2023-02-14 DIAGNOSIS — M54.50 ACUTE MIDLINE LOW BACK PAIN WITHOUT SCIATICA: ICD-10-CM

## 2023-02-14 DIAGNOSIS — M81.0 OSTEOPOROSIS, UNSPECIFIED OSTEOPOROSIS TYPE, UNSPECIFIED PATHOLOGICAL FRACTURE PRESENCE: ICD-10-CM
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
## 2023-02-14 PROCEDURE — 72190 X-RAY EXAM OF PELVIS: CPT

## 2023-02-14 PROCEDURE — 72100 X-RAY EXAM L-S SPINE 2/3 VWS: CPT

## 2023-02-15 NOTE — TELEPHONE ENCOUNTER
"Patient fell onto \"her back\" last night on a marble floor in hotel bathroom.  She did not fall onto her buttocks, she did not hit her head.  She was in stocking feet and her \"feet slipped out from under her.\"  She as non radiating low back pain managed by ibuprofen 200 mg. She has known cerebellar ataxia and has a history of frequent falls.  History of osteoporosis.  Case discussed with Dr Duggan.  Pelvic and lumbar x-rays obtained.  Results reviewed with patient.  She is aware of interval development of L4 fracture and is instructed to call for any changes in her condition.  "

## 2023-03-09 DIAGNOSIS — M54.50 ACUTE MIDLINE LOW BACK PAIN WITHOUT SCIATICA: ICD-10-CM
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
## 2023-03-09 DIAGNOSIS — G60.9 IDIOPATHIC PERIPHERAL NEUROPATHY: ICD-10-CM

## 2023-03-09 DIAGNOSIS — G11.9 CEREBELLAR ATAXIA (HCC): ICD-10-CM

## 2023-04-03 ENCOUNTER — RX ONLY (OUTPATIENT)
Age: 78
Setting detail: RX ONLY
End: 2023-04-03

## 2023-04-03 RX ORDER — CLOBETASOL PROPIONATE 0.5 MG/ML
1 SOLUTION TOPICAL QD
Qty: 50 | Refills: 1 | Status: ERX | COMMUNITY
Start: 2023-04-03

## 2023-06-16 ENCOUNTER — RX ONLY (OUTPATIENT)
Age: 78
Setting detail: RX ONLY
End: 2023-06-16

## 2023-06-16 RX ORDER — TRIAMCINOLONE ACETONIDE 1 MG/G
1 CREAM TOPICAL QD
Qty: 454 | Refills: 2 | Status: ERX

## 2023-07-08 NOTE — PROGRESS NOTES
Ana Paula states she woke up early this morning with sharp pain in right lower leg. No swelling in lower leg but leg is mildly tender with squeezing calf. No history of VTE. No recent trauma. She states many years ago she was told she has herniated disc in lower back. She refuses to consider going to hospital to have any testing done at this time while the coronavirus activity is high. Will Rx neurontin and tramadol for neuropathic pain which she feels this is more likey due to her history of peripheral neuropathy.   Pre-procedure teaching reinforced.

## 2023-08-14 ENCOUNTER — TELEPHONE (OUTPATIENT)
Dept: HEALTH INFORMATION MANAGEMENT | Facility: OTHER | Age: 78
End: 2023-08-14

## 2023-10-16 ENCOUNTER — APPOINTMENT (RX ONLY)
Dept: URBAN - METROPOLITAN AREA CLINIC 4 | Facility: CLINIC | Age: 78
Setting detail: DERMATOLOGY
End: 2023-10-16

## 2023-10-16 DIAGNOSIS — L57.0 ACTINIC KERATOSIS: ICD-10-CM

## 2023-10-16 DIAGNOSIS — Z85.828 PERSONAL HISTORY OF OTHER MALIGNANT NEOPLASM OF SKIN: ICD-10-CM

## 2023-10-16 DIAGNOSIS — L81.4 OTHER MELANIN HYPERPIGMENTATION: ICD-10-CM

## 2023-10-16 DIAGNOSIS — L57.8 OTHER SKIN CHANGES DUE TO CHRONIC EXPOSURE TO NONIONIZING RADIATION: ICD-10-CM

## 2023-10-16 DIAGNOSIS — D22 MELANOCYTIC NEVI: ICD-10-CM

## 2023-10-16 DIAGNOSIS — L82.1 OTHER SEBORRHEIC KERATOSIS: ICD-10-CM

## 2023-10-16 DIAGNOSIS — D18.0 HEMANGIOMA: ICD-10-CM

## 2023-10-16 PROBLEM — D22.5 MELANOCYTIC NEVI OF TRUNK: Status: ACTIVE | Noted: 2023-10-16

## 2023-10-16 PROBLEM — D18.01 HEMANGIOMA OF SKIN AND SUBCUTANEOUS TISSUE: Status: ACTIVE | Noted: 2023-10-16

## 2023-10-16 PROBLEM — D48.5 NEOPLASM OF UNCERTAIN BEHAVIOR OF SKIN: Status: ACTIVE | Noted: 2023-10-16

## 2023-10-16 PROCEDURE — 11102 TANGNTL BX SKIN SINGLE LES: CPT

## 2023-10-16 PROCEDURE — ? BIOPSY BY SHAVE METHOD

## 2023-10-16 PROCEDURE — ? LIQUID NITROGEN

## 2023-10-16 PROCEDURE — ? COUNSELING

## 2023-10-16 PROCEDURE — 17000 DESTRUCT PREMALG LESION: CPT | Mod: 59

## 2023-10-16 PROCEDURE — 99214 OFFICE O/P EST MOD 30 MIN: CPT | Mod: 25

## 2023-10-16 ASSESSMENT — LOCATION ZONE DERM
LOCATION ZONE: ARM
LOCATION ZONE: TRUNK
LOCATION ZONE: HAND
LOCATION ZONE: NECK
LOCATION ZONE: FACE

## 2023-10-16 ASSESSMENT — LOCATION SIMPLE DESCRIPTION DERM
LOCATION SIMPLE: LEFT POSTERIOR UPPER ARM
LOCATION SIMPLE: RIGHT UPPER BACK
LOCATION SIMPLE: RIGHT HAND
LOCATION SIMPLE: LEFT ANTERIOR NECK
LOCATION SIMPLE: RIGHT CHEEK
LOCATION SIMPLE: LEFT HAND

## 2023-10-16 ASSESSMENT — LOCATION DETAILED DESCRIPTION DERM
LOCATION DETAILED: LEFT SUPERIOR ANTERIOR NECK
LOCATION DETAILED: RIGHT SUPERIOR UPPER BACK
LOCATION DETAILED: RIGHT ULNAR DORSAL HAND
LOCATION DETAILED: RIGHT SUPERIOR MEDIAL UPPER BACK
LOCATION DETAILED: RIGHT INFERIOR CENTRAL MALAR CHEEK
LOCATION DETAILED: LEFT ULNAR DORSAL HAND
LOCATION DETAILED: LEFT PROXIMAL LATERAL POSTERIOR UPPER ARM

## 2023-10-16 NOTE — PROCEDURE: LIQUID NITROGEN
Number Of Freeze-Thaw Cycles: 2 freeze-thaw cycles
Render Post-Care Instructions In Note?: no
Duration Of Freeze Thaw-Cycle (Seconds): 3
Show Applicator Variable?: Yes
Aperture Size (Optional): C
Detail Level: Simple
Consent: The patient's consent was obtained including but not limited to risks of crusting, scabbing, blistering, scarring, darker or lighter pigmentary change, recurrence, incomplete removal and infection.
Post-Care Instructions: I reviewed with the patient in detail post-care instructions. Patient is to wear sunprotection, and avoid picking at any of the treated lesions. Pt may apply Vaseline to crusted or scabbing areas.

## 2023-12-27 ENCOUNTER — RX ONLY (OUTPATIENT)
Age: 78
Setting detail: RX ONLY
End: 2023-12-27

## 2023-12-27 RX ORDER — TRIAMCINOLONE ACETONIDE 1 MG/G
1 CREAM TOPICAL QD
Qty: 454 | Refills: 3 | Status: ERX